# Patient Record
Sex: FEMALE | Race: BLACK OR AFRICAN AMERICAN | NOT HISPANIC OR LATINO | Employment: PART TIME | URBAN - METROPOLITAN AREA
[De-identification: names, ages, dates, MRNs, and addresses within clinical notes are randomized per-mention and may not be internally consistent; named-entity substitution may affect disease eponyms.]

---

## 2017-01-20 ENCOUNTER — TELEPHONE (OUTPATIENT)
Dept: OBSTETRICS AND GYNECOLOGY | Facility: CLINIC | Age: 32
End: 2017-01-20

## 2017-01-20 NOTE — TELEPHONE ENCOUNTER
----- Message from Hattie Bowen MA sent at 1/20/2017 10:43 AM CST -----  Contact: Jm      ----- Message -----     From: Swetha Wilson     Sent: 1/20/2017  10:41 AM       To: , #    Pt is returning phone call from Kierra. Pt can be reached at [phone]

## 2017-02-01 ENCOUNTER — HOSPITAL ENCOUNTER (EMERGENCY)
Facility: OTHER | Age: 32
Discharge: HOME | End: 2017-02-01
Attending: EMERGENCY MEDICINE
Payer: MEDICARE

## 2017-02-01 VITALS
HEIGHT: 67 IN | RESPIRATION RATE: 14 BRPM | DIASTOLIC BLOOD PRESSURE: 74 MMHG | HEART RATE: 74 BPM | TEMPERATURE: 98 F | BODY MASS INDEX: 30.76 KG/M2 | OXYGEN SATURATION: 100 % | SYSTOLIC BLOOD PRESSURE: 118 MMHG | WEIGHT: 196 LBS

## 2017-02-01 DIAGNOSIS — G89.29 CHRONIC LOW BACK PAIN WITHOUT SCIATICA, UNSPECIFIED BACK PAIN LATERALITY: ICD-10-CM

## 2017-02-01 DIAGNOSIS — M54.50 CHRONIC LOW BACK PAIN WITHOUT SCIATICA, UNSPECIFIED BACK PAIN LATERALITY: ICD-10-CM

## 2017-02-01 DIAGNOSIS — S90.31XA CONTUSION OF RIGHT FOOT, INITIAL ENCOUNTER: ICD-10-CM
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
## 2017-02-01 DIAGNOSIS — M25.579 ANKLE PAIN: ICD-10-CM

## 2017-02-01 LAB
B-HCG UR QL: NEGATIVE
CTP QC/QA: YES

## 2017-02-01 PROCEDURE — 99283 EMERGENCY DEPT VISIT LOW MDM: CPT

## 2017-02-01 PROCEDURE — 81025 URINE PREGNANCY TEST: CPT | Performed by: EMERGENCY MEDICINE

## 2017-02-01 NOTE — ED TRIAGE NOTES
Pt states she had a shooting pain down her right leg, and fell injuring lateral aspect of right foot, pt denies head injury, and LOC.
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
## 2017-02-01 NOTE — ED AVS SNAPSHOT
[facility]  [address] [place] [id number]               Jm Newton   2017 10:41 AM   ED    Description:  Female : 1985   Department:  [facility]           Your Care was Coordinated By:     Provider Role From To    Liss Cadena MD Attending Provider 17 1042 --    Neha Sullivan PA-C Physician Assistant 17 104 --      Reason for Visit     Fall           Diagnoses this Visit        Comments    Fall    -  Primary     Ankle pain         Contusion of right foot, initial encounter         Chronic low back pain without sciatica, unspecified back pain laterality           ED Disposition     None           To Do List           Follow-up Information     Follow up with Neo Cesar MD In 2 days.    Specialty:  Orthopedic Surgery    Why:  For symptom re-check.     Contact information:    [address] [place] [address] [phone]        [facility]     [facility] -  Assistance  Registered nurses in the [facility] provide clinical advisement, health education, appointment booking, and other advisory services.  Call for this free service at [phone].             Medications           Message regarding Medications     Verify the changes and/or additions to your medication regime listed below are the same as discussed with your clinician today.  If any of these changes or additions are incorrect, please notify your healthcare provider.        STOP taking these medications     iron polysaccharides (NIFEREX) 150 mg iron Cap Take 1 capsule (150 mg total) by mouth 2 (two) times daily.    misoprostol (CYTOTEC) 200 MCG Tab Place 1 tablet (200 mcg total) vaginally every 6 (six) hours.    ondansetron (ZOFRAN, AS HYDROCHLORIDE,) 4 MG tablet Take 1 tablet (4 mg total) by mouth daily as needed for Nausea.    PNV #26-iron ps-folic acid-dha 29 mg iron- 1 mg-200 mg Cap Take 1  "capsule by mouth once daily.           Verify that the below list of medications is an accurate representation of the medications you are currently taking.  If none reported, the list may be blank. If incorrect, please contact your healthcare provider. Carry this list with you in case of emergency.           Current Medications     emtricitabine-tenofovir 200-300 mg (TRUVADA) 200-300 mg Tab Take 1 tablet by mouth once daily.    PREZISTA 800 mg Tab 800 mg daily with breakfast.     ritonavir (NORVIR) 100 mg Cap Take 100 mg by mouth once daily.    diclofenac (VOLTAREN) 25 MG TbEC Take 1 tablet (25 mg total) by mouth 3 (three) times daily as needed (pain).           Clinical Reference Information           Your Vitals Were     BP Pulse Temp Resp Height Weight    116/56 (BP Location: Left arm, Patient Position: Sitting) 66 98 °F (36.7 °C) (Oral) 14 5' 7" (1.702 m) 88.9 kg (196 lb)    SpO2 BMI             100% 30.7 kg/m2         Allergies as of 2/1/2017        Reactions    Azithromycin Edema      Immunizations Administered on Date of Encounter - 2/1/2017     None      ED Micro, Lab, POCT     Start Ordered       Status Ordering Provider    02/01/17 1044 02/01/17 1043  POCT urine pregnancy  Once      Final result       ED Imaging Orders     Start Ordered       Status Ordering Provider    02/01/17 1142 02/01/17 1141  X-Ray Foot Complete Right  1 time imaging      Final result     02/01/17 1100 02/01/17 1059  X-Ray Ankle Complete Right  1 time imaging      Final result       Discharge References/Attachments     FOOT CONTUSION (ENGLISH)       [facility] complies with applicable Federal civil rights laws and does not discriminate on the basis of race, color, national origin, age, disability, or sex.        Language Assistance Services     ATTENTION: Language assistance services are available, free of charge. Please call [phone].      ATENCIÓN: Si jorge lla jethro, tiene a summers disposición servicios " gratuitos de asistencia lingüística. Kevin bronson [phone].     GAVIN Ý: N?u b?n nói Ti?ng Vi?t, có các d?ch v? h? tr? ngôn ng? mi?n phí salh cho b?n. G?i s? [phone].

## 2017-02-17 ENCOUNTER — TELEPHONE (OUTPATIENT)
Dept: OBSTETRICS AND GYNECOLOGY | Facility: CLINIC | Age: 32
End: 2017-02-17

## 2017-02-17 NOTE — TELEPHONE ENCOUNTER
----- Message from Sunitha Ponce sent at 2/16/2017  3:34 PM CST -----  Pt states that she is having irregular bleeding. Pt can be reached at [phone].

## 2017-02-20 ENCOUNTER — TELEPHONE (OUTPATIENT)
Dept: OBSTETRICS AND GYNECOLOGY | Facility: CLINIC | Age: 32
End: 2017-02-20

## 2017-02-21 ENCOUNTER — OFFICE VISIT (OUTPATIENT)
Dept: OBSTETRICS AND GYNECOLOGY | Facility: CLINIC | Age: 32
End: 2017-02-21
Payer: MEDICARE

## 2017-02-21 ENCOUNTER — LAB VISIT (OUTPATIENT)
Dept: LAB | Facility: HOSPITAL | Age: 32
End: 2017-02-21
Attending: OBSTETRICS & GYNECOLOGY
Payer: MEDICARE

## 2017-02-21 VITALS
DIASTOLIC BLOOD PRESSURE: 70 MMHG | BODY MASS INDEX: 31.56 KG/M2 | HEIGHT: 67 IN | WEIGHT: 201.06 LBS | SYSTOLIC BLOOD PRESSURE: 120 MMHG

## 2017-02-21 DIAGNOSIS — E05.90 HYPERTHYROIDISM: ICD-10-CM

## 2017-02-21 DIAGNOSIS — N93.9 ABNORMAL UTERINE BLEEDING (AUB): ICD-10-CM

## 2017-02-21 DIAGNOSIS — N93.9 ABNORMAL UTERINE BLEEDING (AUB): Primary | ICD-10-CM

## 2017-02-21 LAB
T4 FREE SERPL-MCNC: 0.84 NG/DL
TSH SERPL DL<=0.005 MIU/L-ACNC: 0.1 UIU/ML

## 2017-02-21 PROCEDURE — 1160F RVW MEDS BY RX/DR IN RCRD: CPT | Mod: S$GLB,,, | Performed by: OBSTETRICS & GYNECOLOGY

## 2017-02-21 PROCEDURE — 99999 PR PBB SHADOW E&M-EST. PATIENT-LVL III: CPT | Mod: PBBFAC,,, | Performed by: OBSTETRICS & GYNECOLOGY

## 2017-02-21 PROCEDURE — 84439 ASSAY OF FREE THYROXINE: CPT

## 2017-02-21 PROCEDURE — 99213 OFFICE O/P EST LOW 20 MIN: CPT | Mod: S$GLB,,, | Performed by: OBSTETRICS & GYNECOLOGY

## 2017-02-21 PROCEDURE — 84443 ASSAY THYROID STIM HORMONE: CPT

## 2017-02-21 PROCEDURE — 36415 COLL VENOUS BLD VENIPUNCTURE: CPT

## 2017-02-21 RX ORDER — METHOCARBAMOL 500 MG/1
TABLET, FILM COATED ORAL
Refills: 0 | COMMUNITY
Start: 2017-01-08 | End: 2017-08-29

## 2017-02-21 NOTE — PROGRESS NOTES
SUBJECTIVE:   31 y.o. female  complains of abnormal uterine bleeding - patient bleeding more than once per month. Patient's last menstrual period was 2017 (exact date). She began bleeding again on the . Alternating heavy and light days, spotting yesterday and today.  She describes her periods as regular before, but now unpredictable. She is HIV positive. Also with hypothyroidism, not currently on meds.  She uses no method for contraception and does not wish to start anything. Patient needs a colposcopy but declines it today because she is afraid that it will be too painful. Patient with abnormal pap prior to last pregnancy. No showed for previous colpo appointment. Patient will be scheduled for colpo at [facility] for completion of colpo with paracervical block for pain control.    ROS:  GENERAL: No fever, chills, fatigability or weight loss.  VULVAR: No pain, no lesions and no itching.  VAGINAL: No relaxation, no itching, no discharge, no abnormal bleeding and no lesions.  ABDOMEN: No abdominal pain. Denies nausea. Denies vomiting. No diarrhea. No constipation  BREAST: Denies pain. No lumps. No discharge.  URINARY: No incontinence, no nocturia, no frequency and no dysuria.  CARDIOVASCULAR: No chest pain. No shortness of breath. No leg cramps.  NEUROLOGICAL: No headaches. No vision changes.    Vitals:    17 0908   BP: 120/70     OBJECTIVE:   She appears well, afebrile.  Abdomen: benign, soft, nontender, no masses.  VULVA: Normal external female genitalia, normal urethra, normal urethral meatus  VAGINA:no lesions, moderate blood in the vault  CERVIX appears normal  UTERUSnormal size, contour, position, consistency, mobility, tender to palpation (but appropriate for patient's baseline)  ADNEXA normal adnexa and no mass, fullness, tenderness    ASSESSMENT:   Abnormal uterine bleeding    PLAN:   Colpo scheduled for [facility] on 3/6  Exam benign today  No OCPs at this time

## 2017-02-22 ENCOUNTER — PATIENT MESSAGE (OUTPATIENT)
Dept: OBSTETRICS AND GYNECOLOGY | Facility: CLINIC | Age: 32
End: 2017-02-22

## 2017-02-22 DIAGNOSIS — N93.9 ABNORMAL UTERINE BLEEDING (AUB): Primary | ICD-10-CM

## 2017-02-23 ENCOUNTER — TELEPHONE (OUTPATIENT)
Dept: OBSTETRICS AND GYNECOLOGY | Facility: CLINIC | Age: 32
End: 2017-02-23

## 2017-02-23 NOTE — TELEPHONE ENCOUNTER
----- Message from Srinivasa Hill sent at 2/23/2017 10:45 AM CST -----  Contact: pt  x_ 1st Request   _ 2nd Request   _ 3rd Request     Who: ZAY MERAZ [[id number]]    Why: Pt missed your call is requesting a call back    What Number to Call Back: [phone]    When to Expect a call back: (Before the end of the day)   -- if call after 3:00 call back will be tomorrow.

## 2017-03-15 ENCOUNTER — TELEPHONE (OUTPATIENT)
Dept: OBSTETRICS AND GYNECOLOGY | Facility: CLINIC | Age: 32
End: 2017-03-15

## 2017-03-15 NOTE — TELEPHONE ENCOUNTER
----- Message from Kierra Kirkpatrick LPN sent at 3/8/2017  8:31 PM CST -----  Call pt to reschedule colpo.

## 2017-03-23 ENCOUNTER — PROCEDURE VISIT (OUTPATIENT)
Dept: OBSTETRICS AND GYNECOLOGY | Facility: CLINIC | Age: 32
End: 2017-03-23
Payer: MEDICARE

## 2017-03-23 VITALS
HEIGHT: 67 IN | BODY MASS INDEX: 31.11 KG/M2 | SYSTOLIC BLOOD PRESSURE: 120 MMHG | DIASTOLIC BLOOD PRESSURE: 70 MMHG | WEIGHT: 198.19 LBS

## 2017-03-23 DIAGNOSIS — B20 HIV (HUMAN IMMUNODEFICIENCY VIRUS INFECTION): ICD-10-CM

## 2017-03-23 DIAGNOSIS — R87.619 ABNORMAL CERVICAL PAPANICOLAOU SMEAR, UNSPECIFIED ABNORMAL PAP FINDING: ICD-10-CM

## 2017-03-23 DIAGNOSIS — R87.612 LGSIL ON PAP SMEAR OF CERVIX: Primary | ICD-10-CM

## 2017-03-23 LAB
B-HCG UR QL: NEGATIVE
CTP QC/QA: YES

## 2017-03-23 PROCEDURE — 81025 URINE PREGNANCY TEST: CPT | Mod: S$GLB,,, | Performed by: OBSTETRICS & GYNECOLOGY

## 2017-03-23 PROCEDURE — 88305 TISSUE EXAM BY PATHOLOGIST: CPT | Performed by: PATHOLOGY

## 2017-03-23 PROCEDURE — 57454 BX/CURETT OF CERVIX W/SCOPE: CPT | Mod: S$GLB,,, | Performed by: OBSTETRICS & GYNECOLOGY

## 2017-03-23 PROCEDURE — 88305 TISSUE EXAM BY PATHOLOGIST: CPT | Mod: 26,,, | Performed by: PATHOLOGY

## 2017-03-23 NOTE — PROCEDURES
Colposcopy  Date/Time: 3/23/2017 1:50 PM  Performed by: MARTÍN NEWBERRY  Authorized by: MARTÍN NEWBERRY     Consent Done?:  Yes (Written)  Assistants?: Yes    List of assistants:  Hemant Mahoney MD   I was present for the entire procedure.    Colposcopy Site:  Cervix  Position:  Supine  Anesthesia:  Digital block  Local anesthetic:  Lidocaine 1% without epinephrine  Anesthetic total (ml): 4  Acrowhite Lesion? Yes    Atypical Vessels? Yes    Transformation Zone Adequate?: Yes    Biopsy?: Yes         Location:  Cervix ((2 00 and 7 00))  ECC Performed?: Yes    LEEP Performed?: No     Patient tolerated the procedure well with no immediate complications.   Post-operative instructions were provided for the patient.   Patient was discharged and will follow up if any complications occur    COLPOSCOPY:    Jm Newton is a 31 y.o. female   presents for colposcopy.  Patient's last menstrual period was 2017 (exact date)..  Her most recent pap smear shows low-grade squamous intraepithelial neoplasia (LGSIL - encompassing HPV,mild dysplasia,EMEKA I).      The abnormal test findings were discussed, as well as HPV infection, need for colposcopy and possible biopsies to determine the plan of care, treatments available, the minimal risk of bleeding and infection with colposcopy, and alternatives to colposcopy and she agrees to proceed.      UPT is negative    COLPOSCOPY EXAM:   TIME OUT PERFORMED.     acetowhite lesion(s) noted at 2 and 7 o'clock and abnormal vessels noted at 7 o'clock    Biopsy was taken at 2 o'clock.  ECC was performed    Hemostasis was adequate with application of Monsel's solution.  The speculum was removed.  The patient did not tolerate the procedure well.    All collected specimens sent to pathology for histologic analysis.    Post-colposcopy counseling:  The patient was instructed to manage post-colposcopy cramping with NSAIDs or Tylenol, or with a prescription per the medication card.   Avoid intercourse, douching, or tampons in the vagina for at least 2-3 days.  Expect a clumpy blackish discharge due to Monsel's solution application for several days.  Report heavy bleeding, worsening pain or pain that does not respond to above medications, or foul-smelling vaginal discharge. HPV vaccine recommended according to FDA age guidelines.  Importance of follow-up stressed.      Follow up based on colposcopy results.

## 2017-03-28 ENCOUNTER — TELEPHONE (OUTPATIENT)
Dept: OBSTETRICS AND GYNECOLOGY | Facility: CLINIC | Age: 32
End: 2017-03-28

## 2017-03-28 NOTE — TELEPHONE ENCOUNTER
----- Message from Sunitha Ponce sent at 3/28/2017  1:07 PM CDT -----  Pt states that she is having heavy bleeding. Pt can be reached at [phone].

## 2017-03-29 ENCOUNTER — TELEPHONE (OUTPATIENT)
Dept: OBSTETRICS AND GYNECOLOGY | Facility: CLINIC | Age: 32
End: 2017-03-29

## 2017-03-29 NOTE — TELEPHONE ENCOUNTER
Returned call. Pt stated that she's still bleeding and also c/o cramping and back pain. Pt requested a return call from Dr Worrell.

## 2017-03-29 NOTE — TELEPHONE ENCOUNTER
----- Message from Sunitha Ponce sent at 3/29/2017 10:48 AM CDT -----  Pt states that she is bleeding like a cycle. Pt states that she had a cycle. Pt can be reached at [phone].

## 2017-03-30 ENCOUNTER — TELEPHONE (OUTPATIENT)
Dept: OBSTETRICS AND GYNECOLOGY | Facility: CLINIC | Age: 32
End: 2017-03-30

## 2017-03-30 NOTE — TELEPHONE ENCOUNTER
Called pt and scheduled appt on 04/04/2017. Instructed pt to go to ER if she feels dizziness and/or SOB or if symptoms worsen or persist before next appt. Pt voiced understanding.

## 2017-03-30 NOTE — TELEPHONE ENCOUNTER
----- Message from Kierra Kirkpatrick LPN sent at 3/29/2017  4:28 PM CDT -----  Please call Jm because she's been bleeding since Sunday. Also c/o back pain and cramping. Currently taking Tylenol. Pt can be reached at .

## 2017-04-03 ENCOUNTER — PATIENT MESSAGE (OUTPATIENT)
Dept: OBSTETRICS AND GYNECOLOGY | Facility: CLINIC | Age: 32
End: 2017-04-03

## 2017-08-29 ENCOUNTER — OFFICE VISIT (OUTPATIENT)
Dept: OBSTETRICS AND GYNECOLOGY | Facility: CLINIC | Age: 32
End: 2017-08-29
Payer: MEDICARE

## 2017-08-29 VITALS
DIASTOLIC BLOOD PRESSURE: 78 MMHG | WEIGHT: 204.38 LBS | SYSTOLIC BLOOD PRESSURE: 120 MMHG | BODY MASS INDEX: 32.08 KG/M2 | HEIGHT: 67 IN

## 2017-08-29 DIAGNOSIS — N93.8 DUB (DYSFUNCTIONAL UTERINE BLEEDING): Primary | ICD-10-CM

## 2017-08-29 DIAGNOSIS — B20 HIV (HUMAN IMMUNODEFICIENCY VIRUS INFECTION): ICD-10-CM

## 2017-08-29 DIAGNOSIS — E05.90 HYPERTHYROIDISM: ICD-10-CM

## 2017-08-29 DIAGNOSIS — N87.0 CIN I (CERVICAL INTRAEPITHELIAL NEOPLASIA I): ICD-10-CM

## 2017-08-29 PROCEDURE — 99499 UNLISTED E&M SERVICE: CPT | Mod: S$GLB,,, | Performed by: OBSTETRICS & GYNECOLOGY

## 2017-08-29 PROCEDURE — 99999 PR PBB SHADOW E&M-EST. PATIENT-LVL III: CPT | Mod: PBBFAC,,, | Performed by: OBSTETRICS & GYNECOLOGY

## 2017-08-29 PROCEDURE — 3008F BODY MASS INDEX DOCD: CPT | Mod: S$GLB,,, | Performed by: OBSTETRICS & GYNECOLOGY

## 2017-08-29 PROCEDURE — 99213 OFFICE O/P EST LOW 20 MIN: CPT | Mod: S$GLB,,, | Performed by: OBSTETRICS & GYNECOLOGY

## 2017-08-29 RX ORDER — CLOTRIMAZOLE AND BETAMETHASONE DIPROPIONATE 10; .64 MG/G; MG/G
CREAM TOPICAL
Refills: 2 | COMMUNITY
Start: 2017-08-18 | End: 2017-08-29

## 2017-08-29 RX ORDER — SULFAMETHOXAZOLE AND TRIMETHOPRIM 800; 160 MG/1; MG/1
1 TABLET ORAL DAILY
COMMUNITY
End: 2018-11-29

## 2017-08-29 RX ORDER — MEDROXYPROGESTERONE ACETATE 10 MG/1
10 TABLET ORAL DAILY
Qty: 30 TABLET | Refills: 3 | Status: SHIPPED | OUTPATIENT
Start: 2017-08-29 | End: 2018-06-18

## 2017-08-29 NOTE — PROGRESS NOTES
Subjective:       Patient ID: Jm Newton is a 32 y.o. female.    Chief Complaint:  Menstrual Problem (having two menstraul cycles per month, large clots)      History of Present Illness  HPI   Pt is 32 y.o. with Patient's last menstrual period was 2017 (exact date). who is here for evaluation of polymenorrhea.  Pt had D/C 2016 and has had following issues:  Skipped /Feb  2 periods feb (2/3- and )  2 in march  1 in April/may  2 in   1 in July  2 in august.    Now starting to pass clots.  No new meds.  Pt does have hx of hyperthyroidism.      GYN & OB History  Patient's last menstrual period was 2017 (exact date).   Date of Last Pap: 11/3/2016    OB History    Para Term  AB Living   7 6 1 5   4   SAB TAB Ectopic Multiple Live Births         2 6      # Outcome Date GA Lbr Adrian/2nd Weight Sex Delivery Anes PTL Lv   7 Term 14 37w5d  3.26 kg (7 lb 3 oz) F CS-LTranv EPI N ANAHY   6A   24w0d  0.51 kg (1 lb 2 oz) M CS-LTranv  Y DEC      Complications: Premature rupture of membranes      Birth Comments: lived for 2 months in NICU   6B   24w0d  0.992 kg (2 lb 3 oz) M CS-LTranv  Y DEC      Complications: Premature rupture of membranes      Birth Comments: lived only 2 hours   5  09 35w0d  2.608 kg (5 lb 12 oz) M CS-LTranv  Y ANAHY      Complications: Premature rupture of membranes      Birth Comments: HIV, CS d/t viral load   4  08 32w0d  2.722 kg (6 lb) M Vag-Spont  Y ANAHY      Complications: Premature rupture of membranes      Birth Comments:  labor   3  10/05/06 24w0d   M Vag-Spont  Y FD      Complications: Abruptio Placenta      Birth Comments: abruption and stillbirth   2  03 35w0d  2.722 kg (6 lb) M Vag-Spont  Y ANAHY      Birth Comments:  labor   1                    Review of Systems  Review of Systems   Constitutional: Negative for activity change, appetite change and  fatigue.   HENT: Negative.  Negative for tinnitus.    Eyes: Negative.    Respiratory: Negative for cough and shortness of breath.    Cardiovascular: Negative for chest pain and palpitations.   Gastrointestinal: Negative.  Negative for abdominal pain, blood in stool, constipation, diarrhea and nausea.   Endocrine: Negative.  Negative for hot flashes.   Genitourinary: Positive for menstrual problem. Negative for dyspareunia, dysuria, frequency, pelvic pain, vaginal discharge, dysmenorrhea, urinary incontinence and postcoital bleeding.   Musculoskeletal: Negative for back pain and joint swelling.   Skin:  Negative for rash.   Neurological: Negative.  Negative for headaches.   Hematological: Negative.  Does not bruise/bleed easily.   Psychiatric/Behavioral: The patient is not nervous/anxious.    Breast: negative.  Negative for breast mass, nipple discharge and skin changes          Objective:    Physical Exam     def (pt was bleeding)  Assessment:        1. DUB (dysfunctional uterine bleeding)    2. EMEKA I (cervical intraepithelial neoplasia I)    3. HIV (human immunodeficiency virus infection)    4. Hyperthyroidism                Plan:      Jm was seen today for menstrual problem.    Diagnoses and all orders for this visit:    DUB (dysfunctional uterine bleeding)  -     medroxyPROGESTERone (PROVERA) 10 MG tablet; Take 1 tablet (10 mg total) by mouth once daily.  -     TSH; Future  -     T4, free; Future  Will coordinate u/s prior to follow up appt.  Possibly due to AUB-E (hx of hyperthyroidism).  Discussed ocp's and pt doesn't want to use contraception.  Will track cycles and re-evaluate in 6 weeks.    EMEKA I (cervical intraepithelial neoplasia I)  -      HIV (human immunodeficiency virus infection)  Hyperthyroidism  -     TSH; Future  -     T4, free; Future

## 2017-08-30 ENCOUNTER — HOSPITAL ENCOUNTER (OUTPATIENT)
Dept: RADIOLOGY | Facility: OTHER | Age: 32
Discharge: HOME | End: 2017-08-30
Attending: OBSTETRICS & GYNECOLOGY
Payer: MEDICARE

## 2017-08-30 DIAGNOSIS — N93.9 ABNORMAL UTERINE BLEEDING (AUB): ICD-10-CM

## 2017-08-30 PROCEDURE — 76856 US EXAM PELVIC COMPLETE: CPT | Mod: 26,,, | Performed by: RADIOLOGY

## 2017-08-30 PROCEDURE — 76830 TRANSVAGINAL US NON-OB: CPT | Mod: 26,,, | Performed by: RADIOLOGY

## 2017-08-30 PROCEDURE — 76856 US EXAM PELVIC COMPLETE: CPT | Mod: TC

## 2018-02-06 ENCOUNTER — OFFICE VISIT (OUTPATIENT)
Dept: OBSTETRICS AND GYNECOLOGY | Facility: CLINIC | Age: 33
End: 2018-02-06
Payer: MEDICARE

## 2018-02-06 VITALS
HEIGHT: 67 IN | WEIGHT: 223.13 LBS | DIASTOLIC BLOOD PRESSURE: 80 MMHG | SYSTOLIC BLOOD PRESSURE: 132 MMHG | BODY MASS INDEX: 35.02 KG/M2

## 2018-02-06 DIAGNOSIS — Z91.89 GYN EXAM FOR HIGH-RISK MEDICARE PATIENT: Primary | ICD-10-CM

## 2018-02-06 DIAGNOSIS — N87.0 CIN I (CERVICAL INTRAEPITHELIAL NEOPLASIA I): ICD-10-CM

## 2018-02-06 DIAGNOSIS — E05.90 HYPERTHYROIDISM: ICD-10-CM

## 2018-02-06 PROCEDURE — G0101 CA SCREEN;PELVIC/BREAST EXAM: HCPCS | Mod: GC,S$GLB,, | Performed by: STUDENT IN AN ORGANIZED HEALTH CARE EDUCATION/TRAINING PROGRAM

## 2018-02-06 PROCEDURE — 88175 CYTOPATH C/V AUTO FLUID REDO: CPT | Performed by: PATHOLOGY

## 2018-02-06 PROCEDURE — 99999 PR PBB SHADOW E&M-EST. PATIENT-LVL III: CPT | Mod: PBBFAC,GC,, | Performed by: STUDENT IN AN ORGANIZED HEALTH CARE EDUCATION/TRAINING PROGRAM

## 2018-02-06 PROCEDURE — 99499 UNLISTED E&M SERVICE: CPT | Mod: S$GLB,,, | Performed by: OBSTETRICS & GYNECOLOGY

## 2018-02-06 PROCEDURE — 87624 HPV HI-RISK TYP POOLED RSLT: CPT

## 2018-02-06 PROCEDURE — 88141 CYTOPATH C/V INTERPRET: CPT | Mod: ,,, | Performed by: PATHOLOGY

## 2018-02-06 RX ORDER — OXYCODONE AND ACETAMINOPHEN 5; 325 MG/1; MG/1
TABLET ORAL
Refills: 0 | COMMUNITY
Start: 2018-02-02 | End: 2018-06-18

## 2018-02-06 RX ORDER — CLOBETASOL PROPIONATE 0.5 MG/G
OINTMENT TOPICAL
Refills: 0 | COMMUNITY
Start: 2017-11-17 | End: 2018-02-06

## 2018-02-06 RX ORDER — GABAPENTIN 300 MG/1
CAPSULE ORAL
Refills: 1 | COMMUNITY
Start: 2017-12-29 | End: 2018-02-06

## 2018-02-06 RX ORDER — IBUPROFEN 800 MG/1
TABLET ORAL
Refills: 0 | COMMUNITY
Start: 2018-02-02 | End: 2018-11-29

## 2018-02-06 RX ORDER — CYCLOBENZAPRINE HCL 10 MG
TABLET ORAL
Refills: 1 | COMMUNITY
Start: 2017-12-29 | End: 2018-02-06

## 2018-02-06 RX ORDER — MELOXICAM 15 MG/1
TABLET ORAL
Refills: 1 | COMMUNITY
Start: 2017-12-29 | End: 2018-02-06

## 2018-02-06 RX ORDER — LIDOCAINE AND PRILOCAINE 25; 25 MG/G; MG/G
CREAM TOPICAL
Refills: 1 | COMMUNITY
Start: 2017-11-17 | End: 2018-02-06

## 2018-02-06 NOTE — PROGRESS NOTES
SUBJECTIVE:   32 y.o. female   for annual routine Pap and checkup. Patient's last menstrual period was 2018..  She has no unusual complaints.        Past Medical History:   Diagnosis Date    Asthma     HIV infection     dx     Hyperthyroidism in pregnancy, antepartum      Past Surgical History:   Procedure Laterality Date    CERVICAL CERCLAGE      emergent with twins     SECTION, CLASSIC      Last section with classical extention, from [facility]     Social History     Social History    Marital status: Single     Spouse name: N/A    Number of children: N/A    Years of education: N/A     Occupational History    Not on file.     Social History Main Topics    Smoking status: Never Smoker    Smokeless tobacco: Never Used    Alcohol use 0.0 oz/week      Comment: socially    Drug use: No    Sexual activity: Yes     Partners: Male     Birth control/ protection: Condom     Other Topics Concern    Not on file     Social History Narrative    No narrative on file     Family History   Problem Relation Age of Onset    Colon cancer Neg Hx     Hypertension Neg Hx      OB History    Para Term  AB Living   7 6 1 5   4   SAB TAB Ectopic Multiple Live Births         2 6      # Outcome Date GA Lbr Adrian/2nd Weight Sex Delivery Anes PTL Lv   7 Term 14 37w5d  3.26 kg (7 lb 3 oz) F CS-LTranv EPI N ANAHY   6A   24w0d  0.51 kg (1 lb 2 oz) M CS-LTranv  Y DEC      Complications: Premature rupture of membranes      Birth Comments: lived for 2 months in NICU   6B   24w0d  0.992 kg (2 lb 3 oz) M CS-LTranv  Y DEC      Complications: Premature rupture of membranes      Birth Comments: lived only 2 hours   5  09 35w0d  2.608 kg (5 lb 12 oz) M CS-LTranv  Y ANAHY      Complications: Premature rupture of membranes      Birth Comments: HIV, CS d/t viral load   4  08 32w0d  2.722 kg (6 lb) M Vag-Spont  Y ANAHY      Complications: Premature rupture of  membranes      Birth Comments:  labor   3  10/05/06 24w0d   M Vag-Spont  Y FD      Complications: Abruptio Placenta      Birth Comments: abruption and stillbirth   2  03 35w0d  2.722 kg (6 lb) M Vag-Spont  Y ANAHY      Birth Comments:  labor   1                      Current Outpatient Prescriptions   Medication Sig Dispense Refill    darunavir-cobicistat (PREZCOBIX) 800-150 mg-mg Tab Take 1 tablet by mouth once daily.      emtricitabine-tenofovir alafen (DESCOVY) 200-25 mg Tab Take 1 tablet by mouth once daily.      ibuprofen (ADVIL,MOTRIN) 800 MG tablet TK 1 T PO  Q 6 H PRN P.  0    oxyCODONE-acetaminophen (PERCOCET) 5-325 mg per tablet TK 1 T PO Q 4 TO 6 H PRF DENTAL PAIN.  0    sulfamethoxazole-trimethoprim 800-160mg (BACTRIM DS) 800-160 mg Tab Take 1 tablet by mouth once daily.      medroxyPROGESTERone (PROVERA) 10 MG tablet Take 1 tablet (10 mg total) by mouth once daily. 30 tablet 3     No current facility-administered medications for this visit.      Allergies: Azithromycin     ROS:  Constitutional: no weight loss, weight gain, fever, fatigue  Eyes:  No vision changes, glasses/contacts  ENT/Mouth: No ulcers, sinus problems, ears ringing, headache  Cardiovascular: No inability to lie flat, chest pain, exercise intolerance, swelling, heart palpitations  Respiratory: No wheezing, coughing blood, shortness of breath, or cough  Gastrointestinal: No diarrhea, bloody stool, nausea/vomiting, constipation, gas, hemorrhoids  Genitourinary: No blood in urine, painful urination, urgency of urination, frequency of urination, incomplete emptying, incontinence, abnormal bleeding, painful periods, heavy periods, vaginal discharge, vaginal odor, painful intercourse, sexual problems, bleeding after intercourse.  Musculoskeletal: No muscle weakness  Skin/Breast: No painful breasts, nipple discharge, masses, rash, ulcers  Neurological: No passing out, seizures, numbness,  "headache  Endocrine: No diabetes, hypothyroid, hyperthyroid, hot flashes, hair loss, abnormal hair growth, ance  Psychiatric: No depression, crying  Hematologic: No bruises, bleeding, swollen lymph nodes, anemia.      OBJECTIVE:   The patient appears well, alert, oriented x 3, in no distress.  /80   Ht 5' 7" (1.702 m)   Wt 101.2 kg (223 lb 1.7 oz)   LMP 01/21/2018   BMI 34.94 kg/m²   NECK: no thyromegaly, trachea midline  SKIN: no acne, striae, hirsutism  BREAST EXAM: breasts appear normal, no suspicious masses, no skin or nipple changes or axillary nodes  ABDOMEN: no hernias, masses, or hepatosplenomegaly  GENITALIA: normal external genitalia, no erythema, no discharge  URETHRA: normal urethra, normal urethral meatus  VAGINA: Normal  CERVIX: no lesions or cervical motion tenderness  UTERUS: normal size, contour, position, consistency, mobility, non-tender  ADNEXA: no mass, fullness, tenderness    \  ASSESSMENT:   Chip was seen today for annual exam.    Diagnoses and all orders for this visit:    GYN exam for high-risk Medicare patient  -     Liquid-based pap smear, screening  -     HPV High Risk Genotypes, PCR    EMEKA I (cervical intraepithelial neoplasia I)    Hyperthyroidism  -     TSH; Future      "

## 2018-02-09 LAB
HPV16 AG SPEC QL: NEGATIVE
HPV16+18+H RISK 12 DNA CVX-IMP: POSITIVE
HPV18 DNA SPEC QL NAA+PROBE: NEGATIVE

## 2018-02-15 ENCOUNTER — TELEPHONE (OUTPATIENT)
Dept: OBSTETRICS AND GYNECOLOGY | Facility: HOSPITAL | Age: 33
End: 2018-02-15

## 2018-03-08 ENCOUNTER — TELEPHONE (OUTPATIENT)
Dept: OBSTETRICS AND GYNECOLOGY | Facility: CLINIC | Age: 33
End: 2018-03-08

## 2018-04-16 ENCOUNTER — TELEPHONE (OUTPATIENT)
Dept: OBSTETRICS AND GYNECOLOGY | Facility: CLINIC | Age: 33
End: 2018-04-16

## 2018-06-05 ENCOUNTER — TELEPHONE (OUTPATIENT)
Dept: OBSTETRICS AND GYNECOLOGY | Facility: CLINIC | Age: 33
End: 2018-06-05

## 2018-06-06 ENCOUNTER — TELEPHONE (OUTPATIENT)
Dept: OBSTETRICS AND GYNECOLOGY | Facility: CLINIC | Age: 33
End: 2018-06-06

## 2018-06-12 DIAGNOSIS — N87.0 CIN I (CERVICAL INTRAEPITHELIAL NEOPLASIA I): Primary | ICD-10-CM

## 2018-06-12 DIAGNOSIS — B20 HIV (HUMAN IMMUNODEFICIENCY VIRUS INFECTION): ICD-10-CM

## 2018-06-18 ENCOUNTER — OFFICE VISIT (OUTPATIENT)
Dept: OBSTETRICS AND GYNECOLOGY | Facility: CLINIC | Age: 33
End: 2018-06-18
Attending: OBSTETRICS & GYNECOLOGY
Payer: MEDICARE

## 2018-06-18 ENCOUNTER — HOSPITAL ENCOUNTER (OUTPATIENT)
Dept: PREADMISSION TESTING | Facility: OTHER | Age: 33
Discharge: HOME | End: 2018-06-18
Attending: OBSTETRICS & GYNECOLOGY
Payer: MEDICARE

## 2018-06-18 ENCOUNTER — ANESTHESIA EVENT (OUTPATIENT)
Dept: SURGERY | Facility: OTHER | Age: 33
End: 2018-06-18
Payer: MEDICARE

## 2018-06-18 VITALS
DIASTOLIC BLOOD PRESSURE: 64 MMHG | HEIGHT: 67 IN | SYSTOLIC BLOOD PRESSURE: 128 MMHG | TEMPERATURE: 98 F | BODY MASS INDEX: 38.14 KG/M2 | OXYGEN SATURATION: 98 % | WEIGHT: 243 LBS

## 2018-06-18 VITALS
HEIGHT: 67 IN | WEIGHT: 243.81 LBS | BODY MASS INDEX: 38.27 KG/M2 | SYSTOLIC BLOOD PRESSURE: 132 MMHG | DIASTOLIC BLOOD PRESSURE: 68 MMHG

## 2018-06-18 DIAGNOSIS — E05.90 HYPERTHYROIDISM: ICD-10-CM

## 2018-06-18 DIAGNOSIS — E66.09 CLASS 2 OBESITY DUE TO EXCESS CALORIES WITHOUT SERIOUS COMORBIDITY WITH BODY MASS INDEX (BMI) OF 38.0 TO 38.9 IN ADULT: ICD-10-CM

## 2018-06-18 DIAGNOSIS — N87.0 CIN I (CERVICAL INTRAEPITHELIAL NEOPLASIA I): Primary | ICD-10-CM

## 2018-06-18 DIAGNOSIS — B20 HIV (HUMAN IMMUNODEFICIENCY VIRUS INFECTION): ICD-10-CM

## 2018-06-18 PROBLEM — F33.1 MODERATE RECURRENT MAJOR DEPRESSION: Status: ACTIVE | Noted: 2017-06-21

## 2018-06-18 PROBLEM — E66.812 CLASS 2 OBESITY DUE TO EXCESS CALORIES WITHOUT SERIOUS COMORBIDITY WITH BODY MASS INDEX (BMI) OF 38.0 TO 38.9 IN ADULT: Status: ACTIVE | Noted: 2018-06-18

## 2018-06-18 LAB
ANISOCYTOSIS BLD QL SMEAR: SLIGHT
BASOPHILS # BLD AUTO: 0.01 K/UL
BASOPHILS NFR BLD: 0.2 %
DIFFERENTIAL METHOD: ABNORMAL
EOSINOPHIL # BLD AUTO: 0 K/UL
EOSINOPHIL NFR BLD: 0.4 %
ERYTHROCYTE [DISTWIDTH] IN BLOOD BY AUTOMATED COUNT: 19.4 %
GIANT PLATELETS BLD QL SMEAR: PRESENT
HCT VFR BLD AUTO: 29.9 %
HGB BLD-MCNC: 9.2 G/DL
LYMPHOCYTES # BLD AUTO: 1.4 K/UL
LYMPHOCYTES NFR BLD: 28.8 %
MCH RBC QN AUTO: 21.7 PG
MCHC RBC AUTO-ENTMCNC: 30.8 G/DL
MCV RBC AUTO: 71 FL
MONOCYTES # BLD AUTO: 0.3 K/UL
MONOCYTES NFR BLD: 6.2 %
NEUTROPHILS # BLD AUTO: 3.1 K/UL
NEUTROPHILS NFR BLD: 64.4 %
PLATELET # BLD AUTO: 273 K/UL
PMV BLD AUTO: 9 FL
RBC # BLD AUTO: 4.23 M/UL
WBC # BLD AUTO: 4.86 K/UL

## 2018-06-18 PROCEDURE — 99499 UNLISTED E&M SERVICE: CPT | Mod: HCWC,S$GLB,, | Performed by: OBSTETRICS & GYNECOLOGY

## 2018-06-18 PROCEDURE — 99999 PR PBB SHADOW E&M-EST. PATIENT-LVL III: CPT | Mod: PBBFAC,,, | Performed by: OBSTETRICS & GYNECOLOGY

## 2018-06-18 PROCEDURE — 36415 COLL VENOUS BLD VENIPUNCTURE: CPT

## 2018-06-18 PROCEDURE — 99499 UNLISTED E&M SERVICE: CPT | Mod: S$GLB,,, | Performed by: OBSTETRICS & GYNECOLOGY

## 2018-06-18 PROCEDURE — 85025 COMPLETE CBC W/AUTO DIFF WBC: CPT

## 2018-06-18 RX ORDER — PREGABALIN 75 MG/1
150 CAPSULE ORAL
Status: DISCONTINUED | OUTPATIENT
Start: 2018-06-18 | End: 2018-06-19 | Stop reason: HOSPADM

## 2018-06-18 RX ORDER — ALBUTEROL SULFATE 0.83 MG/ML
2.5 SOLUTION RESPIRATORY (INHALATION)
Status: CANCELLED | OUTPATIENT
Start: 2018-06-18 | End: 2018-06-18

## 2018-06-18 RX ORDER — FAMOTIDINE 20 MG/1
20 TABLET, FILM COATED ORAL
Status: CANCELLED | OUTPATIENT
Start: 2018-06-18 | End: 2018-06-18

## 2018-06-18 RX ORDER — SODIUM CHLORIDE, SODIUM LACTATE, POTASSIUM CHLORIDE, CALCIUM CHLORIDE 600; 310; 30; 20 MG/100ML; MG/100ML; MG/100ML; MG/100ML
INJECTION, SOLUTION INTRAVENOUS CONTINUOUS
Status: CANCELLED | OUTPATIENT
Start: 2018-06-18

## 2018-06-18 NOTE — DISCHARGE INSTRUCTIONS
PRE-ADMIT TESTING -  [phone]    [address]  [facility]          Your surgery has been scheduled at [facility]. We are pleased to have the opportunity to serve you. For Further Information please call [phone].    On the day of surgery please report to the Information Desk on the 1st floor.    · CONTACT YOUR PHYSICIAN'S OFFICE THE DAY PRIOR TO YOUR SURGERY TO OBTAIN YOUR ARRIVAL TIME.     · The evening before surgery do not eat anything after 9 p.m. ( this includes hard candy, chewing gum and mints).  You may only have GATORADE, POWERADE AND WATER  from 9 p.m. until you leave your home.   DO NOT DRINK ANY LIQUIDS ON THE WAY TO THE HOSPITAL.      SPECIAL MEDICATION INSTRUCTIONS: TAKE medications checked off by the Anesthesiologist on your Medication List.    Angiogram Patients: Take medications as instructed by your physician, including aspirin.     Surgery Patients:    If you take ASPIRIN - Your PHYSICIAN/SURGEON will need to inform you IF/OR when you need to stop taking aspirin prior to your surgery.     Do Not take any medications containing IBUPROFEN.  Do Not Wear any make-up or dark nail polish   (especially eye make-up) to surgery. If you come to surgery with makeup on you will be required to remove the makeup or nail polish.    Do not shave your surgical area at least 5 days prior to your surgery. The surgical prep will be performed at the hospital according to Infection Control regulations.    Leave all valuables at home.   Do Not wear any jewelry or watches, including any metal in body piercings.  Contact Lens must be removed before surgery. Either do not wear the contact lens or bring a case and solution for storage.  Please bring a container for eyeglasses or dentures as required.  Bring any paperwork your physician has provided, such as consent forms,  history and physicals, doctor's orders, etc.   Bring comfortable clothes that are loose fitting to wear upon  discharge. Take into consideration the type of surgery being performed.  Maintain your diet as advised per your physician the day prior to surgery.      Adequate rest the night before surgery is advised.   Park in the Parking lot behind the hospital or in the [place] [facility] across the street from the parking lot. Parking is complimentary.  If you will be discharged the same day as your procedure, please arrange for a responsible adult to drive you home or to accompany you if traveling by taxi.   YOU WILL NOT BE PERMITTED TO DRIVE OR TO LEAVE THE HOSPITAL ALONE AFTER SURGERY.   It is strongly recommended that you arrange for someone to remain with you for the first 24 hrs following your surgery.       Thank you for your cooperation.  The Staff of [facility].        Bathing Instructions                                                                 Please shower the evening before and morning of your procedure with    ANTIBACTERIAL SOAP. ( DIAL, etc )  Concentrate on the surgical area   for at least 3 minutes and rinse completely. Dry off as usual.   Do not use any deodorant, powder, body lotions, perfume, after shave or    cologne.

## 2018-06-19 NOTE — PRE ADMISSION SCREENING
CBC results sent too Dr. Worrell.  8:20 - Spoke to Kierra re: CBC results. Please have Dr. Worrell look at them.

## 2018-06-21 NOTE — PROGRESS NOTES
Subjective:       Patient ID: Jm Newton is a 32 y.o. female.    Chief Complaint: Pre-op Exam (consent ckc)    HPI  She is here for pre-op for ckc for persistent EMEKA I and AIDS.  colpos are extremely uncomfortable despite cervical block.  Review of Systems    ROS:  GENERAL: No fever, chills, fatigability or weight loss.  VULVAR: No pain, no lesions and no itching.  VAGINAL: No relaxation, no itching, no discharge, no abnormal bleeding and no lesions.  ABDOMEN: No abdominal pain. Denies nausea. Denies vomiting. No diarrhea. No constipation  BREAST: Denies pain. No lumps. No discharge.  URINARY: No incontinence, no nocturia, no frequency and no dysuria.  CARDIOVASCULAR: No chest pain. No shortness of breath. No leg cramps.  NEUROLOGICAL: no headaches. No vision changes.    Objective:      Physical Exam    NECK: no thyromegaly, trachea midline  SKIN: no acne, striae, hirsutism  BREAST EXAM: breasts appear normal, no suspicious masses, no skin or nipple changes or axillary nodes  ABDOMEN: no hernias, masses, or hepatosplenomegaly  GENITALIA: normal external genitalia, no erythema, no discharge  URETHRA: normal urethra, normal urethral meatus  VAGINA: Normal  CERVIX: no lesions or cervical motion tenderness  UTERUS: normal size, contour, position, consistency, mobility, non-tender  ADNEXA: no mass, fullness, tenderness  Assessment:       1. EMEKA I (cervical intraepithelial neoplasia I)    2. HIV (human immunodeficiency virus infection)    3. Hyperthyroidism    4. Class 2 obesity due to excess calories without serious comorbidity with body mass index (BMI) of 38.0 to 38.9 in adult        Plan:       Plan CKC.  I have discussed the risks, benefits, indications, and alternatives of the procedure in detail.  The patient verbalizes her understanding.  All questions answered.  Consents signed.  The patient agrees to proceed to proceed as planned.

## 2018-06-25 ENCOUNTER — ANESTHESIA (OUTPATIENT)
Dept: SURGERY | Facility: OTHER | Age: 33
End: 2018-06-25
Payer: MEDICARE

## 2018-06-25 ENCOUNTER — HOSPITAL ENCOUNTER (OUTPATIENT)
Facility: OTHER | Age: 33
Discharge: HOME | End: 2018-06-25
Attending: OBSTETRICS & GYNECOLOGY | Admitting: OBSTETRICS & GYNECOLOGY
Payer: MEDICARE

## 2018-06-25 VITALS
BODY MASS INDEX: 38.14 KG/M2 | HEIGHT: 67 IN | SYSTOLIC BLOOD PRESSURE: 132 MMHG | OXYGEN SATURATION: 100 % | HEART RATE: 63 BPM | TEMPERATURE: 98 F | DIASTOLIC BLOOD PRESSURE: 67 MMHG | RESPIRATION RATE: 18 BRPM | WEIGHT: 243 LBS

## 2018-06-25 DIAGNOSIS — N87.0 CIN I (CERVICAL INTRAEPITHELIAL NEOPLASIA I): ICD-10-CM

## 2018-06-25 DIAGNOSIS — Z98.890 S/P LEEP: Primary | ICD-10-CM

## 2018-06-25 LAB
B-HCG UR QL: NEGATIVE
CTP QC/QA: YES

## 2018-06-25 PROCEDURE — 25000003 PHARM REV CODE 250: Performed by: ANESTHESIOLOGY

## 2018-06-25 PROCEDURE — 88305 TISSUE EXAM BY PATHOLOGIST: CPT | Performed by: PATHOLOGY

## 2018-06-25 PROCEDURE — 81025 URINE PREGNANCY TEST: CPT | Performed by: ANESTHESIOLOGY

## 2018-06-25 PROCEDURE — 71000016 HC POSTOP RECOV ADDL HR: Performed by: OBSTETRICS & GYNECOLOGY

## 2018-06-25 PROCEDURE — 25000003 PHARM REV CODE 250: Performed by: OBSTETRICS & GYNECOLOGY

## 2018-06-25 PROCEDURE — 57522 CONIZATION OF CERVIX: CPT | Mod: ,,, | Performed by: OBSTETRICS & GYNECOLOGY

## 2018-06-25 PROCEDURE — 36000706: Performed by: OBSTETRICS & GYNECOLOGY

## 2018-06-25 PROCEDURE — 82962 GLUCOSE BLOOD TEST: CPT | Performed by: OBSTETRICS & GYNECOLOGY

## 2018-06-25 PROCEDURE — 25000003 PHARM REV CODE 250: Performed by: STUDENT IN AN ORGANIZED HEALTH CARE EDUCATION/TRAINING PROGRAM

## 2018-06-25 PROCEDURE — 36000707: Performed by: OBSTETRICS & GYNECOLOGY

## 2018-06-25 PROCEDURE — 63600175 PHARM REV CODE 636 W HCPCS: Performed by: ANESTHESIOLOGY

## 2018-06-25 PROCEDURE — 94640 AIRWAY INHALATION TREATMENT: CPT

## 2018-06-25 PROCEDURE — 71000033 HC RECOVERY, INTIAL HOUR: Performed by: OBSTETRICS & GYNECOLOGY

## 2018-06-25 PROCEDURE — 63600175 PHARM REV CODE 636 W HCPCS: Performed by: NURSE ANESTHETIST, CERTIFIED REGISTERED

## 2018-06-25 PROCEDURE — 88307 TISSUE EXAM BY PATHOLOGIST: CPT | Performed by: PATHOLOGY

## 2018-06-25 PROCEDURE — 25000242 PHARM REV CODE 250 ALT 637 W/ HCPCS: Performed by: ANESTHESIOLOGY

## 2018-06-25 PROCEDURE — 63600175 PHARM REV CODE 636 W HCPCS: Performed by: OBSTETRICS & GYNECOLOGY

## 2018-06-25 PROCEDURE — 88305 TISSUE EXAM BY PATHOLOGIST: CPT | Mod: 26,,, | Performed by: PATHOLOGY

## 2018-06-25 PROCEDURE — 88307 TISSUE EXAM BY PATHOLOGIST: CPT | Mod: 26,,, | Performed by: PATHOLOGY

## 2018-06-25 PROCEDURE — 37000009 HC ANESTHESIA EA ADD 15 MINS: Performed by: OBSTETRICS & GYNECOLOGY

## 2018-06-25 PROCEDURE — 71000015 HC POSTOP RECOV 1ST HR: Performed by: OBSTETRICS & GYNECOLOGY

## 2018-06-25 PROCEDURE — 94761 N-INVAS EAR/PLS OXIMETRY MLT: CPT

## 2018-06-25 PROCEDURE — 37000008 HC ANESTHESIA 1ST 15 MINUTES: Performed by: OBSTETRICS & GYNECOLOGY

## 2018-06-25 RX ORDER — HYDROCODONE BITARTRATE AND ACETAMINOPHEN 10; 325 MG/1; MG/1
1 TABLET ORAL EVERY 4 HOURS PRN
Status: DISCONTINUED | OUTPATIENT
Start: 2018-06-25 | End: 2018-06-25 | Stop reason: HOSPADM

## 2018-06-25 RX ORDER — FENTANYL CITRATE 50 UG/ML
INJECTION, SOLUTION INTRAMUSCULAR; INTRAVENOUS
Status: DISCONTINUED | OUTPATIENT
Start: 2018-06-25 | End: 2018-06-25

## 2018-06-25 RX ORDER — ALBUTEROL SULFATE 0.83 MG/ML
2.5 SOLUTION RESPIRATORY (INHALATION)
Status: COMPLETED | OUTPATIENT
Start: 2018-06-25 | End: 2018-06-25

## 2018-06-25 RX ORDER — PROPOFOL 10 MG/ML
VIAL (ML) INTRAVENOUS
Status: DISCONTINUED | OUTPATIENT
Start: 2018-06-25 | End: 2018-06-25

## 2018-06-25 RX ORDER — ONDANSETRON 2 MG/ML
4 INJECTION INTRAMUSCULAR; INTRAVENOUS DAILY PRN
Status: DISCONTINUED | OUTPATIENT
Start: 2018-06-25 | End: 2018-06-25 | Stop reason: HOSPADM

## 2018-06-25 RX ORDER — OXYCODONE HYDROCHLORIDE 5 MG/1
5 TABLET ORAL
Status: DISCONTINUED | OUTPATIENT
Start: 2018-06-25 | End: 2018-06-25 | Stop reason: HOSPADM

## 2018-06-25 RX ORDER — SODIUM CHLORIDE 0.9 % (FLUSH) 0.9 %
3 SYRINGE (ML) INJECTION
Status: DISCONTINUED | OUTPATIENT
Start: 2018-06-25 | End: 2018-06-25 | Stop reason: HOSPADM

## 2018-06-25 RX ORDER — MEPERIDINE HYDROCHLORIDE 50 MG/ML
12.5 INJECTION INTRAMUSCULAR; INTRAVENOUS; SUBCUTANEOUS ONCE AS NEEDED
Status: DISCONTINUED | OUTPATIENT
Start: 2018-06-25 | End: 2018-06-25 | Stop reason: HOSPADM

## 2018-06-25 RX ORDER — HYDROMORPHONE HYDROCHLORIDE 2 MG/ML
0.4 INJECTION, SOLUTION INTRAMUSCULAR; INTRAVENOUS; SUBCUTANEOUS EVERY 5 MIN PRN
Status: DISCONTINUED | OUTPATIENT
Start: 2018-06-25 | End: 2018-06-25 | Stop reason: HOSPADM

## 2018-06-25 RX ORDER — CEFAZOLIN SODIUM 2 G/50ML
2 SOLUTION INTRAVENOUS
Status: COMPLETED | OUTPATIENT
Start: 2018-06-25 | End: 2018-06-25

## 2018-06-25 RX ORDER — MIDAZOLAM HYDROCHLORIDE 1 MG/ML
INJECTION INTRAMUSCULAR; INTRAVENOUS
Status: DISCONTINUED | OUTPATIENT
Start: 2018-06-25 | End: 2018-06-25

## 2018-06-25 RX ORDER — FENTANYL CITRATE 50 UG/ML
25 INJECTION, SOLUTION INTRAMUSCULAR; INTRAVENOUS EVERY 5 MIN PRN
Status: DISCONTINUED | OUTPATIENT
Start: 2018-06-25 | End: 2018-06-25 | Stop reason: HOSPADM

## 2018-06-25 RX ORDER — HYDROCODONE BITARTRATE AND ACETAMINOPHEN 5; 325 MG/1; MG/1
1 TABLET ORAL EVERY 4 HOURS PRN
Status: DISCONTINUED | OUTPATIENT
Start: 2018-06-25 | End: 2018-06-25 | Stop reason: HOSPADM

## 2018-06-25 RX ORDER — DIPHENHYDRAMINE HYDROCHLORIDE 50 MG/ML
25 INJECTION INTRAMUSCULAR; INTRAVENOUS EVERY 4 HOURS PRN
Status: DISCONTINUED | OUTPATIENT
Start: 2018-06-25 | End: 2018-06-25 | Stop reason: HOSPADM

## 2018-06-25 RX ORDER — DIPHENHYDRAMINE HCL 25 MG
25 CAPSULE ORAL EVERY 4 HOURS PRN
Status: DISCONTINUED | OUTPATIENT
Start: 2018-06-25 | End: 2018-06-25 | Stop reason: HOSPADM

## 2018-06-25 RX ORDER — ACETAMINOPHEN 10 MG/ML
INJECTION, SOLUTION INTRAVENOUS
Status: DISCONTINUED | OUTPATIENT
Start: 2018-06-25 | End: 2018-06-25

## 2018-06-25 RX ORDER — HYDROCODONE BITARTRATE AND ACETAMINOPHEN 5; 325 MG/1; MG/1
1 TABLET ORAL EVERY 4 HOURS PRN
Qty: 5 TABLET | Refills: 0 | Status: SHIPPED | OUTPATIENT
Start: 2018-06-25 | End: 2018-11-08

## 2018-06-25 RX ORDER — KETOROLAC TROMETHAMINE 30 MG/ML
INJECTION, SOLUTION INTRAMUSCULAR; INTRAVENOUS
Status: DISCONTINUED | OUTPATIENT
Start: 2018-06-25 | End: 2018-06-25

## 2018-06-25 RX ORDER — FAMOTIDINE 20 MG/1
20 TABLET, FILM COATED ORAL
Status: COMPLETED | OUTPATIENT
Start: 2018-06-25 | End: 2018-06-25

## 2018-06-25 RX ORDER — ONDANSETRON 2 MG/ML
INJECTION INTRAMUSCULAR; INTRAVENOUS
Status: DISCONTINUED | OUTPATIENT
Start: 2018-06-25 | End: 2018-06-25

## 2018-06-25 RX ORDER — IBUPROFEN 600 MG/1
600 TABLET ORAL 3 TIMES DAILY
Qty: 40 TABLET | Refills: 0 | Status: SHIPPED | OUTPATIENT
Start: 2018-06-25 | End: 2018-11-29

## 2018-06-25 RX ORDER — ONDANSETRON 8 MG/1
8 TABLET, ORALLY DISINTEGRATING ORAL EVERY 8 HOURS PRN
Status: DISCONTINUED | OUTPATIENT
Start: 2018-06-25 | End: 2018-06-25 | Stop reason: HOSPADM

## 2018-06-25 RX ORDER — SODIUM CHLORIDE, SODIUM LACTATE, POTASSIUM CHLORIDE, CALCIUM CHLORIDE 600; 310; 30; 20 MG/100ML; MG/100ML; MG/100ML; MG/100ML
INJECTION, SOLUTION INTRAVENOUS CONTINUOUS
Status: DISCONTINUED | OUTPATIENT
Start: 2018-06-25 | End: 2018-06-25 | Stop reason: HOSPADM

## 2018-06-25 RX ORDER — DIPHENHYDRAMINE HYDROCHLORIDE 50 MG/ML
12.5 INJECTION INTRAMUSCULAR; INTRAVENOUS EVERY 30 MIN PRN
Status: DISCONTINUED | OUTPATIENT
Start: 2018-06-25 | End: 2018-06-25 | Stop reason: HOSPADM

## 2018-06-25 RX ORDER — LIDOCAINE HYDROCHLORIDE AND EPINEPHRINE 10; 10 MG/ML; UG/ML
INJECTION, SOLUTION INFILTRATION; PERINEURAL
Status: DISCONTINUED | OUTPATIENT
Start: 2018-06-25 | End: 2018-06-25 | Stop reason: HOSPADM

## 2018-06-25 RX ORDER — LIDOCAINE HCL/PF 100 MG/5ML
SYRINGE (ML) INTRAVENOUS
Status: DISCONTINUED | OUTPATIENT
Start: 2018-06-25 | End: 2018-06-25

## 2018-06-25 RX ORDER — IBUPROFEN 600 MG/1
600 TABLET ORAL EVERY 6 HOURS PRN
Status: DISCONTINUED | OUTPATIENT
Start: 2018-06-25 | End: 2018-06-25 | Stop reason: HOSPADM

## 2018-06-25 RX ADMIN — FAMOTIDINE 20 MG: 20 TABLET ORAL at 08:06

## 2018-06-25 RX ADMIN — LIDOCAINE HYDROCHLORIDE 100 MG: 20 INJECTION, SOLUTION INTRAVENOUS at 08:06

## 2018-06-25 RX ADMIN — ONDANSETRON 4 MG: 2 INJECTION INTRAMUSCULAR; INTRAVENOUS at 09:06

## 2018-06-25 RX ADMIN — ALBUTEROL SULFATE 2.5 MG: 2.5 SOLUTION RESPIRATORY (INHALATION) at 08:06

## 2018-06-25 RX ADMIN — ONDANSETRON 8 MG: 8 TABLET, ORALLY DISINTEGRATING ORAL at 11:06

## 2018-06-25 RX ADMIN — KETOROLAC TROMETHAMINE 30 MG: 30 INJECTION, SOLUTION INTRAMUSCULAR; INTRAVENOUS at 09:06

## 2018-06-25 RX ADMIN — HYDROMORPHONE HYDROCHLORIDE 0.4 MG: 2 INJECTION INTRAMUSCULAR; INTRAVENOUS; SUBCUTANEOUS at 09:06

## 2018-06-25 RX ADMIN — ACETAMINOPHEN 1000 MG: 10 INJECTION, SOLUTION INTRAVENOUS at 08:06

## 2018-06-25 RX ADMIN — FENTANYL CITRATE 100 MCG: 50 INJECTION, SOLUTION INTRAMUSCULAR; INTRAVENOUS at 08:06

## 2018-06-25 RX ADMIN — CEFAZOLIN SODIUM 2 G: 2 SOLUTION INTRAVENOUS at 08:06

## 2018-06-25 RX ADMIN — PROMETHAZINE HYDROCHLORIDE 6.25 MG: 25 INJECTION INTRAMUSCULAR; INTRAVENOUS at 12:06

## 2018-06-25 RX ADMIN — PROPOFOL 200 MG: 10 INJECTION, EMULSION INTRAVENOUS at 08:06

## 2018-06-25 RX ADMIN — OXYCODONE HYDROCHLORIDE 5 MG: 5 TABLET ORAL at 09:06

## 2018-06-25 RX ADMIN — SODIUM CHLORIDE, SODIUM LACTATE, POTASSIUM CHLORIDE, AND CALCIUM CHLORIDE: 600; 310; 30; 20 INJECTION, SOLUTION INTRAVENOUS at 08:06

## 2018-06-25 NOTE — ANESTHESIA POSTPROCEDURE EVALUATION
"Anesthesia Post Evaluation    Patient: Jm Newton    Procedure(s) Performed: Procedure(s) (LRB):  CONE BIOPSY, CERVIX, USING COLD KNIFE (N/A)  LEEP CONIZATION, CERVIX (N/A)    Final Anesthesia Type: general  Patient location during evaluation: PACU  Patient participation: Yes- Able to Participate  Level of consciousness: awake and alert  Post-procedure vital signs: reviewed and stable  Pain management: adequate  Airway patency: patent  PONV status at discharge: No PONV  Anesthetic complications: no      Cardiovascular status: blood pressure returned to baseline  Respiratory status: unassisted, spontaneous ventilation and room air  Hydration status: euvolemic  Follow-up not needed.        Visit Vitals  /76 (BP Location: Left arm, Patient Position: Lying)   Pulse 84   Temp 36.9 °C (98.4 °F) (Oral)   Resp 16   Ht 5' 7" (1.702 m)   Wt 110.2 kg (243 lb)   LMP 05/31/2018 (Exact Date)   SpO2 100%   Breastfeeding? No   BMI 38.06 kg/m²       Pain/Katherine Score: Pain Assessment Performed: Yes (6/25/2018 10:35 AM)  Presence of Pain: denies (6/25/2018 10:35 AM)  Pain Rating Prior to Med Admin: 8 (6/25/2018  9:51 AM)  Pain Rating Post Med Admin: 0 (appears to be sleeping) (6/25/2018 10:12 AM)  Katherine Score: 10 (6/25/2018 10:35 AM)      "

## 2018-06-25 NOTE — INTERVAL H&P NOTE
The patient has been examined and the H&P has been reviewed:    I concur with the findings and no changes have occurred since H&P was written.    Surgery risks, benefits and alternative options discussed and understood by patient/family.      Active Hospital Problems    Diagnosis  POA    EMEKA I (cervical intraepithelial neoplasia I) [N87.0]  Yes      Resolved Hospital Problems    Diagnosis Date Resolved POA   No resolved problems to display.     Davida Monaco M.D.  PGY-3 ObGyn  [phone]

## 2018-06-25 NOTE — OP NOTE
OPERATIVE NOTE    DATE OF PROCEDURE: 10/23/2017    SURGEON: Chantal Worrell M.D.     ASSISTANT: Blanka Kunz M.D. (RES)     PREOPERATIVE DIAGNOSIS:   1. EMEKA 1    POSTOPERATIVE DIAGNOSIS: same, s/p LEEP.     PROCEDURE: LEEP    ANESTHESIA: General    FINDINGS:   Normal external genitalia  Cervix did not pull well.   Transformation zone visualized after staining with Lugol's   Noted decreased uptake at the endocervical canal    ESTIMATED BLOOD LOSS: Minimal < 10 cc     URINE OUTPUT: 75 mL.     IV FLUIDS: 300 mL    INDICATIONS: Persistent EMEKA 1    SPECIMEN:  1. LEEP Specimen  2. Endocervical curretage    PROCEDURE IN DETAIL:   After proper consents were explained and obtained, the patient was taken to the Operating Room where anesthesia was obtained without difficulty. She was then placed in dorsal lithotomy position in Nathanael stirrups. The patient was then prepped and draped in normal sterile fashion. A coated speculum was placed into the vagina with an attached smoke evacuator. The cervix and transformation zone were visualized after staining with Lugol's solution. Decreased uptake of the Lugols solution was noted around the endocervical canal. The appropriate size loop was selected. The entire transformation zone was excised and sent to pathology. The base was cauterized with a ball cautery. Monsels solution was applied to the base to obtain hemostasis and the speculum removed.    The patient tolerated the procedure well. All counts were correct x2. The patient was taken to Recovery area in stable condition.    Deng Quinones M.D.  PGY1 OB/GYN

## 2018-06-25 NOTE — PLAN OF CARE
Problem: Patient Care Overview  Goal: Plan of Care Review  Outcome: Ongoing (interventions implemented as appropriate)  Patient on room air saturations 98% with diminished and clear BS;1 x pre-op albuterol 2.5mg given tolerated well.

## 2018-06-25 NOTE — DISCHARGE SUMMARY
[facility]  Brief Op Note/Discharge Note  Short Stay    Admit Date: 6/25/2018    Discharge Date: 06/25/2018    Attending Physician: Chantal Worrell MD     Surgery Date: 6/25/2018     Surgeon(s) and Role:     * Chantal Worrell MD - Primary    Assisting Surgeon: Deng Milan MD (Resident)    Pre-op Diagnosis:  EMEKA I (cervical intraepithelial neoplasia I) [N87.0]    Post-op Diagnosis:  Post-Op Diagnosis Codes:     * EMEKA I (cervical intraepithelial neoplasia I) [N87.0]     * S/P LEEP    Procedure(s) (LRB):  CONE BIOPSY, CERVIX, USING COLD KNIFE (N/A)  LEEP CONIZATION, CERVIX (N/A)    Anesthesia: Choice    Findings/Key Components:   1. Normal external genitalia  2. Decreased uptake of Lugols Solution noted at cervical OS  3. Successful LEEP procedure. Specimen sent to pathology    Estimated Blood Loss: * No values recorded between 6/25/2018  9:04 AM and 6/25/2018  9:24 AM *         Specimens:   Specimen (12h ago through future)    Start     Ordered    06/25/18 0911  Specimen to Pathology - Surgery  Once     Comments:  1. Cervical biopsy2. Endometrial cervical curettage      06/25/18 0915          Discharge Provider: Deng Milan    Diagnoses:  Active Hospital Problems    Diagnosis  POA    S/P LEEP [Z98.890]  Not Applicable    EMEKA I (cervical intraepithelial neoplasia I) [N87.0]  Yes      Resolved Hospital Problems    Diagnosis Date Resolved POA   No resolved problems to display.       Discharged Condition: good    Hospital Course:   Patient was admitted for outpatient procedure as above, and tolerated the procedure well with no complications. Please see operative report for further details. Following the procedure, the patient was awakened from anesthesia and transferred to the recovery area in stable condition. She was discharged to home once ambulating, voiding, tolerating PO intake, and pain was well-controlled. Patient was given routine post-op instructions and prescriptions for pain medication to take  as needed. Patient instructed to follow up with Dr. Worrell in 4 weeks.    Final Diagnoses: Same as principal problem.    Disposition: Home or Self Care    Follow up/Patient Instructions:    Medications:  Reconciled Home Medications:      Medication List      START taking these medications    HYDROcodone-acetaminophen 5-325 mg per tablet  Commonly known as:  NORCO  Take 1 tablet by mouth every 4 (four) hours as needed for Pain.        CONTINUE taking these medications    BACTRIM -160 mg Tab  Generic drug:  sulfamethoxazole-trimethoprim 800-160mg  Take 1 tablet by mouth once daily.     DESCOVY 200-25 mg Tab  Generic drug:  emtricitabine-tenofovir alafen  Take 1 tablet by mouth once daily.     ibuprofen 800 MG tablet  Commonly known as:  ADVIL,MOTRIN  TK 1 T PO  Q 6 H PRN P.     multivitamin with minerals tablet     PREZCOBIX 800-150 mg-mg Tab  Generic drug:  darunavir-cobicistat  Take 1 tablet by mouth once daily.     VENTOLIN HFA INHL  Ventolin HFA 90 mcg/actuation aerosol inhaler 2 Puff(s) INH PRN as needed SOB            Discharge Procedure Orders  Diet general     Activity as tolerated     Other restrictions (specify):   Order Comments: Pelvic Rest - Nothing in the Vagina for 2 weeks.     Call MD for:  extreme fatigue     Call MD for:  persistent dizziness or light-headedness     Call MD for:  hives     Call MD for:  redness, tenderness, or signs of infection (pain, swelling, redness, odor or green/yellow discharge around incision site)     Call MD for:  difficulty breathing, headache or visual disturbances     Call MD for:  severe uncontrolled pain     Call MD for:  persistent nausea and vomiting     Call MD for:   Order Comments: Vaginal Bleeding greater than a pad per hour.       Follow-up Information     Chantal Worrell MD.    Specialties:  Obstetrics, Obstetrics and Gynecology  Contact information:  [address] [id number] [phone]                   Deng Quinones M.D.  PGY1  OB/GYN

## 2018-06-25 NOTE — TRANSFER OF CARE
"Anesthesia Transfer of Care Note    Patient: Jm Newton    Procedure(s) Performed: Procedure(s) (LRB):  CONE BIOPSY, CERVIX, USING COLD KNIFE (N/A)  LEEP CONIZATION, CERVIX (N/A)    Patient location: PACU    Anesthesia Type: general    Transport from OR: Transported from OR on room air with adequate spontaneous ventilation    Post pain: adequate analgesia    Post assessment: no apparent anesthetic complications    Post vital signs: stable    Level of consciousness: awake, alert and oriented    Nausea/Vomiting: no nausea/vomiting    Complications: none    Transfer of care protocol was followed      Last vitals:   Visit Vitals  /69 (BP Location: Left arm, Patient Position: Lying)   Pulse 74   Temp 37.2 °C (98.9 °F) (Oral)   Resp 16   Ht 5' 7" (1.702 m)   Wt 110.2 kg (243 lb)   LMP 05/31/2018 (Exact Date)   SpO2 98%   Breastfeeding? No   BMI 38.06 kg/m²     "

## 2018-06-25 NOTE — PLAN OF CARE
Jm Nielsenelle Amna has met all discharge criteria from Phase II. Vital Signs are stable, ambulating  without difficulty. Nausea has now subsided. Discharge instructions given, patient verbalized understanding. Discharged from facility via wheelchair in stable condition.

## 2018-06-25 NOTE — DISCHARGE INSTRUCTIONS
After a Cone Biopsy     Make sure to keep any follow-up appointments with your healthcare provider.     A cone biopsy is a quick outpatient surgery used to find and treat a problem in the cervix. Your healthcare provider may do a cone biopsy if one or more Pap tests and a microscope (colposcopy) exam showed abnormal cells on your cervix. A cone biopsy takes less than an hour, and youll be able to go home the same day.    During your recovery  After the surgery has been done, youll rest in the recovery area until youre awake and ready to go home. An adult friend or family member will need to drive you home.  · Plan to rest at home for a day or two.  · You may have some bleeding or discharge and mild cramping for a few days after surgery. Use sanitary pads, not tampons, for at least the first month.  · You may be given medicine to relieve any discomfort  · Do not have sexual intercourse or douche for 4 to 6 weeks after your biopsy. If the cervix has not fully healed, the tissue could be injured and then bleed.  · Follow any other instructions your healthcare provider gives you.    Getting your results  Your healthcare provider will get the biopsy results and discuss them with you in about a week. He or she will see you in 3 to 6 weeks to be sure the tissue is healing well.    Call your healthcare provider if you have any of the following after your cone biopsy:  · Heavy bleeding (more than a pad an hour) or blood clots  · Severe stomach pain  · Chills  · Fever over 100.4°F (38°C)           Discharge Instructions: After Your Surgery  Youve just had surgery. During surgery, you were given medicine called anesthesia to keep you relaxed and free of pain. After surgery, you may have some pain or nausea. This is common. Here are some tips for feeling better and getting well after surgery.     Stay on schedule with your medicine.     Going home  Your healthcare provider will show you how to take care of yourself when  you go home. He or she will also answer your questions. Have an adult family member or friend drive you home. For the first 24 hours after your surgery:    · Do not drive or use heavy equipment.  · Do not make important decisions or sign legal papers.  · Do not drink alcohol.  · Have someone stay with you, if needed. He or she can watch for problems and help keep you safe.    Be sure to go to all follow-up visits with your healthcare provider. And rest after your surgery for as long as your healthcare provider tells you to.    Coping with pain  If you have pain after surgery, pain medicine will help you feel better. Take it as told, before pain becomes severe. Also, ask your healthcare provider or pharmacist about other ways to control pain. This might be with heat, ice, or relaxation. And follow any other instructions your surgeon or nurse gives you.    Tips for taking pain medicine  To get the best relief possible, remember these points:    · Pain medicines can upset your stomach. Taking them with a little food may help.  · Most pain relievers taken by mouth need at least 20 to 30 minutes to start to work.  · Taking medicine on a schedule can help you remember to take it. Try to time your medicine so that you can take it before starting an activity. This might be before you get dressed, go for a walk, or sit down for dinner.  · Constipation is a common side effect of pain medicines. Call your healthcare provider before taking any medicines such as laxatives or stool softeners to help ease constipation. Also ask if you should skip any foods. Drinking lots of fluids and eating foods such as fruits and vegetables that are high in fiber can also help. Remember, do not take laxatives unless your surgeon has prescribed them.  · Drinking alcohol and taking pain medicine can cause dizziness and slow your breathing. It can even be deadly. Do not drink alcohol while taking pain medicine.  · Pain medicine can make you react  more slowly to things. Do not drive or run machinery while taking pain medicine.    Your healthcare provider may tell you to take acetaminophen to help ease your pain. Ask him or her how much you are supposed to take each day. Acetaminophen or other pain relievers may interact with your prescription medicines or other over-the-counter (OTC) medicines. Some prescription medicines have acetaminophen and other ingredients. Using both prescription and OTC acetaminophen for pain can cause you to overdose. Read the labels on your OTC medicines with care. This will help you to clearly know the list of ingredients, how much to take, and any warnings. It may also help you not take too much acetaminophen. If you have questions or do not understand the information, ask your pharmacist or healthcare provider to explain it to you before you take the OTC medicine.    Managing nausea  Some people have an upset stomach after surgery. This is often because of anesthesia, pain, or pain medicine, or the stress of surgery. These tips will help you handle nausea and eat healthy foods as you get better. If you were on a special food plan before surgery, ask your healthcare provider if you should follow it while you get better. These tips may help:    · Do not push yourself to eat. Your body will tell you when to eat and how much.  · Start off with clear liquids and soup. They are easier to digest.  · Next try semi-solid foods, such as mashed potatoes, applesauce, and gelatin, as you feel ready.  · Slowly move to solid foods. Dont eat fatty, rich, or spicy foods at first.  · Do not force yourself to have 3 large meals a day. Instead eat smaller amounts more often.  · Take pain medicines with a small amount of solid food, such as crackers or toast, to avoid nausea.     Call your surgeon if  · You still have pain an hour after taking medicine. The medicine may not be strong enough.  · You feel too sleepy, dizzy, or groggy. The medicine  may be too strong.  · You have side effects like nausea, vomiting, or skin changes, such as rash, itching, or hives.       If you have obstructive sleep apnea  You were given anesthesia medicine during surgery to keep you comfortable and free of pain. After surgery, you may have more apnea spells because of this medicine and other medicines you were given. The spells may last longer than usual.   At home:    · Keep using the continuous positive airway pressure (CPAP) device when you sleep. Unless your healthcare provider tells you not to, use it when you sleep, day or night. CPAP is a common device used to treat obstructive sleep apnea.  · Talk with your provider before taking any pain medicine, muscle relaxants, or sedatives. Your provider will tell you about the possible dangers of taking these medicines.    © 5753-9863 [employer]. [address] [place] [address], [place] [address]. All rights reserved. This information is not intended as a substitute for professional medical care. Always follow your healthcare professional's instructions.    PLEASE FOLLOW ANY OTHER INSTRUCTIONS PROVIDED TO YOU BY DR. NEWBERRY!

## 2018-06-26 ENCOUNTER — TELEPHONE (OUTPATIENT)
Dept: OBSTETRICS AND GYNECOLOGY | Facility: CLINIC | Age: 33
End: 2018-06-26

## 2018-06-26 NOTE — TELEPHONE ENCOUNTER
----- Message from Susan Salmeron sent at 6/26/2018  9:28 AM CDT -----  Contact: self  Pt had procedure on yesterday and needing a 1 week follow up appt, pt wants to be seen at [facility], she can be reached at [phone].

## 2018-06-27 ENCOUNTER — TELEPHONE (OUTPATIENT)
Dept: OBSTETRICS AND GYNECOLOGY | Facility: CLINIC | Age: 33
End: 2018-06-27

## 2018-06-27 RX ORDER — ONDANSETRON 4 MG/1
4 TABLET, FILM COATED ORAL DAILY PRN
Qty: 30 TABLET | Refills: 1 | Status: SHIPPED | OUTPATIENT
Start: 2018-06-27 | End: 2019-06-27

## 2018-06-27 NOTE — TELEPHONE ENCOUNTER
----- Message from Sunitha Ponce sent at 6/27/2018 10:52 AM CDT -----  Pt states that she is still nausea. Pt can be reached at [phone].

## 2018-06-27 NOTE — TELEPHONE ENCOUNTER
----- Message from Herman Osullivan MA sent at 6/27/2018  1:50 PM CDT -----  Contact: Self   Pt requesting a rx for nausea  ----- Message -----  From: Sera Welch  Sent: 6/27/2018  11:18 AM  To: , #    Patient called back regarding a missed call and would like be reached again. Patient can be reached at [phone].

## 2018-06-28 ENCOUNTER — TELEPHONE (OUTPATIENT)
Dept: OBSTETRICS AND GYNECOLOGY | Facility: CLINIC | Age: 33
End: 2018-06-28

## 2018-06-28 NOTE — TELEPHONE ENCOUNTER
Pt needs letter stating that she had surgery on 06/25/2018 and her expected return to work date is 07/11/2018. Pre-op appt is scheduled on 07/10/2018. Email leter to [email or web address] per pt request.

## 2018-06-28 NOTE — LETTER
June 28, 2018    Jm Newton  [address]  [facility] [address]         [facility]  [address]  [facility] [address]  Phone: [phone] June 28, 2018     Patient: Jm Newton   YOB: 1985   Date of Visit: 6/28/2018       To Whom It May Concern:    It is my medical opinion that Jm Newton may return to work on 7/11/18.  She began leave on 6/25/18.    If you have any questions or concerns, please don't hesitate to call.    Sincerely,        Chantal Worrell MD

## 2018-06-28 NOTE — TELEPHONE ENCOUNTER
----- Message from Sera Welch sent at 6/28/2018 11:23 AM CDT -----  Contact: Self   Patient would like Kierra to give her a call back regarding a procedure that was done. The patient can be reached at [phone]

## 2018-07-10 ENCOUNTER — OFFICE VISIT (OUTPATIENT)
Dept: OBSTETRICS AND GYNECOLOGY | Facility: CLINIC | Age: 33
End: 2018-07-10
Payer: MEDICARE

## 2018-07-10 VITALS
WEIGHT: 245.38 LBS | HEIGHT: 67 IN | BODY MASS INDEX: 38.51 KG/M2 | DIASTOLIC BLOOD PRESSURE: 82 MMHG | SYSTOLIC BLOOD PRESSURE: 128 MMHG

## 2018-07-10 DIAGNOSIS — N87.1 CIN II (CERVICAL INTRAEPITHELIAL NEOPLASIA II): ICD-10-CM

## 2018-07-10 DIAGNOSIS — Z98.890 S/P LEEP: Primary | ICD-10-CM

## 2018-07-10 PROCEDURE — 99999 PR PBB SHADOW E&M-EST. PATIENT-LVL III: CPT | Mod: PBBFAC,GC,, | Performed by: STUDENT IN AN ORGANIZED HEALTH CARE EDUCATION/TRAINING PROGRAM

## 2018-07-10 PROCEDURE — 99024 POSTOP FOLLOW-UP VISIT: CPT | Mod: GC,S$GLB,, | Performed by: STUDENT IN AN ORGANIZED HEALTH CARE EDUCATION/TRAINING PROGRAM

## 2018-07-10 PROCEDURE — 3008F BODY MASS INDEX DOCD: CPT | Mod: CPTII,GC,S$GLB, | Performed by: STUDENT IN AN ORGANIZED HEALTH CARE EDUCATION/TRAINING PROGRAM

## 2018-07-10 RX ORDER — DOXYCYCLINE 100 MG/1
100 CAPSULE ORAL EVERY 12 HOURS
Qty: 14 CAPSULE | Refills: 0 | Status: SHIPPED | OUTPATIENT
Start: 2018-07-10 | End: 2018-07-17

## 2018-07-10 NOTE — LETTER
July 10, 2018    Jm Newton  [address]  [facility] [address]         [facility]  [address]  [facility] [address]  Phone: [phone] July 10, 2018     Patient: Jm Newton   YOB: 1985   Date of Visit: 7/10/2018       To Whom It May Concern:    It is my medical opinion that Jm Newton may return to full duty immediately with no restrictions.    If you have any questions or concerns, please don't hesitate to call.    Sincerely,        Chantal Worrell MD

## 2018-07-10 NOTE — PROGRESS NOTES
SUBJECTIVE:   32 y.o. female  is here for follow up after leep.  She complains of bleeding that began last night.  Pathology shows emeka II with negative margins.    ROS:  GENERAL: No fever, chills, fatigability or weight loss.  VULVAR: No pain, no lesions and no itching.  VAGINAL: No relaxation, no itching, no discharge, no abnormal bleeding and no lesions.  ABDOMEN: No abdominal pain. Denies nausea. Denies vomiting. No diarrhea. No constipation  BREAST: Denies pain. No lumps. No discharge.  URINARY: No incontinence, no nocturia, no frequency and no dysuria.  CARDIOVASCULAR: No chest pain. No shortness of breath. No leg cramps.  NEUROLOGICAL: No headaches. No vision changes.        Vitals:    07/10/18 0828   BP: 128/82         OBJECTIVE:   She appears well, afebrile.  Abdomen: benign, soft, nontender, no masses.  VULVA: Normal external female genitalia, normal urethra, normal urethral meatus  VAGINA:blood  CERVIXNormal  UTERUSnormal size, contour, position, consistency, mobility, non-tender  ADNEXAno mass, fullness, tenderness      ASSESSMENT:   Jm was seen today for consult.    Diagnoses and all orders for this visit:    S/P LEEP    EMEKA II (cervical intraepithelial neoplasia II)    Other orders  -     doxycycline (MONODOX) 100 MG capsule; Take 1 capsule (100 mg total) by mouth every 12 (twelve) hours. MAY REFILL ONCE for 7 days    She will let me know if bleeding continues.

## 2018-07-20 ENCOUNTER — PES CALL (OUTPATIENT)
Dept: ADMINISTRATIVE | Facility: CLINIC | Age: 33
End: 2018-07-20

## 2018-09-18 NOTE — ANESTHESIA PREPROCEDURE EVALUATION
06/18/2018  Jm Newton is a 32 y.o., female.    Anesthesia Evaluation    I have reviewed the Patient Summary Reports.    I have reviewed the Nursing Notes.   I have reviewed the Medications.     Review of Systems  Anesthesia Hx:  No problems with previous Anesthesia  History of prior surgery of interest to airway management or planning: Previous anesthesia: General 11/16 D&C with general anesthesia.  Airway issues documented on chart review include mask, easy, laryngeal mask airway used  Denies Family Hx of Anesthesia complications.   Denies Personal Hx of Anesthesia complications.   Social:  Non-Smoker    Hematology/Oncology:     Oncology Normal    -- Anemia (hct 30): Hematology Comments: HIV on antivirals    Cardiovascular:  Cardiovascular Normal     Pulmonary:   Asthma mild and asymptomatic Seasonal asthma rarely uses inhaler   Renal/:  Renal/ Normal     Hepatic/GI:  Hepatic/GI Normal    Musculoskeletal:  Spine Disorders: lumbar Disc disease    Neurological:  Neurology Normal    Endocrine:   Hyperthyroidism Treated 3 yr ago and doing well       Physical Exam  General:  Obesity    Airway/Jaw/Neck:  Airway Findings: Mouth Opening: Normal Tongue: Normal  General Airway Assessment: Adult  Mallampati: II         Dental:  Dental Findings: In tact             Anesthesia Plan  Type of Anesthesia, risks & benefits discussed:  Anesthesia Type:  general  Patient's Preference:   Intra-op Monitoring Plan: standard ASA monitors  Intra-op Monitoring Plan Comments:   Post Op Pain Control Plan: per primary service following discharge from PACU  Post Op Pain Control Plan Comments:   Induction:   IV  Beta Blocker:         Informed Consent: Patient understands risks and agrees with Anesthesia plan.  Questions answered. Anesthesia consent signed with patient.  ASA Score: 2     Day of Surgery Review of  History & Physical:    H&P update referred to the surgeon.         Ready For Surgery From Anesthesia Perspective.        Parent(s)

## 2018-11-08 ENCOUNTER — OFFICE VISIT (OUTPATIENT)
Dept: OBSTETRICS AND GYNECOLOGY | Facility: CLINIC | Age: 33
End: 2018-11-08
Payer: MEDICARE

## 2018-11-08 VITALS
SYSTOLIC BLOOD PRESSURE: 120 MMHG | BODY MASS INDEX: 38.37 KG/M2 | HEIGHT: 67 IN | DIASTOLIC BLOOD PRESSURE: 74 MMHG | WEIGHT: 244.5 LBS

## 2018-11-08 DIAGNOSIS — N93.9 ABNORMAL UTERINE BLEEDING (AUB): Primary | ICD-10-CM

## 2018-11-08 DIAGNOSIS — E05.90 HYPERTHYROIDISM: ICD-10-CM

## 2018-11-08 PROCEDURE — 99999 PR PBB SHADOW E&M-EST. PATIENT-LVL III: CPT | Mod: PBBFAC,HCWC,, | Performed by: OBSTETRICS & GYNECOLOGY

## 2018-11-08 PROCEDURE — 3008F BODY MASS INDEX DOCD: CPT | Mod: CPTII,HCWC,S$GLB, | Performed by: OBSTETRICS & GYNECOLOGY

## 2018-11-08 PROCEDURE — 99213 OFFICE O/P EST LOW 20 MIN: CPT | Mod: HCWC,S$GLB,, | Performed by: OBSTETRICS & GYNECOLOGY

## 2018-11-08 RX ORDER — SULFAMETHOXAZOLE AND TRIMETHOPRIM 400; 80 MG/1; MG/1
TABLET ORAL
Refills: 5 | COMMUNITY
Start: 2018-10-16 | End: 2018-11-29

## 2018-11-08 RX ORDER — CHLORHEXIDINE GLUCONATE ORAL RINSE 1.2 MG/ML
SOLUTION DENTAL
Refills: 0 | COMMUNITY
Start: 2018-10-12 | End: 2018-11-29

## 2018-11-08 RX ORDER — ALBUTEROL SULFATE 90 UG/1
2 AEROSOL, METERED RESPIRATORY (INHALATION) EVERY 6 HOURS PRN
Refills: 5 | COMMUNITY
Start: 2018-09-04 | End: 2021-03-31 | Stop reason: CLARIF

## 2018-11-08 RX ORDER — METHYLPREDNISOLONE 4 MG/1
TABLET ORAL
Refills: 1 | COMMUNITY
Start: 2018-10-12 | End: 2018-11-29

## 2018-11-13 NOTE — PROGRESS NOTES
Subjective:       Patient ID: Jm Newton is a 33 y.o. female.    Chief Complaint: Annual Exam (vomiting )    HPI She complains of irregular bleeding.      Review of Systems    ROS:  GENERAL: No fever, chills, fatigability or weight loss.  VULVAR: No pain, no lesions and no itching.  VAGINAL: No relaxation, no itching, no discharge, + abnormal bleeding and no lesions.  ABDOMEN: No abdominal pain. Denies nausea. Denies vomiting. No diarrhea. No constipation  BREAST: Denies pain. No lumps. No discharge.  URINARY: No incontinence, no nocturia, no frequency and no dysuria.  CARDIOVASCULAR: No chest pain. No shortness of breath. No leg cramps.  NEUROLOGICAL: no headaches. No vision changes.    Objective:      Physical Exam   Constitutional: She is oriented to person, place, and time. She appears well-developed and well-nourished.   Abdominal: She exhibits no mass. There is no hepatomegaly. There is no tenderness. No hernia.   Neurological: She is alert and oriented to person, place, and time.   Psychiatric: She has a normal mood and affect. Her behavior is normal. Judgment and thought content normal.       Assessment:       1. Abnormal uterine bleeding (AUB)    2. Hyperthyroidism        Plan:       Jm was seen today for annual exam.    Diagnoses and all orders for this visit:    Abnormal uterine bleeding (AUB)  -     TSH; Future  -     CBC auto differential; Future  -     US Pelvis Comp with Transvag NON-OB (xpd); Future    Hyperthyroidism  -     TSH; Future

## 2018-11-14 ENCOUNTER — LAB VISIT (OUTPATIENT)
Dept: LAB | Facility: HOSPITAL | Age: 33
End: 2018-11-14
Attending: OBSTETRICS & GYNECOLOGY
Payer: MEDICARE

## 2018-11-14 ENCOUNTER — TELEPHONE (OUTPATIENT)
Dept: OBSTETRICS AND GYNECOLOGY | Facility: CLINIC | Age: 33
End: 2018-11-14

## 2018-11-14 DIAGNOSIS — N93.9 ABNORMAL UTERINE BLEEDING (AUB): ICD-10-CM

## 2018-11-14 DIAGNOSIS — E05.90 HYPERTHYROIDISM: ICD-10-CM

## 2018-11-14 LAB
BASOPHILS # BLD AUTO: 0.01 K/UL
BASOPHILS NFR BLD: 0.2 %
DIFFERENTIAL METHOD: ABNORMAL
EOSINOPHIL # BLD AUTO: 0 K/UL
EOSINOPHIL NFR BLD: 0.4 %
ERYTHROCYTE [DISTWIDTH] IN BLOOD BY AUTOMATED COUNT: 16 %
HCT VFR BLD AUTO: 30.6 %
HGB BLD-MCNC: 8.7 G/DL
IMM GRANULOCYTES # BLD AUTO: 0.01 K/UL
IMM GRANULOCYTES NFR BLD AUTO: 0.2 %
LYMPHOCYTES # BLD AUTO: 1.5 K/UL
LYMPHOCYTES NFR BLD: 26.9 %
MCH RBC QN AUTO: 21.4 PG
MCHC RBC AUTO-ENTMCNC: 28.4 G/DL
MCV RBC AUTO: 75 FL
MONOCYTES # BLD AUTO: 0.4 K/UL
MONOCYTES NFR BLD: 7.7 %
NEUTROPHILS # BLD AUTO: 3.6 K/UL
NEUTROPHILS NFR BLD: 64.6 %
NRBC BLD-RTO: 0 /100 WBC
PLATELET # BLD AUTO: 369 K/UL
PMV BLD AUTO: 9.7 FL
RBC # BLD AUTO: 4.06 M/UL
T4 FREE SERPL-MCNC: 1.08 NG/DL
TSH SERPL DL<=0.005 MIU/L-ACNC: 0.09 UIU/ML
WBC # BLD AUTO: 5.57 K/UL

## 2018-11-14 PROCEDURE — 36415 COLL VENOUS BLD VENIPUNCTURE: CPT | Mod: HCWC

## 2018-11-14 PROCEDURE — 85025 COMPLETE CBC W/AUTO DIFF WBC: CPT | Mod: HCWC

## 2018-11-14 PROCEDURE — 84439 ASSAY OF FREE THYROXINE: CPT | Mod: HCWC

## 2018-11-14 PROCEDURE — 84443 ASSAY THYROID STIM HORMONE: CPT | Mod: HCWC

## 2018-11-29 ENCOUNTER — OFFICE VISIT (OUTPATIENT)
Dept: OBSTETRICS AND GYNECOLOGY | Facility: CLINIC | Age: 33
End: 2018-11-29
Payer: MEDICARE

## 2018-11-29 VITALS
DIASTOLIC BLOOD PRESSURE: 80 MMHG | BODY MASS INDEX: 37.72 KG/M2 | SYSTOLIC BLOOD PRESSURE: 132 MMHG | WEIGHT: 240.31 LBS | HEIGHT: 67 IN

## 2018-11-29 DIAGNOSIS — R53.83 OTHER FATIGUE: ICD-10-CM

## 2018-11-29 DIAGNOSIS — E05.90 HYPERTHYROIDISM: ICD-10-CM

## 2018-11-29 DIAGNOSIS — R10.9 ABDOMINAL PAIN, UNSPECIFIED ABDOMINAL LOCATION: ICD-10-CM

## 2018-11-29 DIAGNOSIS — N91.2 AMENORRHEA: Primary | ICD-10-CM

## 2018-11-29 DIAGNOSIS — B20 HIV (HUMAN IMMUNODEFICIENCY VIRUS INFECTION): ICD-10-CM

## 2018-11-29 DIAGNOSIS — Z32.01 POSITIVE PREGNANCY TEST: ICD-10-CM

## 2018-11-29 LAB
ABO + RH BLD: NORMAL
ANION GAP SERPL CALC-SCNC: 9 MMOL/L
B-HCG UR QL: POSITIVE
BASOPHILS # BLD AUTO: 0.01 K/UL
BASOPHILS NFR BLD: 0.3 %
BLD GP AB SCN CELLS X3 SERPL QL: NORMAL
BUN SERPL-MCNC: 9 MG/DL
CALCIUM SERPL-MCNC: 9.5 MG/DL
CHLORIDE SERPL-SCNC: 109 MMOL/L
CO2 SERPL-SCNC: 20 MMOL/L
CREAT SERPL-MCNC: 0.7 MG/DL
CTP QC/QA: YES
DIFFERENTIAL METHOD: ABNORMAL
EOSINOPHIL # BLD AUTO: 0 K/UL
EOSINOPHIL NFR BLD: 0 %
ERYTHROCYTE [DISTWIDTH] IN BLOOD BY AUTOMATED COUNT: 16.9 %
EST. GFR  (AFRICAN AMERICAN): >60 ML/MIN/1.73 M^2
EST. GFR  (NON AFRICAN AMERICAN): >60 ML/MIN/1.73 M^2
GLUCOSE SERPL-MCNC: 80 MG/DL
HCT VFR BLD AUTO: 29.2 %
HGB BLD-MCNC: 8.7 G/DL
IMM GRANULOCYTES # BLD AUTO: 0.01 K/UL
IMM GRANULOCYTES NFR BLD AUTO: 0.3 %
LYMPHOCYTES # BLD AUTO: 1.2 K/UL
LYMPHOCYTES NFR BLD: 30.9 %
MCH RBC QN AUTO: 21.1 PG
MCHC RBC AUTO-ENTMCNC: 29.8 G/DL
MCV RBC AUTO: 71 FL
MONOCYTES # BLD AUTO: 0.3 K/UL
MONOCYTES NFR BLD: 8.5 %
NEUTROPHILS # BLD AUTO: 2.3 K/UL
NEUTROPHILS NFR BLD: 60 %
NRBC BLD-RTO: 0 /100 WBC
PLATELET # BLD AUTO: 291 K/UL
PMV BLD AUTO: 10.3 FL
POTASSIUM SERPL-SCNC: 3.8 MMOL/L
RBC # BLD AUTO: 4.12 M/UL
SODIUM SERPL-SCNC: 138 MMOL/L
T4 FREE SERPL-MCNC: 1.1 NG/DL
TSH SERPL DL<=0.005 MIU/L-ACNC: 0.06 UIU/ML
WBC # BLD AUTO: 3.88 K/UL

## 2018-11-29 PROCEDURE — 87340 HEPATITIS B SURFACE AG IA: CPT | Mod: HCWC

## 2018-11-29 PROCEDURE — 83021 HEMOGLOBIN CHROMOTOGRAPHY: CPT | Mod: HCWC

## 2018-11-29 PROCEDURE — 80048 BASIC METABOLIC PNL TOTAL CA: CPT | Mod: HCWC

## 2018-11-29 PROCEDURE — 85025 COMPLETE CBC W/AUTO DIFF WBC: CPT | Mod: HCWC

## 2018-11-29 PROCEDURE — 86361 T CELL ABSOLUTE COUNT: CPT | Mod: HCWC

## 2018-11-29 PROCEDURE — 86592 SYPHILIS TEST NON-TREP QUAL: CPT | Mod: HCWC

## 2018-11-29 PROCEDURE — 87086 URINE CULTURE/COLONY COUNT: CPT | Mod: HCWC

## 2018-11-29 PROCEDURE — 3008F BODY MASS INDEX DOCD: CPT | Mod: CPTII,HCWC,S$GLB, | Performed by: OBSTETRICS & GYNECOLOGY

## 2018-11-29 PROCEDURE — 84443 ASSAY THYROID STIM HORMONE: CPT | Mod: HCWC

## 2018-11-29 PROCEDURE — 99213 OFFICE O/P EST LOW 20 MIN: CPT | Mod: HCWC,S$GLB,, | Performed by: OBSTETRICS & GYNECOLOGY

## 2018-11-29 PROCEDURE — 86901 BLOOD TYPING SEROLOGIC RH(D): CPT | Mod: HCWC

## 2018-11-29 PROCEDURE — 87491 CHLMYD TRACH DNA AMP PROBE: CPT | Mod: HCWC

## 2018-11-29 PROCEDURE — 86762 RUBELLA ANTIBODY: CPT | Mod: HCWC

## 2018-11-29 PROCEDURE — 99999 PR PBB SHADOW E&M-EST. PATIENT-LVL III: CPT | Mod: PBBFAC,HCWC,, | Performed by: OBSTETRICS & GYNECOLOGY

## 2018-11-29 PROCEDURE — 87536 HIV-1 QUANT&REVRSE TRNSCRPJ: CPT | Mod: HCWC

## 2018-11-29 PROCEDURE — 81025 URINE PREGNANCY TEST: CPT | Mod: HCWC,S$GLB,, | Performed by: OBSTETRICS & GYNECOLOGY

## 2018-11-29 PROCEDURE — 84439 ASSAY OF FREE THYROXINE: CPT | Mod: HCWC

## 2018-11-29 RX ORDER — PROMETHAZINE HYDROCHLORIDE 25 MG/1
25 TABLET ORAL EVERY 6 HOURS PRN
Qty: 30 TABLET | Refills: 6 | Status: SHIPPED | OUTPATIENT
Start: 2018-11-29 | End: 2019-10-15 | Stop reason: CLARIF

## 2018-11-30 LAB
BACTERIA UR CULT: NO GROWTH
C TRACH DNA SPEC QL NAA+PROBE: NOT DETECTED
HBV SURFACE AG SERPL QL IA: NEGATIVE
HGB A2 MFR BLD HPLC: 2.2 %
HGB FRACT BLD ELPH-IMP: NORMAL
HGB FRACT BLD ELPH-IMP: NORMAL
N GONORRHOEA DNA SPEC QL NAA+PROBE: NOT DETECTED
RPR SER QL: NORMAL
RUBV IGG SER-ACNC: 14.7 IU/ML
RUBV IGG SER-IMP: REACTIVE

## 2018-12-03 LAB
CD3+CD4+ CELLS # BLD: 193 CELLS/UL (ref 300–1400)
CD3+CD4+ CELLS NFR BLD: 18.4 % (ref 28–57)

## 2018-12-03 NOTE — PROGRESS NOTES
SUBJECTIVE:   33 y.o. female  complains of amenorrhea.  +UPT.  She is taking pnv.  Will alert HIV docs of diagnosis.  She understands will need cerclage and con.    ROS:  GENERAL: No fever, chills, fatigability or weight loss.  VULVAR: No pain, no lesions and no itching.  VAGINAL: No relaxation, no itching, no discharge, no abnormal bleeding and no lesions.  ABDOMEN: No abdominal pain. Denies nausea. Denies vomiting. No diarrhea. No constipation  BREAST: Denies pain. No lumps. No discharge.  URINARY: No incontinence, no nocturia, no frequency and no dysuria.  CARDIOVASCULAR: No chest pain. No shortness of breath. No leg cramps.  NEUROLOGICAL: No headaches. No vision changes.        Vitals:    18 1334   BP: 132/80         OBJECTIVE:   She appears well, afebrile.  Abdomen: benign, soft, nontender, no masses.  VULVA: Normal external female genitalia, normal urethra, normal urethral meatus  VAGINA:no lesions  CERVIXNormal  UTERUSnormal size, contour, position, consistency, mobility, non-tender  ADNEXAnormal adnexa and no mass, fullness, tenderness      ASSESSMENT:   Jm was seen today for amenorrhea, morning sickness and dysmenorrhea.    Diagnoses and all orders for this visit:    Amenorrhea  -     POCT urine pregnancy    Positive pregnancy test  -     Cancel: Basic metabolic panel; Future  -     Cancel: RPR; Future  -     Cancel: Hemoglobin Electrophoresis,Hgb A2 Conner.; Future  -     Cancel: Hepatitis B surface antigen; Future  -     Cancel: Type & Screen - Ob Profile; Future  -     Cancel: Rubella antibody, IgG; Future  -     Urine culture  -     Cancel: CBC auto differential; Future  -     US OB/GYN Procedure ([facility]); Future  -     Basic metabolic panel  -     RPR  -     Hemoglobin Electrophoresis,Hgb A2 Conner.  -     Hepatitis B surface antigen  -     Type & Screen - Ob Profile  -     Rubella antibody, IgG  -     CBC auto differential    Hyperthyroidism  -     Cancel: TSH; Future  -      TSH    HIV (human immunodeficiency virus infection)  -     C. trachomatis/N. gonorrhoeae by AMP DNA  -     Cancel: HIV RNA, QUANTITATIVE, PCR; Future  -     Cancel: T-HELPER CELLS (CD4) COUNT; Future  -     HIV RNA, QUANTITATIVE, PCR  -     T-HELPER CELLS (CD4) COUNT    Other fatigue   -     Urine culture    Abdominal pain, unspecified abdominal location   -     C. trachomatis/N. gonorrhoeae by AMP DNA    Other orders  -     promethazine (PHENERGAN) 25 MG tablet; Take 1 tablet (25 mg total) by mouth every 6 (six) hours as needed for Nausea.  -     T4, free

## 2018-12-04 LAB
HIV UQ DATE RECEIVED: ABNORMAL
HIV UQ DATE REPORTED: ABNORMAL
HIV1 RNA # SERPL NAA+PROBE: ABNORMAL COPIES/ML
HIV1 RNA SERPL NAA+PROBE-LOG#: 3.14 LOG (10) COPIES/ML
HIV1 RNA SERPL QL NAA+PROBE: DETECTED

## 2018-12-18 ENCOUNTER — PROCEDURE VISIT (OUTPATIENT)
Dept: OBSTETRICS AND GYNECOLOGY | Facility: CLINIC | Age: 33
End: 2018-12-18
Attending: OBSTETRICS & GYNECOLOGY
Payer: MEDICARE

## 2018-12-18 DIAGNOSIS — Z32.01 POSITIVE PREGNANCY TEST: ICD-10-CM

## 2018-12-18 DIAGNOSIS — N92.6 MISSED MENSES: ICD-10-CM

## 2018-12-18 DIAGNOSIS — O36.80X0 ENCOUNTER TO DETERMINE FETAL VIABILITY OF PREGNANCY, SINGLE OR UNSPECIFIED FETUS: ICD-10-CM

## 2018-12-18 DIAGNOSIS — Z36.89 ESTABLISH GESTATIONAL AGE, ULTRASOUND: ICD-10-CM

## 2018-12-18 PROCEDURE — 76801 OB US < 14 WKS SINGLE FETUS: CPT | Mod: HCWC,S$GLB,, | Performed by: OBSTETRICS & GYNECOLOGY

## 2018-12-18 NOTE — PROCEDURES
Obstetrical ultrasound completed today.  See report in imaging section of [facility].

## 2018-12-19 ENCOUNTER — TELEPHONE (OUTPATIENT)
Dept: OBSTETRICS AND GYNECOLOGY | Facility: CLINIC | Age: 33
End: 2018-12-19

## 2018-12-19 NOTE — TELEPHONE ENCOUNTER
----- Message from Herman Osullivan MA sent at 12/19/2018 10:51 AM CST -----  Contact: Dr. Ramos      ----- Message -----  From: Crys Austin  Sent: 12/19/2018  10:01 AM  To: Anaid GRIMES Staff              Name of Who is Calling: Dr. Ramos      What is the request in detail: Dr. Ramos states he would like to speak to Dr. Worrell regarding the mutual pt. Dr. Ramos states due to the pt's pregnancy he made some changes to the pt's medications that he would like to discuss. Please contact to further discuss and advise.        Can the clinic reply by [facility]: N      What Number to Call Back if not in [facility]: [phone](Mobile)

## 2018-12-20 ENCOUNTER — ROUTINE PRENATAL (OUTPATIENT)
Dept: OBSTETRICS AND GYNECOLOGY | Facility: CLINIC | Age: 33
End: 2018-12-20
Payer: MEDICARE

## 2018-12-20 VITALS — BODY MASS INDEX: 37.43 KG/M2 | DIASTOLIC BLOOD PRESSURE: 80 MMHG | SYSTOLIC BLOOD PRESSURE: 142 MMHG | WEIGHT: 239 LBS

## 2018-12-20 DIAGNOSIS — Z3A.01 7 WEEKS GESTATION OF PREGNANCY: Primary | ICD-10-CM

## 2018-12-20 DIAGNOSIS — B20 HIV (HUMAN IMMUNODEFICIENCY VIRUS INFECTION): ICD-10-CM

## 2018-12-20 DIAGNOSIS — O09.899 HISTORY OF PRETERM DELIVERY, CURRENTLY PREGNANT: ICD-10-CM

## 2018-12-20 PROBLEM — O09.90 PREGNANCY, SUPERVISION, HIGH-RISK: Status: ACTIVE | Noted: 2018-12-20

## 2018-12-20 PROBLEM — O09.299 HISTORY OF CERVICAL INCOMPETENCE IN PREGNANCY, CURRENTLY PREGNANT: Status: ACTIVE | Noted: 2018-12-20

## 2018-12-20 PROBLEM — O34.40 H/O LEEP (LOOP ELECTROSURGICAL EXCISION PROCEDURE) OF CERVIX COMPLICATING PREGNANCY: Status: ACTIVE | Noted: 2018-12-20

## 2018-12-20 PROBLEM — O99.210 OBESITY COMPLICATING PREGNANCY: Status: ACTIVE | Noted: 2018-12-20

## 2018-12-20 PROBLEM — O99.519 MATERNAL ASTHMA COMPLICATING PREGNANCY: Status: ACTIVE | Noted: 2018-12-20

## 2018-12-20 PROBLEM — J45.909 MATERNAL ASTHMA COMPLICATING PREGNANCY: Status: ACTIVE | Noted: 2018-12-20

## 2018-12-20 PROBLEM — Z98.891 HISTORY OF CLASSICAL CESAREAN SECTION: Status: ACTIVE | Noted: 2018-12-20

## 2018-12-20 PROBLEM — O99.280 HYPERTHYROIDISM AFFECTING PREGNANCY: Status: ACTIVE | Noted: 2018-12-20

## 2018-12-20 PROBLEM — E05.90 HYPERTHYROIDISM AFFECTING PREGNANCY: Status: ACTIVE | Noted: 2018-12-20

## 2018-12-20 PROBLEM — Z98.890 H/O LEEP (LOOP ELECTROSURGICAL EXCISION PROCEDURE) OF CERVIX COMPLICATING PREGNANCY: Status: ACTIVE | Noted: 2018-12-20

## 2018-12-20 PROCEDURE — 0502F SUBSEQUENT PRENATAL CARE: CPT | Mod: HCWC,GC,S$GLB, | Performed by: STUDENT IN AN ORGANIZED HEALTH CARE EDUCATION/TRAINING PROGRAM

## 2018-12-20 PROCEDURE — 99999 PR PBB SHADOW E&M-EST. PATIENT-LVL III: CPT | Mod: PBBFAC,HCWC,GC, | Performed by: STUDENT IN AN ORGANIZED HEALTH CARE EDUCATION/TRAINING PROGRAM

## 2018-12-20 PROCEDURE — 3008F BODY MASS INDEX DOCD: CPT | Mod: CPTII,HCWC,GC,S$GLB | Performed by: STUDENT IN AN ORGANIZED HEALTH CARE EDUCATION/TRAINING PROGRAM

## 2018-12-20 RX ORDER — ALBUTEROL SULFATE 90 UG/1
AEROSOL, METERED RESPIRATORY (INHALATION)
COMMUNITY
End: 2019-04-25

## 2018-12-20 RX ORDER — ATAZANAVIR 300 MG/1
CAPSULE ORAL
Status: ON HOLD | COMMUNITY
Start: 2018-12-19 | End: 2019-01-28 | Stop reason: HOSPADM

## 2018-12-20 RX ORDER — EMTRICITABINE AND TENOFOVIR DISOPROXIL FUMARATE 200; 300 MG/1; MG/1
TABLET, FILM COATED ORAL
COMMUNITY
Start: 2018-12-19 | End: 2019-04-25

## 2018-12-20 NOTE — PROGRESS NOTES
Subjective:       Patient ID: Jm Newton is a 33 y.o. female.    Chief Complaint:  Routine Prenatal Visit      History of Present Illness  Jm Newton is a 33 y.o. [id number] at 7w3d presents for routine ob visit.   This IUP is complicated by HIV, history of  labor and asthma. Patient is feeling overall well today. Nausea/vomiting has been controlled with phenergan and zofran and avoiding triggering food/large meals.  Patient denies contractions, denies vaginal bleeding, denies LOF.   Fetal Movement: n/a.            GYN & OB History  Patient's last menstrual period was 10/29/2018 (exact date).   Date of Last Pap: 2018    OB History    Para Term  AB Living   8 6 1 5   4   SAB TAB Ectopic Multiple Live Births         2 6      # Outcome Date GA Lbr Adrian/2nd Weight Sex Delivery Anes PTL Lv   8 Current            7 Term 14 37w5d  3.26 kg (7 lb 3 oz) F CS-LTranv EPI N ANAHY   6A   24w0d  0.51 kg (1 lb 2 oz) M CS-LTranv  Y DEC      Complications: Premature rupture of membranes      Birth Comments: lived for 2 months in NICU   6B   24w0d  0.992 kg (2 lb 3 oz) M CS-LTranv  Y DEC      Complications: Premature rupture of membranes      Birth Comments: lived only 2 hours   5  09 35w0d  2.608 kg (5 lb 12 oz) M CS-LTranv  Y ANAHY      Complications: Premature rupture of membranes      Birth Comments: HIV, CS d/t viral load   4  08 32w0d  2.722 kg (6 lb) M Vag-Spont  Y ANAHY      Complications: Premature rupture of membranes      Birth Comments:  labor   3  10/05/06 24w0d   M Vag-Spont  Y FD      Complications: Abruptio Placenta      Birth Comments: abruption and stillbirth   2  03 35w0d  2.722 kg (6 lb) M Vag-Spont  Y ANAHY      Birth Comments:  labor   1                    Review of Systems  Review of Systems   Constitutional: Negative for chills and fever.   Eyes: Negative for visual  disturbance.   Respiratory: Negative for shortness of breath.    Cardiovascular: Negative for chest pain and palpitations.   Gastrointestinal: Negative for abdominal pain, constipation, diarrhea, nausea and vomiting.   Genitourinary: Negative for vaginal bleeding and vaginal discharge.   Musculoskeletal: Negative for back pain.   Neurological: Negative for seizures, syncope and headaches.   Hematological: Does not bruise/bleed easily.   Psychiatric/Behavioral: Negative for depression. The patient is not nervous/anxious.            Objective:    Physical Exam:   Constitutional: She is oriented to person, place, and time. She appears well-developed and well-nourished. No distress.    HENT:   Head: Normocephalic and atraumatic.   Nose: No epistaxis.    Eyes: Conjunctivae and EOM are normal.    Neck: Normal range of motion. Neck supple. No thyromegaly present.    Cardiovascular: Normal rate and regular rhythm.     Pulmonary/Chest: Effort normal. No respiratory distress.        Abdominal: Soft. She exhibits no distension. There is no tenderness.             Musculoskeletal: Normal range of motion and moves all extremeties. She exhibits no edema or tenderness.       Neurological: She is alert and oriented to person, place, and time.    Skin: Skin is warm and dry. No rash noted.    Psychiatric: She has a normal mood and affect. Her behavior is normal.          Assessment:        1. 7 weeks gestation of pregnancy    2. HIV (human immunodeficiency virus infection)    3. History of  delivery, currently pregnant               Plan:      Jm was seen today for routine prenatal visit.    Diagnoses and all orders for this visit:    HIV (human immunodeficiency virus infection)  -     Ambulatory consult to Maternal Fetal Medicine  -     US MFM Procedure ([facility]); Future  - following regular with infectious disease specialist   - medications altered since pregnancy     History of  delivery, currently  pregnant  -     Ambulatory consult to Maternal Fetal Medicine  -     US MFM Procedure ([facility]); Future  - plan for con and cerclage    Asthma   - referred to pulmonary specialist       - beclomethasone (QVAR) 80 mcg/actuation Aero; Inhale 1 puff into the lungs 2 (two) times daily. Controller        Orders Placed This Encounter   Procedures    Ambulatory consult to Maternal Fetal Medicine    US MFM Procedure ([facility])       Follow-up in about 4 weeks (around 1/17/2019).       Grace Landry MD  OBGYN, PGY-1

## 2018-12-31 ENCOUNTER — TELEPHONE (OUTPATIENT)
Dept: OBSTETRICS AND GYNECOLOGY | Facility: CLINIC | Age: 33
End: 2018-12-31

## 2018-12-31 NOTE — TELEPHONE ENCOUNTER
----- Message from Sunitha Ponce sent at 12/31/2018  8:15 AM CST -----  9wks ob pt stated that she is having some cramping. Pt can be reached at [phone].

## 2018-12-31 NOTE — LETTER
December 31, 2018    Jm Newton  [address]  [facility] [address]         [facility]  [address]  [facility] [address]  Phone: [phone] December 31, 2018     Patient: Jm Newton   YOB: 1985   Date of Visit: 12/31/2018       To Whom It May Concern:    It is my medical opinion that Jm Newton may return to light duty immediately with the following restrictions: no lifting over 25 pounds.  She needs to be able to sit when necessary..    If you have any questions or concerns, please don't hesitate to call.    Sincerely,        Chantal Worrell MD

## 2018-12-31 NOTE — TELEPHONE ENCOUNTER
----- Message from Herman Osullivan MA sent at 12/31/2018  9:20 AM CST -----  Pt states she is cramping bad, unable to sleep and is stressed about her job. Pt would like to speak to you.  ----- Message -----  From: Sunitha Ponce  Sent: 12/31/2018   9:01 AM  To: , #    Ob pt returning nurse phone call. Pt can be reached at [phone].

## 2019-01-11 ENCOUNTER — HOSPITAL ENCOUNTER (EMERGENCY)
Facility: OTHER | Age: 34
Discharge: HOME | End: 2019-01-12
Attending: EMERGENCY MEDICINE
Payer: MEDICARE

## 2019-01-11 ENCOUNTER — TELEPHONE (OUTPATIENT)
Dept: OBSTETRICS AND GYNECOLOGY | Facility: CLINIC | Age: 34
End: 2019-01-11

## 2019-01-11 VITALS
OXYGEN SATURATION: 98 % | TEMPERATURE: 99 F | HEIGHT: 67 IN | BODY MASS INDEX: 36.41 KG/M2 | WEIGHT: 232 LBS | RESPIRATION RATE: 18 BRPM | HEART RATE: 75 BPM | SYSTOLIC BLOOD PRESSURE: 129 MMHG | DIASTOLIC BLOOD PRESSURE: 61 MMHG

## 2019-01-11 DIAGNOSIS — Z21 HIV POSITIVE, ASYMPTOMATIC: ICD-10-CM

## 2019-01-11 DIAGNOSIS — Z3A.10 10 WEEKS GESTATION OF PREGNANCY: ICD-10-CM

## 2019-01-11 DIAGNOSIS — O26.899 PELVIC PAIN IN PREGNANCY: ICD-10-CM

## 2019-01-11 DIAGNOSIS — R10.2 PELVIC PAIN IN PREGNANCY: ICD-10-CM

## 2019-01-11 DIAGNOSIS — R11.2 INTRACTABLE VOMITING WITH NAUSEA, UNSPECIFIED VOMITING TYPE: Primary | ICD-10-CM

## 2019-01-11 LAB
BACTERIA GENITAL QL WET PREP: ABNORMAL
BILIRUB UR QL STRIP: NEGATIVE
CLARITY UR REFRACT.AUTO: ABNORMAL
CLUE CELLS VAG QL WET PREP: ABNORMAL
COLOR UR AUTO: YELLOW
FILAMENT FUNGI VAG WET PREP-#/AREA: ABNORMAL
GLUCOSE UR QL STRIP: NEGATIVE
HGB UR QL STRIP: NEGATIVE
KETONES UR QL STRIP: NEGATIVE
LEUKOCYTE ESTERASE UR QL STRIP: NEGATIVE
NITRITE UR QL STRIP: NEGATIVE
PH UR STRIP: 7 [PH] (ref 5–8)
PROT UR QL STRIP: NEGATIVE
SP GR UR STRIP: 1.02 (ref 1–1.03)
SPECIMEN SOURCE: ABNORMAL
T VAGINALIS GENITAL QL WET PREP: ABNORMAL
URN SPEC COLLECT METH UR: ABNORMAL
WBC #/AREA VAG WET PREP: ABNORMAL
YEAST GENITAL QL WET PREP: ABNORMAL

## 2019-01-11 PROCEDURE — 99285 EMERGENCY DEPT VISIT HI MDM: CPT | Mod: 25,HCWC

## 2019-01-11 PROCEDURE — 99284 PR EMERGENCY DEPT VISIT,LEVEL IV: ICD-10-PCS | Mod: HCWC,,, | Performed by: PHYSICIAN ASSISTANT

## 2019-01-11 PROCEDURE — 99284 PR EMERGENCY DEPT VISIT,LEVEL IV: ICD-10-PCS | Mod: HCWC,,, | Performed by: OBSTETRICS & GYNECOLOGY

## 2019-01-11 PROCEDURE — 99284 EMERGENCY DEPT VISIT MOD MDM: CPT | Mod: HCWC,,, | Performed by: PHYSICIAN ASSISTANT

## 2019-01-11 PROCEDURE — 81003 URINALYSIS AUTO W/O SCOPE: CPT | Mod: HCWC

## 2019-01-11 PROCEDURE — 96365 THER/PROPH/DIAG IV INF INIT: CPT | Mod: HCWC

## 2019-01-11 PROCEDURE — 87491 CHLMYD TRACH DNA AMP PROBE: CPT | Mod: HCWC

## 2019-01-11 PROCEDURE — 63600175 PHARM REV CODE 636 W HCPCS: Mod: HCWC | Performed by: EMERGENCY MEDICINE

## 2019-01-11 PROCEDURE — 63600175 PHARM REV CODE 636 W HCPCS: Mod: HCWC | Performed by: OBSTETRICS & GYNECOLOGY

## 2019-01-11 PROCEDURE — 25000003 PHARM REV CODE 250: Mod: HCWC | Performed by: OBSTETRICS & GYNECOLOGY

## 2019-01-11 PROCEDURE — 96361 HYDRATE IV INFUSION ADD-ON: CPT | Mod: HCWC

## 2019-01-11 PROCEDURE — 96375 TX/PRO/DX INJ NEW DRUG ADDON: CPT | Mod: HCWC

## 2019-01-11 PROCEDURE — 87210 SMEAR WET MOUNT SALINE/INK: CPT | Mod: HCWC

## 2019-01-11 PROCEDURE — 99284 EMERGENCY DEPT VISIT MOD MDM: CPT | Mod: HCWC,,, | Performed by: OBSTETRICS & GYNECOLOGY

## 2019-01-11 PROCEDURE — 25000003 PHARM REV CODE 250: Mod: HCWC | Performed by: PHYSICIAN ASSISTANT

## 2019-01-11 RX ORDER — ACETAMINOPHEN 500 MG
1000 TABLET ORAL
Status: COMPLETED | OUTPATIENT
Start: 2019-01-11 | End: 2019-01-11

## 2019-01-11 RX ORDER — ONDANSETRON 2 MG/ML
4 INJECTION INTRAMUSCULAR; INTRAVENOUS
Status: COMPLETED | OUTPATIENT
Start: 2019-01-11 | End: 2019-01-11

## 2019-01-11 RX ORDER — ONDANSETRON 4 MG/1
4 TABLET, ORALLY DISINTEGRATING ORAL
Status: COMPLETED | OUTPATIENT
Start: 2019-01-11 | End: 2019-01-11

## 2019-01-11 RX ADMIN — PROMETHAZINE HYDROCHLORIDE 12.5 MG: 25 INJECTION INTRAMUSCULAR; INTRAVENOUS at 10:01

## 2019-01-11 RX ADMIN — ONDANSETRON 4 MG: 4 TABLET, ORALLY DISINTEGRATING ORAL at 05:01

## 2019-01-11 RX ADMIN — ACETAMINOPHEN 1000 MG: 500 TABLET ORAL at 05:01

## 2019-01-11 RX ADMIN — SODIUM CHLORIDE 1000 ML: 0.9 INJECTION, SOLUTION INTRAVENOUS at 06:01

## 2019-01-11 RX ADMIN — ONDANSETRON 4 MG: 2 INJECTION INTRAMUSCULAR; INTRAVENOUS at 08:01

## 2019-01-11 NOTE — LETTER
January 12, 2019         [address]  [facility] [address]  Phone: [phone]       Patient: Jm Newton   YOB: 1985  Date of Visit: 01/12/2019    To Whom It May Concern:    Julia Newton  was at [facility] on 01/12/2019. She may return to work/school on Monday, 1/14/2019 with no restrictions. If you have any questions or concerns, or if I can be of further assistance, please do not hesitate to contact me.    Sincerely,          Katharine Espinosa MD

## 2019-01-11 NOTE — TELEPHONE ENCOUNTER
Returned call. Pt is in the ER and stated that she has been vomiting and cramping all day. Instructed pt to stay in ER to be evaluated. Notified Dr. Worrell.

## 2019-01-11 NOTE — ED PROVIDER NOTES
Encounter Date: 2019       History     Chief Complaint   Patient presents with    Emesis     Pt vomiting since this am.  Feels dizzy.  Pt is 10wks pregnant.   Also having LLQ pain.  Denies bleeding.      33-year-old  pregnant female at approximately 10 weeks EGA, HIV presents the ED with complaints of vomiting, lightheadedness, pelvic cramping for the past couple of days.  Patient reports persistent nausea for the duration of this pregnancy, however she has been unable to tolerate anything by mouth and has had multiple episodes of vomiting today.  She reports LLQ abdominal cramping that began yesterday and is now across the low abdomen.  She denies vaginal bleeding.  Patient has been seen by OB early in her pregnancy.  She had a vaginal ultrasound at approximately 6 weeks EGA which showed a yolk sac and fetal pole in the uterus.  Patient is scheduled to see MFM later this month for possible placement of cerclage.           Review of patient's allergies indicates:   Allergen Reactions    Azithromycin Edema     Past Medical History:   Diagnosis Date    Asthma     HIV infection     dx     Hyperthyroidism in pregnancy, antepartum      Past Surgical History:   Procedure Laterality Date    CERCLAGE, CERVIX N/A 2014    Performed by Obey Henry [facility] at [facility] L[facility]D    CERVICAL CERCLAGE      emergent with twins     SECTION, CLASSIC      Last section with classical extention, from [facility]    CONE BIOPSY, CERVIX, USING COLD KNIFE N/A 2018    Performed by [facility]al Worrell [facility] at [facility] OR    D & C (SUCTION) N/A 11/15/2016    Performed by Pancho Albert MD at [facility] OR    DELIVERY-CEASAREAN SECTION N/A 2014    Performed by [facility]rell [facility] at [facility] L[facility]D    DILATION AND CURETTAGE OF UTERUS      LEEP CONIZATION, CERVIX N/A 2018    Performed by [facility]rell MD at [facility] OR     Family History   Problem Relation Age of Onset    Colon cancer Neg Hx     Hypertension Neg Hx      Social  History     Tobacco Use    Smoking status: Never Smoker    Smokeless tobacco: Never Used   Substance Use Topics    Alcohol use: No     Alcohol/week: 0.0 oz     Frequency: Never     Comment: socially    Drug use: No     Review of Systems   Constitutional: Negative for fever.   HENT: Negative for sore throat.    Respiratory: Negative for shortness of breath.    Cardiovascular: Negative for chest pain.   Gastrointestinal: Positive for nausea and vomiting.   Genitourinary: Positive for pelvic pain. Negative for dysuria.   Musculoskeletal: Negative for back pain.   Skin: Negative for rash.   Neurological: Negative for weakness.   Hematological: Does not bruise/bleed easily.       Physical Exam     Initial Vitals [01/11/19 1409]   BP Pulse Resp Temp SpO2   (!) 153/79 95 16 98.7 °F (37.1 °C) 99 %      MAP       --         Physical Exam    Nursing note and vitals reviewed.  Constitutional: She appears well-developed and well-nourished. She is not diaphoretic.  Non-toxic appearance. She does not appear ill. No distress.   HENT:   Head: Normocephalic and atraumatic.   Neck: Neck supple.   Cardiovascular: Normal rate and regular rhythm. Exam reveals no gallop and no friction rub.    No murmur heard.  Pulmonary/Chest: Effort normal and breath sounds normal. No accessory muscle usage. No tachypnea. No respiratory distress. She has no decreased breath sounds. She has no wheezes. She has no rhonchi. She has no rales.   Abdominal: Soft. She exhibits no distension. There is tenderness in the suprapubic area and left lower quadrant.   Genitourinary: Pelvic exam was performed with patient supine. Uterus is tender. Cervix exhibits no discharge. Left adnexum displays tenderness. No bleeding in the vagina. Vaginal discharge found.   Genitourinary Comments: Scant of white vaginal discharge. Cervical os does not appear fully closed.    Neurological: She is alert.   Skin: No rash noted.   Psychiatric: She has a normal mood and affect.  Her behavior is normal.         ED Course   Procedures  Labs Reviewed   URINALYSIS, REFLEX TO URINE CULTURE - Abnormal; Notable for the following components:       Result Value    Appearance, UA Hazy (*)     All other components within normal limits    Narrative:     Preferred Collection Type->Urine, Clean Catch   VAGINAL SCREEN - Abnormal; Notable for the following components:    Clue Cells, Wet Prep Rare (*)     Bacteria - Vaginal Screen Rare (*)     All other components within normal limits   C. TRACHOMATIS/N. GONORRHOEAE BY AMP DNA    Narrative:     Resulting Location->[facility]          Imaging Results    None          Medical Decision Making:   History:   Old Medical Records: I decided to obtain old medical records.  Differential Diagnosis:   My differential diagnosis includes but is not limited to:  Threatened , PID, Dehydration, electrolyte abnormality, RAAD  Clinical Tests:   Lab Tests: Ordered and Reviewed  Other:   I have discussed this case with another health care provider.       APC / Resident Notes:   34 yo pregnant F with HIV, approx 10wks EGA presents with pelvic pain and vomiting for 1-2 days. NAD, afebrile.  Abd exam remarkable for LLQ and suprapubic TTP.  exam shows a partially open cervical os. No products of conception or blood noted.  Scant discharge. Uterine tenderness. Bedside US revealed good fetal movement with fetal heart rate of 156.     Discussed this case with OB.  With only rare clue cells on vaginal screen, they do not recommend treatment for bacterial vaginosis.  THought cervix appears slightly opened, does not seem to extend towards internal cervix per bimanual exam. Pt given antiemetics, fluids in the ED.  Will plan to discharge if able to tolerate PO.  If she fails PO challenge, OB recommends transfer to [facility] for further management.  Pt signed out to my attending, Dr. Bacon pending final disposition.     I have reviewed the patient's records and discussed this case  with my supervising physician.           Attending Attestation:     Physician Attestation Statement for NP/PA:       Other NP/PA Attestation Additions:      Medical Decision Making: Patient failed PO challenge in ED. Discussed with Dr. Walden, OB-GYN at [facility] who recommended transfer to [facility] ED for further evaluation. She and Dr. Evans accepted patient as transfer.   Procedure Note: Beside US performed by me. IUP w/ good fetal heart tones and movement noted via TA US.                    Clinical Impression:   The primary encounter diagnosis was Intractable vomiting with nausea, unspecified vomiting type. Diagnoses of Pelvic pain in pregnancy, HIV positive, asymptomatic, and 10 weeks gestation of pregnancy were also pertinent to this visit.      Disposition:   Disposition: Transferred  Condition: Stable                        Margi Auguste PA-C  01/12/19 1051       Marco A Bacon MD  01/12/19 4654

## 2019-01-11 NOTE — ED TRIAGE NOTES
10 week 4 day pregnant female presents to ER with lower abdominal pain and pressure, nausea and vomiting.  Patient's name and date of birth checked and is correct.    LOC: The patient is awake, alert, and oriented to place, time, situation. Affect is appropriate.  Speech is appropriate and clear.      APPEARANCE: Patient resting comfortably, and is  in no acute distress.  Patient is clean and well groomed.     SKIN: The skin is warm and dry; color consistent with ethnicity.  Patient has normal skin turgor and moist mucus membranes.  Skin intact; no breakdown or bruising noted.      MUSCULOSKELETAL: Patient moving upper and lower extremities without difficulty.  Denies weakness.      RESPIRATORY: Airway is open and patent. Respirations spontaneous, even, easy, and non-labored.  Patient has a normal effort and rate.  No accessory muscle use noted. Denies cough.  BS clear.     CARDIAC:  No peripheral edema noted. No complaints of chest pain.       ABDOMEN: Soft and non tender to palpation.  No distention noted.      NEUROLOGIC: Eyes open spontaneously.  Behavior appropriate to situation.  Follows commands; facial expression symmetrical.  Purposeful motor response noted; normal sensation in all extremities.

## 2019-01-11 NOTE — TELEPHONE ENCOUNTER
----- Message from Sunitha Ponce sent at 1/11/2019  1:15 PM CST -----  Pt can be reached at [phone]. Pt needs to talk to nurse.

## 2019-01-12 LAB
C TRACH DNA SPEC QL NAA+PROBE: NOT DETECTED
N GONORRHOEA DNA SPEC QL NAA+PROBE: NOT DETECTED

## 2019-01-12 NOTE — DISCHARGE INSTRUCTIONS
Call clinic [phone] or L & D after hours at [phone] for vaginal bleeding, leakage of fluids, contractions 4-5 in one hour, decreased fetal movements ( 10 kicks in 2 hours), headache not relieved by Tylenol, blurry vision, or temp of 100.4 or greater.  Begin doing fetal kick counts, at least 10 movements in 2 hours starting at 28 weeks gestation.  Keep next clinic appointment

## 2019-01-12 NOTE — ED PROVIDER NOTES
Encounter Date: 2019       History     Chief Complaint   Patient presents with    Emesis     Pt vomiting since this am.  Feels dizzy.  Pt is 10wks pregnant.   Also having LLQ pain.  Denies bleeding.      32 yo  at 10w4d presents as a transfer from [facility] for nausea and vomiting. She states that she has been taking zofran around the clock at home with no relief. She last kept food down yesterday but felt light-headed today at work. She also reports constipation and mild abdominal cramping but states that improved after IV fluids. She denies fever, chills, diarrhea, dysuria. No contractions, vaginal bleeding, leakage of fluid. Reports good fetal movement.            Review of patient's allergies indicates:   Allergen Reactions    Azithromycin Edema     Past Medical History:   Diagnosis Date    Asthma     HIV infection     dx     Hyperthyroidism in pregnancy, antepartum      Past Surgical History:   Procedure Laterality Date    CERCLAGE, CERVIX N/A 2014    Performed by Obey Henry MD at [facility] [place], operated by [facility] L&D    CERVICAL CERCLAGE      emergent with twins     SECTION, CLASSIC      Last section with classical extention, from [facility]    CONE BIOPSY, CERVIX, USING COLD KNIFE N/A 2018    Performed by Chantal Worrell MD at [facility] [place] operated [facility] OR    D & C (SUCTION) N/A 11/15/2016    Performed by Pancho Albert MD at [facility] [place] OR    DELIVERY-CEASAREAN SECTION N/A 2014    Performed by Chantal Worrell MD at [facility] [place] L[facility]D    DILATION AND CURETTAGE OF UTERUS      LEEP CONIZATION, CERVIX N/A 2018    Performed by Chantal Worrell MD at [facility] [place] OR     Family History   Problem Relation Age of Onset    Colon cancer Neg Hx     Hypertension Neg Hx      Social History     Tobacco Use    Smoking status: Never Smoker    Smokeless tobacco: Never Used   Substance Use Topics    Alcohol use: No     Alcohol/week: 0.0 oz     Frequency: Never     Comment: socially    Drug use: No     Review of Systems    Constitutional: Negative for activity change, appetite change, chills and fever.   Eyes: Negative for photophobia and visual disturbance.   Respiratory: Negative for chest tightness and shortness of breath.    Cardiovascular: Negative for chest pain.   Gastrointestinal: Positive for constipation, nausea and vomiting. Negative for abdominal pain and diarrhea.   Genitourinary: Negative for dysuria, pelvic pain, vaginal bleeding and vaginal discharge.   Neurological: Positive for light-headedness. Negative for dizziness and headaches.   Psychiatric/Behavioral: Negative for dysphoric mood, self-injury and suicidal ideas.       Physical Exam     Initial Vitals [01/11/19 1409]   BP Pulse Resp Temp SpO2   (!) 153/79 95 16 98.7 °F (37.1 °C) 99 %      MAP       --         BP: (127-153)/(61-79) 129/61    Physical Exam    Vitals reviewed.  Constitutional: She appears well-developed and well-nourished. She is not diaphoretic. No distress.   HENT:   Head: Normocephalic and atraumatic.   Nose: Nose normal.   Eyes: Conjunctivae are normal.   Neck: Normal range of motion.   Cardiovascular: Normal rate.   Pulmonary/Chest: No respiratory distress.   Abdominal: Soft. She exhibits no distension. There is no tenderness.   Musculoskeletal: She exhibits no edema or tenderness.   Neurological: She is alert and oriented to person, place, and time.   Skin: Skin is warm and dry.   Psychiatric: She has a normal mood and affect. Her behavior is normal. Judgment and thought content normal.         ED Course   Procedures  Labs Reviewed   URINALYSIS, REFLEX TO URINE CULTURE - Abnormal; Notable for the following components:       Result Value    Appearance, UA Hazy (*)     All other components within normal limits    Narrative:     Preferred Collection Type->Urine, Clean Catch   VAGINAL SCREEN - Abnormal; Notable for the following components:    Clue Cells, Wet Prep Rare (*)     Bacteria - Vaginal Screen Rare (*)     All other components within  normal limits   C. TRACHOMATIS/N. GONORRHOEAE BY AMP DNA          Imaging Results    None          Medical Decision Making:   ED Management:  Vital signs stable. Afebrile. UA negative for ketones. Patient received 1 liter LR and zofran at outside hospital and still could not tolerate PO. Will give phenergan 12.5 mg IV and reassess.    Patient reports feeling much improved. Able to tolerate sips of water. States that she has phenergan at home that she can take. Patient stable for discharge. All questions answered.              Attending Attestation:   Physician Attestation Statement for Resident:  As the supervising MD   Physician Attestation Statement: I have personally seen and examined this patient.   I agree with the above history. -:   As the supervising MD I agree with the above PE.    As the supervising MD I agree with the above treatment, course, plan, and disposition.   -: Patient evaluated and found to be stable, agree with resident's assessment of hyperemesis at 10 weeks responsive to phenergan and plan to discharge to home after hydration.  I was personally present during the critical portions of the procedure(s) performed by the resident and was immediately available in the ED to provide services and assistance as needed during the entire procedure.  I have reviewed the following: old records at this facility.                       Clinical Impression:   The primary encounter diagnosis was Intractable vomiting with nausea, unspecified vomiting type. A diagnosis of Pelvic pain in pregnancy was also pertinent to this visit.      Disposition:   Disposition: Discharged  Condition: Stable                        Katharine Espinosa MD  Resident  01/12/19 0045       Etta Song MD  01/12/19 0122

## 2019-01-17 ENCOUNTER — ROUTINE PRENATAL (OUTPATIENT)
Dept: OBSTETRICS AND GYNECOLOGY | Facility: CLINIC | Age: 34
End: 2019-01-17
Payer: MEDICARE

## 2019-01-17 VITALS
WEIGHT: 241.19 LBS | BODY MASS INDEX: 37.77 KG/M2 | DIASTOLIC BLOOD PRESSURE: 68 MMHG | SYSTOLIC BLOOD PRESSURE: 130 MMHG

## 2019-01-17 DIAGNOSIS — J45.909 MATERNAL ASTHMA COMPLICATING PREGNANCY: ICD-10-CM

## 2019-01-17 DIAGNOSIS — O09.891 HIV RISK FACTORS AFFECTING PREGNANCY IN FIRST TRIMESTER: ICD-10-CM

## 2019-01-17 DIAGNOSIS — O99.519 MATERNAL ASTHMA COMPLICATING PREGNANCY: ICD-10-CM

## 2019-01-17 DIAGNOSIS — Z98.891 HISTORY OF CLASSICAL CESAREAN SECTION: ICD-10-CM

## 2019-01-17 DIAGNOSIS — O09.299 HISTORY OF CERVICAL INCOMPETENCE IN PREGNANCY, CURRENTLY PREGNANT: ICD-10-CM

## 2019-01-17 DIAGNOSIS — O09.91 SUPERVISION OF HIGH RISK PREGNANCY IN FIRST TRIMESTER: Primary | ICD-10-CM

## 2019-01-17 DIAGNOSIS — O99.281 HYPERTHYROIDISM AFFECTING PREGNANCY IN FIRST TRIMESTER: ICD-10-CM

## 2019-01-17 DIAGNOSIS — E05.90 HYPERTHYROIDISM AFFECTING PREGNANCY IN FIRST TRIMESTER: ICD-10-CM

## 2019-01-17 DIAGNOSIS — O99.211 OBESITY AFFECTING PREGNANCY IN FIRST TRIMESTER: ICD-10-CM

## 2019-01-17 PROCEDURE — 99999 PR PBB SHADOW E&M-EST. PATIENT-LVL III: ICD-10-PCS | Mod: PBBFAC,HCWC,, | Performed by: OBSTETRICS & GYNECOLOGY

## 2019-01-17 PROCEDURE — 3008F BODY MASS INDEX DOCD: CPT | Mod: CPTII,HCWC,S$GLB, | Performed by: OBSTETRICS & GYNECOLOGY

## 2019-01-17 PROCEDURE — 99999 PR PBB SHADOW E&M-EST. PATIENT-LVL III: CPT | Mod: PBBFAC,HCWC,, | Performed by: OBSTETRICS & GYNECOLOGY

## 2019-01-17 PROCEDURE — 0502F PR SUBSEQUENT PRENATAL CARE: ICD-10-PCS | Mod: HCWC,S$GLB,, | Performed by: OBSTETRICS & GYNECOLOGY

## 2019-01-17 PROCEDURE — 0502F SUBSEQUENT PRENATAL CARE: CPT | Mod: HCWC,S$GLB,, | Performed by: OBSTETRICS & GYNECOLOGY

## 2019-01-17 PROCEDURE — 99499 UNLISTED E&M SERVICE: CPT | Mod: HCWC,S$GLB,, | Performed by: OBSTETRICS & GYNECOLOGY

## 2019-01-17 PROCEDURE — 3008F PR BODY MASS INDEX (BMI) DOCUMENTED: ICD-10-PCS | Mod: CPTII,HCWC,S$GLB, | Performed by: OBSTETRICS & GYNECOLOGY

## 2019-01-17 PROCEDURE — 99499 RISK ADDL DX/OHS AUDIT: ICD-10-PCS | Mod: HCWC,S$GLB,, | Performed by: OBSTETRICS & GYNECOLOGY

## 2019-01-17 RX ORDER — RITONAVIR 100 MG/1
TABLET ORAL DAILY
Refills: 6 | Status: ON HOLD | COMMUNITY
Start: 2018-12-20 | End: 2019-10-22 | Stop reason: SDUPTHER

## 2019-01-17 RX ORDER — HYDROXYPROGESTERONE CAPROATE 250 MG/ML
250 INJECTION INTRAMUSCULAR
Qty: 5 ML | Refills: 6 | Status: ON HOLD | OUTPATIENT
Start: 2019-01-17 | End: 2019-06-15

## 2019-01-17 RX ORDER — INHALER, ASSIST DEVICES
SPACER (EA) MISCELLANEOUS
COMMUNITY
Start: 2019-01-09 | End: 2023-11-14 | Stop reason: CLARIF

## 2019-01-17 RX ORDER — DEXAMETHASONE 4 MG/1
TABLET ORAL
Refills: 11 | COMMUNITY
Start: 2019-01-09 | End: 2019-04-26

## 2019-01-17 RX ORDER — INHALER,ASSIST DEVICE,LG MASK
SPACER (EA) MISCELLANEOUS
Refills: 3 | COMMUNITY
Start: 2019-01-14 | End: 2023-11-14 | Stop reason: CLARIF

## 2019-01-17 NOTE — PROGRESS NOTES
Still with n/v.  Breathing worse at night.  Admits to heartburn.  Recommend zantac twice a day and diclegisMaki Oneal was seen today for routine prenatal visit.    Diagnoses and all orders for this visit:    Supervision of high risk pregnancy in first trimester    HIV risk factors affecting pregnancy in first trimester    Maternal asthma complicating pregnancy - pulmonary referral in  -     Ambulatory consult to Pulmonology    Hyperthyroidism affecting pregnancy in first trimester    History of classical  section - repeat 36-37 weeks    History of cervical incompetence in pregnancy, currently pregnant - mfm consult/con  -     hydroxyprogest,PF,,preg presv, (CON) 250 mg/mL (1 mL) Oil; Inject 1 mL (250 mg total) into the muscle every 7 days.    Obesity affecting pregnancy in first trimester

## 2019-01-17 NOTE — PATIENT INSTRUCTIONS
Take 20 mg of unisom and 20 mg of vitamin B6 every night.  If you are still having nausea you can take 10 mg of unisom and 10 mg of vitamin B6 in the morning starting on day 3.  If your nausea persists, you can add 10 mg of unisom and 10 mg of B6 in the afternoon starting on day 4.  Let me know if you have any questions.

## 2019-01-18 ENCOUNTER — TELEPHONE (OUTPATIENT)
Dept: PHARMACY | Facility: CLINIC | Age: 34
End: 2019-01-18

## 2019-01-18 ENCOUNTER — TELEPHONE (OUTPATIENT)
Dept: INTERNAL MEDICINE | Facility: CLINIC | Age: 34
End: 2019-01-18

## 2019-01-21 ENCOUNTER — OFFICE VISIT (OUTPATIENT)
Dept: URGENT CARE | Facility: CLINIC | Age: 34
End: 2019-01-21
Payer: MEDICARE

## 2019-01-21 VITALS
HEART RATE: 115 BPM | SYSTOLIC BLOOD PRESSURE: 137 MMHG | DIASTOLIC BLOOD PRESSURE: 76 MMHG | TEMPERATURE: 99 F | RESPIRATION RATE: 18 BRPM | WEIGHT: 241 LBS | HEIGHT: 67 IN | OXYGEN SATURATION: 100 % | BODY MASS INDEX: 37.83 KG/M2

## 2019-01-21 DIAGNOSIS — J06.9 UPPER RESPIRATORY TRACT INFECTION, UNSPECIFIED TYPE: ICD-10-CM

## 2019-01-21 DIAGNOSIS — Z20.828 EXPOSURE TO INFLUENZA: Primary | ICD-10-CM

## 2019-01-21 DIAGNOSIS — R05.9 COUGH: ICD-10-CM

## 2019-01-21 PROCEDURE — 99214 PR OFFICE/OUTPT VISIT, EST, LEVL IV, 30-39 MIN: ICD-10-PCS | Mod: S$GLB,,, | Performed by: NURSE PRACTITIONER

## 2019-01-21 PROCEDURE — 99214 OFFICE O/P EST MOD 30 MIN: CPT | Mod: S$GLB,,, | Performed by: NURSE PRACTITIONER

## 2019-01-21 PROCEDURE — 3008F PR BODY MASS INDEX (BMI) DOCUMENTED: ICD-10-PCS | Mod: CPTII,S$GLB,, | Performed by: NURSE PRACTITIONER

## 2019-01-21 PROCEDURE — 3008F BODY MASS INDEX DOCD: CPT | Mod: CPTII,S$GLB,, | Performed by: NURSE PRACTITIONER

## 2019-01-21 RX ORDER — OSELTAMIVIR PHOSPHATE 75 MG/1
75 CAPSULE ORAL DAILY
Qty: 10 CAPSULE | Refills: 0 | Status: SHIPPED | OUTPATIENT
Start: 2019-01-21 | End: 2019-01-31

## 2019-01-21 RX ORDER — PROMETHAZINE HYDROCHLORIDE AND DEXTROMETHORPHAN HYDROBROMIDE 6.25; 15 MG/5ML; MG/5ML
5 SYRUP ORAL NIGHTLY PRN
Qty: 118 ML | Refills: 0 | Status: SHIPPED | OUTPATIENT
Start: 2019-01-21 | End: 2019-01-31

## 2019-01-21 NOTE — PATIENT INSTRUCTIONS
Follow up with your doctor in a few days.  Return to the urgent care or go to the ER if symptoms get worse.    tamiflu as directed for prophylaxis.  Tylenol as directed.  Stay hydrated.  Promethazine DM as directed.  Follow up if symptoms do not improve.  mucinex  for chest congestion.        Bronchitis, Viral (Adult)    You have a viral bronchitis. Bronchitis is inflammation and swelling of the lining of the lungs. This is often caused by an infection. Symptoms include a dry, hacking cough that is worse at night. The cough may bring up yellow-green mucus. You may also feel short of breath or wheeze. Other symptoms may include tiredness, chest discomfort, and chills.  Bronchitis that is caused by a virus is not treated with antibiotics. Instead, medicines may be given to help relieve symptoms. Symptoms can last up to 2 weeks, although the cough may last much longer.  This illness is contagious during the first few days and is spread through the air by coughing and sneezing, or by direct contact (touching the sick person and then touching your own eyes, nose, or mouth).  Most viral illnesses resolve within 10 to 14 days with rest and simple home remedies, although they may sometimes last for several weeks.  Home care  · If symptoms are severe, rest at home for the first 2 to 3 days. When you go back to your usual activities, don't let yourself get too tired.  · Do not smoke. Also avoid being exposed to secondhand smoke.  · You may use over-the-counter medicine to control fever or pain, unless another pain medicine was prescribed. (Note: If you have chronic liver or kidney disease or have ever had a stomach ulcer or gastrointestinal bleeding, talk with your healthcare provider before using these medicines. Also talk to your provider if you are taking medicine to prevent blood clots.) Aspirin should never be given to anyone younger than 18 years of age who is ill with a viral infection or fever. It may cause severe  liver or brain damage.  · Your appetite may be poor, so a light diet is fine. Avoid dehydration by drinking 6 to 8 glasses of fluids per day (such as water, soft drinks, sports drinks, juices, tea, or soup). Extra fluids will help loosen secretions in the nose and lungs.  · Over-the-counter cough, cold, and sore-throat medicines will not shorten the length of the illness, but they may help to reduce symptoms. (Note: Do not use decongestants if you have high blood pressure.)  Follow-up care  Follow up with your healthcare provider, or as advised. If you had an X-ray or ECG (electrocardiogram), a specialist will review it. You will be notified of any new findings that may affect your care.  Note: If you are age 65 or older, or if you have a chronic lung disease or condition that affects your immune system, or you smoke, talk to your healthcare provider about having pneumococcal vaccinations and a yearly influenza vaccination (flu shot).  When to seek medical advice  Call your healthcare provider right away if any of these occur:  · Fever of 100.4°F (38°C) or higher  · Coughing up increased amounts of colored sputum  · Weakness, drowsiness, headache, facial pain, ear pain, or a stiff neck  Call 911, or get immediate medical care  Contact emergency services right away if any of these occur:  · Coughing up blood  · Worsening weakness, drowsiness, headache, or stiff neck  · Trouble breathing, wheezing, or pain with breathing  Date Last Reviewed: 9/13/2015  © 7672-8870 [employer]. [address] [place] [address], [place] [address]. All rights reserved. This information is not intended as a substitute for professional medical care. Always follow your healthcare professional's instructions.

## 2019-01-21 NOTE — PROGRESS NOTES
"Subjective:       Patient ID: Jm Newton is a 33 y.o. female.    Vitals:  height is 5' 7" (1.702 m) and weight is 109.3 kg (241 lb). Her temperature is 98.5 °F (36.9 °C). Her blood pressure is 137/76 and her pulse is 115 (abnormal). Her respiration is 18 and oxygen saturation is 100%.     Chief Complaint: Cough    Productive cough and congestion for 4 days. Patient also reports headaches   SHE IS CURRENTLY PREGNANT, HIV POSITIVE. DAUGHTER POSITIVE FOR INFLUENZA A AT TIME OF VISIT. PATIENT STATES SHE RECEIVED FLU SHOT AND DENIES FEVER/CHILLS.      Cough   This is a new problem. The current episode started in the past 7 days. The problem has been unchanged. The problem occurs every few minutes. The cough is productive of sputum. Associated symptoms include headaches and rhinorrhea. Pertinent negatives include no chills, ear pain, eye redness, fever, hemoptysis, myalgias, rash, sore throat, shortness of breath or wheezing. She has tried nothing for the symptoms. Her past medical history is significant for asthma.       Constitution: Negative for chills, sweating, fatigue and fever.   HENT: Positive for congestion. Negative for ear pain, sinus pain, sinus pressure, sore throat and voice change.    Neck: Negative for painful lymph nodes.   Eyes: Negative for eye redness.   Respiratory: Positive for cough and sputum production. Negative for chest tightness, bloody sputum, COPD, shortness of breath, stridor, wheezing and asthma.    Gastrointestinal: Negative for nausea and vomiting.   Musculoskeletal: Negative for muscle ache.   Skin: Negative for rash.   Allergic/Immunologic: Negative for seasonal allergies and asthma.   Neurological: Positive for headaches.   Hematologic/Lymphatic: Negative for swollen lymph nodes.       Objective:      Physical Exam   Constitutional: She is oriented to person, place, and time. She appears well-developed and well-nourished. She is cooperative.  Non-toxic appearance. She " does not appear ill. No distress.   HENT:   Head: Normocephalic and atraumatic.   Right Ear: Hearing, tympanic membrane, external ear and ear canal normal.   Left Ear: Hearing, tympanic membrane, external ear and ear canal normal.   Nose: Mucosal edema and rhinorrhea present. No nasal deformity. No epistaxis. Right sinus exhibits no maxillary sinus tenderness and no frontal sinus tenderness. Left sinus exhibits no maxillary sinus tenderness and no frontal sinus tenderness.   Mouth/Throat: Uvula is midline and mucous membranes are normal. No trismus in the jaw. Normal dentition. No uvula swelling. Posterior oropharyngeal edema and posterior oropharyngeal erythema present.   Eyes: Conjunctivae and lids are normal. No scleral icterus.   Sclera clear bilat   Neck: Trachea normal, full passive range of motion without pain and phonation normal. Neck supple.   Cardiovascular: Normal rate, regular rhythm, normal heart sounds, intact distal pulses and normal pulses.   Pulmonary/Chest: Effort normal and breath sounds normal. No stridor. No respiratory distress. She has no decreased breath sounds. She has no wheezes.   Abdominal: Soft. Normal appearance and bowel sounds are normal. She exhibits no distension. There is no tenderness.   Musculoskeletal: Normal range of motion. She exhibits no edema or deformity.   Neurological: She is alert and oriented to person, place, and time. She exhibits normal muscle tone. Coordination normal.   Skin: Skin is warm, dry and intact. She is not diaphoretic. No pallor.   Psychiatric: She has a normal mood and affect. Her speech is normal and behavior is normal. Judgment and thought content normal. Cognition and memory are normal.   Nursing note and vitals reviewed.      Assessment:       1. Exposure to influenza    2. Upper respiratory tract infection, unspecified type    3. Cough        Plan:     PROMETHAZINE DM PRESCRIBED; PATIENT ON SCHEDULED PROMETHAZINE FOR HYPEREMESIS  GRAVIDARUM    Exposure to influenza  -     oseltamivir (TAMIFLU) 75 MG capsule; Take 1 capsule (75 mg total) by mouth once daily. for 10 days  Dispense: 10 capsule; Refill: 0    Upper respiratory tract infection, unspecified type    Cough  -     promethazine-dextromethorphan (PROMETHAZINE-DM) 6.25-15 mg/5 mL Syrp; Take 5 mLs by mouth nightly as needed.  Dispense: 118 mL; Refill: 0      Patient Instructions   Follow up with your doctor in a few days.  Return to the urgent care or go to the ER if symptoms get worse.    tamiflu as directed for prophylaxis.  Tylenol as directed.  Stay hydrated.  Promethazine DM as directed.  Follow up if symptoms do not improve.  mucinex  for chest congestion.        Bronchitis, Viral (Adult)    You have a viral bronchitis. Bronchitis is inflammation and swelling of the lining of the lungs. This is often caused by an infection. Symptoms include a dry, hacking cough that is worse at night. The cough may bring up yellow-green mucus. You may also feel short of breath or wheeze. Other symptoms may include tiredness, chest discomfort, and chills.  Bronchitis that is caused by a virus is not treated with antibiotics. Instead, medicines may be given to help relieve symptoms. Symptoms can last up to 2 weeks, although the cough may last much longer.  This illness is contagious during the first few days and is spread through the air by coughing and sneezing, or by direct contact (touching the sick person and then touching your own eyes, nose, or mouth).  Most viral illnesses resolve within 10 to 14 days with rest and simple home remedies, although they may sometimes last for several weeks.  Home care  · If symptoms are severe, rest at home for the first 2 to 3 days. When you go back to your usual activities, don't let yourself get too tired.  · Do not smoke. Also avoid being exposed to secondhand smoke.  · You may use over-the-counter medicine to control fever or pain, unless another pain  medicine was prescribed. (Note: If you have chronic liver or kidney disease or have ever had a stomach ulcer or gastrointestinal bleeding, talk with your healthcare provider before using these medicines. Also talk to your provider if you are taking medicine to prevent blood clots.) Aspirin should never be given to anyone younger than 18 years of age who is ill with a viral infection or fever. It may cause severe liver or brain damage.  · Your appetite may be poor, so a light diet is fine. Avoid dehydration by drinking 6 to 8 glasses of fluids per day (such as water, soft drinks, sports drinks, juices, tea, or soup). Extra fluids will help loosen secretions in the nose and lungs.  · Over-the-counter cough, cold, and sore-throat medicines will not shorten the length of the illness, but they may help to reduce symptoms. (Note: Do not use decongestants if you have high blood pressure.)  Follow-up care  Follow up with your healthcare provider, or as advised. If you had an X-ray or ECG (electrocardiogram), a specialist will review it. You will be notified of any new findings that may affect your care.  Note: If you are age 65 or older, or if you have a chronic lung disease or condition that affects your immune system, or you smoke, talk to your healthcare provider about having pneumococcal vaccinations and a yearly influenza vaccination (flu shot).  When to seek medical advice  Call your healthcare provider right away if any of these occur:  · Fever of 100.4°F (38°C) or higher  · Coughing up increased amounts of colored sputum  · Weakness, drowsiness, headache, facial pain, ear pain, or a stiff neck  Call 911, or get immediate medical care  Contact emergency services right away if any of these occur:  · Coughing up blood  · Worsening weakness, drowsiness, headache, or stiff neck  · Trouble breathing, wheezing, or pain with breathing  Date Last Reviewed: 9/13/2015  © 3195-9635 [employer]. [address] [place] [address]  Road, [place] [address]. All rights reserved. This information is not intended as a substitute for professional medical care. Always follow your healthcare professional's instructions.

## 2019-01-24 ENCOUNTER — INITIAL CONSULT (OUTPATIENT)
Dept: MATERNAL FETAL MEDICINE | Facility: CLINIC | Age: 34
End: 2019-01-24
Payer: MEDICARE

## 2019-01-24 ENCOUNTER — PROCEDURE VISIT (OUTPATIENT)
Dept: MATERNAL FETAL MEDICINE | Facility: CLINIC | Age: 34
End: 2019-01-24
Payer: MEDICARE

## 2019-01-24 VITALS
BODY MASS INDEX: 37.48 KG/M2 | DIASTOLIC BLOOD PRESSURE: 78 MMHG | SYSTOLIC BLOOD PRESSURE: 125 MMHG | WEIGHT: 239.31 LBS

## 2019-01-24 DIAGNOSIS — O09.899 HISTORY OF PRETERM DELIVERY, CURRENTLY PREGNANT: ICD-10-CM

## 2019-01-24 DIAGNOSIS — Z3A.01 7 WEEKS GESTATION OF PREGNANCY: ICD-10-CM

## 2019-01-24 DIAGNOSIS — Z36.89 ENCOUNTER FOR FETAL ANATOMIC SURVEY: Primary | ICD-10-CM

## 2019-01-24 DIAGNOSIS — B20 HIV (HUMAN IMMUNODEFICIENCY VIRUS INFECTION): ICD-10-CM

## 2019-01-24 DIAGNOSIS — Z36.82 ENCOUNTER FOR ANTENATAL SCREENING FOR NUCHAL TRANSLUCENCY: ICD-10-CM

## 2019-01-24 PROCEDURE — 3008F BODY MASS INDEX DOCD: CPT | Mod: HCWC,CPTII,S$GLB, | Performed by: PEDIATRICS

## 2019-01-24 PROCEDURE — 76813 OB US NUCHAL MEAS 1 GEST: CPT | Mod: HCWC,S$GLB,, | Performed by: PEDIATRICS

## 2019-01-24 PROCEDURE — 76801 OB US < 14 WKS SINGLE FETUS: CPT | Mod: HCWC,S$GLB,, | Performed by: PEDIATRICS

## 2019-01-24 PROCEDURE — 99999 PR PBB SHADOW E&M-EST. PATIENT-LVL III: CPT | Mod: PBBFAC,HCWC,, | Performed by: PEDIATRICS

## 2019-01-24 PROCEDURE — 76801 PR US, OB <14WKS, TRANSABD, SINGLE GESTATION: ICD-10-PCS | Mod: HCWC,S$GLB,, | Performed by: PEDIATRICS

## 2019-01-24 PROCEDURE — 3008F PR BODY MASS INDEX (BMI) DOCUMENTED: ICD-10-PCS | Mod: HCWC,CPTII,S$GLB, | Performed by: PEDIATRICS

## 2019-01-24 PROCEDURE — 99205 OFFICE O/P NEW HI 60 MIN: CPT | Mod: HCWC,25,S$GLB, | Performed by: PEDIATRICS

## 2019-01-24 PROCEDURE — 99205 PR OFFICE/OUTPT VISIT, NEW, LEVL V, 60-74 MIN: ICD-10-PCS | Mod: HCWC,25,S$GLB, | Performed by: PEDIATRICS

## 2019-01-24 PROCEDURE — 76813 PR US, OB NUCHAL, TRANSABDOM/TRANSVAG, FIRST GESTATION: ICD-10-PCS | Mod: HCWC,S$GLB,, | Performed by: PEDIATRICS

## 2019-01-24 PROCEDURE — 99999 PR PBB SHADOW E&M-EST. PATIENT-LVL III: ICD-10-PCS | Mod: PBBFAC,HCWC,, | Performed by: PEDIATRICS

## 2019-01-24 NOTE — LETTER
January 29, 2019      Chantal Worrell MD  [address]  [facility] [address]           [facility] Maternal Fetal [facility]  [address] [facility] [address]  Phone: [phone]  Fax: [phone]          Patient: Jm Newton   MR Number: [id number]   YOB: 1985   Date of Visit: 1/24/2019       Dear Dr. Chantal Worrell:    Thank you for referring Jm Newton to me for evaluation. Attached you will find relevant portions of my assessment and plan of care.    If you have questions, please do not hesitate to call me. I look forward to following Jm Newton along with you.    Sincerely,    Reji Martin  CC:  No Recipients    If you would like to receive this communication electronically, please contact [email or web address] or [phone] to request more information on [employer] access.    For providers and/or their staff who would like to refer a patient to [facility], please contact us through our one-stop-shop provider referral line, [facility], at [phone].    If you feel you have received this communication in error or would no longer like to receive these types of communications, please e-mail [email or web address]

## 2019-01-25 ENCOUNTER — HOSPITAL ENCOUNTER (INPATIENT)
Facility: OTHER | Age: 34
LOS: 3 days | Discharge: HOME | DRG: 832 | End: 2019-01-28
Attending: OBSTETRICS & GYNECOLOGY | Admitting: OBSTETRICS & GYNECOLOGY
Payer: MEDICARE

## 2019-01-25 DIAGNOSIS — O21.0 HYPEREMESIS OF PREGNANCY: ICD-10-CM

## 2019-01-25 DIAGNOSIS — B20 HIV (HUMAN IMMUNODEFICIENCY VIRUS INFECTION): Primary | ICD-10-CM

## 2019-01-25 DIAGNOSIS — O09.91 SUPERVISION OF HIGH RISK PREGNANCY IN FIRST TRIMESTER: ICD-10-CM

## 2019-01-25 PROCEDURE — G0378 HOSPITAL OBSERVATION PER HR: HCPCS | Mod: HCWC

## 2019-01-25 PROCEDURE — 11000001 HC ACUTE MED/SURG PRIVATE ROOM: Mod: HCWC

## 2019-01-25 PROCEDURE — 99219 PR INITIAL OBSERVATION CARE,LEVL II: CPT | Mod: HCWC,GC,, | Performed by: OBSTETRICS & GYNECOLOGY

## 2019-01-25 PROCEDURE — 99219 PR INITIAL OBSERVATION CARE,LEVL II: ICD-10-PCS | Mod: HCWC,GC,, | Performed by: OBSTETRICS & GYNECOLOGY

## 2019-01-25 PROCEDURE — G0379 DIRECT REFER HOSPITAL OBSERV: HCPCS | Mod: HCWC

## 2019-01-25 RX ORDER — PYRIDOXINE HCL (VITAMIN B6) 25 MG
25 TABLET ORAL ONCE
Status: DISCONTINUED | OUTPATIENT
Start: 2019-01-26 | End: 2019-01-25

## 2019-01-25 RX ORDER — AMOXICILLIN 250 MG
1 CAPSULE ORAL NIGHTLY PRN
Status: DISCONTINUED | OUTPATIENT
Start: 2019-01-25 | End: 2019-01-28 | Stop reason: HOSPADM

## 2019-01-25 RX ORDER — PYRIDOXINE HCL (VITAMIN B6) 25 MG
25 TABLET ORAL NIGHTLY
Status: DISCONTINUED | OUTPATIENT
Start: 2019-01-25 | End: 2019-01-28 | Stop reason: HOSPADM

## 2019-01-25 RX ORDER — ALBUTEROL SULFATE 90 UG/1
2 AEROSOL, METERED RESPIRATORY (INHALATION) EVERY 6 HOURS PRN
Status: DISCONTINUED | OUTPATIENT
Start: 2019-01-25 | End: 2019-01-28 | Stop reason: HOSPADM

## 2019-01-25 RX ORDER — DIPHENHYDRAMINE HCL 25 MG
25 CAPSULE ORAL EVERY 4 HOURS PRN
Status: DISCONTINUED | OUTPATIENT
Start: 2019-01-25 | End: 2019-01-28 | Stop reason: HOSPADM

## 2019-01-25 RX ORDER — PROMETHAZINE HYDROCHLORIDE 12.5 MG/1
12.5 TABLET ORAL EVERY 4 HOURS
Status: DISCONTINUED | OUTPATIENT
Start: 2019-01-26 | End: 2019-01-25

## 2019-01-25 RX ORDER — PYRIDOXINE HCL (VITAMIN B6) 25 MG
25 TABLET ORAL NIGHTLY
Status: DISCONTINUED | OUTPATIENT
Start: 2019-01-25 | End: 2019-01-25

## 2019-01-25 RX ORDER — SODIUM CHLORIDE, SODIUM LACTATE, POTASSIUM CHLORIDE, CALCIUM CHLORIDE 600; 310; 30; 20 MG/100ML; MG/100ML; MG/100ML; MG/100ML
INJECTION, SOLUTION INTRAVENOUS CONTINUOUS
Status: DISCONTINUED | OUTPATIENT
Start: 2019-01-26 | End: 2019-01-27

## 2019-01-25 RX ORDER — SIMETHICONE 80 MG
1 TABLET,CHEWABLE ORAL EVERY 6 HOURS PRN
Status: DISCONTINUED | OUTPATIENT
Start: 2019-01-25 | End: 2019-01-28 | Stop reason: HOSPADM

## 2019-01-25 RX ORDER — EMTRICITABINE AND TENOFOVIR DISOPROXIL FUMARATE 200; 300 MG/1; MG/1
1 TABLET, FILM COATED ORAL DAILY
Status: DISCONTINUED | OUTPATIENT
Start: 2019-01-26 | End: 2019-01-28 | Stop reason: HOSPADM

## 2019-01-25 RX ORDER — DIPHENHYDRAMINE HYDROCHLORIDE 50 MG/ML
25 INJECTION INTRAMUSCULAR; INTRAVENOUS EVERY 4 HOURS PRN
Status: DISCONTINUED | OUTPATIENT
Start: 2019-01-25 | End: 2019-01-28 | Stop reason: HOSPADM

## 2019-01-25 RX ORDER — PRENATAL WITH FERROUS FUM AND FOLIC ACID 3080; 920; 120; 400; 22; 1.84; 3; 20; 10; 1; 12; 200; 27; 25; 2 [IU]/1; [IU]/1; MG/1; [IU]/1; MG/1; MG/1; MG/1; MG/1; MG/1; MG/1; UG/1; MG/1; MG/1; MG/1; MG/1
1 TABLET ORAL DAILY
Status: DISCONTINUED | OUTPATIENT
Start: 2019-01-26 | End: 2019-01-28 | Stop reason: HOSPADM

## 2019-01-25 RX ORDER — OSELTAMIVIR PHOSPHATE 75 MG/1
75 CAPSULE ORAL DAILY
Status: DISCONTINUED | OUTPATIENT
Start: 2019-01-26 | End: 2019-01-27

## 2019-01-26 LAB
ALBUMIN SERPL BCP-MCNC: 3.1 G/DL
ALP SERPL-CCNC: 56 U/L
ALT SERPL W/O P-5'-P-CCNC: 15 U/L
ANION GAP SERPL CALC-SCNC: 13 MMOL/L
AST SERPL-CCNC: 19 U/L
BACTERIA #/AREA URNS HPF: NORMAL /HPF
BASOPHILS # BLD AUTO: 0.01 K/UL
BASOPHILS NFR BLD: 0.3 %
BILIRUB SERPL-MCNC: 0.3 MG/DL
BILIRUB UR QL STRIP: NEGATIVE
BUN SERPL-MCNC: 8 MG/DL
CALCIUM SERPL-MCNC: 9.6 MG/DL
CHLORIDE SERPL-SCNC: 106 MMOL/L
CLARITY UR: CLEAR
CO2 SERPL-SCNC: 18 MMOL/L
COLOR UR: YELLOW
CREAT SERPL-MCNC: 0.6 MG/DL
DIFFERENTIAL METHOD: ABNORMAL
EOSINOPHIL # BLD AUTO: 0 K/UL
EOSINOPHIL NFR BLD: 0.9 %
ERYTHROCYTE [DISTWIDTH] IN BLOOD BY AUTOMATED COUNT: 19.6 %
EST. GFR  (AFRICAN AMERICAN): >60 ML/MIN/1.73 M^2
EST. GFR  (NON AFRICAN AMERICAN): >60 ML/MIN/1.73 M^2
GLUCOSE SERPL-MCNC: 85 MG/DL
GLUCOSE UR QL STRIP: NEGATIVE
HCT VFR BLD AUTO: 30.6 %
HGB BLD-MCNC: 9.8 G/DL
HGB UR QL STRIP: NEGATIVE
KETONES UR QL STRIP: NEGATIVE
LEUKOCYTE ESTERASE UR QL STRIP: ABNORMAL
LYMPHOCYTES # BLD AUTO: 1.2 K/UL
LYMPHOCYTES NFR BLD: 34 %
MAGNESIUM SERPL-MCNC: 1.7 MG/DL
MCH RBC QN AUTO: 22.1 PG
MCHC RBC AUTO-ENTMCNC: 32 G/DL
MCV RBC AUTO: 69 FL
MICROSCOPIC COMMENT: NORMAL
MONOCYTES # BLD AUTO: 0.3 K/UL
MONOCYTES NFR BLD: 9.4 %
NEUTROPHILS # BLD AUTO: 1.9 K/UL
NEUTROPHILS NFR BLD: 55.1 %
NITRITE UR QL STRIP: NEGATIVE
PH UR STRIP: 7 [PH] (ref 5–8)
PHOSPHATE SERPL-MCNC: 3.4 MG/DL
PLATELET # BLD AUTO: 253 K/UL
PMV BLD AUTO: 9.5 FL
POTASSIUM SERPL-SCNC: 4 MMOL/L
PROT SERPL-MCNC: 8.2 G/DL
PROT UR QL STRIP: NEGATIVE
RBC # BLD AUTO: 4.43 M/UL
SODIUM SERPL-SCNC: 137 MMOL/L
SP GR UR STRIP: 1.01 (ref 1–1.03)
SQUAMOUS #/AREA URNS HPF: 3 /HPF
T4 FREE SERPL-MCNC: 1.52 NG/DL
TSH SERPL DL<=0.005 MIU/L-ACNC: <0.01 UIU/ML
URN SPEC COLLECT METH UR: ABNORMAL
UROBILINOGEN UR STRIP-ACNC: NEGATIVE EU/DL
WBC # BLD AUTO: 3.41 K/UL
WBC #/AREA URNS HPF: 4 /HPF (ref 0–5)
WBC CLUMPS URNS QL MICRO: NORMAL

## 2019-01-26 PROCEDURE — 36415 COLL VENOUS BLD VENIPUNCTURE: CPT | Mod: HCWC

## 2019-01-26 PROCEDURE — 25000003 PHARM REV CODE 250: Mod: HCWC | Performed by: STUDENT IN AN ORGANIZED HEALTH CARE EDUCATION/TRAINING PROGRAM

## 2019-01-26 PROCEDURE — 85025 COMPLETE CBC W/AUTO DIFF WBC: CPT | Mod: HCWC

## 2019-01-26 PROCEDURE — G0378 HOSPITAL OBSERVATION PER HR: HCPCS | Mod: HCWC

## 2019-01-26 PROCEDURE — 84100 ASSAY OF PHOSPHORUS: CPT | Mod: HCWC

## 2019-01-26 PROCEDURE — 80053 COMPREHEN METABOLIC PANEL: CPT | Mod: HCWC

## 2019-01-26 PROCEDURE — 83735 ASSAY OF MAGNESIUM: CPT | Mod: HCWC

## 2019-01-26 PROCEDURE — 63600175 PHARM REV CODE 636 W HCPCS: Mod: HCWC | Performed by: OBSTETRICS & GYNECOLOGY

## 2019-01-26 PROCEDURE — 99900035 HC TECH TIME PER 15 MIN (STAT): Mod: HCWC

## 2019-01-26 PROCEDURE — 11000001 HC ACUTE MED/SURG PRIVATE ROOM: Mod: HCWC

## 2019-01-26 PROCEDURE — 25000003 PHARM REV CODE 250: Mod: HCWC | Performed by: OBSTETRICS & GYNECOLOGY

## 2019-01-26 PROCEDURE — 84443 ASSAY THYROID STIM HORMONE: CPT | Mod: HCWC

## 2019-01-26 PROCEDURE — 63600175 PHARM REV CODE 636 W HCPCS: Mod: HCWC | Performed by: STUDENT IN AN ORGANIZED HEALTH CARE EDUCATION/TRAINING PROGRAM

## 2019-01-26 PROCEDURE — 63700000 PHARM REV CODE 250 ALT 637 W/O HCPCS: Mod: HCWC | Performed by: OBSTETRICS & GYNECOLOGY

## 2019-01-26 PROCEDURE — 59025 FETAL NON-STRESS TEST: CPT | Mod: HCWC

## 2019-01-26 PROCEDURE — 84439 ASSAY OF FREE THYROXINE: CPT | Mod: HCWC

## 2019-01-26 PROCEDURE — 81000 URINALYSIS NONAUTO W/SCOPE: CPT | Mod: HCWC

## 2019-01-26 RX ORDER — ONDANSETRON 2 MG/ML
4 INJECTION INTRAMUSCULAR; INTRAVENOUS EVERY 6 HOURS
Status: DISCONTINUED | OUTPATIENT
Start: 2019-01-26 | End: 2019-01-26

## 2019-01-26 RX ORDER — PROMETHAZINE HYDROCHLORIDE 25 MG/1
25 TABLET ORAL EVERY 6 HOURS
Status: DISCONTINUED | OUTPATIENT
Start: 2019-01-26 | End: 2019-01-26

## 2019-01-26 RX ORDER — FAMOTIDINE 20 MG/1
20 TABLET, FILM COATED ORAL 2 TIMES DAILY
Status: DISCONTINUED | OUTPATIENT
Start: 2019-01-26 | End: 2019-01-28 | Stop reason: HOSPADM

## 2019-01-26 RX ORDER — ONDANSETRON 2 MG/ML
4 INJECTION INTRAMUSCULAR; INTRAVENOUS EVERY 6 HOURS
Status: DISCONTINUED | OUTPATIENT
Start: 2019-01-27 | End: 2019-01-27

## 2019-01-26 RX ORDER — DARUNAVIR 600 MG/1
600 TABLET, FILM COATED ORAL 2 TIMES DAILY WITH MEALS
Status: DISCONTINUED | OUTPATIENT
Start: 2019-01-26 | End: 2019-01-28 | Stop reason: HOSPADM

## 2019-01-26 RX ORDER — ONDANSETRON 8 MG/1
8 TABLET, ORALLY DISINTEGRATING ORAL EVERY 8 HOURS
Status: DISCONTINUED | OUTPATIENT
Start: 2019-01-27 | End: 2019-01-26

## 2019-01-26 RX ORDER — RITONAVIR 100 MG/1
100 TABLET ORAL 2 TIMES DAILY WITH MEALS
Status: DISCONTINUED | OUTPATIENT
Start: 2019-01-26 | End: 2019-01-28 | Stop reason: HOSPADM

## 2019-01-26 RX ADMIN — PROMETHAZINE HYDROCHLORIDE 25 MG: 25 INJECTION INTRAMUSCULAR; INTRAVENOUS at 08:01

## 2019-01-26 RX ADMIN — PROMETHAZINE HYDROCHLORIDE 25 MG: 25 INJECTION, SOLUTION INTRAMUSCULAR; INTRAVENOUS at 06:01

## 2019-01-26 RX ADMIN — SODIUM CHLORIDE, SODIUM LACTATE, POTASSIUM CHLORIDE, AND CALCIUM CHLORIDE: .6; .31; .03; .02 INJECTION, SOLUTION INTRAVENOUS at 07:01

## 2019-01-26 RX ADMIN — Medication 25 MG: at 08:01

## 2019-01-26 RX ADMIN — DARUNAVIR 600 MG: 600 TABLET, FILM COATED ORAL at 08:01

## 2019-01-26 RX ADMIN — ONDANSETRON 4 MG: 2 INJECTION INTRAMUSCULAR; INTRAVENOUS at 10:01

## 2019-01-26 RX ADMIN — ONDANSETRON 4 MG: 2 INJECTION INTRAMUSCULAR; INTRAVENOUS at 06:01

## 2019-01-26 RX ADMIN — DOXYLAMINE SUCCINATE 12.5 MG: 25 TABLET ORAL at 12:01

## 2019-01-26 RX ADMIN — DOXYLAMINE SUCCINATE 12.5 MG: 25 TABLET ORAL at 08:01

## 2019-01-26 RX ADMIN — FAMOTIDINE 20 MG: 20 TABLET, FILM COATED ORAL at 08:01

## 2019-01-26 RX ADMIN — SODIUM CHLORIDE, SODIUM LACTATE, POTASSIUM CHLORIDE, AND CALCIUM CHLORIDE: .6; .31; .03; .02 INJECTION, SOLUTION INTRAVENOUS at 03:01

## 2019-01-26 RX ADMIN — PROMETHAZINE HYDROCHLORIDE 25 MG: 25 INJECTION, SOLUTION INTRAMUSCULAR; INTRAVENOUS at 11:01

## 2019-01-26 RX ADMIN — SODIUM CHLORIDE, SODIUM LACTATE, POTASSIUM CHLORIDE, AND CALCIUM CHLORIDE: .6; .31; .03; .02 INJECTION, SOLUTION INTRAVENOUS at 11:01

## 2019-01-26 RX ADMIN — PRENATAL VIT W/ FE FUMARATE-FA TAB 27-0.8 MG 1 TABLET: 27-0.8 TAB at 09:01

## 2019-01-26 RX ADMIN — SODIUM CHLORIDE, SODIUM LACTATE, POTASSIUM CHLORIDE, AND CALCIUM CHLORIDE: .6; .31; .03; .02 INJECTION, SOLUTION INTRAVENOUS at 12:01

## 2019-01-26 RX ADMIN — EMTRICITABINE AND TENOFOVIR DISOPROXIL FUMARATE 1 TABLET: 200; 300 TABLET, FILM COATED ORAL at 09:01

## 2019-01-26 RX ADMIN — OSELTAMIVIR PHOSPHATE 75 MG: 75 CAPSULE ORAL at 09:01

## 2019-01-26 RX ADMIN — Medication 25 MG: at 12:01

## 2019-01-26 RX ADMIN — RITONAVIR 100 MG: 100 TABLET, FILM COATED ORAL at 08:01

## 2019-01-26 RX ADMIN — PROMETHAZINE HYDROCHLORIDE 25 MG: 25 INJECTION, SOLUTION INTRAMUSCULAR; INTRAVENOUS at 12:01

## 2019-01-26 NOTE — PLAN OF CARE
Problem: Adult Inpatient Plan of Care  Goal: Plan of Care Review  Outcome: Ongoing (interventions implemented as appropriate)  No respiratory distress, prn tx not required overnight.

## 2019-01-26 NOTE — H&P
HISTORY AND PHYSICAL  ANTEPARTUM          Subjective:       Jm Newton is a 33 y.o.  female with IUP at 12w4d measured by 7wk ultrasound with significant hx of HIV, hyperthyroidism, obesity, asthma, h/o prior classical  delivery and history of multiple prior  deliveries who is being admitted for nausea and vomiting of pregnancy.     Patient reportedly has been suffering from nausea and vomiting throughout pregnancy with inability to tolerate solid food since 9weeks gestation. She reports she has lost 5-7 pounds during pregnancy. She is tolerating liquid diet without difficulty. She had been prescribed zofran and phenergan which have not been providing relief despite taking daily. She was seen by her ID physician yesterday and reports an elevation in her HIV viral load possibly secondary to poor drug absorption due to nausea vomiting. He suggested admission for nausea/vomiting management.     Patient denies contractions, denies vaginal bleeding, denies LOF.   Fetal Movement: n/a.     PMHx:   Past Medical History:   Diagnosis Date    Asthma     HIV infection     dx     Hyperthyroidism in pregnancy, antepartum        PSHx:   Past Surgical History:   Procedure Laterality Date    CERCLAGE, CERVIX N/A 2014    Performed by [facility]bey [facility] at [facility] L[facility]D    CERVICAL CERCLAGE      emergent with twins     SECTION, CLASSIC      Last section with classical extention, from [facility]    CONE BIOPSY, CERVIX, USING COLD KNIFE N/A 2018    [facility]formed [facility]rell [facility] at [facility] OR    D & C (SUCTION) N/A 11/15/2016    Performed by Pancho Albert MD at [facility] OR    DELIVERY-CEASAREAN SECTION N/A 2014    Performed by [facility] at [facility] L[facility]D    DILATION AND CURETTAGE OF UTERUS      LEEP CONIZATION, CERVIX N/A 2018    Performed by [facility] at [facility] OR       All:   Review of patient's allergies indicates:   Allergen Reactions    Azithromycin  Edema       Meds:   Medications Prior to Admission   Medication Sig Dispense Refill Last Dose    albuterol (VENTOLIN HFA) 90 mcg/actuation inhaler Ventolin HFA 90 mcg/actuation aerosol inhaler 2 Puff(s) INH PRN as needed SOB   Taking    albuterol sulfate (VENTOLIN HFA INHL) Ventolin HFA 90 mcg/actuation aerosol inhaler 2 Puff(s) INH PRN as needed SOB   Taking    atazanavir (REYATAZ) 300 MG Cap 1 capsule with food   Taking    beclomethasone (QVAR) 80 mcg/actuation Aero Inhale 1 puff into the lungs 2 (two) times daily. Controller 1 Inhaler 0 Not Taking    beclomethasone dipropionate (QVAR REDIHALER) 80 mcg/actuation HFAB 1 puff   Not Taking    darunavir-cobicistat (PREZCOBIX) 800-150 mg-mg Tab Take 1 tablet by mouth once daily.   Taking    emtricitabine-tenofovir 200-300 mg (TRUVADA) 200-300 mg Tab 1 tablet   Taking    emtricitabine-tenofovir alafen (DESCOVY) 200-25 mg Tab Take 1 tablet by mouth once daily.   Taking    FLOVENT  mcg/actuation inhaler INL 2 PUFFS INTO THE LUNGS BID  11 Taking    hydroxyprogest,PF,,preg presv, (KARI) 250 mg/mL (1 mL) Oil Inject 1 mL (250 mg total) into the muscle every 7 days. 5 mL 6 Not Taking    ondansetron (ZOFRAN) 4 MG tablet Take 1 tablet (4 mg total) by mouth daily as needed for Nausea. 30 tablet 1 Taking    OPTICHAMBER HERBERT LG MASK Spcr U UTD BID  3 Taking    oseltamivir (TAMIFLU) 75 MG capsule Take 1 capsule (75 mg total) by mouth once daily. for 10 days 10 capsule 0 Taking    PROCHAMBER    Taking    promethazine (PHENERGAN) 25 MG tablet Take 1 tablet (25 mg total) by mouth every 6 (six) hours as needed for Nausea. 30 tablet 6 Not Taking    promethazine-dextromethorphan (PROMETHAZINE-DM) 6.25-15 mg/5 mL Syrp Take 5 mLs by mouth nightly as needed. 118 mL 0 Taking    ritonavir (NORVIR) 100 mg Cap 1 tablet with a meal   Taking    ritonavir (NORVIR) 100 mg Tab tablet   6 Taking    VENTOLIN HFA 90 mcg/actuation inhaler   5 Taking       SH:   Social  History     Socioeconomic History    Marital status: Single     Spouse name: Not on file    Number of children: Not on file    Years of education: Not on file    Highest education level: Not on file   Social Needs    Financial resource strain: Not on file    Food insecurity - worry: Not on file    Food insecurity - inability: Not on file    Transportation needs - medical: Not on file    Transportation needs - non-medical: Not on file   Occupational History    Not on file   Tobacco Use    Smoking status: Never Smoker    Smokeless tobacco: Never Used   Substance and Sexual Activity    Alcohol use: No     Alcohol/week: 0.0 oz     Frequency: Never     Comment: socially    Drug use: No    Sexual activity: Yes     Partners: Male     Birth control/protection: None   Other Topics Concern    Not on file   Social History Narrative    Not on file       FH:   Family History   Problem Relation Age of Onset    Colon cancer Neg Hx     Hypertension Neg Hx        OBHx:   Obstetric History       T1      L4     SAB0   TAB0   Ectopic0   Multiple2   Live Births6       # Outcome Date GA Lbr Adrian/2nd Weight Sex Delivery Anes PTL Lv   8 Current            7 Term 14 37w5d  3.26 kg (7 lb 3 oz) F CS-LTranv EPI N ANAHY      Name: Zaiddominik      Apgar1:  9                Apgar5: 9   6A   24w0d  0.51 kg (1 lb 2 oz) M CS-LTranv  Y DEC      Name: Humphrey       Complications: Premature rupture of membranes   6B   24w0d  0.992 kg (2 lb 3 oz) M CS-LTranv  Y DEC      Name: Cat      Complications: Premature rupture of membranes   5  09 35w0d  2.608 kg (5 lb 12 oz) M CS-LTranv  Y ANAHY      Name: Omega      Complications: Premature rupture of membranes   4  08 32w0d  2.722 kg (6 lb) M Vag-Spont  Y ANAHY      Name: Edgar      Complications: Premature rupture of membranes   3  10/05/06 24w0d   M Vag-Spont  Y FD      Name: Mak      Complications: Abruptio Placenta    2  03 35w0d  2.722 kg (6 lb) M Vag-Spont  Y ANAHY      Name: Jeremy   1                    Objective:       /64   Pulse 85   Temp 98.6 °F (37 °C) (Oral)   Resp 18   LMP 10/29/2018 (Exact Date)   SpO2 97%   Breastfeeding? No     Vitals:    19 2231 19 2300   BP: 127/64    Pulse: 85    Resp: 18    Temp:  98.6 °F (37 °C)   TempSrc:  Oral   SpO2: 97%        General:   alert, appears stated age and cooperative   Lungs:   clear to auscultation bilaterally   Heart:   regular rate and rhythm, S1, S2 normal, no murmur, click, rub or gallop   Abdomen:  soft, non-tender; bowel sounds normal; no masses,  no organomegaly   Extremities negative edema, negative erythema   FHT: Confirmed via ultrasound      Lab Review  Blood Type O POS  GBBS: n/a   Rubella: Immune  RPR: NR  HIV: positive  HepB: negative      Assessment:       12w4d weeks gestation presents for nausea and vomiting     Active Hospital Problems    Diagnosis  POA    Hyperemesis of pregnancy [O21.0]  Yes      Resolved Hospital Problems   No resolved problems to display.            Plan:   Plan to admit patient due to persistent nausea and vomiting with inability to tolerate PO intake. Admit to antefloor.    1. Nausea and Vomiting in pregnancy   - NPO until nausea resolves  - scheduled phenergan 25 q6  - doxylamine 12.5 mg and pyridoxine 25 mg nightly   - IVFs   - CBC, CMP drawn  - upon chart review appears patient has gained weight     2. HIV   - patient is currently on truvada, Reyatav and norvir  - per patient her ID physician is switching her to Truvada and two other new medications which she does not know the name of starting tomorrow   - plan contact Dr. Hamlin at [facility] in the am to discuss patient care   - continue home HIV medications   - viral load 1 month ago 1390; per patient viral load was also elevated at ID visit yesterday    3. Asthma   - currently asymptomatic  - patient has home inhaler with her which can  be used for symptomatic relief      4. Obesity   - TEDs/SCDs  - encourage ambulation       5. Hyperthyroidism   - TSH one month ago 0.057  - free T4 normal   - repeat TSH pending      Grace Landry MD  [facility], PGY-1

## 2019-01-26 NOTE — CONSULTS
Consult received for 'long term nausea & vomiting'. Pt 12wks pregnant. No height or weight to properly assess needs, evaluation pending. RN please obtain weight and height.

## 2019-01-27 PROCEDURE — 99900035 HC TECH TIME PER 15 MIN (STAT): Mod: HCWC

## 2019-01-27 PROCEDURE — 63700000 PHARM REV CODE 250 ALT 637 W/O HCPCS: Mod: HCWC | Performed by: OBSTETRICS & GYNECOLOGY

## 2019-01-27 PROCEDURE — 99232 SBSQ HOSP IP/OBS MODERATE 35: CPT | Mod: HCWC,GC,, | Performed by: OBSTETRICS & GYNECOLOGY

## 2019-01-27 PROCEDURE — 25000003 PHARM REV CODE 250: Mod: HCWC | Performed by: STUDENT IN AN ORGANIZED HEALTH CARE EDUCATION/TRAINING PROGRAM

## 2019-01-27 PROCEDURE — G0378 HOSPITAL OBSERVATION PER HR: HCPCS | Mod: HCWC

## 2019-01-27 PROCEDURE — 25000003 PHARM REV CODE 250: Mod: HCWC | Performed by: OBSTETRICS & GYNECOLOGY

## 2019-01-27 PROCEDURE — 11000001 HC ACUTE MED/SURG PRIVATE ROOM: Mod: HCWC

## 2019-01-27 PROCEDURE — 99232 PR SUBSEQUENT HOSPITAL CARE,LEVL II: ICD-10-PCS | Mod: HCWC,GC,, | Performed by: OBSTETRICS & GYNECOLOGY

## 2019-01-27 PROCEDURE — 63600175 PHARM REV CODE 636 W HCPCS: Mod: HCWC | Performed by: STUDENT IN AN ORGANIZED HEALTH CARE EDUCATION/TRAINING PROGRAM

## 2019-01-27 RX ORDER — ONDANSETRON 4 MG/1
4 TABLET, FILM COATED ORAL EVERY 6 HOURS
Status: DISCONTINUED | OUTPATIENT
Start: 2019-01-27 | End: 2019-01-28 | Stop reason: HOSPADM

## 2019-01-27 RX ORDER — METOCLOPRAMIDE 10 MG/1
10 TABLET ORAL
Status: DISCONTINUED | OUTPATIENT
Start: 2019-01-27 | End: 2019-01-28 | Stop reason: HOSPADM

## 2019-01-27 RX ORDER — ONDANSETRON 4 MG/1
4 TABLET, FILM COATED ORAL EVERY 6 HOURS
Status: DISCONTINUED | OUTPATIENT
Start: 2019-01-27 | End: 2019-01-27

## 2019-01-27 RX ORDER — ONDANSETRON 2 MG/ML
4 INJECTION INTRAMUSCULAR; INTRAVENOUS EVERY 6 HOURS
Status: DISCONTINUED | OUTPATIENT
Start: 2019-01-27 | End: 2019-01-27

## 2019-01-27 RX ADMIN — PROMETHAZINE HYDROCHLORIDE 25 MG: 25 INJECTION INTRAMUSCULAR; INTRAVENOUS at 03:01

## 2019-01-27 RX ADMIN — RITONAVIR 100 MG: 100 TABLET, FILM COATED ORAL at 07:01

## 2019-01-27 RX ADMIN — DARUNAVIR 600 MG: 600 TABLET, FILM COATED ORAL at 07:01

## 2019-01-27 RX ADMIN — FAMOTIDINE 20 MG: 20 TABLET, FILM COATED ORAL at 08:01

## 2019-01-27 RX ADMIN — SODIUM CHLORIDE, SODIUM LACTATE, POTASSIUM CHLORIDE, AND CALCIUM CHLORIDE: .6; .31; .03; .02 INJECTION, SOLUTION INTRAVENOUS at 11:01

## 2019-01-27 RX ADMIN — ONDANSETRON 4 MG: 2 INJECTION INTRAMUSCULAR; INTRAVENOUS at 01:01

## 2019-01-27 RX ADMIN — DOXYLAMINE SUCCINATE 12.5 MG: 25 TABLET ORAL at 09:01

## 2019-01-27 RX ADMIN — PRENATAL VIT W/ FE FUMARATE-FA TAB 27-0.8 MG 1 TABLET: 27-0.8 TAB at 08:01

## 2019-01-27 RX ADMIN — ONDANSETRON 4 MG: 4 TABLET, FILM COATED ORAL at 10:01

## 2019-01-27 RX ADMIN — METOCLOPRAMIDE 10 MG: 10 TABLET ORAL at 04:01

## 2019-01-27 RX ADMIN — RITONAVIR 100 MG: 100 TABLET, FILM COATED ORAL at 04:01

## 2019-01-27 RX ADMIN — EMTRICITABINE AND TENOFOVIR DISOPROXIL FUMARATE 1 TABLET: 200; 300 TABLET, FILM COATED ORAL at 08:01

## 2019-01-27 RX ADMIN — DARUNAVIR 600 MG: 600 TABLET, FILM COATED ORAL at 04:01

## 2019-01-27 RX ADMIN — Medication 25 MG: at 09:01

## 2019-01-27 RX ADMIN — ONDANSETRON 4 MG: 2 INJECTION INTRAMUSCULAR; INTRAVENOUS at 07:01

## 2019-01-27 RX ADMIN — METOCLOPRAMIDE 10 MG: 10 TABLET ORAL at 12:01

## 2019-01-27 RX ADMIN — PROMETHAZINE HYDROCHLORIDE 25 MG: 25 INJECTION INTRAMUSCULAR; INTRAVENOUS at 08:01

## 2019-01-27 RX ADMIN — FAMOTIDINE 20 MG: 20 TABLET, FILM COATED ORAL at 09:01

## 2019-01-27 NOTE — PLAN OF CARE
Problem: Adult Inpatient Plan of Care  Goal: Plan of Care Review  Outcome: Ongoing (interventions implemented as appropriate)  No major events over night. VSS. Pt had 1 episode of vomiting. Pt is able to keep down liquids and PO pills. IV meds given for N/V. HIV medications given as ordered. POC reviewed with pt. All questions and concerns answered. Denies vaginal bleeding, LOF, and cxts. Remains safe and free from falls with call light in reach. Will continue to monitor.

## 2019-01-27 NOTE — PROGRESS NOTES
PROGRESS NOTE - ANTEPARTUM    Admit Date: 2019   LOS: 1 day     Reason for Admission:  Hyperemesis of pregnancy    SUBJECTIVE:     Jm Newton is a 33 y.o. female at 12w6d who is here for management of hyperemesis gravidarum which has resulted in her HIV viral load increasing due to vomiting up her medication.  Patient had episode of emesis after dinner but no nausea or emesis overnight.  Patient reports no obstetric complaints.    OBJECTIVE:     Vital Signs (last 24 hours)  Temp:  [96.6 °F (35.9 °C)-97.8 °F (36.6 °C)] 96.6 °F (35.9 °C)  Pulse:  [71-87] 86  Resp:  [18] 18  SpO2:  [98 %-100 %] 100 %  BP: (114-130)/(53-61) 119/56    I & O (Last 24H):    Intake/Output Summary (Last 24 hours) at 2019 1148  Last data filed at 2019 1547  Gross per 24 hour   Intake 1320.42 ml   Output 600 ml   Net 720.42 ml       Physical Exam:  General: well developed, well nourished, appears stated age, no distress  Lungs:  clear to auscultation bilaterally and normal respiratory effort  Heart: regular rate and rhythm, S1, S2 normal, no murmur, click, rub or gallop  Abdomen: soft, non-tender non-distented; bowel sounds normal; no masses,  no organomegaly and gravid  Extremities: no cyanosis or edema, or clubbing and no edema, redness or tenderness in the calves or thighs      ASSESSMENT/PLAN:     Assessment:   33 y.o.female  at 12w6d HD#2 for hyperemesis gravidum    Active Hospital Problems    Diagnosis  POA    *Hyperemesis of pregnancy [O21.0]  Yes    HIV (human immunodeficiency virus infection) [B20]  Yes      Resolved Hospital Problems   No resolved problems to display.       Plan:  1. Hyperemesis  - No baseline nausea or emesis between meals, episodes still only after meals,   - Continue scheduled zofran/phenergan, will add reglan 10mg TID  - Continue IVF  - Discontinue tamilfu    2. HIV  - Continue Truvada, Darunavir/ritonavir, emtricitibaine-tenofovir    3. Asthma  - PRN  albuterol      Scheduled Meds:   darunavir ethanolate  600 mg Oral BID WM    And    ritonavir  100 mg Oral BID WM    doxylamine succinate  12.5 mg Oral QHS    And    pyridoxine (vitamin B6)  25 mg Oral QHS    emtricitabine-tenofovir 200-300 mg  1 tablet Oral Daily    famotidine  20 mg Oral BID    metoclopramide HCl  10 mg Oral TID AC    ondansetron  4 mg Intravenous Q6H    prenatal vitamin  1 tablet Oral Daily    promethazine (PHENERGAN) IVPB  25 mg Intravenous Q6H     Continuous Infusions:   lactated ringers 125 mL/hr at 01/26/19 2338     PRN Meds:albuterol, diphenhydrAMINE, diphenhydrAMINE, senna-docusate 8.6-50 mg, simethicone    Marcus Jacobs MD   PGY-4 Ob-gyn

## 2019-01-28 ENCOUNTER — ANESTHESIA (OUTPATIENT)
Dept: OBSTETRICS AND GYNECOLOGY | Facility: OTHER | Age: 34
End: 2019-01-28

## 2019-01-28 ENCOUNTER — TELEPHONE (OUTPATIENT)
Dept: MATERNAL FETAL MEDICINE | Facility: CLINIC | Age: 34
End: 2019-01-28

## 2019-01-28 ENCOUNTER — ANESTHESIA EVENT (OUTPATIENT)
Dept: OBSTETRICS AND GYNECOLOGY | Facility: OTHER | Age: 34
End: 2019-01-28

## 2019-01-28 VITALS
DIASTOLIC BLOOD PRESSURE: 54 MMHG | RESPIRATION RATE: 18 BRPM | HEART RATE: 74 BPM | OXYGEN SATURATION: 98 % | TEMPERATURE: 97 F | SYSTOLIC BLOOD PRESSURE: 106 MMHG

## 2019-01-28 PROCEDURE — 25000003 PHARM REV CODE 250: Mod: HCWC | Performed by: OBSTETRICS & GYNECOLOGY

## 2019-01-28 PROCEDURE — 25000003 PHARM REV CODE 250: Mod: HCWC | Performed by: STUDENT IN AN ORGANIZED HEALTH CARE EDUCATION/TRAINING PROGRAM

## 2019-01-28 PROCEDURE — 99232 SBSQ HOSP IP/OBS MODERATE 35: CPT | Mod: HCWC,GC,, | Performed by: OBSTETRICS & GYNECOLOGY

## 2019-01-28 PROCEDURE — 99232 PR SUBSEQUENT HOSPITAL CARE,LEVL II: ICD-10-PCS | Mod: HCWC,GC,, | Performed by: OBSTETRICS & GYNECOLOGY

## 2019-01-28 PROCEDURE — 99900035 HC TECH TIME PER 15 MIN (STAT): Mod: HCWC

## 2019-01-28 PROCEDURE — 11000001 HC ACUTE MED/SURG PRIVATE ROOM: Mod: HCWC

## 2019-01-28 RX ORDER — PYRIDOXINE HCL (VITAMIN B6) 25 MG
25 TABLET ORAL NIGHTLY
Refills: 0 | COMMUNITY
Start: 2019-01-28 | End: 2019-04-25

## 2019-01-28 RX ORDER — METOCLOPRAMIDE 10 MG/1
10 TABLET ORAL
Qty: 60 TABLET | Refills: 2 | Status: ON HOLD | OUTPATIENT
Start: 2019-01-28 | End: 2019-10-22

## 2019-01-28 RX ADMIN — RITONAVIR 100 MG: 100 TABLET, FILM COATED ORAL at 08:01

## 2019-01-28 RX ADMIN — FAMOTIDINE 20 MG: 20 TABLET, FILM COATED ORAL at 08:01

## 2019-01-28 RX ADMIN — PRENATAL VIT W/ FE FUMARATE-FA TAB 27-0.8 MG 1 TABLET: 27-0.8 TAB at 08:01

## 2019-01-28 RX ADMIN — EMTRICITABINE AND TENOFOVIR DISOPROXIL FUMARATE 1 TABLET: 200; 300 TABLET, FILM COATED ORAL at 08:01

## 2019-01-28 RX ADMIN — DARUNAVIR 600 MG: 600 TABLET, FILM COATED ORAL at 08:01

## 2019-01-28 RX ADMIN — METOCLOPRAMIDE 10 MG: 10 TABLET ORAL at 06:01

## 2019-01-28 RX ADMIN — METOCLOPRAMIDE 10 MG: 10 TABLET ORAL at 11:01

## 2019-01-28 RX ADMIN — ONDANSETRON 4 MG: 4 TABLET, FILM COATED ORAL at 11:01

## 2019-01-28 RX ADMIN — ONDANSETRON 4 MG: 4 TABLET, FILM COATED ORAL at 06:01

## 2019-01-28 NOTE — PLAN OF CARE
Problem: Adult Inpatient Plan of Care  Goal: Plan of Care Review  Outcome: Ongoing (interventions implemented as appropriate)  PRN treatment not requested.

## 2019-01-28 NOTE — PLAN OF CARE
Problem: Adult Inpatient Plan of Care  Goal: Plan of Care Review  Outcome: Ongoing (interventions implemented as appropriate)  No acute changes this overnight. Pt denies N/V and was able to tolerate meals/PO meds. HIV and nausea meds given as ordered. Denies LOF, vag bleeding. POC discussed with pt. All questions answered, pt verbalized understanding. Pt free from falls/injuries. Will continue to monitor.

## 2019-01-28 NOTE — TELEPHONE ENCOUNTER
Patient has been notified of NEGATIVE [id number] results. This specimen showed an expected representation of chromosomes 13, 18 and 21 material.     Patient requested fetal sex information be placed in an sealed envelope and she will pick it up from [facility] clinic after cerclage on 1/31/19.    Pt verbalized understanding of information.

## 2019-01-28 NOTE — PLAN OF CARE
Problem: Adult Inpatient Plan of Care  Goal: Plan of Care Review  Outcome: Ongoing (interventions implemented as appropriate)  No PRN tx required. No distress noted.

## 2019-01-28 NOTE — DISCHARGE SUMMARY
[facility]  Discharge Summary  Obstetrics - Antepartum      Admit Date: 1/25/2019    Discharge Date and Time: 1/28/2019 11:30 AM    Discharge Attending Physician: No att. providers found     Discharge Provider: Gibson Dunn    Reason for Admission: hyperemesis    Procedures Performed: * No surgery found *    Hospital Course Pt was admitted for hyperemesis. Was placed on IVF and given scheduled antiemetics and diclegis. HIV medications were continued based on her ID doctor's recommendations.  Was started on reglan with meals which resolved patients symptoms. Pt was discharged home with a rx for reglan to take with meals.     Significant Diagnostic Studies: Labs:   [facility] No results for input(s): NA, K, CL, CO2, GLU, BUN, CREATININE, CALCIUM, PROT, ALBUMIN, BILITOT, ALKPHOS, AST, ALT, ANIONGAP, ESTGFRAFRICA, EGFRNONAA in the last 48 hours. and CBC   Recent Labs   Lab 01/31/19  0844   WBC 5.98   HGB 9.9*   HCT 30.1*          Final Diagnoses:   Principal Problem: Hyperemesis of pregnancy   Secondary Diagnoses: HIV    Discharged Condition: good    Disposition: Home or Self Care    Follow Up/Patient Instructions:     Medications:  Reconciled Home Medications:      Medication List      START taking these medications    doxylamine succinate 25 mg tablet  Take 1 tablet (25 mg total) by mouth nightly as needed (nausea).     metoclopramide HCl 10 MG tablet  Commonly known as:  REGLAN  Take 1 tablet (10 mg total) by mouth 3 (three) times daily before meals.     pyridoxine (vitamin B6) 25 MG Tab  Commonly known as:  B-6  Take 1 tablet (25 mg total) by mouth every evening.        CONTINUE taking these medications    FLOVENT  mcg/actuation inhaler  Generic drug:  fluticasone  INL 2 PUFFS INTO THE LUNGS BID     hydroxyprogest(PF)(preg presv) 250 mg/mL (1 mL) Oil  Commonly known as:  KARI  Inject 1 mL (250 mg total) into the muscle every 7 days.     * NORVIR 100 mg Cap  Generic drug:  ritonavir  1  tablet with a meal     * ritonavir 100 mg Tab tablet  Commonly known as:  NORVIR     ondansetron 4 MG tablet  Commonly known as:  ZOFRAN  Take 1 tablet (4 mg total) by mouth daily as needed for Nausea.     OPTICHAMBER HERBERT LG MASK Spcr  Generic drug:  inhalat.spacing dev,large mask  U UTD BID     oseltamivir 75 MG capsule  Commonly known as:  TAMIFLU  Take 1 capsule (75 mg total) by mouth once daily. for 10 days     PROCHAMBER  Generic drug:  inhalation spacing device     promethazine 25 MG tablet  Commonly known as:  PHENERGAN  Take 1 tablet (25 mg total) by mouth every 6 (six) hours as needed for Nausea.     promethazine-dextromethorphan 6.25-15 mg/5 mL Syrp  Commonly known as:  PROMETHAZINE-DM  Take 5 mLs by mouth nightly as needed.     * QVAR REDIHALER 80 mcg/actuation Hfab  Generic drug:  beclomethasone dipropionate  1 puff     * beclomethasone 80 mcg/actuation Aero  Commonly known as:  QVAR  Inhale 1 puff into the lungs 2 (two) times daily. Controller     TRUVADA 200-300 mg Tab  Generic drug:  emtricitabine-tenofovir 200-300 mg  1 tablet     * VENTOLIN HFA 90 mcg/actuation inhaler  Generic drug:  albuterol  Ventolin HFA 90 mcg/actuation aerosol inhaler 2 Puff(s) INH PRN as needed SOB     * VENTOLIN HFA INHL  Ventolin HFA 90 mcg/actuation aerosol inhaler 2 Puff(s) INH PRN as needed SOB     * VENTOLIN HFA 90 mcg/actuation inhaler  Generic drug:  albuterol         * This list has 7 medication(s) that are the same as other medications prescribed for you. Read the directions carefully, and ask your doctor or other care provider to review them with you.            STOP taking these medications    DESCOVY 200-25 mg Tab  Generic drug:  emtricitabine-tenofovir alafen     PREZCOBIX 800-150 mg-mg Tab  Generic drug:  darunavir-cobicistat     REYATAZ 300 MG Cap  Generic drug:  atazanavir          Discharge Procedure Orders   Diet Adult Regular     Notify your health care provider if you experience any of the following:   temperature >100.4     Notify your health care provider if you experience any of the following:  persistent nausea and vomiting or diarrhea     Notify your health care provider if you experience any of the following:  severe uncontrolled pain     Notify your health care provider if you experience any of the following:  increased confusion or weakness     Notify your health care provider if you experience any of the following:  persistent dizziness, light-headedness, or visual disturbances     Notify your health care provider if you experience any of the following:  worsening rash     Notify your health care provider if you experience any of the following:  severe persistent headache     Notify your health care provider if you experience any of the following:  difficulty breathing or increased cough     Activity as tolerated     Follow-up Information     [facility]. Schedule an appointment as soon as possible for a visit in 1 week.    Specialty:  Obstetrics and Gynecology  Why:  follow up nausea/vomiting  Contact information:  [address] [facility] [address] [phone]               Gibson Dunn MD  PGY2, OBGYN  [facility]

## 2019-01-28 NOTE — PROGRESS NOTES
PROGRESS NOTE - ANTEPARTUM    Admit Date: 2019   LOS: 1 day     Reason for Admission:  Hyperemesis of pregnancy    SUBJECTIVE:     Jm Newton is a 33 y.o. female at 13w0d who is here for management of hyperemesis gravidarum which has resulted in her HIV viral load increasing due to vomiting up her medication.  Patient reports no current nausea/vomiting. No pain. Patient reports no obstetric complaints.    OBJECTIVE:     Vital Signs (last 24 hours)  Temp:  [96.6 °F (35.9 °C)-98.3 °F (36.8 °C)] 97.1 °F (36.2 °C)  Pulse:  [73-88] 73  Resp:  [18] 18  SpO2:  [94 %-100 %] 94 %  BP: (111-125)/(53-56) 111/53    I & O (Last 24H):  No intake or output data in the 24 hours ending 19 0658    Physical Exam:  A&Ox3, NAD  nonlabored breathing, no respiratory distress  Abd soft, nontender, nondistended  No LE edema  Appropriate mood & affect      ASSESSMENT/PLAN:     Assessment:   33 y.o.female  at 12w6d HD#3 for hyperemesis gravidum    Active Hospital Problems    Diagnosis  POA    *Hyperemesis of pregnancy [O21.0]  Yes    HIV (human immunodeficiency virus infection) [B20]  Yes      Resolved Hospital Problems   No resolved problems to display.       Plan:  1. Hyperemesis  - No current nausea/vomiting  - Continue scheduled zofran/phenergan, will add reglan 10mg TID  - Continue IVF  - tamiflu discontinued  - PO challenge for breakfast this am    2. HIV  - Continue Truvada, Darunavir/ritonavir, emtricitibaine-tenofovir    3. Asthma  - PRN albuterol      Scheduled Meds:   darunavir ethanolate  600 mg Oral BID WM    And    ritonavir  100 mg Oral BID WM    doxylamine succinate  12.5 mg Oral QHS    And    pyridoxine (vitamin B6)  25 mg Oral QHS    emtricitabine-tenofovir 200-300 mg  1 tablet Oral Daily    famotidine  20 mg Oral BID    metoclopramide HCl  10 mg Oral TID AC    ondansetron  4 mg Oral Q6H    prenatal vitamin  1 tablet Oral Daily     Continuous Infusions:    PRN Meds:albuterol,  diphenhydrAMINE, diphenhydrAMINE, senna-docusate 8.6-50 mg, simethicone    Gibson Dunn MD  PGY2, OBGYN [facility]

## 2019-01-28 NOTE — DISCHARGE INSTRUCTIONS
Take your HIV medication as prescribed by Dr. Hamlin. You should be taking Truvada once daily, darunavir 600 two times a day, and ritonavir 100 two times a day. Please call Dr. Hamlin with any questions or concerns and follow up with him as scheduled by his office.

## 2019-01-28 NOTE — NURSING
Discharge instructions reviewed with pt. Pt denies questions, states understanding. Pt discharged home.

## 2019-01-29 ENCOUNTER — TELEPHONE (OUTPATIENT)
Dept: MATERNAL FETAL MEDICINE | Facility: CLINIC | Age: 34
End: 2019-01-29

## 2019-01-29 NOTE — TELEPHONE ENCOUNTER
After discussion between Dr. Lizarraga and Dr. Hollins, the decision was made to reschedule the patient's cerclage on 1/31/19 at 7 am to 9am. Pt was then contacted and informed of the time change. Pt will check-in at 7am on Labor & Delivery, nothing to eat or drink after midnight.    Pt verbalized understanding of information.

## 2019-01-31 ENCOUNTER — ANESTHESIA (OUTPATIENT)
Dept: OBSTETRICS AND GYNECOLOGY | Facility: OTHER | Age: 34
End: 2019-01-31

## 2019-01-31 ENCOUNTER — HOSPITAL ENCOUNTER (OUTPATIENT)
Facility: OTHER | Age: 34
Discharge: HOME | End: 2019-01-31
Attending: OBSTETRICS & GYNECOLOGY | Admitting: OBSTETRICS & GYNECOLOGY
Payer: MEDICARE

## 2019-01-31 ENCOUNTER — ANESTHESIA (OUTPATIENT)
Dept: OBSTETRICS AND GYNECOLOGY | Facility: OTHER | Age: 34
End: 2019-01-31
Payer: MEDICARE

## 2019-01-31 ENCOUNTER — ANESTHESIA EVENT (OUTPATIENT)
Dept: OBSTETRICS AND GYNECOLOGY | Facility: OTHER | Age: 34
End: 2019-01-31
Payer: MEDICARE

## 2019-01-31 ENCOUNTER — ANESTHESIA EVENT (OUTPATIENT)
Dept: OBSTETRICS AND GYNECOLOGY | Facility: OTHER | Age: 34
End: 2019-01-31

## 2019-01-31 ENCOUNTER — TELEPHONE (OUTPATIENT)
Dept: PHARMACY | Facility: CLINIC | Age: 34
End: 2019-01-31

## 2019-01-31 VITALS
HEART RATE: 75 BPM | OXYGEN SATURATION: 100 % | HEIGHT: 67 IN | TEMPERATURE: 97 F | BODY MASS INDEX: 37.35 KG/M2 | WEIGHT: 238 LBS | DIASTOLIC BLOOD PRESSURE: 60 MMHG | SYSTOLIC BLOOD PRESSURE: 119 MMHG | RESPIRATION RATE: 16 BRPM

## 2019-01-31 DIAGNOSIS — O34.32 CERVICAL CERCLAGE SUTURE PRESENT IN SECOND TRIMESTER: ICD-10-CM

## 2019-01-31 DIAGNOSIS — O09.299 HISTORY OF CERVICAL INCOMPETENCE IN PREGNANCY, CURRENTLY PREGNANT: Primary | ICD-10-CM

## 2019-01-31 DIAGNOSIS — N88.3 CERVICAL INCOMPETENCE: ICD-10-CM

## 2019-01-31 LAB
ABO + RH BLD: NORMAL
ANISOCYTOSIS BLD QL SMEAR: SLIGHT
BASOPHILS # BLD AUTO: 0.02 K/UL
BASOPHILS NFR BLD: 0.3 %
BLD GP AB SCN CELLS X3 SERPL QL: NORMAL
DIFFERENTIAL METHOD: ABNORMAL
EOSINOPHIL # BLD AUTO: 0 K/UL
EOSINOPHIL NFR BLD: 0.3 %
ERYTHROCYTE [DISTWIDTH] IN BLOOD BY AUTOMATED COUNT: 19.6 %
HCT VFR BLD AUTO: 30.1 %
HGB BLD-MCNC: 9.9 G/DL
HYPOCHROMIA BLD QL SMEAR: ABNORMAL
LYMPHOCYTES # BLD AUTO: 2.1 K/UL
LYMPHOCYTES NFR BLD: 34.6 %
MCH RBC QN AUTO: 22.4 PG
MCHC RBC AUTO-ENTMCNC: 32.9 G/DL
MCV RBC AUTO: 68 FL
MONOCYTES # BLD AUTO: 0.4 K/UL
MONOCYTES NFR BLD: 6.4 %
NEUTROPHILS # BLD AUTO: 3.5 K/UL
NEUTROPHILS NFR BLD: 58.4 %
PLATELET # BLD AUTO: 258 K/UL
PLATELET BLD QL SMEAR: ABNORMAL
PMV BLD AUTO: 9.2 FL
RBC # BLD AUTO: 4.42 M/UL
WBC # BLD AUTO: 5.98 K/UL

## 2019-01-31 PROCEDURE — 27200688 HC TRAY, SPINAL-HYPER/ ISOBARIC: Mod: HCWC | Performed by: SURGERY

## 2019-01-31 PROCEDURE — 36004722: Mod: HCWC | Performed by: OBSTETRICS & GYNECOLOGY

## 2019-01-31 PROCEDURE — D9220A PRA ANESTHESIA: Mod: HCWC,,, | Performed by: ANESTHESIOLOGY

## 2019-01-31 PROCEDURE — 63600175 PHARM REV CODE 636 W HCPCS: Mod: HCWC | Performed by: STUDENT IN AN ORGANIZED HEALTH CARE EDUCATION/TRAINING PROGRAM

## 2019-01-31 PROCEDURE — 25000003 PHARM REV CODE 250: Mod: HCWC | Performed by: SURGERY

## 2019-01-31 PROCEDURE — 71000033 HC RECOVERY, INTIAL HOUR: Mod: HCWC | Performed by: OBSTETRICS & GYNECOLOGY

## 2019-01-31 PROCEDURE — 71000039 HC RECOVERY, EACH ADD'L HOUR: Mod: HCWC | Performed by: OBSTETRICS & GYNECOLOGY

## 2019-01-31 PROCEDURE — 37000009 HC ANESTHESIA EA ADD 15 MINS: Mod: HCWC | Performed by: OBSTETRICS & GYNECOLOGY

## 2019-01-31 PROCEDURE — D9220A PRA ANESTHESIA: ICD-10-PCS | Mod: HCWC,,, | Performed by: ANESTHESIOLOGY

## 2019-01-31 PROCEDURE — 63600175 PHARM REV CODE 636 W HCPCS: Mod: HCWC | Performed by: SURGERY

## 2019-01-31 PROCEDURE — G0378 HOSPITAL OBSERVATION PER HR: HCPCS | Mod: HCWC

## 2019-01-31 PROCEDURE — 37000008 HC ANESTHESIA 1ST 15 MINUTES: Mod: HCWC | Performed by: OBSTETRICS & GYNECOLOGY

## 2019-01-31 PROCEDURE — 59320 REVISION OF CERVIX: CPT | Mod: HCWC,,, | Performed by: OBSTETRICS & GYNECOLOGY

## 2019-01-31 PROCEDURE — 25000003 PHARM REV CODE 250: Mod: HCWC | Performed by: STUDENT IN AN ORGANIZED HEALTH CARE EDUCATION/TRAINING PROGRAM

## 2019-01-31 PROCEDURE — 59320 PR REVISION CERVIX W PREG,VAG APPRCH: ICD-10-PCS | Mod: HCWC,,, | Performed by: OBSTETRICS & GYNECOLOGY

## 2019-01-31 PROCEDURE — 63600175 PHARM REV CODE 636 W HCPCS: Mod: HCWC | Performed by: ANESTHESIOLOGY

## 2019-01-31 PROCEDURE — S0028 INJECTION, FAMOTIDINE, 20 MG: HCPCS | Mod: HCWC | Performed by: SURGERY

## 2019-01-31 PROCEDURE — 36004723: Mod: HCWC | Performed by: OBSTETRICS & GYNECOLOGY

## 2019-01-31 PROCEDURE — 85025 COMPLETE CBC W/AUTO DIFF WBC: CPT | Mod: HCWC

## 2019-01-31 PROCEDURE — 86850 RBC ANTIBODY SCREEN: CPT | Mod: HCWC

## 2019-01-31 RX ORDER — ACETAMINOPHEN 325 MG/1
650 TABLET ORAL EVERY 6 HOURS
Status: CANCELLED | OUTPATIENT
Start: 2019-01-31 | End: 2019-02-01

## 2019-01-31 RX ORDER — INDOMETHACIN 25 MG/1
50 CAPSULE ORAL ONCE
Status: COMPLETED | OUTPATIENT
Start: 2019-01-31 | End: 2019-01-31

## 2019-01-31 RX ORDER — FAMOTIDINE 10 MG/ML
20 INJECTION INTRAVENOUS 2 TIMES DAILY
Status: DISCONTINUED | OUTPATIENT
Start: 2019-01-31 | End: 2019-01-31 | Stop reason: HOSPADM

## 2019-01-31 RX ORDER — ONDANSETRON HYDROCHLORIDE 2 MG/ML
INJECTION, SOLUTION INTRAMUSCULAR; INTRAVENOUS
Status: DISCONTINUED | OUTPATIENT
Start: 2019-01-31 | End: 2019-01-31

## 2019-01-31 RX ORDER — ACETAMINOPHEN 10 MG/ML
INJECTION, SOLUTION INTRAVENOUS
Status: DISCONTINUED | OUTPATIENT
Start: 2019-01-31 | End: 2019-01-31

## 2019-01-31 RX ORDER — CEFAZOLIN SODIUM 2 G/50ML
2 SOLUTION INTRAVENOUS ONCE
Status: COMPLETED | OUTPATIENT
Start: 2019-01-31 | End: 2019-01-31

## 2019-01-31 RX ORDER — SODIUM CITRATE AND CITRIC ACID MONOHYDRATE 334; 500 MG/5ML; MG/5ML
30 SOLUTION ORAL ONCE
Status: COMPLETED | OUTPATIENT
Start: 2019-01-31 | End: 2019-01-31

## 2019-01-31 RX ORDER — SODIUM CHLORIDE, SODIUM LACTATE, POTASSIUM CHLORIDE, CALCIUM CHLORIDE 600; 310; 30; 20 MG/100ML; MG/100ML; MG/100ML; MG/100ML
INJECTION, SOLUTION INTRAVENOUS CONTINUOUS PRN
Status: DISCONTINUED | OUTPATIENT
Start: 2019-01-31 | End: 2019-01-31

## 2019-01-31 RX ORDER — ONDANSETRON 2 MG/ML
4 INJECTION INTRAMUSCULAR; INTRAVENOUS ONCE AS NEEDED
Status: CANCELLED | OUTPATIENT
Start: 2019-01-31 | End: 2030-06-29

## 2019-01-31 RX ORDER — AMOXICILLIN AND CLAVULANATE POTASSIUM 875; 125 MG/1; MG/1
1 TABLET, FILM COATED ORAL EVERY 12 HOURS
Qty: 1 TABLET | Refills: 0 | Status: SHIPPED | OUTPATIENT
Start: 2019-01-31 | End: 2019-02-01

## 2019-01-31 RX ORDER — FENTANYL CITRATE 50 UG/ML
INJECTION, SOLUTION INTRAMUSCULAR; INTRAVENOUS
Status: DISCONTINUED | OUTPATIENT
Start: 2019-01-31 | End: 2019-01-31

## 2019-01-31 RX ORDER — AMOXICILLIN AND CLAVULANATE POTASSIUM 875; 125 MG/1; MG/1
1 TABLET, FILM COATED ORAL EVERY 12 HOURS
Status: DISCONTINUED | OUTPATIENT
Start: 2019-01-31 | End: 2019-01-31 | Stop reason: HOSPADM

## 2019-01-31 RX ORDER — INDOMETHACIN 25 MG/1
25 CAPSULE ORAL EVERY 6 HOURS
Qty: 7 CAPSULE | Refills: 0 | Status: SHIPPED | OUTPATIENT
Start: 2019-01-31 | End: 2019-02-20

## 2019-01-31 RX ADMIN — MEPIVACAINE HYDROCHLORIDE 3 ML: 15 INJECTION, SOLUTION EPIDURAL; INFILTRATION at 09:01

## 2019-01-31 RX ADMIN — ONDANSETRON 4 MG: 2 INJECTION, SOLUTION INTRAMUSCULAR; INTRAVENOUS at 09:01

## 2019-01-31 RX ADMIN — FENTANYL CITRATE 10 MCG: 50 INJECTION, SOLUTION INTRAMUSCULAR; INTRAVENOUS at 09:01

## 2019-01-31 RX ADMIN — SODIUM CHLORIDE, SODIUM LACTATE, POTASSIUM CHLORIDE, AND CALCIUM CHLORIDE: 600; 310; 30; 20 INJECTION, SOLUTION INTRAVENOUS at 09:01

## 2019-01-31 RX ADMIN — SODIUM CITRATE AND CITRIC ACID MONOHYDRATE 30 ML: 500; 334 SOLUTION ORAL at 08:01

## 2019-01-31 RX ADMIN — ACETAMINOPHEN 1000 MG: 10 INJECTION, SOLUTION INTRAVENOUS at 10:01

## 2019-01-31 RX ADMIN — INDOMETHACIN 50 MG: 25 CAPSULE ORAL at 10:01

## 2019-01-31 RX ADMIN — CEFAZOLIN SODIUM 2 G: 2 SOLUTION INTRAVENOUS at 10:01

## 2019-01-31 RX ADMIN — FAMOTIDINE 20 MG: 10 INJECTION, SOLUTION INTRAVENOUS at 08:01

## 2019-01-31 RX ADMIN — AMOXICILLIN AND CLAVULANATE POTASSIUM 1 TABLET: 875; 125 TABLET, FILM COATED ORAL at 12:01

## 2019-01-31 NOTE — TELEPHONE ENCOUNTER
Good Afternoon,      This is Palmira from [facility]. We have received a delayed approval for the Pt's KARI through [facility] [place].  However her co-pay is over $500 so we will proveed with

## 2019-01-31 NOTE — DISCHARGE SUMMARY
[facility]  Discharge Note  Short Stay    Admit Date: 1/31/2019    Discharge Date: 01/31/2019    Surgery Date: 1/31/2019     Surgeon(s) and Role:     * Obey Henry MD - Primary    Assisting Surgeon: Gibson Dunn MD PGY2    Pre-op Diagnosis:  History of cervical incompetence in pregnancy, currently pregnant [O09.299]    Post-op Diagnosis:  Post-Op Diagnosis Codes:     * History of cervical incompetence in pregnancy, currently pregnant [O09.299]    Procedure(s) (LRB):  CERCLAGE, CERVIX (N/A)    Discharge Provider: Gibson Dunn    Diagnoses:  Active Hospital Problems    Diagnosis  POA    *Cervical incompetence [N88.3]  Yes    Cervical cerclage suture present in second trimester [O34.32]  Unknown      Resolved Hospital Problems   No resolved problems to display.       Discharged Condition: good    Hospital Course:   Patient was admitted for outpatient procedure as above, and tolerated the procedure well with no complications. Please see operative report for further details. Following the procedure, the patient was transferred to the recovery area in stable condition. She was discharged to home once ambulating, voiding, tolerating PO intake, and pain was well-controlled. Patient was given routine post-op instructions and instructed to f/u with [facility] in 2 weeks. Given 48 hours of indocin. Given 1 dose of augmentin prior to discharge with 2nd dose in 12 hours.    Final Diagnoses: Same as principal problem.    Disposition: Home or Self Care    Follow up/Patient Instructions:    Medications:  Reconciled Home Medications:      Medication List      START taking these medications    amoxicillin-clavulanate 875-125mg 875-125 mg per tablet  Commonly known as:  AUGMENTIN  Take 1 tablet by mouth every 12 (twelve) hours. for 1 dose     indomethacin 25 MG capsule  Commonly known as:  INDOCIN  Take 1 capsule (25 mg total) by mouth every 6 (six) hours.        CONTINUE taking these medications    doxylamine  succinate 25 mg tablet  Take 1 tablet (25 mg total) by mouth nightly as needed (nausea).     FLOVENT  mcg/actuation inhaler  Generic drug:  fluticasone  INL 2 PUFFS INTO THE LUNGS BID     hydroxyprogest(PF)(preg presv) 250 mg/mL (1 mL) Oil  Commonly known as:  KARI  Inject 1 mL (250 mg total) into the muscle every 7 days.     metoclopramide HCl 10 MG tablet  Commonly known as:  REGLAN  Take 1 tablet (10 mg total) by mouth 3 (three) times daily before meals.     * NORVIR 100 mg Cap  Generic drug:  ritonavir  1 tablet with a meal     * ritonavir 100 mg Tab tablet  Commonly known as:  NORVIR     ondansetron 4 MG tablet  Commonly known as:  ZOFRAN  Take 1 tablet (4 mg total) by mouth daily as needed for Nausea.     OPTICHAMBER HERBERT LG MASK Spcr  Generic drug:  inhalat.spacing dev,large mask  U UTD BID     oseltamivir 75 MG capsule  Commonly known as:  TAMIFLU  Take 1 capsule (75 mg total) by mouth once daily. for 10 days     PROCHAMBER  Generic drug:  inhalation spacing device     promethazine 25 MG tablet  Commonly known as:  PHENERGAN  Take 1 tablet (25 mg total) by mouth every 6 (six) hours as needed for Nausea.     promethazine-dextromethorphan 6.25-15 mg/5 mL Syrp  Commonly known as:  PROMETHAZINE-DM  Take 5 mLs by mouth nightly as needed.     pyridoxine (vitamin B6) 25 MG Tab  Commonly known as:  B-6  Take 1 tablet (25 mg total) by mouth every evening.     * QVAR REDIHALER 80 mcg/actuation Hfab  Generic drug:  beclomethasone dipropionate  1 puff     * beclomethasone 80 mcg/actuation Aero  Commonly known as:  QVAR  Inhale 1 puff into the lungs 2 (two) times daily. Controller     TRUVADA 200-300 mg Tab  Generic drug:  emtricitabine-tenofovir 200-300 mg  1 tablet     * VENTOLIN HFA 90 mcg/actuation inhaler  Generic drug:  albuterol  Ventolin HFA 90 mcg/actuation aerosol inhaler 2 Puff(s) INH PRN as needed SOB     * VENTOLIN HFA INHL  Ventolin HFA 90 mcg/actuation aerosol inhaler 2 Puff(s) INH PRN as  needed SOB     * VENTOLIN HFA 90 mcg/actuation inhaler  Generic drug:  albuterol         * This list has 7 medication(s) that are the same as other medications prescribed for you. Read the directions carefully, and ask your doctor or other care provider to review them with you.              Discharge Procedure Orders   Diet Adult Regular     Other restrictions (specify):   Order Comments: Nothing in the vagina until cleared by your doctor     Notify your health care provider if you experience any of the following:  temperature >100.4     Notify your health care provider if you experience any of the following:  persistent nausea and vomiting or diarrhea     Notify your health care provider if you experience any of the following:  severe uncontrolled pain     Notify your health care provider if you experience any of the following:  difficulty breathing or increased cough     Notify your health care provider if you experience any of the following:  severe persistent headache     Notify your health care provider if you experience any of the following:  increased confusion or weakness     Notify your health care provider if you experience any of the following:  persistent dizziness, light-headedness, or visual disturbances     Notify your health care provider if you experience any of the following:  worsening rash     Notify your health care provider if you experience any of the following:   Order Comments: Heavy vaginal bleeding     Activity as tolerated     Follow-up Information     [facility] In 2 weeks.    Specialty:  Maternal and Fetal Medicine  Why:  Post Op Check  Contact information:  [address]  [facility] [address] [phone]  Additional information:  4th floor               Gibson Dunn MD  PGY2, OBGYN  [facility]

## 2019-01-31 NOTE — PROGRESS NOTES
Pt discharged home with her cousin.  Medications to be picked up at [facility] on 2nd floor.  Pt told to follow up with MFM in 2 weeks.

## 2019-01-31 NOTE — PHYSICIAN QUERY
PT Name: Jm Newton  MR #: [id number]    Physician Query Form - HIV Clarification     CDS/: ALICIA Oshea,[email or web address]          Contact information:[email or web address]    This form is a permanent document in the medical record.     Query Date: January 31, 2019      By submitting this query, we are merely seeking further clarification of documentation. Please utilize your independent clinical judgment when addressing the question(s) below.    The Medical record contains the following:   Indicators   Supporting Clinical Findings Location in Medical Record   X HIV or AIDS significant hx of HIV    HIV infection H&P1/26   X CD4 Count          CD4%        Viral Load viral load 1 month ago 1390; per patient viral load was also elevated at ID visit yesterday   H&P1/26    History of Opportunistic Infection      Cancer  Pneumocystis Pneumonia (PCP)  Cytomegalovirus (CMV)  Kaposi Sarcoma      HIV-Related Conditions (e.g. Dementia, Encephalopathy) documented     X Medications - patient is currently on truvada, Reyatav and norvir  - per patient her ID physician is switching her to Truvada and two other new medications which she does not know the name of starting tomorrow H&P1/26    Other       Please clarify the patients HIV status  Per CDC publication Vol 60 RR-17 https://www.cdc.gov/mmwr/preview/mmwrhtml/vw1923h3.htmRelating to the classification HIV Infection, once a patient is diagnosed with AIDS the diagnosis still stands even if, after treatment, the CD4+ T cell count rises to above 200 per ìL of blood or other AIDS-defining illnesses are cured.       The following are AIDS-Defining Illnesses or HIV-Related Diseases.  Bacterial infections, multiple or recurrent only in children aged <6 years Kaposi sarcoma   Candidiasis of bronchi, trachea, or lungs Lymphoma, Burkitt (or equivalent term)   Candidiasis of esophagus Lymphoma, immunoblastic (or equivalent term)   Cervical cancer, invasive Only  among adults, adolescents, and children aged > 6 years. Lymphoma, primary, of brain   Coccidioidomycosis, disseminated or extrapulmonary Mycobacterium avium complex or Mycobacterium kansasii, disseminated or extrapulmonary   Cryptococcosis, extrapulmonary Mycobacterium tuberculosis of any site, pulmonary, disseminated, or extrapulmonary   Cryptosporidiosis, chronic intestinal (>1 months duration) Mycobacterium, other species or unidentified species, disseminated or extrapulmonary   Cytomegalovirus disease (other than liver, spleen, or nodes), onset at age >1 month Pneumocystis jirovecii (previously known as Pneumocystis carinii) pneumonia   Cytomegalovirus retinitis (with loss of vision) Pneumonia, recurrent Only among adults, adolescents, and children aged .6 years.   Encephalopathy attributed to HIV Progressive multifocal leukoencephalopathy   Herpes simplex: chronic ulcers (>1 months duration) or bronchitis, pneumonitis, or esophagitis (onset at age >1 month) Salmonella septicemia, recurrent   Histoplasmosis, disseminated or extrapulmonary Toxoplasmosis of brain, onset at age >1 month   Isosporiasis, chronic intestinal (>1 months duration) Wasting syndrome attributed to HIV       [ x] Asymptomatic HIV Infection - Positive Status Only - without any history of (or current) AIDS-Defining Illness or HIV-Related Illness     [   ] HIV Disease / AIDS - Meets the current CDC Definition of AIDS: HIV-infected persons who have less than 200 CD4+ T-lymphocytes/uL, or CD4+ T-lymphocyte percentage of total lymphocytes of less than 14, and/or an AIDS-Defining Illness or HIV-Related Disease (see above).     [   ] Patient is HIV Negative   [   ] Other (please specify):   [  ] Clinically Undetermined         Please document in your progress notes daily for the duration of treatment until resolved and include in your discharge summary.

## 2019-01-31 NOTE — H&P
HISTORY AND PHYSICAL                                                OBSTETRICS          Subjective:       Jm Newton is a 33 y.o.  female with IUP at 13w3d weeks gestation who presents for scheduled cerclage placement 2/2 history of cervical incompetence and h/o cerclages in 2 prior pregnancies. Pt without complaints today. Has no questions regarding the procedure this am.  This IUP is complicated by HIV, asthma, and hyperthyroidism.       Review of Systems   Constitutional: Negative for chills and fever.   Eyes: Negative for visual disturbance.   Respiratory: Negative for shortness of breath.    Cardiovascular: Negative for chest pain.   Gastrointestinal: Negative for abdominal pain, nausea and vomiting.   Genitourinary: Negative for vaginal bleeding and vaginal discharge.   Neurological: Negative for headaches.       PMHx:   Past Medical History:   Diagnosis Date    Asthma     HIV infection     dx     Hyperthyroidism in pregnancy, antepartum        PSHx:   Past Surgical History:   Procedure Laterality Date    CERCLAGE, CERVIX N/A 2014    [facility]    CERVICAL CERCLAGE      emergent with twins     SECTION, CLASSIC      Last section with classical extention, from [facility]    CONE BIOPSY, CERVIX, USING COLD KNIFE N/A 2018    [facility] OR    D & C (SUCTION) N/A 11/15/2016    [facility] Pancho Albert [facility] at [facility] OR    DELIVERY-CEASAREAN SECTION N/A 2014    [facility] at [facility] L[facility]D    DILATION AND CURETTAGE OF UTERUS      LEEP CONIZATION, CERVIX N/A 2018    [facility] at [facility] OR       All:   Review of patient's allergies indicates:   Allergen Reactions    Azithromycin Edema       Meds:   Medications Prior to Admission   Medication Sig Dispense Refill Last Dose    albuterol (VENTOLIN HFA) 90 mcg/actuation inhaler Ventolin HFA 90 mcg/actuation  aerosol inhaler 2 Puff(s) INH PRN as needed SOB   Taking    albuterol sulfate (VENTOLIN HFA INHL) Ventolin HFA 90 mcg/actuation aerosol inhaler 2 Puff(s) INH PRN as needed SOB   Taking    beclomethasone (QVAR) 80 mcg/actuation Aero Inhale 1 puff into the lungs 2 (two) times daily. Controller 1 Inhaler 0 Not Taking    beclomethasone dipropionate (QVAR REDIHALER) 80 mcg/actuation HFAB 1 puff   Not Taking    doxylamine succinate 25 mg tablet Take 1 tablet (25 mg total) by mouth nightly as needed (nausea).  0     emtricitabine-tenofovir 200-300 mg (TRUVADA) 200-300 mg Tab 1 tablet   Taking    FLOVENT  mcg/actuation inhaler INL 2 PUFFS INTO THE LUNGS BID  11 Taking    hydroxyprogest,PF,,preg presv, (KARI) 250 mg/mL (1 mL) Oil Inject 1 mL (250 mg total) into the muscle every 7 days. 5 mL 6 Not Taking    metoclopramide HCl (REGLAN) 10 MG tablet Take 1 tablet (10 mg total) by mouth 3 (three) times daily before meals. 60 tablet 2     ondansetron (ZOFRAN) 4 MG tablet Take 1 tablet (4 mg total) by mouth daily as needed for Nausea. 30 tablet 1 Taking    OPTICHAMBER HERBERT LG MASK Spcr U UTD BID  3 Taking    oseltamivir (TAMIFLU) 75 MG capsule Take 1 capsule (75 mg total) by mouth once daily. for 10 days 10 capsule 0 Taking    PROCHAMBER    Taking    promethazine (PHENERGAN) 25 MG tablet Take 1 tablet (25 mg total) by mouth every 6 (six) hours as needed for Nausea. 30 tablet 6 Not Taking    promethazine-dextromethorphan (PROMETHAZINE-DM) 6.25-15 mg/5 mL Syrp Take 5 mLs by mouth nightly as needed. 118 mL 0 Taking    pyridoxine, vitamin B6, (B-6) 25 MG Tab Take 1 tablet (25 mg total) by mouth every evening.  0     ritonavir (NORVIR) 100 mg Cap 1 tablet with a meal   Taking    ritonavir (NORVIR) 100 mg Tab tablet   6 Taking    VENTOLIN HFA 90 mcg/actuation inhaler   5 Taking       SH:   Social History     Socioeconomic History    Marital status: Single     Spouse name: Not on file    Number of  children: Not on file    Years of education: Not on file    Highest education level: Not on file   Social Needs    Financial resource strain: Not on file    Food insecurity - worry: Not on file    Food insecurity - inability: Not on file    Transportation needs - medical: Not on file    Transportation needs - non-medical: Not on file   Occupational History    Not on file   Tobacco Use    Smoking status: Never Smoker    Smokeless tobacco: Never Used   Substance and Sexual Activity    Alcohol use: No     Alcohol/week: 0.0 oz     Frequency: Never     Comment: socially    Drug use: No    Sexual activity: Yes     Partners: Male     Birth control/protection: None   Other Topics Concern    Not on file   Social History Narrative    Not on file       FH:   Family History   Problem Relation Age of Onset    Colon cancer Neg Hx     Hypertension Neg Hx        OBHx:   Obstetric History       T1      L4     SAB0   TAB0   Ectopic0   Multiple2   Live Births6       # Outcome Date GA Lbr Adrian/2nd Weight Sex Delivery Anes PTL Lv   7 Current            6 Term 14 37w5d  3.26 kg (7 lb 3 oz) F CS-LTranv EPI N ANAHY      Name: Ruben      Apgar1:  9                Apgar5: 9   5A   24w0d  0.51 kg (1 lb 2 oz) M CS-LTranv  Y DEC      Name: Humphrey       Complications: Premature rupture of membranes   5B   24w0d  0.992 kg (2 lb 3 oz) M CS-LTranv  Y DEC      Name: Cat      Complications: Premature rupture of membranes   4  09 35w0d  2.608 kg (5 lb 12 oz) M CS-LTranv  Y ANAHY      Name: Omega      Complications: Premature rupture of membranes   3  08 32w0d  2.722 kg (6 lb) M Vag-Spont  Y ANAHY      Name: Edgar      Complications: Premature rupture of membranes   2  10/05/06 24w0d   M Vag-Spont  Y FD      Name: Mak      Complications: Abruptio Placenta   1  03 35w0d  2.722 kg (6 lb) M Vag-Spont  Y ANAHY      Name: Jeremy          Objective:        LMP 10/29/2018 (Exact Date)     There were no vitals filed for this visit.    General:   alert, appears stated age and cooperative, no apparent distress   HENT:  normocephalic, atraumatic   Eyes:  extraocular movements and conjunctivae normal   Neck:  range of motion normal   Lungs:   no respiratory distress   Heart:   regular rate   Abdomen:  soft, non-tender, non-distended    Extremities negative edema, negative erythema   FHT: N/a FETAL HEART TONES VERIFIED                 TOCO: N/a                                        Lab Review  Blood Type O POS  CBC pending       Assessment:       13w3d weeks gestation presenting for scheduled cervical cerclage.    Active Hospital Problems    Diagnosis  POA    Cervical incompetence [N88.3]  Yes      Resolved Hospital Problems   No resolved problems to display.          Plan:      Risks, benefits, alternatives and possible complications have been discussed in detail with the patient.   - Consents signed and to chart  - Anesthesia notified for spinal  - Draw CBC, T&S  - Notify Staff  - to OR for cerclage  - verify heart tones s/p procedure and dc home    Gibson Dunn MD  PGY2, OBGYN [facility]

## 2019-01-31 NOTE — TRANSFER OF CARE
"Anesthesia Transfer of Care Note    Patient: Jm Newton    Procedure(s) Performed: Procedure(s) (LRB):  CERCLAGE, CERVIX (N/A)    Patient location: Labor and Delivery    Anesthesia Type: spinal    Transport from OR: Transported from OR on room air with adequate spontaneous ventilation    Post pain: adequate analgesia    Post assessment: no apparent anesthetic complications    Post vital signs: stable    Level of consciousness: awake, alert and oriented    Nausea/Vomiting: no nausea/vomiting    Complications: none    Transfer of care protocol was followed      Last vitals:   Visit Vitals  BP (!) 112/55   Pulse 87   Temp 35.8 °C (96.5 °F) (Temporal)   Resp 16   Ht 5' 7" (1.702 m)   Wt 108 kg (238 lb)   LMP 10/29/2018 (Exact Date)   SpO2 100%   Breastfeeding? No   BMI 37.28 kg/m²     "

## 2019-01-31 NOTE — ANESTHESIA PREPROCEDURE EVALUATION
2019  Jm Newton is a 33 y.o., female   Pre-operative evaluation for Procedure(s) (LRB):  CERCLAGE, CERVIX (N/A)    Jm Newton is a 33 y.o. female   OB History      Para Term  AB Living    7 6 1 5   4    SAB TAB Ectopic Multiple Live Births          2 6            Patient Active Problem List   Diagnosis    HIV (human immunodeficiency virus infection)    Hyperthyroidism    Moderate recurrent major depression    Class 2 obesity due to excess calories without serious comorbidity with body mass index (BMI) of 38.0 to 38.9 in adult    EMEKA II (cervical intraepithelial neoplasia II)    Pregnancy, supervision, high-risk/flu    HIV (human immunodeficiency virus) risk factors complicating pregnancy    Maternal asthma complicating pregnancy - pulmonary referral in    Hyperthyroidism affecting pregnancy -     History of classical  section - repeat 36-37 weeks    History of cervical incompetence in pregnancy, currently pregnant - mfm consult/con    H/O LEEP (loop electrosurgical excision procedure) of cervix complicating pregnancy    Obesity complicating pregnancy    Hyperemesis of pregnancy    Cervical incompetence       Review of patient's allergies indicates:   Allergen Reactions    Azithromycin Edema       No current facility-administered medications on file prior to encounter.      Current Outpatient Medications on File Prior to Encounter   Medication Sig Dispense Refill    albuterol (VENTOLIN HFA) 90 mcg/actuation inhaler Ventolin HFA 90 mcg/actuation aerosol inhaler 2 Puff(s) INH PRN as needed SOB      albuterol sulfate (VENTOLIN HFA INHL) Ventolin HFA 90 mcg/actuation aerosol inhaler 2 Puff(s) INH PRN as needed SOB      beclomethasone (QVAR) 80 mcg/actuation Aero Inhale 1 puff into the lungs 2 (two) times daily. Controller 1 Inhaler 0     beclomethasone dipropionate (QVAR REDIHALER) 80 mcg/actuation HFAB 1 puff      doxylamine succinate 25 mg tablet Take 1 tablet (25 mg total) by mouth nightly as needed (nausea).  0    emtricitabine-tenofovir 200-300 mg (TRUVADA) 200-300 mg Tab 1 tablet      FLOVENT  mcg/actuation inhaler INL 2 PUFFS INTO THE LUNGS BID  11    hydroxyprogest,PF,,preg presv, (KARI) 250 mg/mL (1 mL) Oil Inject 1 mL (250 mg total) into the muscle every 7 days. 5 mL 6    metoclopramide HCl (REGLAN) 10 MG tablet Take 1 tablet (10 mg total) by mouth 3 (three) times daily before meals. 60 tablet 2    ondansetron (ZOFRAN) 4 MG tablet Take 1 tablet (4 mg total) by mouth daily as needed for Nausea. 30 tablet 1    OPTICHAMBER HERBERT LG MASK Spcr U UTD BID  3    oseltamivir (TAMIFLU) 75 MG capsule Take 1 capsule (75 mg total) by mouth once daily. for 10 days 10 capsule 0    PROCHAMBER       promethazine (PHENERGAN) 25 MG tablet Take 1 tablet (25 mg total) by mouth every 6 (six) hours as needed for Nausea. 30 tablet 6    promethazine-dextromethorphan (PROMETHAZINE-DM) 6.25-15 mg/5 mL Syrp Take 5 mLs by mouth nightly as needed. 118 mL 0    pyridoxine, vitamin B6, (B-6) 25 MG Tab Take 1 tablet (25 mg total) by mouth every evening.  0    ritonavir (NORVIR) 100 mg Cap 1 tablet with a meal      ritonavir (NORVIR) 100 mg Tab tablet   6    VENTOLIN HFA 90 mcg/actuation inhaler   5       Past Surgical History:   Procedure Laterality Date    CERCLAGE, CERVIX N/A 2014    Performed by [facility] at [facility] L&D    CERVICAL CERCLAGE      emergent with twins     SECTION, CLASSIC      Last section with classical extention, from [facility]    CONE BIOPSY, CERVIX, USING COLD KNIFE N/A 2018    [facility] at [facility] OR    D & C (SUCTION) N/A 11/15/2016    [facility] by [facility]o Albert [facility] at [facility] OR    DELIVERY-CEASAREAN SECTION N/A 2014    [facility] at [facility] L[facility]D     DILATION AND CURETTAGE OF UTERUS      LEEP CONIZATION, CERVIX N/A 2018    [facility]       Social History     Socioeconomic History    Marital status: Single     Spouse name: Not on file    Number of children: Not on file    Years of education: Not on file    Highest education level: Not on file   Social Needs    Financial resource strain: Not on file    Food insecurity - worry: Not on file    Food insecurity - inability: Not on file    Transportation needs - medical: Not on file    Transportation needs - non-medical: Not on file   Occupational History    Not on file   Tobacco Use    Smoking status: Never Smoker    Smokeless tobacco: Never Used   Substance and Sexual Activity    Alcohol use: No     Alcohol/week: 0.0 oz     Frequency: Never     Comment: socially    Drug use: No    Sexual activity: Yes     Partners: Male     Birth control/protection: None   Other Topics Concern    Not on file   Social History Narrative    Not on file     Lab Results   Component Value Date    WBC 3.41 (L) 2019    HGB 9.8 (L) 2019    HCT 30.6 (L) 2019    MCV 69 (L) 2019     2019         Anesthesia Evaluation    I have reviewed the Patient Summary Reports.     I have reviewed the Medications.     Review of Systems  Anesthesia Hx:  No problems with previous Anesthesia Denies Hx of Anesthetic complications  History of prior surgery of interest to airway management or planning: Denies Family Hx of Anesthesia complications.   Denies Personal Hx of Anesthesia complications.   Social:  No Alcohol Use, Non-Smoker HIV positive, on retroviral therapy but viral load remains high   Hematology/Oncology:  Hematology Normal   Oncology Normal     EENT/Dental:EENT/Dental Normal   Cardiovascular:  Cardiovascular Normal Exercise tolerance: good     Pulmonary:   Asthma    Renal/:  Renal/ Normal     Hepatic/GI:  Hepatic/GI Normal    OB/GYN/PEDS:   presenting  for history indicated cerclage   Neurological:  Neurology Normal    Endocrine:   Hyperthyroidism    Psych:   Psychiatric History depression             Anesthesia Plan  Type of Anesthesia, risks & benefits discussed:  Anesthesia Type:  spinal  Patient's Preference:   Intra-op Monitoring Plan: standard ASA monitors  Intra-op Monitoring Plan Comments:   Post Op Pain Control Plan: intrathecal opioid and IV/PO Opioids PRN  Post Op Pain Control Plan Comments:   Induction:    Beta Blocker:  Patient is not currently on a Beta-Blocker (No further documentation required).       Informed Consent: Patient understands risks and agrees with Anesthesia plan.  Questions answered. Anesthesia consent signed with patient.  ASA Score: 3     Day of Surgery Review of History & Physical:    H&P update referred to the surgeon.         Ready For Surgery From Anesthesia Perspective.

## 2019-01-31 NOTE — PROGRESS NOTES
Pt's IV appears to be infiltrated.  Ancef infusion about 1/3 completed - asked Dr. Dunn if necessary to finish infusion or if we could replace with PO antibiotic.  She will speak to Dr. Henry and let me know.

## 2019-01-31 NOTE — OP NOTE
Operative Note       Surgery Date: 01/31/2019     Surgeon(s) and Role:    * Obey Henry MD - Primary    * Gibson Dunn MD PGY2    Pre-op Diagnosis:  Incompetence of cervix [622.5]    Post-op Diagnosis:  Same    Procedure(s) (LRB):  ENCERCLAGE (N/A)    Anesthesia: Spinal    Findings/Key Components:  Cerclage placed without difficuly    Procedure in Detail:  The patient was taken to the OR where spinal anesthesia was found to be adequate.  She was placed in the dorsal lithotomy position, then prepped and draped in the normal sterile fashion.    A weighted speculum was inserted into the posterior vagina and a right angle was used to visualize the cervix.  A second betadine prep was performed by the surgeon under direct visualization.  The anterior lip of the cervix was grasped with an allis clamp.  A #1 Ethibond was inserted through the 12 o'clock position of the cervix with careful attention to avoid the bladder.  The suture was the continued around the cervix at the 9 o'clock, 6 o'clock, and 3 o'clock positions.  The suture was tied with a long tail remaining.  The cervix was noted to be hemostatic, and the os was noted to be closed at the end of the procedure.   The weighted speculum and ring forceps were removed.  The patient was taken out of the dorsal lithotomy position.  She tolerated the procedure well.  Instrument and lap counts were correct times two.  She was transferred to recovery in stable condition.        Estimated Blood Loss: 10cc           Specimens     None        Output:  Urine: 200 cc        Complications: None           Disposition: PACU - hemodynamically stable.           Condition: Stable    Gibson Dunn MD  PGY2, OBGYN [facility]

## 2019-01-31 NOTE — ANESTHESIA PREPROCEDURE EVALUATION
2019  Jm Newton is a 33 y.o., female   Pre-operative evaluation for Procedure(s) (LRB):  CERCLAGE, CERVIX (N/A)    Jm Newton is a 33 y.o. female   OB History      Para Term  AB Living    7 6 1 5   4    SAB TAB Ectopic Multiple Live Births          2 6            Patient Active Problem List   Diagnosis    HIV (human immunodeficiency virus infection)    Hyperthyroidism    Moderate recurrent major depression    Class 2 obesity due to excess calories without serious comorbidity with body mass index (BMI) of 38.0 to 38.9 in adult    EMEKA II (cervical intraepithelial neoplasia II)    Pregnancy, supervision, high-risk/flu    HIV (human immunodeficiency virus) risk factors complicating pregnancy    Maternal asthma complicating pregnancy - pulmonary referral in    Hyperthyroidism affecting pregnancy -     History of classical  section - repeat 36-37 weeks    History of cervical incompetence in pregnancy, currently pregnant - mfm consult/con    H/O LEEP (loop electrosurgical excision procedure) of cervix complicating pregnancy    Obesity complicating pregnancy    Hyperemesis of pregnancy    Cervical incompetence    Cervical cerclage suture present in second trimester       Review of patient's allergies indicates:   Allergen Reactions    Azithromycin Edema       Current Facility-Administered Medications on File Prior to Visit   Medication Dose Route Frequency Provider Last Rate Last Dose    amoxicillin-clavulanate 875-125mg per tablet 1 tablet  1 tablet Oral Q12H Gibson Dunn MD   1 tablet at 19 1202    famotidine (PF) injection 20 mg  20 mg Intravenous BID Abhay Cuevas MD   20 mg at 19 0838     Current Outpatient Medications on File Prior to Visit   Medication Sig Dispense Refill    albuterol (VENTOLIN HFA) 90  mcg/actuation inhaler Ventolin HFA 90 mcg/actuation aerosol inhaler 2 Puff(s) INH PRN as needed SOB      albuterol sulfate (VENTOLIN HFA INHL) Ventolin HFA 90 mcg/actuation aerosol inhaler 2 Puff(s) INH PRN as needed SOB      amoxicillin-clavulanate 875-125mg (AUGMENTIN) 875-125 mg per tablet Take 1 tablet by mouth every 12 (twelve) hours. for 1 dose 1 tablet 0    beclomethasone (QVAR) 80 mcg/actuation Aero Inhale 1 puff into the lungs 2 (two) times daily. Controller 1 Inhaler 0    beclomethasone dipropionate (QVAR REDIHALER) 80 mcg/actuation HFAB 1 puff      doxylamine succinate 25 mg tablet Take 1 tablet (25 mg total) by mouth nightly as needed (nausea).  0    emtricitabine-tenofovir 200-300 mg (TRUVADA) 200-300 mg Tab 1 tablet      FLOVENT  mcg/actuation inhaler INL 2 PUFFS INTO THE LUNGS BID  11    hydroxyprogest,PF,,preg presv, (KARI) 250 mg/mL (1 mL) Oil Inject 1 mL (250 mg total) into the muscle every 7 days. 5 mL 6    indomethacin (INDOCIN) 25 MG capsule Take 1 capsule (25 mg total) by mouth every 6 (six) hours. 7 capsule 0    metoclopramide HCl (REGLAN) 10 MG tablet Take 1 tablet (10 mg total) by mouth 3 (three) times daily before meals. 60 tablet 2    ondansetron (ZOFRAN) 4 MG tablet Take 1 tablet (4 mg total) by mouth daily as needed for Nausea. 30 tablet 1    OPTICHAMBER HERBERT LG MASK Spcr U UTD BID  3    oseltamivir (TAMIFLU) 75 MG capsule Take 1 capsule (75 mg total) by mouth once daily. for 10 days 10 capsule 0    PROCHAMBER       promethazine (PHENERGAN) 25 MG tablet Take 1 tablet (25 mg total) by mouth every 6 (six) hours as needed for Nausea. 30 tablet 6    promethazine-dextromethorphan (PROMETHAZINE-DM) 6.25-15 mg/5 mL Syrp Take 5 mLs by mouth nightly as needed. 118 mL 0    pyridoxine, vitamin B6, (B-6) 25 MG Tab Take 1 tablet (25 mg total) by mouth every evening.  0    ritonavir (NORVIR) 100 mg Cap 1 tablet with a meal      ritonavir (NORVIR) 100 mg Tab tablet   6     VENTOLIN HFA 90 mcg/actuation inhaler   5       Past Surgical History:   Procedure Laterality Date    CERCLAGE, CERVIX N/A 2014    Performed by Obey Henry [facility] at [facility] L[facility]D    CERVICAL CERCLAGE      emergent with twins     SECTION, CLASSIC      Last section with classical extention, from [facility]    CONE BIOPSY, CERVIX, USING COLD KNIFE N/A 2018    Performed by [facility]al Worrell MD at [facility] OR    D & C (SUCTION) N/A 11/15/2016    Performed by Pancho Albert MD at [facility] OR    DELIVERY-CEASAREAN SECTION N/A 2014    Performed by [facility]al Worrell MD at [facility] L&D    DILATION AND CURETTAGE OF UTERUS      LEEP CONIZATION, CERVIX N/A 2018    Performed by [facility]al Worrell MD at [facility] OR       Social History     Socioeconomic History    Marital status: Single     Spouse name: Not on file    Number of children: Not on file    Years of education: Not on file    Highest education level: Not on file   Social Needs    Financial resource strain: Not on file    Food insecurity - worry: Not on file    Food insecurity - inability: Not on file    Transportation needs - medical: Not on file    Transportation needs - non-medical: Not on file   Occupational History    Not on file   Tobacco Use    Smoking status: Never Smoker    Smokeless tobacco: Never Used   Substance and Sexual Activity    Alcohol use: No     Alcohol/week: 0.0 oz     Frequency: Never     Comment: socially    Drug use: No    Sexual activity: Yes     Partners: Male     Birth control/protection: None   Other Topics Concern    Not on file   Social History Narrative    Not on file     Lab Results   Component Value Date    WBC 5.98 2019    HGB 9.9 (L) 2019    HCT 30.1 (L) 2019    MCV 68 (L) 2019     2019         Pre-op Assessment    I have reviewed the Patient Summary Reports.      I have reviewed the Medications.     Review of Systems  Anesthesia Hx:  No problems with previous Anesthesia  Denies Hx of Anesthetic complications  History of prior surgery of interest to airway management or planning: Denies Family Hx of Anesthesia complications.   Denies Personal Hx of Anesthesia complications.   Social:  No Alcohol Use, Non-Smoker HIV positive, on retroviral therapy but viral load remains high   Hematology/Oncology:  Hematology Normal   Oncology Normal     EENT/Dental:EENT/Dental Normal   Cardiovascular:  Cardiovascular Normal Exercise tolerance: good     Pulmonary:   Asthma    Renal/:  Renal/ Normal     Hepatic/GI:  Hepatic/GI Normal    OB/GYN/PEDS:   presenting for history indicated cerclage   Neurological:  Neurology Normal    Endocrine:   Hyperthyroidism    Psych:   Psychiatric History depression      Body mass index is 37.28 kg/m².      Physical Exam  General:  Well nourished, Obesity    Airway/Jaw/Neck:  Airway Findings: Mouth Opening: Normal Tongue: Normal  General Airway Assessment: Adult, Average  Mallampati: II  TM Distance: Normal, at least 6 cm  Jaw/Neck Findings:  Neck ROM: Normal ROM      Dental:  Dental Findings: In tact   Chest/Lungs:  Chest/Lungs Findings: Normal Respiratory Rate, Clear to auscultation     Heart/Vascular:  Heart Findings: Rate: Normal  Rhythm: Regular Rhythm        Mental Status:  Mental Status Findings:  Alert and Oriented, Cooperative         Anesthesia Plan  Type of Anesthesia, risks & benefits discussed:  Anesthesia Type:  spinal  Patient's Preference:   Intra-op Monitoring Plan: standard ASA monitors  Intra-op Monitoring Plan Comments:   Post Op Pain Control Plan: intrathecal opioid and IV/PO Opioids PRN  Post Op Pain Control Plan Comments:   Induction:    Beta Blocker:  Patient is not currently on a Beta-Blocker (No further documentation required).       Informed Consent: Patient understands risks and agrees with Anesthesia plan.  Questions answered. Anesthesia consent signed with patient.  ASA Score: 3     Day of Surgery Review of History & Physical:     H&P update referred to the surgeon.         Ready For Surgery From Anesthesia Perspective.

## 2019-01-31 NOTE — ANESTHESIA PROCEDURE NOTES
Spinal    Diagnosis: incompetent cervix  Patient location during procedure: OR  Start time: 1/31/2019 9:37 AM  Timeout: 1/31/2019 9:36 AM  End time: 1/31/2019 9:47 AM  Staffing  Anesthesiologist: Elida Lui MD  Resident/CRNA: Abhay Cuevas MD  Performed: anesthesiologist   Preanesthetic Checklist  Completed: patient identified, site marked, surgical consent, pre-op evaluation, timeout performed, IV checked, risks and benefits discussed and monitors and equipment checked  Spinal Block  Patient position: sitting  Prep: ChloraPrep  Patient monitoring: heart rate, cardiac monitor, continuous pulse ox and frequent blood pressure checks  Approach: midline  Location: L3-4  Injection technique: single shot  CSF Fluid: clear free-flowing CSF  Needle  Needle type: Felecia   Needle gauge: 25 G  Needle length: 5 in  Additional Documentation: incremental injection, negative aspiration for heme, no paresthesia on injection, left transient paresthesia and right transient paresthesia  Needle localization: anatomical landmarks  Assessment  Sensory level: T6   Dermatomal levels determined by pinch or prick  Ease of block: moderate  Patient's tolerance of the procedure: comfortable throughout block

## 2019-01-31 NOTE — ANESTHESIA POSTPROCEDURE EVALUATION
"Anesthesia Post Evaluation    Patient: Jm Newton    Procedure(s) Performed: Procedure(s) (LRB):  CERCLAGE, CERVIX (N/A)    Final Anesthesia Type: spinal  Patient location during evaluation: labor & delivery  Patient participation: Yes- Able to Participate  Level of consciousness: awake and alert and oriented  Post-procedure vital signs: reviewed and stable  Pain management: adequate  Airway patency: patent  PONV status at discharge: No PONV  Anesthetic complications: no      Cardiovascular status: blood pressure returned to baseline and hemodynamically stable  Respiratory status: unassisted, spontaneous ventilation and room air  Hydration status: euvolemic  Follow-up not needed.        Visit Vitals  /60   Pulse 75   Temp 35.8 °C (96.5 °F) (Temporal)   Resp 16   Ht 5' 7" (1.702 m)   Wt 108 kg (238 lb)   LMP 10/29/2018 (Exact Date)   SpO2 100%   Breastfeeding? No   BMI 37.28 kg/m²       Pain/Katherine Score: Pain Rating Prior to Med Admin: 0 (1/31/2019 10:53 AM)        "

## 2019-02-03 NOTE — PROGRESS NOTES
"  Indication  ========    Consultation: NT/ HIV/hx cerclage .    History  ======    General History  Height 170 cm  Height (ft) 5 ft  Height (in) 7 in  Medical History  Past surgical history: Previous surgeries performed  Surgery:  section  Details: 3  Previous Outcomes  Preg. no. 1  Outcome: Live YOB: 2003  Gest. age 35 w + 0 d  Details: , 6 lbs,  labor, "Jeremy"  Preg. no. 2  Outcome: Intrapartum stillbirth  Date: 10/5/2006  Gest. age 24 w + 0 d  Gender: male  Details: , abruption & stillbirth  Preg. no. 3  Outcome: Live YOB: 2008  Gest. age 32 w + 0 d  Gender: male  Details: , 6lbs, PPROM, "Edgar"  Preg. no. 4  Outcome: Live YOB: 2009  Gest. age 35 w + 0 d  Gender: male  Details: c/s, 5lbs 12oz, PPROM, "Alfredo'ryn"/ viral load  Preg. no. 5  Outcome:  death  Gest. age 24 w + 0 d  Gender: male  Details: , twins, PPROM: 1lb 2oz male - lived for 2 months; 2lbs 3oz male - lived for 2 hours  Preg. no. 6  Outcome: Live YOB: 2014  Gest. age 37 w + 5 d  Gender: female  Details: c/s, 7lbs 3oz, "Dalaiya"  Preg. no. 7  Outcome: Spontaneous miscarriage  Gest. age 8 w + 0 d  Details: 2016, blighted ovum   8  Para 7  Leyva children born living (T) 1  Leyva children born (T) 1  Leyva children born (P) 6  Abortions (A) 1  Leyva living children (L) 4  Leyva children born living (P) 3   deaths (P) 1  Total  deaths 2  Stillbirths 1  Miscarriages 1  Risk Factors  History risk factors:  delivery in previous pregnancy  Details: hx c/s  Details: LEEP - 2018  Details: hx D& C  Details: hyperthyroidism  Details: HIV dx in   Details: asthma    Maternal Assessment  =================    Weight 109 kg  Weight (lb) 240 lb  Height 170 cm  Height (ft) 5 ft  Height (in) 7 in  BP syst 125 mmHg  BP diast 78 mmHg  BMI 37.64 kg/m²    Method  ======    Transabdominal ultrasound examination, [place] S8. View: " Good view.    Pregnancy  =========    Leyva pregnancy. Number of fetuses: 1.    Dating  ======    Ultrasound examination on: 2019  GA by U/S based upon: CRL  GA by U/S 12 w + 5 d  ESTUARDO by U/S: 8/3/2019  Assigned: Dating performed on 2018, based on the LMP  Assigned GA 12 w + 3 d  Assigned ESTUARDO: 2019    General Evaluation  ==============    Cardiac activity: present.          Fetal Biometry  ============    CRL 63.0 mm 12w 5d Hadlock  NT 1.7 mm   bpm          Consultation  ==========      Ms. Jm Newton is a 33-year-old  8 para 0814 at 12 weeks and 3 days. Ms. Newton is referred from Dr. Worrell for HIV and a history of  cerclage. Patient also has a medical history of hyperthyroidism and asthma. I reviewed her [facility] medications and they appear to be correct per  the patient. She is on triple therapy as noted for HIV and is cared for by Dr. Vazquez at [facility]. His phone number is [phone].  Her most recent HIV-1 RNA ultra was 1390 copies per mL. That is down from 52,910 copies two years prior.    Ms. Newton has a history of 2 prior classical sections, 1 LTCS and 3 SVDs -her 1st 3 pregnancies.    All of her pregnancies have been . She lost a 24 week pregnancy from abruption in  and she had 24 week twins in -both   in the  period. Her obstetrical history is given to me concurs with epic. Her last pregnancy was a blighted ovum in .    Due to the history consistent  deliveries, the last 2 pregnancies with cerclage, the patient is interested in repeat cerclage. This has  been discussed in length with multiple providers, and she will be scheduled for cerclage in our institution with Dr. Henry. We have  discussed genetic screening for this - I have recommended cffDNA as sequential screening would not be performed in time for the cerclage.    Vertical HIV transmission has declined remarkably with the use of antiretroviral medications.  The combined use of maternal antepartum,  maternal intrapartum, and infant antiretroviral prophylaxis maximizes infant pre-exposure and post-exposure prophylaxis to decrease the risk of  transmission. Fully suppressive antiretroviral therapy (ART) for HIV-infected pregnant women will reduce  transmission and treat  maternal HIV disease. All pregnant HIV-infected women should receive combination ART, and earlier treatment will decrease the chance of  vertical transmission. It is important to select the regimen most effective by evaluation of the individual viral resistance profile. Medications with  safety records are preferred, but also consider adherence potential, tolerability, other comorbidities, and pharmacokinetic data in pregnancy.    Treatment-experienced women on a virologically suppressive ART regimen that they tolerate can continue that regimen, even if the individual  agents are not preferred in pregnancy.    Preferred ART in pregnancy include the NRTI combinations tenofovir disoproxil fumarate with emtricitabine or lamivudine as well as  abacavir/lamivudine; the protease inhibitors ritonavir-boosted atazanavir and ritonavir-boosted darunavir; and the integrase inhibitor raltegravir.    There may be a small increased risk of  delivery and impaired fetal growth with the use of ART, particularly protease inhibitors, during  pregnancy.    Women should continue taking their ART regimen during labor and delivery or scheduled  delivery. Intrapartum intravenous zidovudine  is given to women with HIV RNA >1000 copies/mL. Women with HIV RNA <1000 copies/mL are not candidates for intrapartum IV zidovudine; it  is not associated with further reduction of vertical transmission.    All infants born to HIV-infected mothers receive antiretroviral postexposure prophylaxis. The prophylaxis should ideally be initiated in the first 6 -  12 hours following delivery.    ART should be continued  postpartum. Adherence during the postpartum period may be problematic, so careful adherence counseling and  social support should be offered.    Recommendations:    1. Influeza vaccination, hepatitis A vaccination, hepatitis b vaccination series and the pneumovax vaccination to decrease opportunistic  infection.    2. Continue current therapy.    3. Recommend consultation and follow up with her current infectious disease physicians.    4. Per CDC recommendations, we recommend  section at 38 weeks (with AZT prophylaxis) if viral load is >1,000 at 36 weeks. If viral  load is <1,000 at 36 weeks, would institute AZT prophylaxis at time of spontaneous labor and continue her HAART intrapartum, both to reduce  the risk of vertical transmission. If viral load <1000 copies/mL on ART, C/S for obstetric indications only. I would recommend blood borne  precautions during labor (i.e. no fetal scalp electrode, avoidance of early AROM, no elective forceps).      5. ART regimen should be continued during labor and delivery or scheduled  delivery. Intrapartum IV AZT is given to women with HIV  RNA >1000 copies/mL. Intrapartum IV zidovudine is not associated with further reduction of vertical transmission in women with >1000  copies/mL.    6. Postexposure prophylaxis is given to all exposed infants with initiation within 6 - 12 hours of delivery.              Impression  =========    Fetal size is consistent with dating, and normal fetal heart motion is seen.    The nuchal translucency is measured WNL with present nasal bone. Patient has opted for Materni T 21 given cerclage decision and HIV  status.       I spent 60 min in patient care management and consultation with greater than 50% face-to-face.        Recommendation  ==============    1. Plans have been made for cerclage following performance of cffDNA.  2. Dr. Vazquez will follow HIV; see OB recommendations above.  3. We did not consult regarding  hyperthyroidism or asthma. Please refer back if further information is desired.      Thank you again for allowing us to participate in the care of your patients. If you have any questions concerning today's consultation, feel free  to contact me or one of my partners. We can be reached at [phone] during normal business hours. If you have a question after normal  business hours, please contact Labor and Delivery at [phone].

## 2019-02-04 ENCOUNTER — TELEPHONE (OUTPATIENT)
Dept: MATERNAL FETAL MEDICINE | Facility: CLINIC | Age: 34
End: 2019-02-04

## 2019-02-04 DIAGNOSIS — Z98.890 HISTORY OF CERVICAL CERCLAGE: Primary | ICD-10-CM

## 2019-02-04 DIAGNOSIS — O34.32 CERVICAL CERCLAGE SUTURE PRESENT IN SECOND TRIMESTER: ICD-10-CM

## 2019-02-04 NOTE — TELEPHONE ENCOUNTER
Returned pt's call. Pt scheduled for 2/15/19 8:20 am with Dr Henry.     ----- Message from Amee Aiken sent at 2/4/2019 10:32 AM CST -----  Contact: Self  Pt is calling to schedule a fu from her [facility] placement. Pt can be reached at [phone].

## 2019-02-13 ENCOUNTER — TELEPHONE (OUTPATIENT)
Dept: OBSTETRICS AND GYNECOLOGY | Facility: CLINIC | Age: 34
End: 2019-02-13

## 2019-02-13 NOTE — TELEPHONE ENCOUNTER
Returned call. Pt missed appt this morning due to a 10/10 migraine. Pt stated that she has take Tylenol every 6 hours with no relief. Pt wants to know if she can take anything else. Notified Dr. Worrell. Pharmacy info verified.

## 2019-02-13 NOTE — TELEPHONE ENCOUNTER
----- Message from Sunitha Ponce sent at 2/13/2019  9:00 AM CST -----  Ob pt states that she is having severe headache. Pt would like to know what can she take. Pt can be reached at [phone].

## 2019-02-15 ENCOUNTER — PROCEDURE VISIT (OUTPATIENT)
Dept: MATERNAL FETAL MEDICINE | Facility: CLINIC | Age: 34
End: 2019-02-15
Payer: MEDICARE

## 2019-02-15 DIAGNOSIS — O34.32 CERVICAL CERCLAGE SUTURE PRESENT IN SECOND TRIMESTER: ICD-10-CM

## 2019-02-15 PROCEDURE — 99499 NO LOS: ICD-10-PCS | Mod: HCWC,S$GLB,, | Performed by: OBSTETRICS & GYNECOLOGY

## 2019-02-15 PROCEDURE — 76817 TRANSVAGINAL US OBSTETRIC: CPT | Mod: HCWC,S$GLB,, | Performed by: OBSTETRICS & GYNECOLOGY

## 2019-02-15 PROCEDURE — 76805 OB US >/= 14 WKS SNGL FETUS: CPT | Mod: HCWC,S$GLB,, | Performed by: OBSTETRICS & GYNECOLOGY

## 2019-02-15 PROCEDURE — 99499 UNLISTED E&M SERVICE: CPT | Mod: HCWC,S$GLB,, | Performed by: OBSTETRICS & GYNECOLOGY

## 2019-02-15 PROCEDURE — 76805 PR US, OB 14+WKS, TRANSABD, SINGLE GESTATION: ICD-10-PCS | Mod: HCWC,S$GLB,, | Performed by: OBSTETRICS & GYNECOLOGY

## 2019-02-15 PROCEDURE — 76817 PR US, OB, TRANSVAG APPROACH: ICD-10-PCS | Mod: HCWC,S$GLB,, | Performed by: OBSTETRICS & GYNECOLOGY

## 2019-02-20 ENCOUNTER — APPOINTMENT (OUTPATIENT)
Dept: LAB | Facility: HOSPITAL | Age: 34
End: 2019-02-20
Attending: OBSTETRICS & GYNECOLOGY
Payer: MEDICARE

## 2019-02-20 ENCOUNTER — CLINICAL SUPPORT (OUTPATIENT)
Dept: OBSTETRICS AND GYNECOLOGY | Facility: CLINIC | Age: 34
End: 2019-02-20
Payer: MEDICARE

## 2019-02-20 ENCOUNTER — ROUTINE PRENATAL (OUTPATIENT)
Dept: OBSTETRICS AND GYNECOLOGY | Facility: CLINIC | Age: 34
End: 2019-02-20
Payer: MEDICARE

## 2019-02-20 VITALS — DIASTOLIC BLOOD PRESSURE: 81 MMHG | BODY MASS INDEX: 37.5 KG/M2 | WEIGHT: 239.44 LBS | SYSTOLIC BLOOD PRESSURE: 122 MMHG

## 2019-02-20 DIAGNOSIS — O34.42 HISTORY OF LOOP ELECTROSURGICAL EXCISION PROCEDURE (LEEP) OF CERVIX AFFECTING PREGNANCY IN SECOND TRIMESTER: ICD-10-CM

## 2019-02-20 DIAGNOSIS — Z98.891 HISTORY OF CLASSICAL CESAREAN SECTION: ICD-10-CM

## 2019-02-20 DIAGNOSIS — J45.909 MATERNAL ASTHMA COMPLICATING PREGNANCY: ICD-10-CM

## 2019-02-20 DIAGNOSIS — O09.892 HIV RISK FACTORS AFFECTING PREGNANCY IN SECOND TRIMESTER: ICD-10-CM

## 2019-02-20 DIAGNOSIS — R79.9 ABNORMAL FINDING OF BLOOD CHEMISTRY: ICD-10-CM

## 2019-02-20 DIAGNOSIS — O09.92 SUPERVISION OF HIGH RISK PREGNANCY IN SECOND TRIMESTER: Primary | ICD-10-CM

## 2019-02-20 DIAGNOSIS — O99.519 MATERNAL ASTHMA COMPLICATING PREGNANCY: ICD-10-CM

## 2019-02-20 DIAGNOSIS — O99.212 OBESITY AFFECTING PREGNANCY IN SECOND TRIMESTER: ICD-10-CM

## 2019-02-20 DIAGNOSIS — O99.282 HYPERTHYROIDISM AFFECTING PREGNANCY IN SECOND TRIMESTER: ICD-10-CM

## 2019-02-20 DIAGNOSIS — O09.299 HISTORY OF CERVICAL INCOMPETENCE IN PREGNANCY, CURRENTLY PREGNANT: ICD-10-CM

## 2019-02-20 DIAGNOSIS — E05.90 HYPERTHYROIDISM AFFECTING PREGNANCY IN SECOND TRIMESTER: ICD-10-CM

## 2019-02-20 DIAGNOSIS — Z87.51 HISTORY OF PRETERM DELIVERY: Primary | ICD-10-CM

## 2019-02-20 DIAGNOSIS — Z98.890 HISTORY OF LOOP ELECTROSURGICAL EXCISION PROCEDURE (LEEP) OF CERVIX AFFECTING PREGNANCY IN SECOND TRIMESTER: ICD-10-CM

## 2019-02-20 LAB
FERRITIN SERPL-MCNC: 13 NG/ML
IRON SERPL-MCNC: 34 UG/DL
SATURATED IRON: 8 %
TOTAL IRON BINDING CAPACITY: 411 UG/DL
TRANSFERRIN SERPL-MCNC: 278 MG/DL

## 2019-02-20 PROCEDURE — 99999 PR PBB SHADOW E&M-EST. PATIENT-LVL III: CPT | Mod: PBBFAC,HCWC,,

## 2019-02-20 PROCEDURE — 96372 THER/PROPH/DIAG INJ SC/IM: CPT | Mod: HCWC,S$GLB,, | Performed by: OBSTETRICS & GYNECOLOGY

## 2019-02-20 PROCEDURE — 99999 PR PBB SHADOW E&M-EST. PATIENT-LVL III: CPT | Mod: PBBFAC,HCWC,, | Performed by: OBSTETRICS & GYNECOLOGY

## 2019-02-20 PROCEDURE — 99999 PR PBB SHADOW E&M-EST. PATIENT-LVL III: ICD-10-PCS | Mod: PBBFAC,HCWC,,

## 2019-02-20 PROCEDURE — 99999 PR PBB SHADOW E&M-EST. PATIENT-LVL III: ICD-10-PCS | Mod: PBBFAC,HCWC,, | Performed by: OBSTETRICS & GYNECOLOGY

## 2019-02-20 PROCEDURE — 96372 PR INJECTION,THERAP/PROPH/DIAG2ST, IM OR SUBCUT: ICD-10-PCS | Mod: HCWC,S$GLB,, | Performed by: OBSTETRICS & GYNECOLOGY

## 2019-02-20 PROCEDURE — 82728 ASSAY OF FERRITIN: CPT | Mod: HCWC

## 2019-02-20 PROCEDURE — 0502F SUBSEQUENT PRENATAL CARE: CPT | Mod: HCWC,CPTII,S$GLB, | Performed by: OBSTETRICS & GYNECOLOGY

## 2019-02-20 PROCEDURE — 83540 ASSAY OF IRON: CPT | Mod: HCWC

## 2019-02-20 PROCEDURE — 36415 COLL VENOUS BLD VENIPUNCTURE: CPT | Mod: HCWC,PO

## 2019-02-20 PROCEDURE — 0502F PR SUBSEQUENT PRENATAL CARE: ICD-10-PCS | Mod: HCWC,CPTII,S$GLB, | Performed by: OBSTETRICS & GYNECOLOGY

## 2019-02-20 RX ORDER — FERROUS SULFATE 325(65) MG
325 TABLET ORAL 2 TIMES DAILY
Qty: 30 TABLET | Refills: 3 | Status: ON HOLD | OUTPATIENT
Start: 2019-02-20 | End: 2019-10-23 | Stop reason: SDUPTHER

## 2019-02-20 RX ORDER — HYDROXYPROGESTERONE CAPROATE 250 MG/ML
250 INJECTION INTRAMUSCULAR
Status: DISCONTINUED | OUTPATIENT
Start: 2019-02-20 | End: 2019-06-15

## 2019-02-20 RX ADMIN — HYDROXYPROGESTERONE CAPROATE 250 MG: 250 INJECTION INTRAMUSCULAR at 10:02

## 2019-02-20 NOTE — PROGRESS NOTES
Here for hydroxyprogesterone caproate injection (Ree ) 250 mg/ml. 1 ml./IM . ( LB ) Patient without complaint of pain before or after injection. Advised to wait in lobby 15 minutes after injection. Return in 1 week.     CLINIC SUPPLIED MEDICATION

## 2019-02-20 NOTE — PROGRESS NOTES
Complaints today:Denies vaginal bleeding, contractions, loss of fluid. Occasional HA not relieved by tylenol. Has not tried anything else. No complaints with cerclage. Plans to start con injections this week. Has received flu shot. N/v much improved, able to take retrovirals.     /81   Wt 108.6 kg (239 lb 6.7 oz)   LMP 10/29/2018 (Exact Date)   BMI 37.50 kg/m²     33 y.o., at 16w2d by Estimated Date of Delivery: 19  Patient Active Problem List   Diagnosis    HIV (human immunodeficiency virus infection)    Hyperthyroidism    Moderate recurrent major depression    Class 2 obesity due to excess calories without serious comorbidity with body mass index (BMI) of 38.0 to 38.9 in adult    EMEKA II (cervical intraepithelial neoplasia II)    Pregnancy, supervision, high-risk/flu    HIV (human immunodeficiency virus) risk factors complicating pregnancy    Maternal asthma complicating pregnancy - pulmonary referral in    Hyperthyroidism affecting pregnancy -     History of classical  section - repeat 36-37 weeks    History of cervical incompetence in pregnancy, currently pregnant - mfm consult/con    H/O LEEP (loop electrosurgical excision procedure) of cervix complicating pregnancy    Obesity complicating pregnancy    Hyperemesis of pregnancy    Cervical incompetence    Cervical cerclage suture present in second trimester     OB History    Para Term  AB Living   7 6 1 5   4   SAB TAB Ectopic Multiple Live Births         2 6      # Outcome Date GA Lbr Adrian/2nd Weight Sex Delivery Anes PTL Lv   7 Current            6 Term 14 37w5d  3.26 kg (7 lb 3 oz) F CS-LTranv EPI N ANAHY   5A   24w0d  0.51 kg (1 lb 2 oz) M CS-LTranv  Y DEC      Complications: Premature rupture of membranes      Birth Comments: lived for 2 months in NICU   5B   24w0d  0.992 kg (2 lb 3 oz) M CS-LTranv  Y DEC      Complications: Premature rupture of membranes      Birth Comments:  lived only 2 hours   4  09 35w0d  2.608 kg (5 lb 12 oz) M CS-LTranv  Y ANAHY      Complications: Premature rupture of membranes      Birth Comments: HIV, CS d/t viral load   3  08 32w0d  2.722 kg (6 lb) M Vag-Spont  Y ANAHY      Complications: Premature rupture of membranes      Birth Comments:  labor   2  10/05/06 24w0d   M Vag-Spont  Y FD      Complications: Abruptio Placenta      Birth Comments: abruption and stillbirth   1  03 35w0d  2.722 kg (6 lb) M Vag-Spont  Y ANAHY      Birth Comments:  labor          Dating reviewed    Allergies and problem list reviewed and updated    Medical and surgical history reviewed    Prenatal labs reviewed and updated    Physical Exam:  ABD: soft, gravid, nontender,     Assessment:  Jm was seen today for routine prenatal visit.    Diagnoses and all orders for this visit:    Supervision of high risk pregnancy in second trimester  -     Maternal Screen AFP (Single Marker); Future  -     Iron and TIBC  -     Ferritin    Obesity affecting pregnancy in second trimester    Maternal asthma complicating pregnancy - pulmonary referral in    Hyperthyroidism affecting pregnancy in second trimester    HIV risk factors affecting pregnancy in second trimester    History of classical  section - repeat 36-37 weeks    History of cervical incompetence in pregnancy, currently pregnant - mfm consult/ocn    History of loop electrosurgical excision procedure (LEEP) of cervix affecting pregnancy in second trimester    Abnormal finding of blood chemistry   -     Iron and TIBC  -     Ferritin          Plan:   - reviewed labs, Iron studies ordered, will start on 325mg Fe BID and colace  - fioricet and benadryl PRN for HA  - set up con injections this week  - s/p flu shot  - AFP single marker today  - Not sure what she wants for birth control    follow up 4 Weeks, bleeding/pain, labor precautions given.

## 2019-02-28 ENCOUNTER — CLINICAL SUPPORT (OUTPATIENT)
Dept: OBSTETRICS AND GYNECOLOGY | Facility: CLINIC | Age: 34
End: 2019-02-28
Payer: MEDICARE

## 2019-02-28 PROCEDURE — 96372 THER/PROPH/DIAG INJ SC/IM: CPT | Mod: HCWC,S$GLB,, | Performed by: OBSTETRICS & GYNECOLOGY

## 2019-02-28 PROCEDURE — 99999 PR PBB SHADOW E&M-EST. PATIENT-LVL III: ICD-10-PCS | Mod: PBBFAC,HCWC,,

## 2019-02-28 PROCEDURE — 96372 PR INJECTION,THERAP/PROPH/DIAG2ST, IM OR SUBCUT: ICD-10-PCS | Mod: HCWC,S$GLB,, | Performed by: OBSTETRICS & GYNECOLOGY

## 2019-02-28 PROCEDURE — 99999 PR PBB SHADOW E&M-EST. PATIENT-LVL III: CPT | Mod: PBBFAC,HCWC,,

## 2019-02-28 RX ADMIN — HYDROXYPROGESTERONE CAPROATE 250 MG: 250 INJECTION INTRAMUSCULAR at 09:02

## 2019-02-28 NOTE — PROGRESS NOTES
Here for hydroxyprogesterone caproate injection (Ree ) 250 mg/ml. 1 ml./IM . ( RB ) Patient without complaint of pain before or after injection. Advised to wait in lobby 15 minutes after injection. Return in 1 week.     CLINIC SUPPLIED MEDICATION

## 2019-03-07 ENCOUNTER — CLINICAL SUPPORT (OUTPATIENT)
Dept: OBSTETRICS AND GYNECOLOGY | Facility: CLINIC | Age: 34
End: 2019-03-07
Payer: MEDICARE

## 2019-03-07 PROCEDURE — 96372 PR INJECTION,THERAP/PROPH/DIAG2ST, IM OR SUBCUT: ICD-10-PCS | Mod: HCWC,S$GLB,, | Performed by: OBSTETRICS & GYNECOLOGY

## 2019-03-07 PROCEDURE — 99999 PR PBB SHADOW E&M-EST. PATIENT-LVL III: CPT | Mod: PBBFAC,HCWC,,

## 2019-03-07 PROCEDURE — 96372 THER/PROPH/DIAG INJ SC/IM: CPT | Mod: HCWC,S$GLB,, | Performed by: OBSTETRICS & GYNECOLOGY

## 2019-03-07 PROCEDURE — 99999 PR PBB SHADOW E&M-EST. PATIENT-LVL III: ICD-10-PCS | Mod: PBBFAC,HCWC,,

## 2019-03-07 RX ADMIN — HYDROXYPROGESTERONE CAPROATE 250 MG: 250 INJECTION INTRAMUSCULAR at 10:03

## 2019-03-11 ENCOUNTER — PROCEDURE VISIT (OUTPATIENT)
Dept: MATERNAL FETAL MEDICINE | Facility: CLINIC | Age: 34
End: 2019-03-11
Payer: MEDICARE

## 2019-03-11 DIAGNOSIS — Z36.89 ENCOUNTER FOR FETAL ANATOMIC SURVEY: ICD-10-CM

## 2019-03-11 PROCEDURE — 76817 PR US, OB, TRANSVAG APPROACH: ICD-10-PCS | Mod: HCWC,S$GLB,, | Performed by: OBSTETRICS & GYNECOLOGY

## 2019-03-11 PROCEDURE — 99499 UNLISTED E&M SERVICE: CPT | Mod: HCWC,S$GLB,, | Performed by: OBSTETRICS & GYNECOLOGY

## 2019-03-11 PROCEDURE — 76811 PR US, OB FETAL EVAL & EXAM, TRANSABDOM,FIRST GESTATION: ICD-10-PCS | Mod: HCWC,S$GLB,, | Performed by: OBSTETRICS & GYNECOLOGY

## 2019-03-11 PROCEDURE — 76811 OB US DETAILED SNGL FETUS: CPT | Mod: HCWC,S$GLB,, | Performed by: OBSTETRICS & GYNECOLOGY

## 2019-03-11 PROCEDURE — 99499 NO LOS: ICD-10-PCS | Mod: HCWC,S$GLB,, | Performed by: OBSTETRICS & GYNECOLOGY

## 2019-03-11 PROCEDURE — 76817 TRANSVAGINAL US OBSTETRIC: CPT | Mod: HCWC,S$GLB,, | Performed by: OBSTETRICS & GYNECOLOGY

## 2019-03-13 NOTE — TELEPHONE ENCOUNTER
Patient returned call to [facility]. Explained coverage and copay for [place] through her insurance and patient given # for [place] [facility] to enroll in copay assistance. She mentions that she is over 19 weeks GA now and is currently getting [place] injections in her MDO. She received a letter from [employer] stating that it is covered but she is unsure if that was from a medical or prescription level. If covered medically, she does not want to proceed with [facility]. PÉREZ Piper, asked to call [facility] to confirm for patient. HAN

## 2019-03-13 NOTE — TELEPHONE ENCOUNTER
Patient states that she is already getting Ree at [facility]. She received letter saying it is covered through [employer] - through her medical benefits, so she would like to proceed with getting it from [facility] and NOT billing through her Rx benefits. OSP to discharge from our services. PÉREZ Piper, confirms and will msg [facility] to continue to buy and bill Ree. TTN

## 2019-03-14 ENCOUNTER — TELEPHONE (OUTPATIENT)
Dept: OBSTETRICS AND GYNECOLOGY | Facility: CLINIC | Age: 34
End: 2019-03-14

## 2019-03-14 NOTE — TELEPHONE ENCOUNTER
----- Message from Avery Castillo sent at 3/14/2019 10:44 AM CDT -----  Contact: Pt  Name of Who is Calling: ZAY MERAZ [[id number]]      What is the request in detail: Pt is calling in regards to rescheduling her mekena Injection for tomorrow for 10 am. I tried to reschedule but was unable.    Please contact to further discuss and advise.       Can the clinic reply by [facility]:       [facility]: [phone]

## 2019-03-14 NOTE — TELEPHONE ENCOUNTER
"Returned call and scheduled appt for Ree injection. Offered appt on Monday, March 18. Pt refused and stated " I was actually due today so I need to get the injection as soon as possible". Scheduled appt on 03/5/2019. Pt voiced understanding.  "

## 2019-03-15 ENCOUNTER — CLINICAL SUPPORT (OUTPATIENT)
Dept: OBSTETRICS AND GYNECOLOGY | Facility: CLINIC | Age: 34
End: 2019-03-15
Payer: MEDICARE

## 2019-03-15 PROCEDURE — 99999 PR PBB SHADOW E&M-EST. PATIENT-LVL III: CPT | Mod: PBBFAC,HCWC,,

## 2019-03-15 PROCEDURE — 99999 PR PBB SHADOW E&M-EST. PATIENT-LVL III: ICD-10-PCS | Mod: PBBFAC,HCWC,,

## 2019-03-15 PROCEDURE — 96372 THER/PROPH/DIAG INJ SC/IM: CPT | Mod: HCWC,S$GLB,, | Performed by: OBSTETRICS & GYNECOLOGY

## 2019-03-15 PROCEDURE — 96372 PR INJECTION,THERAP/PROPH/DIAG2ST, IM OR SUBCUT: ICD-10-PCS | Mod: HCWC,S$GLB,, | Performed by: OBSTETRICS & GYNECOLOGY

## 2019-03-15 RX ADMIN — HYDROXYPROGESTERONE CAPROATE 250 MG: 250 INJECTION INTRAMUSCULAR at 10:03

## 2019-03-22 ENCOUNTER — CLINICAL SUPPORT (OUTPATIENT)
Dept: OBSTETRICS AND GYNECOLOGY | Facility: CLINIC | Age: 34
End: 2019-03-22
Payer: MEDICARE

## 2019-03-22 PROCEDURE — 96372 PR INJECTION,THERAP/PROPH/DIAG2ST, IM OR SUBCUT: ICD-10-PCS | Mod: HCWC,S$GLB,, | Performed by: OBSTETRICS & GYNECOLOGY

## 2019-03-22 PROCEDURE — 99999 PR PBB SHADOW E&M-EST. PATIENT-LVL III: ICD-10-PCS | Mod: PBBFAC,HCWC,,

## 2019-03-22 PROCEDURE — 99999 PR PBB SHADOW E&M-EST. PATIENT-LVL III: CPT | Mod: PBBFAC,HCWC,,

## 2019-03-22 PROCEDURE — 96372 THER/PROPH/DIAG INJ SC/IM: CPT | Mod: HCWC,S$GLB,, | Performed by: OBSTETRICS & GYNECOLOGY

## 2019-03-22 RX ADMIN — HYDROXYPROGESTERONE CAPROATE 250 MG: 250 INJECTION INTRAMUSCULAR at 02:03

## 2019-03-27 ENCOUNTER — ROUTINE PRENATAL (OUTPATIENT)
Dept: OBSTETRICS AND GYNECOLOGY | Facility: CLINIC | Age: 34
End: 2019-03-27
Payer: MEDICARE

## 2019-03-27 VITALS
BODY MASS INDEX: 38.33 KG/M2 | SYSTOLIC BLOOD PRESSURE: 113 MMHG | WEIGHT: 244.69 LBS | DIASTOLIC BLOOD PRESSURE: 63 MMHG

## 2019-03-27 DIAGNOSIS — O99.282 HYPERTHYROIDISM AFFECTING PREGNANCY IN SECOND TRIMESTER: ICD-10-CM

## 2019-03-27 DIAGNOSIS — R12 HEARTBURN DURING PREGNANCY IN SECOND TRIMESTER: ICD-10-CM

## 2019-03-27 DIAGNOSIS — O26.892 HEARTBURN DURING PREGNANCY IN SECOND TRIMESTER: ICD-10-CM

## 2019-03-27 DIAGNOSIS — O34.32 CERVICAL CERCLAGE SUTURE PRESENT IN SECOND TRIMESTER: ICD-10-CM

## 2019-03-27 DIAGNOSIS — Z98.891 HISTORY OF CLASSICAL CESAREAN SECTION: ICD-10-CM

## 2019-03-27 DIAGNOSIS — O09.92 SUPERVISION OF HIGH RISK PREGNANCY IN SECOND TRIMESTER: Primary | ICD-10-CM

## 2019-03-27 DIAGNOSIS — O34.42 HISTORY OF LOOP ELECTROSURGICAL EXCISION PROCEDURE (LEEP) OF CERVIX AFFECTING PREGNANCY IN SECOND TRIMESTER: ICD-10-CM

## 2019-03-27 DIAGNOSIS — Z98.890 HISTORY OF LOOP ELECTROSURGICAL EXCISION PROCEDURE (LEEP) OF CERVIX AFFECTING PREGNANCY IN SECOND TRIMESTER: ICD-10-CM

## 2019-03-27 DIAGNOSIS — J45.909 MATERNAL ASTHMA COMPLICATING PREGNANCY: ICD-10-CM

## 2019-03-27 DIAGNOSIS — O09.892 HIV RISK FACTORS AFFECTING PREGNANCY IN SECOND TRIMESTER: ICD-10-CM

## 2019-03-27 DIAGNOSIS — O99.212 OBESITY AFFECTING PREGNANCY IN SECOND TRIMESTER: ICD-10-CM

## 2019-03-27 DIAGNOSIS — E05.90 HYPERTHYROIDISM AFFECTING PREGNANCY IN SECOND TRIMESTER: ICD-10-CM

## 2019-03-27 DIAGNOSIS — O09.299 HISTORY OF CERVICAL INCOMPETENCE IN PREGNANCY, CURRENTLY PREGNANT: ICD-10-CM

## 2019-03-27 DIAGNOSIS — N88.3 CERVICAL INCOMPETENCE: ICD-10-CM

## 2019-03-27 DIAGNOSIS — O99.519 MATERNAL ASTHMA COMPLICATING PREGNANCY: ICD-10-CM

## 2019-03-27 LAB
T4 FREE SERPL-MCNC: 0.88 NG/DL (ref 0.71–1.51)
TSH SERPL DL<=0.005 MIU/L-ACNC: 0.02 UIU/ML (ref 0.4–4)

## 2019-03-27 PROCEDURE — 84443 ASSAY THYROID STIM HORMONE: CPT | Mod: HCWC

## 2019-03-27 PROCEDURE — 3008F PR BODY MASS INDEX (BMI) DOCUMENTED: ICD-10-PCS | Mod: HCWC,CPTII,S$GLB, | Performed by: OBSTETRICS & GYNECOLOGY

## 2019-03-27 PROCEDURE — 84439 ASSAY OF FREE THYROXINE: CPT | Mod: HCWC

## 2019-03-27 PROCEDURE — 99999 PR PBB SHADOW E&M-EST. PATIENT-LVL II: CPT | Mod: PBBFAC,HCWC,, | Performed by: OBSTETRICS & GYNECOLOGY

## 2019-03-27 PROCEDURE — 99999 PR PBB SHADOW E&M-EST. PATIENT-LVL II: ICD-10-PCS | Mod: PBBFAC,HCWC,, | Performed by: OBSTETRICS & GYNECOLOGY

## 2019-03-27 PROCEDURE — 0502F PR SUBSEQUENT PRENATAL CARE: ICD-10-PCS | Mod: HCWC,S$GLB,, | Performed by: OBSTETRICS & GYNECOLOGY

## 2019-03-27 PROCEDURE — 0502F SUBSEQUENT PRENATAL CARE: CPT | Mod: HCWC,S$GLB,, | Performed by: OBSTETRICS & GYNECOLOGY

## 2019-03-27 PROCEDURE — 3008F BODY MASS INDEX DOCD: CPT | Mod: HCWC,CPTII,S$GLB, | Performed by: OBSTETRICS & GYNECOLOGY

## 2019-03-27 RX ORDER — SULFAMETHOXAZOLE AND TRIMETHOPRIM 80; 16 MG/ML; MG/ML
INJECTION INTRAVENOUS
COMMUNITY
End: 2019-04-16

## 2019-03-27 RX ORDER — EMTRICITABINE AND TENOFOVIR DISOPROXIL FUMARATE 200; 300 MG/1; MG/1
TABLET, FILM COATED ORAL DAILY
Status: ON HOLD | COMMUNITY
End: 2019-10-22

## 2019-03-27 RX ORDER — FLUTICASONE PROPIONATE 110 UG/1
AEROSOL, METERED RESPIRATORY (INHALATION)
COMMUNITY
End: 2019-04-25

## 2019-03-27 NOTE — PROGRESS NOTES
Complaints today: none  Good fm.  Denies ctx, vb, lof.  Centering in pregnancy session 1.  Discussed personal goals, prenatal testing, nutrition, healthy choices.    /63   Wt 111 kg (244 lb 11.4 oz)   LMP 10/29/2018 (Exact Date)   BMI 38.33 kg/m²     33 y.o., at 21w2d by Estimated Date of Delivery: 19  Patient Active Problem List   Diagnosis    HIV (human immunodeficiency virus infection)    Hyperthyroidism    Moderate recurrent major depression    Class 2 obesity due to excess calories without serious comorbidity with body mass index (BMI) of 38.0 to 38.9 in adult    EMEKA II (cervical intraepithelial neoplasia II)    Pregnancy, supervision, high-risk/flu    HIV (human immunodeficiency virus) risk factors complicating pregnancy    Maternal asthma complicating pregnancy - pulmonary referral in    Hyperthyroidism affecting pregnancy -     History of classical  section - repeat 36-37 weeks    History of cervical incompetence in pregnancy, currently pregnant - mfm consult/con    H/O LEEP (loop electrosurgical excision procedure) of cervix complicating pregnancy    Obesity complicating pregnancy    Hyperemesis of pregnancy    Cervical incompetence    Cervical cerclage suture present in second trimester     OB History    Para Term  AB Living   7 6 1 5   4   SAB TAB Ectopic Multiple Live Births         2 6      # Outcome Date GA Lbr Adrian/2nd Weight Sex Delivery Anes PTL Lv   7 Current            6 Term 14 37w5d  3.26 kg (7 lb 3 oz) F CS-LTranv EPI N ANAHY   5A   24w0d  0.51 kg (1 lb 2 oz) M CS-LTranv  Y DEC      Birth Comments: lived for 2 months in NICU      Complications: Premature rupture of membranes   5B   24w0d  0.992 kg (2 lb 3 oz) M CS-LTranv  Y DEC      Birth Comments: lived only 2 hours      Complications: Premature rupture of membranes   4  09 35w0d  2.608 kg (5 lb 12 oz) M CS-LTranv  Y ANAHY      Birth Comments: HIV, CS d/t  viral load      Complications: Premature rupture of membranes   3  08 32w0d  2.722 kg (6 lb) M Vag-Spont  Y ANAHY      Birth Comments:  labor      Complications: Premature rupture of membranes   2  10/05/06 24w0d   M Vag-Spont  Y FD      Birth Comments: abruption and stillbirth      Complications: Abruptio Placenta   1  03 35w0d  2.722 kg (6 lb) M Vag-Spont  Y ANAHY      Birth Comments:  labor       Dating reviewed    Allergies and problem list reviewed and updated    Medical and surgical history reviewed    Prenatal labs reviewed and updated    Physical Exam:  ABD: soft, gravid, nontender,     Assessment:  Diagnoses and all orders for this visit:    Supervision of high risk pregnancy in second trimester    HIV risk factors affecting pregnancy in second trimester    Maternal asthma complicating pregnancy - pulmonary referral in    Hyperthyroidism affecting pregnancy in second trimester  -     TSH    History of classical  section - repeat 36-37 weeks    History of cervical incompetence in pregnancy, currently pregnant - mfm consult/con    History of loop electrosurgical excision procedure (LEEP) of cervix affecting pregnancy in second trimester    Obesity affecting pregnancy in second trimester    Cervical cerclage suture present in second trimester    Cervical incompetence    Heartburn during pregnancy in second trimester  -     Discontinue: ranitidine (ZANTAC) 150 MG tablet; Take 1 tablet (150 mg total) by mouth 2 (two) times daily.         Plan:   zantac   follow up 4 Weeks, bleeding/pain  kick counts, labor precautions

## 2019-03-29 ENCOUNTER — CLINICAL SUPPORT (OUTPATIENT)
Dept: OBSTETRICS AND GYNECOLOGY | Facility: CLINIC | Age: 34
End: 2019-03-29
Payer: MEDICARE

## 2019-03-29 PROCEDURE — 99999 PR PBB SHADOW E&M-EST. PATIENT-LVL III: ICD-10-PCS | Mod: PBBFAC,HCWC,,

## 2019-03-29 PROCEDURE — 99999 PR PBB SHADOW E&M-EST. PATIENT-LVL III: CPT | Mod: PBBFAC,HCWC,,

## 2019-03-29 PROCEDURE — 96372 THER/PROPH/DIAG INJ SC/IM: CPT | Mod: HCWC,S$GLB,, | Performed by: OBSTETRICS & GYNECOLOGY

## 2019-03-29 PROCEDURE — 96372 PR INJECTION,THERAP/PROPH/DIAG2ST, IM OR SUBCUT: ICD-10-PCS | Mod: HCWC,S$GLB,, | Performed by: OBSTETRICS & GYNECOLOGY

## 2019-03-29 RX ADMIN — HYDROXYPROGESTERONE CAPROATE 250 MG: 250 INJECTION INTRAMUSCULAR at 01:03

## 2019-04-05 ENCOUNTER — CLINICAL SUPPORT (OUTPATIENT)
Dept: OBSTETRICS AND GYNECOLOGY | Facility: CLINIC | Age: 34
End: 2019-04-05
Payer: MEDICARE

## 2019-04-05 PROCEDURE — 99999 PR PBB SHADOW E&M-EST. PATIENT-LVL III: ICD-10-PCS | Mod: PBBFAC,HCWC,,

## 2019-04-05 PROCEDURE — 96372 PR INJECTION,THERAP/PROPH/DIAG2ST, IM OR SUBCUT: ICD-10-PCS | Mod: HCWC,S$GLB,, | Performed by: OBSTETRICS & GYNECOLOGY

## 2019-04-05 PROCEDURE — 99999 PR PBB SHADOW E&M-EST. PATIENT-LVL III: CPT | Mod: PBBFAC,HCWC,,

## 2019-04-05 PROCEDURE — 96372 THER/PROPH/DIAG INJ SC/IM: CPT | Mod: HCWC,S$GLB,, | Performed by: OBSTETRICS & GYNECOLOGY

## 2019-04-05 RX ADMIN — HYDROXYPROGESTERONE CAPROATE 250 MG: 250 INJECTION INTRAMUSCULAR at 01:04

## 2019-04-12 ENCOUNTER — CLINICAL SUPPORT (OUTPATIENT)
Dept: OBSTETRICS AND GYNECOLOGY | Facility: CLINIC | Age: 34
End: 2019-04-12
Payer: MEDICARE

## 2019-04-12 PROCEDURE — 99999 PR PBB SHADOW E&M-EST. PATIENT-LVL III: CPT | Mod: PBBFAC,HCWC,,

## 2019-04-12 PROCEDURE — 99999 PR PBB SHADOW E&M-EST. PATIENT-LVL III: ICD-10-PCS | Mod: PBBFAC,HCWC,,

## 2019-04-12 PROCEDURE — 96372 PR INJECTION,THERAP/PROPH/DIAG2ST, IM OR SUBCUT: ICD-10-PCS | Mod: HCWC,S$GLB,, | Performed by: OBSTETRICS & GYNECOLOGY

## 2019-04-12 PROCEDURE — 96372 THER/PROPH/DIAG INJ SC/IM: CPT | Mod: HCWC,S$GLB,, | Performed by: OBSTETRICS & GYNECOLOGY

## 2019-04-12 RX ADMIN — HYDROXYPROGESTERONE CAPROATE 250 MG: 250 INJECTION INTRAMUSCULAR at 02:04

## 2019-04-16 ENCOUNTER — INITIAL CONSULT (OUTPATIENT)
Dept: MATERNAL FETAL MEDICINE | Facility: CLINIC | Age: 34
End: 2019-04-16
Payer: MEDICARE

## 2019-04-16 ENCOUNTER — PROCEDURE VISIT (OUTPATIENT)
Dept: MATERNAL FETAL MEDICINE | Facility: CLINIC | Age: 34
End: 2019-04-16
Payer: MEDICARE

## 2019-04-16 ENCOUNTER — APPOINTMENT (OUTPATIENT)
Dept: LAB | Facility: HOSPITAL | Age: 34
End: 2019-04-16
Attending: OBSTETRICS & GYNECOLOGY
Payer: MEDICARE

## 2019-04-16 DIAGNOSIS — Z36.89 ENCOUNTER FOR ULTRASOUND TO CHECK FETAL GROWTH: ICD-10-CM

## 2019-04-16 DIAGNOSIS — Z01.89 ENCOUNTER FOR LABORATORY TEST: ICD-10-CM

## 2019-04-16 DIAGNOSIS — O36.8390 FETAL ARRHYTHMIA AFFECTING PREGNANCY, ANTEPARTUM: ICD-10-CM

## 2019-04-16 DIAGNOSIS — O36.8390 FETAL ARRHYTHMIA AFFECTING PREGNANCY, ANTEPARTUM: Primary | ICD-10-CM

## 2019-04-16 PROCEDURE — 99214 OFFICE O/P EST MOD 30 MIN: CPT | Mod: HCWC,25,S$GLB, | Performed by: PEDIATRICS

## 2019-04-16 PROCEDURE — 36415 COLL VENOUS BLD VENIPUNCTURE: CPT | Mod: HCWC,PO

## 2019-04-16 PROCEDURE — 86235 NUCLEAR ANTIGEN ANTIBODY: CPT | Mod: 59,HCWC

## 2019-04-16 PROCEDURE — 86038 ANTINUCLEAR ANTIBODIES: CPT | Mod: HCWC

## 2019-04-16 PROCEDURE — 99214 PR OFFICE/OUTPT VISIT, EST, LEVL IV, 30-39 MIN: ICD-10-PCS | Mod: HCWC,25,S$GLB, | Performed by: PEDIATRICS

## 2019-04-16 PROCEDURE — 76816 PR  US,PREGNANT UTERUS,F/U,TRANSABD APP: ICD-10-PCS | Mod: HCWC,S$GLB,, | Performed by: PEDIATRICS

## 2019-04-16 PROCEDURE — 76816 OB US FOLLOW-UP PER FETUS: CPT | Mod: HCWC,S$GLB,, | Performed by: PEDIATRICS

## 2019-04-16 PROCEDURE — 86235 NUCLEAR ANTIGEN ANTIBODY: CPT | Mod: HCWC

## 2019-04-16 NOTE — LETTER
April 16, 2019      Chantal Worrell MD  [address]  [facility] [address]           [facility] Maternal Fetal [facility]  [address] [address]  Phone: [phone]  Fax: [phone]          Patient: Jm Newton   MR Number: [id number]   YOB: 1985   Date of Visit: 4/16/2019       Dear Dr. Chantal Worrell:    Thank you for referring Jm Newton to me for evaluation. Attached you will find relevant portions of my assessment and plan of care.    If you have questions, please do not hesitate to call me. I look forward to following Jm Newton along with you.    Sincerely,    Sydnee Lizarraga MD    Enclosure  CC:  No Recipients    If you would like to receive this communication electronically, please contact [email or web address] or [phone] to request more information on [employer] access.    For providers and/or their staff who would like to refer a patient to [facility], please contact us through our one-stop-shop provider referral line, [facility], at [phone].    If you feel you have received this communication in error or would no longer like to receive these types of communications, please e-mail [email or web address]

## 2019-04-16 NOTE — PROGRESS NOTES
"Indication  ========    Follow-up evaluation for fetal growth. Follow-up evaluation of anatomy.    History  ======    General History  Height 170 cm  Height (ft) 5 ft  Height (in) 7 in  Medical History  Past surgical history: Previous surgeries performed  Surgery:  section  Details: 3  Previous Outcomes  Preg. no. 1  Outcome: Live YOB: 2003  Gest. age 35 w + 0 d  Details: , 6 lbs,  labor, "Jeremy"  Preg. no. 2  Outcome: Intrapartum stillbirth  Date: 10/5/2006  Gest. age 24 w + 0 d  Gender: male  Details: , abruption & stillbirth  Preg. no. 3  Outcome: Live YOB: 2008  Gest. age 32 w + 0 d  Gender: male  Details: , 6lbs, PPROM, "Edgar"  Preg. no. 4  Outcome: Live YOB: 2009  Gest. age 35 w + 0 d  Gender: male  Details: c/s, 5lbs 12oz, PPROM, "Alfredo'ryn"/ viral load  Preg. no. 5  Outcome:  death  Gest. age 24 w + 0 d  Gender: male  Details: , twins, PPROM: 1lb 2oz male - lived for 2 months; 2lbs 3oz male - lived for 2 hours  Preg. no. 6  Outcome: Live YOB: 2014  Gest. age 37 w + 5 d  Gender: female  Details: c/s, 7lbs 3oz, "Dalaiya"  Preg. no. 7  Outcome: Spontaneous miscarriage  Gest. age 8 w + 0 d  Details: 2016, blighted ovum   8  Para 7  Leyva children born living (T) 1  Leyva children born (T) 1  Leyva children born (P) 6  Abortions (A) 1  Leyva living children (L) 4  Leyva children born living (P) 3   deaths (P) 1  Total  deaths 2  Stillbirths 1  Miscarriages 1  Risk Factors  History risk factors:  delivery in previous pregnancy  Details: hx c/s  Details: LEEP - 2018  Details: hx D& C  Details: hyperthyroidism  Details: HIV dx in   Details: asthma  History risk factors: Obesity    Pregnancy History  ==============    Maternal Lab Tests  Test: Cell free fetal DNA analysis  Result:  [id number] negative    Wants to know gender: yes    Maternal " Assessment  =================    Height 170 cm  Height (ft) 5 ft  Height (in) 7 in    Method  ======    Transabdominal ultrasound examination, 2D Color Doppler, Voluson S8. View: Good view.    Pregnancy  =========    Leyva pregnancy. Number of fetuses: 1.    Dating  ======    Cycle: regular cycle  Ultrasound examination on: 4/16/2019  GA by U/S based upon: AC, BPD, Femur, HC  GA by U/S 24 w + 5 d  ESTUARDO by U/S: 8/1/2019  Assigned: Dating performed on 12/18/2018, based on the LMP  Assigned GA 24 w + 1 d  Assigned ESTUARDO: 8/5/2019    General Evaluation  ==============    Cardiac activity: present.  bpm.  Fetal movements: visualized.  Presentation: cephalic.  Placenta: anterior.  Umbilical cord: 3 vessel cord.  Amniotic fluid: MVP 4.8 cm.    Fetal Biometry  ============    Fetal Biometry  BPD 59.4 mm 24w 2d Hadlock  OFD 81.1 mm 26w 3d Eliud  .0 mm 24w 3d Hadlock  .6 mm 25w 5d Hadlock  Femur 44.4 mm 24w 4d Hadlock   g 64% Dalton  Calculated by: Hadlock (BPD-HC-AC-FL)  EFW (lb) 1 lb  EFW (oz) 11 oz  Cephalic index 0.73  HC / AC 1.05  FL / BPD 0.75  FL / HC 0.20  FL / AC 0.21  MVP 4.8 cm   bpm    Fetal Anatomy  ============    Cranium: normal  Lips: normal  Nose: normal  LVOT: normal  4-chamber view: 4-chamber normal, septum normal  Aortic arch: normal  3-vessel view: normal  3-vessel-trachea view: normal  Stomach: normal  Kidneys: normal  Bladder: normal  Genitals: documented previously  Cervical spine: normal  Thoracic spine: normal  Lumbar spine: normal  Sacral spine: normal  Gender: male  Wants to know gender: yes          Consultation  ==========    Ms. Newton is seen for growth and completion of anatomy. On ultrasound, an irregular and inconsistent fetal dysrhythmia is noted. Areas of  apparent bradycardia are noted in addition to arrhythmic patterns consistent with PAC/PVC. Patient has no history of cardiac issues or AI  disorders in self or family. She uses Beclomethasone  occasionally for asthma; she also has a Ventolin inhaler. She is on Truvada for HIV. She  gets weekly [place] shots and is s/p cerclage.    No fetal cardiac anomalies are noted on ultrasound.    Ultrasound today demonstrates normal cardiac anatomy with an irregular rhythm. Rate is otherwise normal. The appearance is consistent with  premature atrial contractions.    PACs and PVCs are the most common fetal arrhythmia (>90%). They are generally benign and resolve by delivery and require no treatment.  However, 1-3% will develop a tachyarrhythmia, which can lead to cardiovascular decompensation. As noted, a portion of evaluation was  consistent with bradycardia and possible heart block. I discussed both with this very nice patient. She understands that a fetal echo may help  delineate the issue. In addition, we discussed AI disorders and possible connection with a fetal bradycardic rate/rhythm.    A complete cardiac examination was not possible. The most common causes of a sustained slow heart rate are complete heart block, sinus  bradycardia, and blocked atrial bigeminy. A persistent ventricular rate <60 bpm is usually associated with complete heart block, while rates  between 60 and 80 bpm can be due to nonconducted bigeminy or second or third degree block, and rates of 100 to 110 bpm are usually due to  sinus bradycardia. The rhythm here was intermittent.    RECOMMENDATIONS:  1. Fetal echo - scheduling is being negotiated between patient (who has serious time and transportation limitations) and Peds Cardiology.  2. JONE, SS-A, SS-B  3. Follow up as required by findings. Consistent f/up necessary to ensure fetus does not develop hemodynamically significant dysrhythmia.          I spent 25 minutes in patient care management and consultation with >50% face to face.        Impression  =========    The fetal anatomic survey was completed today, and no fetal structural abnormalities were noted.  Interval fetal growth has  been normal, and the AFV is normal.  A dysrhythmia was noted. See above.              Recommendation  ==============    Thank you again for allowing us to participate in the care of your patients. If you have any questions concerning today's consultation, feel free  to contact me or one of my partners. We can be reached at [phone] during normal business hours. If you have a question after normal  business hours, please contact Labor and Delivery at [phone].

## 2019-04-17 LAB
ANA SER QL IF: NORMAL
ANTI-SSA ANTIBODY: 0.74 EU (ref 0–19.99)
ANTI-SSA INTERPRETATION: NEGATIVE

## 2019-04-18 ENCOUNTER — CLINICAL SUPPORT (OUTPATIENT)
Dept: OBSTETRICS AND GYNECOLOGY | Facility: CLINIC | Age: 34
End: 2019-04-18
Payer: MEDICARE

## 2019-04-18 PROCEDURE — 96372 THER/PROPH/DIAG INJ SC/IM: CPT | Mod: HCWC,S$GLB,, | Performed by: OBSTETRICS & GYNECOLOGY

## 2019-04-18 PROCEDURE — 96372 PR INJECTION,THERAP/PROPH/DIAG2ST, IM OR SUBCUT: ICD-10-PCS | Mod: HCWC,S$GLB,, | Performed by: OBSTETRICS & GYNECOLOGY

## 2019-04-18 RX ADMIN — HYDROXYPROGESTERONE CAPROATE 250 MG: 250 INJECTION INTRAMUSCULAR at 10:04

## 2019-04-22 LAB
ANTI-SSB ANTIBODY: 0.35 EU (ref 0–19.99)
ANTI-SSB INTERPRETATION: NEGATIVE

## 2019-04-25 ENCOUNTER — ROUTINE PRENATAL (OUTPATIENT)
Dept: OBSTETRICS AND GYNECOLOGY | Facility: CLINIC | Age: 34
End: 2019-04-25
Payer: MEDICARE

## 2019-04-25 ENCOUNTER — PES CALL (OUTPATIENT)
Dept: ADMINISTRATIVE | Facility: CLINIC | Age: 34
End: 2019-04-25

## 2019-04-25 VITALS
DIASTOLIC BLOOD PRESSURE: 80 MMHG | SYSTOLIC BLOOD PRESSURE: 140 MMHG | WEIGHT: 245.38 LBS | BODY MASS INDEX: 38.43 KG/M2

## 2019-04-25 DIAGNOSIS — O99.282 HYPERTHYROIDISM AFFECTING PREGNANCY IN SECOND TRIMESTER: ICD-10-CM

## 2019-04-25 DIAGNOSIS — J45.909 MATERNAL ASTHMA COMPLICATING PREGNANCY: ICD-10-CM

## 2019-04-25 DIAGNOSIS — O99.519 MATERNAL ASTHMA COMPLICATING PREGNANCY: ICD-10-CM

## 2019-04-25 DIAGNOSIS — O09.92 SUPERVISION OF HIGH RISK PREGNANCY IN SECOND TRIMESTER: Primary | ICD-10-CM

## 2019-04-25 DIAGNOSIS — O09.299 HISTORY OF CERVICAL INCOMPETENCE IN PREGNANCY, CURRENTLY PREGNANT: ICD-10-CM

## 2019-04-25 DIAGNOSIS — O99.810 ABNORMAL GLUCOSE AFFECTING PREGNANCY: ICD-10-CM

## 2019-04-25 DIAGNOSIS — E05.90 HYPERTHYROIDISM AFFECTING PREGNANCY IN SECOND TRIMESTER: ICD-10-CM

## 2019-04-25 DIAGNOSIS — O99.212 OBESITY AFFECTING PREGNANCY IN SECOND TRIMESTER: ICD-10-CM

## 2019-04-25 DIAGNOSIS — O09.892 HIV RISK FACTORS AFFECTING PREGNANCY IN SECOND TRIMESTER: ICD-10-CM

## 2019-04-25 DIAGNOSIS — Z98.891 HISTORY OF CLASSICAL CESAREAN SECTION: ICD-10-CM

## 2019-04-25 PROCEDURE — 99999 PR PBB SHADOW E&M-EST. PATIENT-LVL III: CPT | Mod: PBBFAC,HCWC,, | Performed by: OBSTETRICS & GYNECOLOGY

## 2019-04-25 PROCEDURE — 99999 PR PBB SHADOW E&M-EST. PATIENT-LVL III: ICD-10-PCS | Mod: PBBFAC,HCWC,, | Performed by: OBSTETRICS & GYNECOLOGY

## 2019-04-25 PROCEDURE — 0502F PR SUBSEQUENT PRENATAL CARE: ICD-10-PCS | Mod: HCWC,CPTII,S$GLB, | Performed by: OBSTETRICS & GYNECOLOGY

## 2019-04-25 PROCEDURE — 0502F SUBSEQUENT PRENATAL CARE: CPT | Mod: HCWC,CPTII,S$GLB, | Performed by: OBSTETRICS & GYNECOLOGY

## 2019-04-25 NOTE — LETTER
April 25, 2019    Jm Newton  [address]  [facility] [address]         [facility]  [address]  [facility] [address]  Phone: [phone] April 25, 2019     Patient: Jm Newton   YOB: 1985   Date of Visit: 4/25/2019       To Whom It May Concern:    It is my medical opinion that Jm Newton may return to light duty immediately with the following restrictions: no lifting greater than 20 pounds, needs to be able to sit when necessary..    If you have any questions or concerns, please don't hesitate to call.    Sincerely,        Chantal Worrell MD

## 2019-04-25 NOTE — PROGRESS NOTES
Complaints today: Occasional heartburn and nausea improved with her current medications. She states that her asthma has been a little worse, which she attributes to the pollen in the area. She had to use her albuterol inhaler 3 times last week. She has not been contacted by pulmonology. No contractions, vaginal bleeding, leakage of fluid. Reports good fetal movement.     BP (!) 140/80   Wt 111.3 kg (245 lb 6 oz)   LMP 10/29/2018 (Exact Date)   BMI 38.43 kg/m²   Repeat /78    33 y.o., at 25w3d by Estimated Date of Delivery: 19  Patient Active Problem List   Diagnosis    HIV (human immunodeficiency virus infection)    Hyperthyroidism    Moderate recurrent major depression    Class 2 obesity due to excess calories without serious comorbidity with body mass index (BMI) of 38.0 to 38.9 in adult    EMEKA II (cervical intraepithelial neoplasia II)    Pregnancy, supervision, high-risk/flu    HIV (human immunodeficiency virus) risk factors complicating pregnancy    Maternal asthma complicating pregnancy - pulmonary referral in    Hyperthyroidism affecting pregnancy -     History of classical  section - repeat 36-37 weeks    History of cervical incompetence in pregnancy, currently pregnant - mfm consult/con    H/O LEEP (loop electrosurgical excision procedure) of cervix complicating pregnancy    Obesity complicating pregnancy    Hyperemesis of pregnancy    Cervical incompetence    Cervical cerclage suture present in second trimester     OB History    Para Term  AB Living   7 6 1 5   4   SAB TAB Ectopic Multiple Live Births         2 6      # Outcome Date GA Lbr Adrian/2nd Weight Sex Delivery Anes PTL Lv   7 Current            6 Term 14 37w5d  3.26 kg (7 lb 3 oz) F CS-LTranv EPI N ANAHY   5A   24w0d  0.51 kg (1 lb 2 oz) M CS-LTranv  Y DEC      Birth Comments: lived for 2 months in NICU      Complications: Premature rupture of membranes   5B   24w0d   0.992 kg (2 lb 3 oz) M CS-LTranv  Y DEC      Birth Comments: lived only 2 hours      Complications: Premature rupture of membranes   4  09 35w0d  2.608 kg (5 lb 12 oz) M CS-LTranv  Y ANAHY      Birth Comments: HIV, CS d/t viral load      Complications: Premature rupture of membranes   3  08 32w0d  2.722 kg (6 lb) M Vag-Spont  Y ANAHY      Birth Comments:  labor      Complications: Premature rupture of membranes   2  10/05/06 24w0d   M Vag-Spont  Y FD      Birth Comments: abruption and stillbirth      Complications: Abruptio Placenta   1  03 35w0d  2.722 kg (6 lb) M Vag-Spont  Y ANAHY      Birth Comments:  labor       Dating reviewed    Allergies and problem list reviewed and updated    Medical and surgical history reviewed    Prenatal labs reviewed and updated    Physical Exam:  GEN: No distress.  CARDIO: Regular rate  PULM: Normal respiratory effort.  ABD: Soft, gravid, nontender  NEURO: Alert and oriented    Assessment:  32 yo  at 25w3d presents for routine OB visit.    Plan:   1. HIV    - Continue current medication regimen per Dr. Hamlin    2. Asthma   - Continue flovent daily and albuterol prn.  - Recommend adding zyrtec daily   - Will attempt to schedule with pulmonology    3.  N/V/GERD  - Continue zantac daily and phenergan/reglan as needed    4. Cervical incompetence  - Continue weekly con injections  - Cerclage in place    5. 25 weeks gestation  - OB glucose, CBC ordered.  - RTC for centering      Katharine Espinosa MD  OBGYN PGY-1

## 2019-04-26 ENCOUNTER — CLINICAL SUPPORT (OUTPATIENT)
Dept: OBSTETRICS AND GYNECOLOGY | Facility: CLINIC | Age: 34
End: 2019-04-26
Payer: MEDICARE

## 2019-04-26 ENCOUNTER — OFFICE VISIT (OUTPATIENT)
Dept: PULMONOLOGY | Facility: CLINIC | Age: 34
End: 2019-04-26
Payer: MEDICARE

## 2019-04-26 VITALS
WEIGHT: 245.81 LBS | HEART RATE: 92 BPM | DIASTOLIC BLOOD PRESSURE: 66 MMHG | OXYGEN SATURATION: 99 % | HEIGHT: 67 IN | SYSTOLIC BLOOD PRESSURE: 118 MMHG | BODY MASS INDEX: 38.58 KG/M2

## 2019-04-26 DIAGNOSIS — J45.909 MATERNAL ASTHMA COMPLICATING PREGNANCY: Primary | ICD-10-CM

## 2019-04-26 DIAGNOSIS — O99.519 MATERNAL ASTHMA COMPLICATING PREGNANCY: Primary | ICD-10-CM

## 2019-04-26 DIAGNOSIS — K21.9 GASTROESOPHAGEAL REFLUX DISEASE, ESOPHAGITIS PRESENCE NOT SPECIFIED: ICD-10-CM

## 2019-04-26 DIAGNOSIS — R09.82 POST-NASAL DRIP: ICD-10-CM

## 2019-04-26 PROCEDURE — 3008F BODY MASS INDEX DOCD: CPT | Mod: HCWC,CPTII,GC,S$GLB | Performed by: HOSPITALIST

## 2019-04-26 PROCEDURE — 96372 THER/PROPH/DIAG INJ SC/IM: CPT | Mod: HCWC,S$GLB,, | Performed by: OBSTETRICS & GYNECOLOGY

## 2019-04-26 PROCEDURE — 3008F PR BODY MASS INDEX (BMI) DOCUMENTED: ICD-10-PCS | Mod: HCWC,CPTII,GC,S$GLB | Performed by: HOSPITALIST

## 2019-04-26 PROCEDURE — 96372 PR INJECTION,THERAP/PROPH/DIAG2ST, IM OR SUBCUT: ICD-10-PCS | Mod: HCWC,S$GLB,, | Performed by: OBSTETRICS & GYNECOLOGY

## 2019-04-26 PROCEDURE — 99204 PR OFFICE/OUTPT VISIT, NEW, LEVL IV, 45-59 MIN: ICD-10-PCS | Mod: HCWC,GC,S$GLB, | Performed by: HOSPITALIST

## 2019-04-26 PROCEDURE — 99999 PR PBB SHADOW E&M-EST. PATIENT-LVL III: ICD-10-PCS | Mod: PBBFAC,HCWC,GC, | Performed by: HOSPITALIST

## 2019-04-26 PROCEDURE — 99999 PR PBB SHADOW E&M-EST. PATIENT-LVL II: CPT | Mod: PBBFAC,HCWC,,

## 2019-04-26 PROCEDURE — 99999 PR PBB SHADOW E&M-EST. PATIENT-LVL II: ICD-10-PCS | Mod: PBBFAC,HCWC,,

## 2019-04-26 PROCEDURE — 99999 PR PBB SHADOW E&M-EST. PATIENT-LVL III: CPT | Mod: PBBFAC,HCWC,GC, | Performed by: HOSPITALIST

## 2019-04-26 PROCEDURE — 99204 OFFICE O/P NEW MOD 45 MIN: CPT | Mod: HCWC,GC,S$GLB, | Performed by: HOSPITALIST

## 2019-04-26 RX ORDER — FLUTICASONE PROPIONATE 220 UG/1
1 AEROSOL, METERED RESPIRATORY (INHALATION) 2 TIMES DAILY
Status: DISCONTINUED | OUTPATIENT
Start: 2019-04-26 | End: 2021-03-31 | Stop reason: CLARIF

## 2019-04-26 RX ORDER — FLUTICASONE PROPIONATE 50 MCG
1 SPRAY, SUSPENSION (ML) NASAL 2 TIMES DAILY
Qty: 18.2 ML | Refills: 3 | Status: ON HOLD | OUTPATIENT
Start: 2019-04-26 | End: 2019-10-22

## 2019-04-26 RX ADMIN — HYDROXYPROGESTERONE CAPROATE 250 MG: 250 INJECTION INTRAMUSCULAR at 09:04

## 2019-04-26 NOTE — PROGRESS NOTES
Subjective:       Patient ID: Jm Newton is a 33 y.o. female.    Chief Complaint: Asthma; Wheezing; Shortness of Breath; and Cough    33yoF with hx of asthma since childhood, mild, intermittent with rare inhaler use at baseline and now 25 weeks pregnant. Reports worsening of her asthma sx since she became pregnant.  Currently uses her inhaler ~3x/week.  On Flovent 110mcg.  Has significant reflux that entirely controlled w/ Zantac.  Has rhinitis and PND.  Reports her asthma is typically seasonal.  Presently most of her sx are when she is outside with the kids.  No fevers, chills.  Has a cough with minimal sputum production.    Review of Systems   Constitutional: Negative for fever, weight loss and activity change.   HENT: Positive for postnasal drip and rhinorrhea. Negative for sore throat and trouble swallowing.    Eyes: Negative for itching.   Respiratory: Positive for cough, sputum production, wheezing and dyspnea on extertion. Negative for apnea and chest tightness.    Cardiovascular: Negative for chest pain and leg swelling.   Genitourinary: Negative for difficulty urinating and hematuria.   Endocrine: Negative for polydipsia and heat intolerance.    Musculoskeletal: Negative for arthralgias and back pain.   Skin: Negative for rash.   Neurological: Negative for dizziness and light-headedness.   Hematological: Negative for adenopathy. Does not bruise/bleed easily.       Objective:      Physical Exam   Constitutional: She is oriented to person, place, and time. She appears well-developed and well-nourished.   HENT:   Head: Normocephalic.   Cardiovascular: Normal rate.   Pulmonary/Chest: Normal expansion. No stridor. She has no decreased breath sounds. She has no wheezes.   Abdominal: Soft.   Pregnant     Musculoskeletal: She exhibits no edema.   Neurological: She is alert and oriented to person, place, and time.   Skin: No erythema.     Personal Diagnostic Review  Pulmonary function tests: No  PFTs  No flowsheet data found.      Assessment:       1. Maternal asthma complicating pregnancy - pulmonary referral in    2. Gastroesophageal reflux disease, esophagitis presence not specified    3. Post-nasal drip        Outpatient Encounter Medications as of 4/26/2019   Medication Sig Dispense Refill    darunavir ethanolate 150 mg Tab Take 150 mg by mouth 2 (two) times daily.      emtricitabine-tenofovir 200-300 mg (TRUVADA) 200-300 mg Tab 1 tablet      ferrous sulfate (FEOSOL) 325 mg (65 mg iron) Tab tablet Take 1 tablet (325 mg total) by mouth 2 (two) times daily. 30 tablet 3    FLOVENT  mcg/actuation inhaler INL 2 PUFFS INTO THE LUNGS BID  11    hydroxyprogest,PF,,preg presv, (KARI) 250 mg/mL (1 mL) Oil Inject 1 mL (250 mg total) into the muscle every 7 days. 5 mL 6    inhaler,assist device,lg mask (OPTICHAMBER HERBERT LG MASK MISC) U UTD BID      metoclopramide HCl (REGLAN) 10 MG tablet Take 1 tablet (10 mg total) by mouth 3 (three) times daily before meals. 60 tablet 2    ondansetron (ZOFRAN) 4 MG tablet Take 1 tablet (4 mg total) by mouth daily as needed for Nausea. 30 tablet 1    OPTICHAMBER HERBERT LG MASK Spcr U UTD BID  3    PROCHAMBER       promethazine (PHENERGAN) 25 MG tablet Take 1 tablet (25 mg total) by mouth every 6 (six) hours as needed for Nausea. 30 tablet 6    ranitidine (ZANTAC) 150 MG tablet TAKE 1 TABLET(150 MG) BY MOUTH TWICE DAILY 180 tablet 1    ritonavir (NORVIR) 100 mg Tab tablet   6    VENTOLIN HFA 90 mcg/actuation inhaler   5     Facility-Administered Encounter Medications as of 4/26/2019   Medication Dose Route Frequency Provider Last Rate Last Dose    HYDROXYprogesterone caproate (Kari) injection 250 mg  250 mg Intramuscular Q7 Days Chantal Worrell MD   250 mg at 04/26/19 0936     No orders of the defined types were placed in this encounter.      Plan:       1. Worsening of baseline mild asthma.  Increase Flovent to 220mcg.  Control PND w/ Flonase.  Have  her discuss escalation of GERD therapy w/ OB.  Can de-escalate therapy post-partum.  No evidence of exacerbation.  Counseled to avoid triggers.    2. Asked her to discuss w/ OB re: escalating GERD therapy as she is not controlled.    3. Flonase prescribed.    RTC 6 months.    Discussed w/ Dr. Bran Santana MD  [employer] Fellow CANDACE

## 2019-05-01 ENCOUNTER — OFFICE VISIT (OUTPATIENT)
Dept: PEDIATRIC CARDIOLOGY | Facility: CLINIC | Age: 34
End: 2019-05-01
Payer: MEDICARE

## 2019-05-01 ENCOUNTER — CLINICAL SUPPORT (OUTPATIENT)
Dept: PEDIATRIC CARDIOLOGY | Facility: CLINIC | Age: 34
End: 2019-05-01
Payer: MEDICARE

## 2019-05-01 VITALS
BODY MASS INDEX: 38.4 KG/M2 | DIASTOLIC BLOOD PRESSURE: 70 MMHG | SYSTOLIC BLOOD PRESSURE: 128 MMHG | WEIGHT: 244.69 LBS | HEART RATE: 90 BPM | HEIGHT: 67 IN

## 2019-05-01 DIAGNOSIS — O36.8390 FETAL ARRHYTHMIA AFFECTING PREGNANCY, ANTEPARTUM: ICD-10-CM

## 2019-05-01 DIAGNOSIS — O36.8390 FETAL ARRHYTHMIA AFFECTING PREGNANCY, ANTEPARTUM: Primary | ICD-10-CM

## 2019-05-01 PROCEDURE — 76825 PR  SO2 FETAL HEART: ICD-10-PCS | Mod: HCWC,S$GLB,, | Performed by: PEDIATRICS

## 2019-05-01 PROCEDURE — 76825 ECHO EXAM OF FETAL HEART: CPT | Mod: HCWC,S$GLB,, | Performed by: PEDIATRICS

## 2019-05-01 PROCEDURE — 99999 PR PBB SHADOW E&M-EST. PATIENT-LVL III: ICD-10-PCS | Mod: PBBFAC,HCWC,, | Performed by: PEDIATRICS

## 2019-05-01 PROCEDURE — 93325 DOPPLER ECHO COLOR FLOW MAPG: CPT | Mod: HCWC,S$GLB,, | Performed by: PEDIATRICS

## 2019-05-01 PROCEDURE — 76827 PR  SO2 FETAL HEART DOPPLER: ICD-10-PCS | Mod: HCWC,S$GLB,, | Performed by: PEDIATRICS

## 2019-05-01 PROCEDURE — 99999 PR PBB SHADOW E&M-EST. PATIENT-LVL III: CPT | Mod: PBBFAC,HCWC,, | Performed by: PEDIATRICS

## 2019-05-01 PROCEDURE — 99203 PR OFFICE/OUTPT VISIT, NEW, LEVL III, 30-44 MIN: ICD-10-PCS | Mod: 25,HCWC,S$GLB, | Performed by: PEDIATRICS

## 2019-05-01 PROCEDURE — 99203 OFFICE O/P NEW LOW 30 MIN: CPT | Mod: 25,HCWC,S$GLB, | Performed by: PEDIATRICS

## 2019-05-01 PROCEDURE — 3008F BODY MASS INDEX DOCD: CPT | Mod: HCWC,CPTII,S$GLB, | Performed by: PEDIATRICS

## 2019-05-01 PROCEDURE — 76827 ECHO EXAM OF FETAL HEART: CPT | Mod: HCWC,S$GLB,, | Performed by: PEDIATRICS

## 2019-05-01 PROCEDURE — 93325 PR DOPPLER COLOR FLOW VELOCITY MAP: ICD-10-PCS | Mod: HCWC,S$GLB,, | Performed by: PEDIATRICS

## 2019-05-01 PROCEDURE — 3008F PR BODY MASS INDEX (BMI) DOCUMENTED: ICD-10-PCS | Mod: HCWC,CPTII,S$GLB, | Performed by: PEDIATRICS

## 2019-05-01 NOTE — PROGRESS NOTES
[facility] Pediatric Cardiology [place] [address] Fetal Cardiology Clinic    Today, I had the pleasure of evaluating Jm Newton who is now 33 y.o. and carrying her fourth pregnancy at 26 2/7 weeks gestation with an ESTUARDO of 19. She was referred for evaluation of the fetal heart due a concern for a fetal arrhythmia.      She is carrying a male fetus, named Victoriano.      Obstetric History:    .  She has had three term C sections.  Her OB history is otherwise unremarkable.     Past Medical History:   Diagnosis Date    Asthma     HIV infection     dx     Hyperthyroidism in pregnancy, antepartum          Current Outpatient Medications:     darunavir ethanolate 150 mg Tab, Take 600 mg by mouth 2 (two) times daily. , Disp: , Rfl:     emtricitabine-tenofovir 200-300 mg (TRUVADA) 200-300 mg Tab, 1 tablet, Disp: , Rfl:     ferrous sulfate (FEOSOL) 325 mg (65 mg iron) Tab tablet, Take 1 tablet (325 mg total) by mouth 2 (two) times daily., Disp: 30 tablet, Rfl: 3    fluticasone (FLONASE) 50 mcg/actuation nasal spray, 1 spray (50 mcg total) by Each Nare route 2 (two) times daily., Disp: 18.2 mL, Rfl: 3    hydroxyprogest,PF,,preg presv, (KARI) 250 mg/mL (1 mL) Oil, Inject 1 mL (250 mg total) into the muscle every 7 days., Disp: 5 mL, Rfl: 6    inhaler,assist device,lg mask (OPTICHAMBER HERBERT LG MASK MISC), U UTD BID, Disp: , Rfl:     metoclopramide HCl (REGLAN) 10 MG tablet, Take 1 tablet (10 mg total) by mouth 3 (three) times daily before meals., Disp: 60 tablet, Rfl: 2    ondansetron (ZOFRAN) 4 MG tablet, Take 1 tablet (4 mg total) by mouth daily as needed for Nausea., Disp: 30 tablet, Rfl: 1    OPTICHAMBER HERBERT LG MASK Spcr, U UTD BID, Disp: , Rfl: 3    PROCHAMBER, , Disp: , Rfl:     promethazine (PHENERGAN) 25 MG tablet, Take 1 tablet (25 mg total) by mouth every 6 (six) hours as needed for Nausea., Disp: 30 tablet, Rfl: 6    ranitidine (ZANTAC) 150 MG tablet, TAKE 1 TABLET(150 MG) BY MOUTH  TWICE DAILY, Disp: 180 tablet, Rfl: 1    ritonavir (NORVIR) 100 mg Tab tablet, , Disp: , Rfl: 6    VENTOLIN HFA 90 mcg/actuation inhaler, , Disp: , Rfl: 5    Current Facility-Administered Medications:     fluticasone 220 mcg/actuation inhaler 1 puff, 1 puff, Inhalation, BID, Thai Santana MD    HYDROXYprogesterone caproate (Ree) injection 250 mg, 250 mg, Intramuscular, Q7 Days, Chantal Worrell MD, 250 mg at 04/26/19 0936    Family History: Negative for congenital heart disease, early coronary artery disease, sudden unexplained death, connective tissues disorders, genetic syndromes, multiple miscarriages or other congenital anomalies.    Social History: Ms. Newton is single. The father of the baby is not involved.  She works for [employer] as a [employer].    FETAL ECHOCARDIOGRAM (summary):  Fetal echocardiogram at 26 2/7 weeks gestation for a concern for fetal arrhythmia. ESTUARDO 8/5/19.  Normally connected heart.  Rare premature atrial contractions, all of which are conducted.  Normal fetal atrial and ductal level shunting.  No ventricular level shunting.  Normal atrioventricular and semilunar valve structure and function.  Normal ductal and aortic arches.  Normal biventricular size and systolic function.  No pericardial effusion.  (Full report in electronic medical record)    Impression:  Single active male fetus at 26 wga.  Structurally, normal fetal echocardiogram.  The fetus does have intermittent premature atrial contractions, all of which are conducted.  Generally, these do not progress to a more organized arrhythmia in the large majority of patients.  She does not have any other evidence of prolonged tachyarrhythmias like ventricular dysfunction or hydrops.      Todays fetal echocardiogram is normal, within the limitations of fetal echocardiography.  I discussed with her that fetal echocardiography is insufficiently sensitive to rule out all septal defects, anomalies of pulmonary and systemic veins,  arch anomalies, and some valvar abnormalities, nor can it ensure that the ductus arteriosus and foramen ovale will spontaneously close.     Recommendations:  Location, timing, and mode of delivery will be determined by the obstetrical team.  She does not require further follow-up in the fetal echocardiography clinic, but I would be happy to see her again if additional questions or concerns arise.    Recommend routine surveillance of the fetal heart rate.    Should there be any concerns about the baby's heart after birth, a post- echocardiogram and cardiology consultation are recommended.     I will see Ms. Newton in the first couple of months after birth to further assess the baby's rhythm.    The above information was discussed in detail including the use of diagrams, with 30 minutes of total face to face time, with greater than 50% with counseling and coordination of care.  The discussion of the diagnosis and treatment options is as described above.      Wilfrid Donald MD, MPH  Pediatric and Fetal Cardiology  [facility]   [address] [place] [address]    Office: [phone]  Cell: [phone]

## 2019-05-02 ENCOUNTER — CLINICAL SUPPORT (OUTPATIENT)
Dept: OBSTETRICS AND GYNECOLOGY | Facility: CLINIC | Age: 34
End: 2019-05-02
Payer: MEDICARE

## 2019-05-02 ENCOUNTER — APPOINTMENT (OUTPATIENT)
Dept: LAB | Facility: HOSPITAL | Age: 34
End: 2019-05-02
Attending: OBSTETRICS & GYNECOLOGY
Payer: MEDICARE

## 2019-05-02 LAB — GLUCOSE SERPL-MCNC: 146 MG/DL (ref 70–140)

## 2019-05-02 PROCEDURE — 99999 PR PBB SHADOW E&M-EST. PATIENT-LVL III: ICD-10-PCS | Mod: PBBFAC,HCWC,,

## 2019-05-02 PROCEDURE — 96372 THER/PROPH/DIAG INJ SC/IM: CPT | Mod: HCWC,S$GLB,, | Performed by: OBSTETRICS & GYNECOLOGY

## 2019-05-02 PROCEDURE — 99999 PR PBB SHADOW E&M-EST. PATIENT-LVL III: CPT | Mod: PBBFAC,HCWC,,

## 2019-05-02 PROCEDURE — 96372 PR INJECTION,THERAP/PROPH/DIAG2ST, IM OR SUBCUT: ICD-10-PCS | Mod: HCWC,S$GLB,, | Performed by: OBSTETRICS & GYNECOLOGY

## 2019-05-02 PROCEDURE — 82950 GLUCOSE TEST: CPT | Mod: HCWC

## 2019-05-02 RX ADMIN — HYDROXYPROGESTERONE CAPROATE 250 MG: 250 INJECTION INTRAMUSCULAR at 11:05

## 2019-05-02 NOTE — PROGRESS NOTES
Here for hydroxyprogesterone caproate injection (Ree ) 250 mg/ml. 1 ml./IM . ( site RB ) Patient without complaint of pain before or after injection. Advised to wait in lobby 15 minutes after injection. Return in 1 week.     CLINIC SUPPLIED MEDICATION

## 2019-05-05 NOTE — PROGRESS NOTES
I have reviewed the notes, assessments, and/or procedures performed this visit, and I concur with the documentation.  Patient instructed to call clinic if symptoms worsen or don't improve.

## 2019-05-06 ENCOUNTER — TELEPHONE (OUTPATIENT)
Dept: OBSTETRICS AND GYNECOLOGY | Facility: CLINIC | Age: 34
End: 2019-05-06

## 2019-05-06 DIAGNOSIS — O99.810 ABNORMAL GLUCOSE AFFECTING PREGNANCY: Primary | ICD-10-CM

## 2019-05-09 ENCOUNTER — HOSPITAL ENCOUNTER (EMERGENCY)
Facility: OTHER | Age: 34
Discharge: HOME | End: 2019-05-09
Attending: OBSTETRICS & GYNECOLOGY
Payer: MEDICARE

## 2019-05-09 ENCOUNTER — CLINICAL SUPPORT (OUTPATIENT)
Dept: OBSTETRICS AND GYNECOLOGY | Facility: CLINIC | Age: 34
End: 2019-05-09
Payer: MEDICARE

## 2019-05-09 VITALS
HEART RATE: 96 BPM | RESPIRATION RATE: 18 BRPM | SYSTOLIC BLOOD PRESSURE: 132 MMHG | OXYGEN SATURATION: 98 % | DIASTOLIC BLOOD PRESSURE: 75 MMHG

## 2019-05-09 DIAGNOSIS — Z3A.27 27 WEEKS GESTATION OF PREGNANCY: ICD-10-CM

## 2019-05-09 DIAGNOSIS — O21.9 NAUSEA AND VOMITING DURING PREGNANCY: Primary | ICD-10-CM

## 2019-05-09 DIAGNOSIS — O09.892 HIV RISK FACTORS AFFECTING PREGNANCY IN SECOND TRIMESTER: ICD-10-CM

## 2019-05-09 PROCEDURE — 59025 PR FETAL 2N-STRESS TEST: ICD-10-PCS | Mod: 26,HCWC,, | Performed by: OBSTETRICS & GYNECOLOGY

## 2019-05-09 PROCEDURE — 99284 EMERGENCY DEPT VISIT MOD MDM: CPT | Mod: 25,HCWC

## 2019-05-09 PROCEDURE — 99284 PR EMERGENCY DEPT VISIT,LEVEL IV: ICD-10-PCS | Mod: HCWC,25,, | Performed by: OBSTETRICS & GYNECOLOGY

## 2019-05-09 PROCEDURE — 99999 PR PBB SHADOW E&M-EST. PATIENT-LVL I: ICD-10-PCS | Mod: PBBFAC,HCWC,,

## 2019-05-09 PROCEDURE — 96372 THER/PROPH/DIAG INJ SC/IM: CPT | Mod: HCWC,S$GLB,, | Performed by: OBSTETRICS & GYNECOLOGY

## 2019-05-09 PROCEDURE — 96372 PR INJECTION,THERAP/PROPH/DIAG2ST, IM OR SUBCUT: ICD-10-PCS | Mod: HCWC,S$GLB,, | Performed by: OBSTETRICS & GYNECOLOGY

## 2019-05-09 PROCEDURE — 59025 FETAL NON-STRESS TEST: CPT | Mod: 26,HCWC,, | Performed by: OBSTETRICS & GYNECOLOGY

## 2019-05-09 PROCEDURE — 99999 PR PBB SHADOW E&M-EST. PATIENT-LVL I: CPT | Mod: PBBFAC,HCWC,,

## 2019-05-09 PROCEDURE — 25000003 PHARM REV CODE 250: Mod: HCWC | Performed by: STUDENT IN AN ORGANIZED HEALTH CARE EDUCATION/TRAINING PROGRAM

## 2019-05-09 PROCEDURE — 99284 EMERGENCY DEPT VISIT MOD MDM: CPT | Mod: HCWC,25,, | Performed by: OBSTETRICS & GYNECOLOGY

## 2019-05-09 PROCEDURE — 59025 FETAL NON-STRESS TEST: CPT | Mod: HCWC

## 2019-05-09 RX ORDER — PROMETHAZINE HYDROCHLORIDE 12.5 MG/1
12.5 TABLET ORAL ONCE
Status: COMPLETED | OUTPATIENT
Start: 2019-05-09 | End: 2019-05-09

## 2019-05-09 RX ORDER — ACETAMINOPHEN 325 MG/1
650 TABLET ORAL ONCE
Status: COMPLETED | OUTPATIENT
Start: 2019-05-09 | End: 2019-05-09

## 2019-05-09 RX ADMIN — ACETAMINOPHEN 650 MG: 325 TABLET ORAL at 06:05

## 2019-05-09 RX ADMIN — PROMETHAZINE HYDROCHLORIDE 12.5 MG: 12.5 TABLET ORAL at 05:05

## 2019-05-09 RX ADMIN — HYDROXYPROGESTERONE CAPROATE 250 MG: 250 INJECTION INTRAMUSCULAR at 11:05

## 2019-05-09 NOTE — PROGRESS NOTES
Here for hydroxyprogesterone caproate injection (Ree ) 250 mg/ml. 1 ml./IM . ( site LB ) Patient without complaint of pain before or after injection. Advised to wait in lobby 15 minutes after injection. Return in 1 week.     CLINIC SUPPLIED MEDICATION

## 2019-05-09 NOTE — DISCHARGE INSTRUCTIONS
Call clinic [phone] or L & D after hours at [phone] for vaginal bleeding, leakage of fluids, regular contractions every 5 mins for 2 hours, decreased fetal movements ( 10 kicks in 2 hours), headache not relieved by Tylenol, blurry vision, or temp of 100.4 or greater.  Begin doing fetal kick counts, at least 10 movements in 2 hours starting at 28 weeks gestation.  Keep next clinic appointment

## 2019-05-09 NOTE — ED PROVIDER NOTES
Encounter Date: 2019       History     Chief Complaint   Patient presents with    Nausea    Vomiting     Jm Newton is a 33 y.o. [id number] at 27w3d presents complaining of nausea/vomiting and headache. Patient smelled some cleaning products earlier and felt nauseous with a headache after. She then threw up three times and has felt nauseous since.   This IUP is complicated by HIV (followed by Dr.Van Cardenas at [facility]), asthma, cervical incompetence, history of  delivery and hyperthyroidism.  Patient denies contractions, denies vaginal bleeding, denies LOF.   Fetal Movement: normal.  Reports nausea and vomiting through out her pregnancy.         Review of patient's allergies indicates:   Allergen Reactions    Azithromycin Edema     Past Medical History:   Diagnosis Date    Asthma     HIV infection     dx     Hyperthyroidism in pregnancy, antepartum      Past Surgical History:   Procedure Laterality Date    CERCLAGE, CERVIX N/A 2019    Performed by [facility]bey [facility] at [facility] L[facility]D    CERCLAGE, CERVIX N/A 2014    Performed by [facility]bey [facility] at [facility] L[facility]D    CERVICAL CERCLAGE      emergent with twins     SECTION, CLASSIC      Last section with classical extention, from [facility]    CONE BIOPSY, CERVIX, USING COLD KNIFE N/A 2018    [facility]rell [facility] at [facility] OR    D & C (SUCTION) N/A 11/15/2016    Performed by Pancho Albert MD at [facility] OR    DELIVERY-CEASAREAN SECTION N/A 2014    Per[facility] by [facility]rell [facility] at [facility] L[facility]D    DILATION AND CURETTAGE OF UTERUS      LEEP CONIZATION, CERVIX N/A 2018    [facility] by [facility] at [facility] OR     Family History   Problem Relation Age of Onset    Hypertension Maternal Grandmother     Diabetes Maternal Grandmother     Colon cancer Neg Hx     Ovarian cancer Neg Hx      Social History     Tobacco Use    Smoking status: Never Smoker    Smokeless tobacco: Never Used   Substance  Use Topics    Alcohol use: No     Alcohol/week: 0.0 oz     Frequency: Never     Comment: socially    Drug use: No     Review of Systems   Constitutional: Negative for activity change, chills, diaphoresis, fatigue and fever.   HENT: Negative for trouble swallowing.    Eyes: Negative for photophobia and visual disturbance.   Respiratory: Negative for cough, chest tightness, shortness of breath and wheezing.    Cardiovascular: Negative for chest pain and palpitations.   Gastrointestinal: Positive for nausea and vomiting. Negative for abdominal pain and diarrhea.   Genitourinary: Negative for difficulty urinating, dysuria, hematuria, pelvic pain, vaginal bleeding, vaginal discharge and vaginal pain.   Musculoskeletal: Negative for arthralgias and myalgias.   Skin: Negative for rash.   Neurological: Positive for headaches. Negative for dizziness, syncope, weakness and light-headedness.       Physical Exam     Initial Vitals   BP Pulse Resp Temp SpO2   -- -- -- -- --      MAP       --       /75   Pulse 96   Resp 18   LMP 10/29/2018 (Exact Date)   SpO2 98%     Physical Exam    Vitals reviewed.  Constitutional: She appears well-developed and well-nourished. She is not diaphoretic. No distress.   HENT:   Head: Normocephalic and atraumatic.   Nose: Nose normal.   Eyes: Conjunctivae are normal. Right eye exhibits no discharge. Left eye exhibits no discharge. No scleral icterus.   Neck: Normal range of motion.   Cardiovascular: Normal rate and intact distal pulses.   Pulmonary/Chest: No respiratory distress.   Abdominal: Soft. She exhibits no distension. There is no tenderness. There is no rebound and no guarding.   gravid   Genitourinary:   Genitourinary Comments: Gravid, NT uterus   Musculoskeletal: Normal range of motion. She exhibits no edema or tenderness.   Neurological: She is alert and oriented to person, place, and time.   Skin: Skin is warm and dry. No erythema.   Psychiatric: She has a normal mood and  affect. Her behavior is normal. Judgment and thought content normal.         ED Course   Obtain Fetal nonstress test (NST)  Date/Time: 5/9/2019 5:00 PM  Performed by: Carlee Byrne MD  Authorized by: Grace Landry MD     Nonstress Test:     Variability:  6-25 BPM    Decelerations:  None    Accelerations:  15 bpm    Baseline:  140    Uterine Irritability: No      Contractions:  Not present  Biophysical Profile:     Nonstress Test Interpretation: reactive      Overall Impression:  Reassuring      Labs Reviewed - No data to display       Imaging Results    None          Medical Decision Making:   ED Management:  VSS  NST reactive and reassuring  PO phenergan with improvement in nausea  Pt complained of mild HA, tylenol  Labor precautions              Attending Attestation:   Physician Attestation Statement for Resident:  As the supervising MD   Physician Attestation Statement: I have personally seen and examined this patient.   I agree with the above history. -:   As the supervising MD I agree with the above PE.    As the supervising MD I agree with the above treatment, course, plan, and disposition.   -: Patient evaluated and found to be stable, agree with resident's assessment and plan.  I was personally present during the critical portions of the procedure(s) performed by the resident and was immediately available in the ED to provide services and assistance as needed during the entire procedure.  I have reviewed the following: old records at this facility.                       Clinical Impression:       ICD-10-CM ICD-9-CM   1. Nausea and vomiting during pregnancy O21.9 643.90   2. HIV risk factors affecting pregnancy in second trimester O09.892 V23.89     3. 27 weeks pregnant    Disposition:   Disposition: Discharged  Condition: Stable                        Gale Lam MD  Resident  05/09/19 1755       Carlee Byrne MD  05/10/19 0649

## 2019-05-13 ENCOUNTER — PROCEDURE VISIT (OUTPATIENT)
Dept: MATERNAL FETAL MEDICINE | Facility: CLINIC | Age: 34
End: 2019-05-13
Payer: MEDICARE

## 2019-05-13 ENCOUNTER — INITIAL CONSULT (OUTPATIENT)
Dept: MATERNAL FETAL MEDICINE | Facility: CLINIC | Age: 34
End: 2019-05-13
Attending: OBSTETRICS & GYNECOLOGY
Payer: MEDICARE

## 2019-05-13 VITALS
BODY MASS INDEX: 38.03 KG/M2 | WEIGHT: 245.13 LBS | DIASTOLIC BLOOD PRESSURE: 72 MMHG | SYSTOLIC BLOOD PRESSURE: 118 MMHG

## 2019-05-13 DIAGNOSIS — Z36.89 ENCOUNTER FOR ULTRASOUND TO CHECK FETAL GROWTH: Primary | ICD-10-CM

## 2019-05-13 DIAGNOSIS — Z36.89 ENCOUNTER FOR ULTRASOUND TO CHECK FETAL GROWTH: ICD-10-CM

## 2019-05-13 DIAGNOSIS — O36.8390 FETAL ARRHYTHMIA AFFECTING PREGNANCY, ANTEPARTUM: ICD-10-CM

## 2019-05-13 PROCEDURE — 99999 PR PBB SHADOW E&M-EST. PATIENT-LVL II: ICD-10-PCS | Mod: PBBFAC,HCWC,, | Performed by: OBSTETRICS & GYNECOLOGY

## 2019-05-13 PROCEDURE — 76816 OB US FOLLOW-UP PER FETUS: CPT | Mod: HCWC,S$GLB,, | Performed by: OBSTETRICS & GYNECOLOGY

## 2019-05-13 PROCEDURE — 99499 UNLISTED E&M SERVICE: CPT | Mod: HCWC,S$GLB,, | Performed by: OBSTETRICS & GYNECOLOGY

## 2019-05-13 PROCEDURE — 99499 NO LOS: ICD-10-PCS | Mod: HCWC,S$GLB,, | Performed by: OBSTETRICS & GYNECOLOGY

## 2019-05-13 PROCEDURE — 99999 PR PBB SHADOW E&M-EST. PATIENT-LVL II: CPT | Mod: PBBFAC,HCWC,, | Performed by: OBSTETRICS & GYNECOLOGY

## 2019-05-13 PROCEDURE — 76816 PR  US,PREGNANT UTERUS,F/U,TRANSABD APP: ICD-10-PCS | Mod: HCWC,S$GLB,, | Performed by: OBSTETRICS & GYNECOLOGY

## 2019-05-13 RX ORDER — SULFAMETHOXAZOLE AND TRIMETHOPRIM 400; 80 MG/1; MG/1
TABLET ORAL
Refills: 5 | COMMUNITY
Start: 2019-05-06 | End: 2020-12-14

## 2019-05-13 RX ORDER — DARUNAVIR 600 MG/1
600 TABLET, FILM COATED ORAL 2 TIMES DAILY WITH MEALS
Refills: 5 | COMMUNITY
Start: 2019-05-06 | End: 2022-12-02

## 2019-05-16 ENCOUNTER — APPOINTMENT (OUTPATIENT)
Dept: LAB | Facility: HOSPITAL | Age: 34
End: 2019-05-16
Attending: OBSTETRICS & GYNECOLOGY
Payer: MEDICARE

## 2019-05-16 ENCOUNTER — CLINICAL SUPPORT (OUTPATIENT)
Dept: OBSTETRICS AND GYNECOLOGY | Facility: CLINIC | Age: 34
End: 2019-05-16
Payer: MEDICARE

## 2019-05-16 LAB
BASOPHILS # BLD AUTO: 0.01 K/UL (ref 0–0.2)
BASOPHILS NFR BLD: 0.2 % (ref 0–1.9)
DIFFERENTIAL METHOD: ABNORMAL
EOSINOPHIL # BLD AUTO: 0 K/UL (ref 0–0.5)
EOSINOPHIL NFR BLD: 0.5 % (ref 0–8)
ERYTHROCYTE [DISTWIDTH] IN BLOOD BY AUTOMATED COUNT: 16.6 % (ref 11.5–14.5)
GLUCOSE SERPL-MCNC: 139 MG/DL
GLUCOSE SERPL-MCNC: 166 MG/DL
GLUCOSE SERPL-MCNC: 70 MG/DL
GLUCOSE SERPL-MCNC: 70 MG/DL (ref 70–110)
HCT VFR BLD AUTO: 32.1 % (ref 37–48.5)
HGB BLD-MCNC: 9.8 G/DL (ref 12–16)
IMM GRANULOCYTES # BLD AUTO: 0.01 K/UL (ref 0–0.04)
IMM GRANULOCYTES NFR BLD AUTO: 0.2 % (ref 0–0.5)
LYMPHOCYTES # BLD AUTO: 1.1 K/UL (ref 1–4.8)
LYMPHOCYTES NFR BLD: 17.1 % (ref 18–48)
MCH RBC QN AUTO: 23.1 PG (ref 27–31)
MCHC RBC AUTO-ENTMCNC: 30.5 G/DL (ref 32–36)
MCV RBC AUTO: 76 FL (ref 82–98)
MONOCYTES # BLD AUTO: 0.2 K/UL (ref 0.3–1)
MONOCYTES NFR BLD: 3.3 % (ref 4–15)
NEUTROPHILS # BLD AUTO: 5.2 K/UL (ref 1.8–7.7)
NEUTROPHILS NFR BLD: 78.7 % (ref 38–73)
NRBC BLD-RTO: 0 /100 WBC
PLATELET # BLD AUTO: 292 K/UL (ref 150–350)
PMV BLD AUTO: 10.5 FL (ref 9.2–12.9)
RBC # BLD AUTO: 4.24 M/UL (ref 4–5.4)
T4 FREE SERPL-MCNC: 0.85 NG/DL (ref 0.71–1.51)
TSH SERPL DL<=0.005 MIU/L-ACNC: <0.01 UIU/ML (ref 0.4–4)
WBC # BLD AUTO: 6.59 K/UL (ref 3.9–12.7)

## 2019-05-16 PROCEDURE — 85025 COMPLETE CBC W/AUTO DIFF WBC: CPT | Mod: HCWC

## 2019-05-16 PROCEDURE — 82952 GTT-ADDED SAMPLES: CPT | Mod: HCWC

## 2019-05-16 PROCEDURE — 82951 GLUCOSE TOLERANCE TEST (GTT): CPT | Mod: HCWC

## 2019-05-16 PROCEDURE — 84443 ASSAY THYROID STIM HORMONE: CPT | Mod: HCWC

## 2019-05-16 PROCEDURE — 99999 PR PBB SHADOW E&M-EST. PATIENT-LVL III: CPT | Mod: PBBFAC,HCWC,,

## 2019-05-16 PROCEDURE — 84439 ASSAY OF FREE THYROXINE: CPT | Mod: HCWC

## 2019-05-16 PROCEDURE — 99999 PR PBB SHADOW E&M-EST. PATIENT-LVL III: ICD-10-PCS | Mod: PBBFAC,HCWC,,

## 2019-05-16 PROCEDURE — 96372 PR INJECTION,THERAP/PROPH/DIAG2ST, IM OR SUBCUT: ICD-10-PCS | Mod: HCWC,S$GLB,, | Performed by: OBSTETRICS & GYNECOLOGY

## 2019-05-16 PROCEDURE — 96372 THER/PROPH/DIAG INJ SC/IM: CPT | Mod: HCWC,S$GLB,, | Performed by: OBSTETRICS & GYNECOLOGY

## 2019-05-16 RX ADMIN — HYDROXYPROGESTERONE CAPROATE 250 MG: 250 INJECTION INTRAMUSCULAR at 01:05

## 2019-05-23 ENCOUNTER — ROUTINE PRENATAL (OUTPATIENT)
Dept: OBSTETRICS AND GYNECOLOGY | Facility: CLINIC | Age: 34
End: 2019-05-23
Payer: MEDICARE

## 2019-05-23 ENCOUNTER — CLINICAL SUPPORT (OUTPATIENT)
Dept: OBSTETRICS AND GYNECOLOGY | Facility: CLINIC | Age: 34
End: 2019-05-23
Payer: MEDICARE

## 2019-05-23 VITALS — DIASTOLIC BLOOD PRESSURE: 64 MMHG | SYSTOLIC BLOOD PRESSURE: 112 MMHG | BODY MASS INDEX: 38.47 KG/M2 | WEIGHT: 248 LBS

## 2019-05-23 DIAGNOSIS — O09.893 HIV RISK FACTORS AFFECTING PREGNANCY IN THIRD TRIMESTER: ICD-10-CM

## 2019-05-23 DIAGNOSIS — O99.213 OBESITY AFFECTING PREGNANCY IN THIRD TRIMESTER: ICD-10-CM

## 2019-05-23 DIAGNOSIS — O26.893 HEARTBURN DURING PREGNANCY IN THIRD TRIMESTER: ICD-10-CM

## 2019-05-23 DIAGNOSIS — R12 HEARTBURN DURING PREGNANCY IN THIRD TRIMESTER: ICD-10-CM

## 2019-05-23 DIAGNOSIS — O99.013 ANEMIA AFFECTING PREGNANCY IN THIRD TRIMESTER: ICD-10-CM

## 2019-05-23 DIAGNOSIS — O99.519 MATERNAL ASTHMA COMPLICATING PREGNANCY: ICD-10-CM

## 2019-05-23 DIAGNOSIS — O99.283 HYPERTHYROIDISM AFFECTING PREGNANCY IN THIRD TRIMESTER: ICD-10-CM

## 2019-05-23 DIAGNOSIS — J45.909 MATERNAL ASTHMA COMPLICATING PREGNANCY: ICD-10-CM

## 2019-05-23 DIAGNOSIS — O09.93 SUPERVISION OF HIGH RISK PREGNANCY IN THIRD TRIMESTER: Primary | ICD-10-CM

## 2019-05-23 DIAGNOSIS — E05.90 HYPERTHYROIDISM AFFECTING PREGNANCY IN THIRD TRIMESTER: ICD-10-CM

## 2019-05-23 DIAGNOSIS — Z98.891 HISTORY OF CLASSICAL CESAREAN SECTION: ICD-10-CM

## 2019-05-23 DIAGNOSIS — O09.299 HISTORY OF CERVICAL INCOMPETENCE IN PREGNANCY, CURRENTLY PREGNANT: ICD-10-CM

## 2019-05-23 PROCEDURE — 90471 TDAP VACCINE GREATER THAN OR EQUAL TO 7YO IM: ICD-10-PCS | Mod: HCWC,S$GLB,, | Performed by: OBSTETRICS & GYNECOLOGY

## 2019-05-23 PROCEDURE — 99999 PR PBB SHADOW E&M-EST. PATIENT-LVL I: CPT | Mod: PBBFAC,HCWC,,

## 2019-05-23 PROCEDURE — 3008F BODY MASS INDEX DOCD: CPT | Mod: HCWC,CPTII,S$GLB, | Performed by: OBSTETRICS & GYNECOLOGY

## 2019-05-23 PROCEDURE — 96372 PR INJECTION,THERAP/PROPH/DIAG2ST, IM OR SUBCUT: ICD-10-PCS | Mod: HCWC,S$GLB,, | Performed by: OBSTETRICS & GYNECOLOGY

## 2019-05-23 PROCEDURE — 99999 PR PBB SHADOW E&M-EST. PATIENT-LVL I: ICD-10-PCS | Mod: PBBFAC,HCWC,,

## 2019-05-23 PROCEDURE — 99999 PR PBB SHADOW E&M-EST. PATIENT-LVL II: CPT | Mod: PBBFAC,HCWC,, | Performed by: OBSTETRICS & GYNECOLOGY

## 2019-05-23 PROCEDURE — 90715 TDAP VACCINE 7 YRS/> IM: CPT | Mod: HCWC,S$GLB,, | Performed by: OBSTETRICS & GYNECOLOGY

## 2019-05-23 PROCEDURE — 0502F PR SUBSEQUENT PRENATAL CARE: ICD-10-PCS | Mod: HCWC,S$GLB,, | Performed by: OBSTETRICS & GYNECOLOGY

## 2019-05-23 PROCEDURE — 90471 IMMUNIZATION ADMIN: CPT | Mod: HCWC,S$GLB,, | Performed by: OBSTETRICS & GYNECOLOGY

## 2019-05-23 PROCEDURE — 96372 THER/PROPH/DIAG INJ SC/IM: CPT | Mod: HCWC,S$GLB,, | Performed by: OBSTETRICS & GYNECOLOGY

## 2019-05-23 PROCEDURE — 99999 PR PBB SHADOW E&M-EST. PATIENT-LVL II: ICD-10-PCS | Mod: PBBFAC,HCWC,, | Performed by: OBSTETRICS & GYNECOLOGY

## 2019-05-23 PROCEDURE — 90715 TDAP VACCINE GREATER THAN OR EQUAL TO 7YO IM: ICD-10-PCS | Mod: HCWC,S$GLB,, | Performed by: OBSTETRICS & GYNECOLOGY

## 2019-05-23 PROCEDURE — 0502F SUBSEQUENT PRENATAL CARE: CPT | Mod: HCWC,S$GLB,, | Performed by: OBSTETRICS & GYNECOLOGY

## 2019-05-23 PROCEDURE — 3008F PR BODY MASS INDEX (BMI) DOCUMENTED: ICD-10-PCS | Mod: HCWC,CPTII,S$GLB, | Performed by: OBSTETRICS & GYNECOLOGY

## 2019-05-23 RX ORDER — OMEPRAZOLE 20 MG/1
20 CAPSULE, DELAYED RELEASE ORAL DAILY
Qty: 30 CAPSULE | Refills: 11 | Status: SHIPPED | OUTPATIENT
Start: 2019-05-23 | End: 2022-01-06 | Stop reason: SDUPTHER

## 2019-05-23 RX ADMIN — HYDROXYPROGESTERONE CAPROATE 250 MG: 250 INJECTION INTRAMUSCULAR at 03:05

## 2019-05-23 NOTE — PROGRESS NOTES
Complaints today: none  Good fm.  Denies ctx, vb, lof.  Centering session 3 today:  Discussed relaxation, managing stress, breastfeeding, and thinking about your family.    /64   Wt 112.5 kg (248 lb 0.3 oz)   LMP 10/29/2018 (Exact Date)   BMI 38.47 kg/m²     33 y.o., at 29w3d by Estimated Date of Delivery: 19  Patient Active Problem List   Diagnosis    HIV (human immunodeficiency virus infection)    Hyperthyroidism    Moderate recurrent major depression    Class 2 obesity due to excess calories without serious comorbidity with body mass index (BMI) of 38.0 to 38.9 in adult    EMEKA II (cervical intraepithelial neoplasia II)    Pregnancy, supervision, high-risk/flu/bottle/btl signed     HIV (human immunodeficiency virus) risk factors complicating pregnancy    Maternal asthma complicating pregnancy - pulmonary referral in    Hyperthyroidism affecting pregnancy -     History of classical  section - repeat 36-37 weeks    History of cervical incompetence in pregnancy, currently pregnant - mfm consult/con    H/O LEEP (loop electrosurgical excision procedure) of cervix complicating pregnancy    Obesity complicating pregnancy    Hyperemesis of pregnancy    Cervical incompetence    Cervical cerclage suture present in second trimester    Anemia affecting pregnancy in third trimester     OB History    Para Term  AB Living   7 6 1 5   4   SAB TAB Ectopic Multiple Live Births         2 6      # Outcome Date GA Lbr Adrian/2nd Weight Sex Delivery Anes PTL Lv   7 Current            6 Term 14 37w5d  3.26 kg (7 lb 3 oz) F CS-LTranv EPI N ANAHY   5A   24w0d  0.51 kg (1 lb 2 oz) M CS-LTranv  Y DEC      Birth Comments: lived for 2 months in NICU      Complications: Premature rupture of membranes   5B   24w0d  0.992 kg (2 lb 3 oz) M CS-LTranv  Y DEC      Birth Comments: lived only 2 hours      Complications: Premature rupture of membranes   4  09  35w0d  2.608 kg (5 lb 12 oz) M CS-LTranv  Y ANAHY      Birth Comments: HIV, CS d/t viral load      Complications: Premature rupture of membranes   3  08 32w0d  2.722 kg (6 lb) M Vag-Spont  Y ANAHY      Birth Comments:  labor      Complications: Premature rupture of membranes   2  10/05/06 24w0d   M Vag-Spont  Y FD      Birth Comments: abruption and stillbirth      Complications: Abruptio Placenta   1  03 35w0d  2.722 kg (6 lb) M Vag-Spont  Y ANAHY      Birth Comments:  labor       Dating reviewed    Allergies and problem list reviewed and updated    Medical and surgical history reviewed    Prenatal labs reviewed and updated    Physical Exam:  ABD: soft, gravid, nontender,     Assessment:  Diagnoses and all orders for this visit:    Supervision of high risk pregnancy in third trimester    HIV risk factors affecting pregnancy in third trimester    Maternal asthma complicating pregnancy - pulmonary referral in    Hyperthyroidism affecting pregnancy in third trimester    History of classical  section - repeat 36-37 weeks    History of cervical incompetence in pregnancy, currently pregnant - mfm consult/con    Obesity affecting pregnancy in third trimester    Heartburn during pregnancy in third trimester  -     omeprazole (PRILOSEC) 20 MG capsule; Take 1 capsule (20 mg total) by mouth once daily.    Anemia affecting pregnancy in third trimester         Plan:   Missed last ID appt.  Will call to reschedule   follow up 2 Weeks, bleeding/pain  kick counts, labor precautions

## 2019-05-30 ENCOUNTER — CLINICAL SUPPORT (OUTPATIENT)
Dept: OBSTETRICS AND GYNECOLOGY | Facility: CLINIC | Age: 34
End: 2019-05-30
Payer: MEDICARE

## 2019-05-30 PROCEDURE — 99999 PR PBB SHADOW E&M-EST. PATIENT-LVL I: ICD-10-PCS | Mod: PBBFAC,HCWC,,

## 2019-05-30 PROCEDURE — 96372 PR INJECTION,THERAP/PROPH/DIAG2ST, IM OR SUBCUT: ICD-10-PCS | Mod: HCWC,S$GLB,, | Performed by: OBSTETRICS & GYNECOLOGY

## 2019-05-30 PROCEDURE — 96372 THER/PROPH/DIAG INJ SC/IM: CPT | Mod: HCWC,S$GLB,, | Performed by: OBSTETRICS & GYNECOLOGY

## 2019-05-30 PROCEDURE — 99999 PR PBB SHADOW E&M-EST. PATIENT-LVL I: CPT | Mod: PBBFAC,HCWC,,

## 2019-05-30 RX ADMIN — HYDROXYPROGESTERONE CAPROATE 250 MG: 250 INJECTION INTRAMUSCULAR at 01:05

## 2019-06-06 ENCOUNTER — ROUTINE PRENATAL (OUTPATIENT)
Dept: OBSTETRICS AND GYNECOLOGY | Facility: CLINIC | Age: 34
End: 2019-06-06
Payer: MEDICARE

## 2019-06-06 VITALS
BODY MASS INDEX: 38.35 KG/M2 | WEIGHT: 247.25 LBS | SYSTOLIC BLOOD PRESSURE: 135 MMHG | DIASTOLIC BLOOD PRESSURE: 70 MMHG

## 2019-06-06 DIAGNOSIS — J45.909 MATERNAL ASTHMA COMPLICATING PREGNANCY: ICD-10-CM

## 2019-06-06 DIAGNOSIS — O09.299 HISTORY OF CERVICAL INCOMPETENCE IN PREGNANCY, CURRENTLY PREGNANT: ICD-10-CM

## 2019-06-06 DIAGNOSIS — Z98.891 HISTORY OF CLASSICAL CESAREAN SECTION: ICD-10-CM

## 2019-06-06 DIAGNOSIS — O99.013 ANEMIA AFFECTING PREGNANCY IN THIRD TRIMESTER: ICD-10-CM

## 2019-06-06 DIAGNOSIS — O09.93 SUPERVISION OF HIGH RISK PREGNANCY IN THIRD TRIMESTER: Primary | ICD-10-CM

## 2019-06-06 DIAGNOSIS — O09.893 HIV RISK FACTORS AFFECTING PREGNANCY IN THIRD TRIMESTER: ICD-10-CM

## 2019-06-06 DIAGNOSIS — O99.519 MATERNAL ASTHMA COMPLICATING PREGNANCY: ICD-10-CM

## 2019-06-06 DIAGNOSIS — O99.283 HYPERTHYROIDISM AFFECTING PREGNANCY IN THIRD TRIMESTER: ICD-10-CM

## 2019-06-06 DIAGNOSIS — E05.90 HYPERTHYROIDISM AFFECTING PREGNANCY IN THIRD TRIMESTER: ICD-10-CM

## 2019-06-06 DIAGNOSIS — O99.213 OBESITY AFFECTING PREGNANCY IN THIRD TRIMESTER: ICD-10-CM

## 2019-06-06 PROCEDURE — 0502F PR SUBSEQUENT PRENATAL CARE: ICD-10-PCS | Mod: HCWC,CPTII,S$GLB, | Performed by: OBSTETRICS & GYNECOLOGY

## 2019-06-06 PROCEDURE — 99999 PR PBB SHADOW E&M-EST. PATIENT-LVL II: CPT | Mod: PBBFAC,HCWC,, | Performed by: OBSTETRICS & GYNECOLOGY

## 2019-06-06 PROCEDURE — 0502F SUBSEQUENT PRENATAL CARE: CPT | Mod: HCWC,CPTII,S$GLB, | Performed by: OBSTETRICS & GYNECOLOGY

## 2019-06-06 PROCEDURE — 99999 PR PBB SHADOW E&M-EST. PATIENT-LVL II: ICD-10-PCS | Mod: PBBFAC,HCWC,, | Performed by: OBSTETRICS & GYNECOLOGY

## 2019-06-06 NOTE — PROGRESS NOTES
Complaints today: none  Good fm.  Denies ctx, vb, lof.  She attended session 4 of centering.  We discussed family planning, safe sex, domestic violence and abuse, fetal brain development,  labor, and thinking about my family for 2 hour.    /70   Wt 112.1 kg (247 lb 3.9 oz)   LMP 10/29/2018 (Exact Date)   BMI 38.35 kg/m²     33 y.o., at 31w3d by Estimated Date of Delivery: 19  Patient Active Problem List   Diagnosis    HIV (human immunodeficiency virus infection)    Hyperthyroidism    Moderate recurrent major depression    Class 2 obesity due to excess calories without serious comorbidity with body mass index (BMI) of 38.0 to 38.9 in adult    EMEKA II (cervical intraepithelial neoplasia II)    Pregnancy, supervision, high-risk/flu/bottle/btl signed     HIV (human immunodeficiency virus) risk factors complicating pregnancy    Maternal asthma complicating pregnancy - pulmonary referral in    Hyperthyroidism affecting pregnancy -     History of classical  section - repeat 36-37 weeks    History of cervical incompetence in pregnancy, currently pregnant - mfm consult/con    H/O LEEP (loop electrosurgical excision procedure) of cervix complicating pregnancy    Obesity complicating pregnancy    Hyperemesis of pregnancy    Cervical incompetence    Cervical cerclage suture present in second trimester    Anemia affecting pregnancy in third trimester     OB History    Para Term  AB Living   7 6 1 5   4   SAB TAB Ectopic Multiple Live Births         2 6      # Outcome Date GA Lbr Adrian/2nd Weight Sex Delivery Anes PTL Lv   7 Current            6 Term 14 37w5d  3.26 kg (7 lb 3 oz) F CS-LTranv EPI N ANAHY   5A   24w0d  0.51 kg (1 lb 2 oz) M CS-LTranv  Y DEC      Birth Comments: lived for 2 months in NICU      Complications: Premature rupture of membranes   5B   24w0d  0.992 kg (2 lb 3 oz) M CS-LTranv  Y DEC      Birth Comments: lived only 2  hours      Complications: Premature rupture of membranes   4  09 35w0d  2.608 kg (5 lb 12 oz) M CS-LTranv  Y ANAHY      Birth Comments: HIV, CS d/t viral load      Complications: Premature rupture of membranes   3  08 32w0d  2.722 kg (6 lb) M Vag-Spont  Y ANAHY      Birth Comments:  labor      Complications: Premature rupture of membranes   2  10/05/06 24w0d   M Vag-Spont  Y FD      Birth Comments: abruption and stillbirth      Complications: Abruptio Placenta   1  03 35w0d  2.722 kg (6 lb) M Vag-Spont  Y ANAHY      Birth Comments:  labor       Dating reviewed    Allergies and problem list reviewed and updated    Medical and surgical history reviewed    Prenatal labs reviewed and updated    Physical Exam:  ABD: soft, gravid, nontender,     Assessment:  Diagnoses and all orders for this visit:    Supervision of high risk pregnancy in third trimester    HIV risk factors affecting pregnancy in third trimester    Maternal asthma complicating pregnancy - pulmonary referral in    Hyperthyroidism affecting pregnancy in third trimester    History of classical  section - repeat 36-37 weeks    History of cervical incompetence in pregnancy, currently pregnant - mfm consult/con    Obesity affecting pregnancy in third trimester    Anemia affecting pregnancy in third trimester         Plan:   To make appt with ID   follow up 2 Weeks, bleeding/pain  kick counts, labor precautions

## 2019-06-10 ENCOUNTER — PROCEDURE VISIT (OUTPATIENT)
Dept: MATERNAL FETAL MEDICINE | Facility: CLINIC | Age: 34
End: 2019-06-10
Attending: OBSTETRICS & GYNECOLOGY
Payer: MEDICARE

## 2019-06-10 DIAGNOSIS — Z36.89 ENCOUNTER FOR ULTRASOUND TO CHECK FETAL GROWTH: ICD-10-CM

## 2019-06-10 PROCEDURE — 99499 NO LOS: ICD-10-PCS | Mod: HCWC,S$GLB,, | Performed by: OBSTETRICS & GYNECOLOGY

## 2019-06-10 PROCEDURE — 76816 OB US FOLLOW-UP PER FETUS: CPT | Mod: HCWC,S$GLB,, | Performed by: OBSTETRICS & GYNECOLOGY

## 2019-06-10 PROCEDURE — 99499 UNLISTED E&M SERVICE: CPT | Mod: HCWC,S$GLB,, | Performed by: OBSTETRICS & GYNECOLOGY

## 2019-06-10 PROCEDURE — 76816 PR  US,PREGNANT UTERUS,F/U,TRANSABD APP: ICD-10-PCS | Mod: HCWC,S$GLB,, | Performed by: OBSTETRICS & GYNECOLOGY

## 2019-06-10 NOTE — PROGRESS NOTES
"Indication  ========    Follow-up evaluation for fetal growth    History  ======    General History  Height 170 cm  Height (ft) 5 ft  Height (in) 7 in  Medical History  Past surgical history: Previous surgeries performed  Surgery:  section  Details: 3  Previous Outcomes   8  Para 7  Leyva children born living (T) 1  Leyva children born (T) 1  [place]va children born (P) 6  Leyva living children (L) 4  [place] children born living (P) 3   deaths (P) 1  Total  deaths 2  Stillbirths 1  Preg. no. 1  Outcome: live birth  Date: Outcome date: 2003  Gest. age 35 w + 0 d  Details: , 6 lbs,  labor, "Jeremy"  Preg. no. 2  Outcome: intrapartum stillbirth  Date: Outcome date: 2006-10-05  Gest. age 24 w + 0 d  Gender: male  Details: , abruption & stillbirth  Preg. no. 3  Outcome: live birth  Date: Outcome date: 2008  Gest. age 32 w + 0 d  Gender: male  Details: , 6lbs, PPROM, "Edgar"  Preg. no. 4  Outcome: live birth  Date: Outcome date: 2009  Gest. age 35 w + 0 d  Gender: male  Details: c/s, 5lbs 12oz, PPROM, "Alfredo'ryn"/ viral load  Preg. no. 5  Outcome:  death  Gest. age 24 w + 0 d  Gender: male  Details: , twins, PPROM: 1lb 2oz male - lived for 2 months; 2lbs 3oz male - lived for 2 hours  Preg. no. 6  Outcome: live birth  Date: Outcome date: 2014  Gest. age 37 w + 5 d  Gender: female  Details: c/s, 7lbs 3oz, "Dalaiya"  Preg. no. 7  Outcome: miscarriage  Gest. age 8 w + 0 d  Details: 2016, blighted ovum  Risk Factors  History risk factors:  delivery in previous pregnancy  Details: hx c/s  Details:  - 2018  Details: hx D& C  Details: hyperthyroidism  Details: HIV dx in   Details: asthma  History risk factors: Obesity    Pregnancy History  ==============    Maternal Lab Tests  Test: Cell free fetal DNA analysis  Result:    [id number] negative    Maternal Assessment  =================    Height 170 cm  Height (ft) 5 " ft  Height (in) 7 in    Fetal Growth Overview  =================    Exam date        GA              BPD (mm)         HC (mm)        AC (mm)        FL (mm)         HL (mm)        EFW (g)  02/15/2019        15w 4d        32.2                  125.0             100.6             21.2                                   149  03/11/2019        19w 0d         41.7                 161.0             152.8             30.8               30.9              319  04/16/2019        24w 1d        59.4                  224.0             212.6             44.4                                   776    64%  05/13/2019        28w 0d        73.4                  270.6             240.6             54.1                                   1,258    54%  06/10/2019        32w 0d        83.2                  297.8             295.6             63.3                                   2,151    46%      Method  ======    Transabdominal ultrasound examination, [place] E10. View: Suboptimal view: limited by maternal body habitus    Pregnancy  =========    Leyva pregnancy. Number of fetuses: 1    Dating  ======    Cycle: regular cycle  Ultrasound examination on: 6/10/2019  GA by U/S based upon: AC, BPD, Femur, HC  GA by U/S 33 w + 1 d  ESTUARDO by U/S: 7/28/2019  Assigned: Dating performed on 12/18/2018, based on the LMP  Assigned GA 32 w + 0 d  Assigned ESTUARDO: 8/5/2019  Pregnancy length 280 d    General Evaluation  ==============    Cardiac activity present.  bpm.  Fetal movements visualized.  Presentation cephalic.  Placenta Placental site: anterior.  Umbilical cord Cord vessels: 3 vessel cord.  Amniotic fluid Amount of AF: normal amount. MVP 5.8 cm.    Fetal Biometry  ============    Standard  BPD 83.2 mm  33w 3d                Hadlock    .0 mm  34w 0d                Eliud    .8 mm  33w 0d                Hadlock    .6 mm  33w 4d                Hadlock    Femur 63.3 mm  32w 5d                Hadlock    HC /  AC 1.01    EFW 2,151 g          46%        Dalton    EFW (lb) 4 lb  EFW (oz) 12 oz  EFW by: Hadlock (BPD-HC-AC-FL)  Head / Face / Neck  Cephalic index 0.80    Extremities / Bony Struc  FL / BPD 0.76    FL / AC 0.21    Other Structures   bpm    Fetal Anatomy  ============    Cranium: normal  4-chamber view: documented previously  Stomach: normal  Kidneys: normal  Bladder: normal  Gender: male  Wants to know gender: yes    Impression  =========    Fetal size is AGA with the EFW at the 46th percentile.  Normal repeat limited fetal anatomic survey. AFV is normal.    Recommendation  ==============    Repeat ultrasound study as clinically indicated

## 2019-06-15 ENCOUNTER — HOSPITAL ENCOUNTER (INPATIENT)
Facility: OTHER | Age: 34
LOS: 3 days | Discharge: HOME | End: 2019-06-18
Attending: OBSTETRICS & GYNECOLOGY | Admitting: OBSTETRICS & GYNECOLOGY
Payer: MEDICARE

## 2019-06-15 ENCOUNTER — ANESTHESIA (OUTPATIENT)
Dept: OBSTETRICS AND GYNECOLOGY | Facility: OTHER | Age: 34
End: 2019-06-15
Payer: MEDICARE

## 2019-06-15 ENCOUNTER — ANESTHESIA EVENT (OUTPATIENT)
Dept: OBSTETRICS AND GYNECOLOGY | Facility: OTHER | Age: 34
End: 2019-06-15
Payer: MEDICARE

## 2019-06-15 DIAGNOSIS — O99.283 HYPERTHYROIDISM AFFECTING PREGNANCY IN THIRD TRIMESTER: ICD-10-CM

## 2019-06-15 DIAGNOSIS — O09.93 SUPERVISION OF HIGH RISK PREGNANCY IN THIRD TRIMESTER: ICD-10-CM

## 2019-06-15 DIAGNOSIS — B20 HIV (HUMAN IMMUNODEFICIENCY VIRUS INFECTION): ICD-10-CM

## 2019-06-15 DIAGNOSIS — E05.90 HYPERTHYROIDISM AFFECTING PREGNANCY IN THIRD TRIMESTER: ICD-10-CM

## 2019-06-15 DIAGNOSIS — O34.219 PREVIOUS CESAREAN DELIVERY AFFECTING PREGNANCY, ANTEPARTUM: Primary | ICD-10-CM

## 2019-06-15 DIAGNOSIS — Z98.891 HISTORY OF CLASSICAL CESAREAN SECTION: ICD-10-CM

## 2019-06-15 DIAGNOSIS — Z3A.32 32 WEEKS GESTATION OF PREGNANCY: ICD-10-CM

## 2019-06-15 DIAGNOSIS — N88.3 CERVICAL INCOMPETENCE: ICD-10-CM

## 2019-06-15 DIAGNOSIS — R10.9 ABDOMINAL PAIN DURING PREGNANCY IN THIRD TRIMESTER: ICD-10-CM

## 2019-06-15 DIAGNOSIS — O09.893 HIV RISK FACTORS AFFECTING PREGNANCY IN THIRD TRIMESTER: ICD-10-CM

## 2019-06-15 DIAGNOSIS — O26.893 ABDOMINAL PAIN DURING PREGNANCY IN THIRD TRIMESTER: ICD-10-CM

## 2019-06-15 LAB
ABO + RH BLD: NORMAL
ALBUMIN SERPL BCP-MCNC: 2.8 G/DL (ref 3.5–5.2)
ALP SERPL-CCNC: 88 U/L (ref 55–135)
ALT SERPL W/O P-5'-P-CCNC: 9 U/L (ref 10–44)
ANION GAP SERPL CALC-SCNC: 9 MMOL/L (ref 8–16)
AST SERPL-CCNC: 13 U/L (ref 10–40)
BILIRUB SERPL-MCNC: 0.3 MG/DL (ref 0.1–1)
BLD GP AB SCN CELLS X3 SERPL QL: NORMAL
BUN SERPL-MCNC: 9 MG/DL (ref 6–20)
CALCIUM SERPL-MCNC: 9.1 MG/DL (ref 8.7–10.5)
CHLORIDE SERPL-SCNC: 109 MMOL/L (ref 95–110)
CO2 SERPL-SCNC: 19 MMOL/L (ref 23–29)
CREAT SERPL-MCNC: 0.7 MG/DL (ref 0.5–1.4)
CREAT UR-MCNC: 160.7 MG/DL (ref 15–325)
EST. GFR  (AFRICAN AMERICAN): >60 ML/MIN/1.73 M^2
EST. GFR  (NON AFRICAN AMERICAN): >60 ML/MIN/1.73 M^2
FIBRONECTIN FETAL SPEC QL: POSITIVE
GLUCOSE SERPL-MCNC: 77 MG/DL (ref 70–110)
POTASSIUM SERPL-SCNC: 4 MMOL/L (ref 3.5–5.1)
PROT SERPL-MCNC: 7.3 G/DL (ref 6–8.4)
PROT UR-MCNC: 24 MG/DL (ref 0–15)
PROT/CREAT UR: 0.15 MG/G{CREAT} (ref 0–0.2)
SODIUM SERPL-SCNC: 137 MMOL/L (ref 136–145)

## 2019-06-15 PROCEDURE — 59025 FETAL NON-STRESS TEST: CPT | Mod: HCWC

## 2019-06-15 PROCEDURE — 86592 SYPHILIS TEST NON-TREP QUAL: CPT | Mod: HCWC

## 2019-06-15 PROCEDURE — 63600175 PHARM REV CODE 636 W HCPCS: Mod: HCWC | Performed by: STUDENT IN AN ORGANIZED HEALTH CARE EDUCATION/TRAINING PROGRAM

## 2019-06-15 PROCEDURE — 25000003 PHARM REV CODE 250: Mod: HCWC | Performed by: STUDENT IN AN ORGANIZED HEALTH CARE EDUCATION/TRAINING PROGRAM

## 2019-06-15 PROCEDURE — 63600175 PHARM REV CODE 636 W HCPCS: Mod: HCWC | Performed by: OBSTETRICS & GYNECOLOGY

## 2019-06-15 PROCEDURE — 27200710 HC EPIDURAL INFUSION PUMP SET: Mod: HCWC | Performed by: ANESTHESIOLOGY

## 2019-06-15 PROCEDURE — 96374 THER/PROPH/DIAG INJ IV PUSH: CPT | Mod: HCWC

## 2019-06-15 PROCEDURE — 86850 RBC ANTIBODY SCREEN: CPT | Mod: HCWC

## 2019-06-15 PROCEDURE — 37000009 HC ANESTHESIA EA ADD 15 MINS: Mod: HCWC | Performed by: OBSTETRICS & GYNECOLOGY

## 2019-06-15 PROCEDURE — 86920 COMPATIBILITY TEST SPIN: CPT | Mod: HCWC

## 2019-06-15 PROCEDURE — 59510 PR FULL ROUT OBSTE CARE,CESAREAN DELIV: ICD-10-PCS | Mod: HCWC,,, | Performed by: OBSTETRICS & GYNECOLOGY

## 2019-06-15 PROCEDURE — 59510 CESAREAN DELIVERY: CPT | Mod: HCWC,,, | Performed by: OBSTETRICS & GYNECOLOGY

## 2019-06-15 PROCEDURE — 27800517 HC TRAY,EPIDURAL-CONTINUOUS: Mod: HCWC | Performed by: ANESTHESIOLOGY

## 2019-06-15 PROCEDURE — 11000001 HC ACUTE MED/SURG PRIVATE ROOM: Mod: HCWC

## 2019-06-15 PROCEDURE — 99284 EMERGENCY DEPT VISIT MOD MDM: CPT | Mod: HCWC,25,, | Performed by: OBSTETRICS & GYNECOLOGY

## 2019-06-15 PROCEDURE — 71000039 HC RECOVERY, EACH ADD'L HOUR: Mod: HCWC | Performed by: OBSTETRICS & GYNECOLOGY

## 2019-06-15 PROCEDURE — 36004725 HC OB OR TIME LEV III - EA ADD 15 MIN: Mod: HCWC | Performed by: OBSTETRICS & GYNECOLOGY

## 2019-06-15 PROCEDURE — 82570 ASSAY OF URINE CREATININE: CPT | Mod: HCWC

## 2019-06-15 PROCEDURE — S0028 INJECTION, FAMOTIDINE, 20 MG: HCPCS | Mod: HCWC | Performed by: ANESTHESIOLOGY

## 2019-06-15 PROCEDURE — 99285 EMERGENCY DEPT VISIT HI MDM: CPT | Mod: 25,HCWC

## 2019-06-15 PROCEDURE — 36004724 HC OB OR TIME LEV III - 1ST 15 MIN: Mod: HCWC | Performed by: OBSTETRICS & GYNECOLOGY

## 2019-06-15 PROCEDURE — 59025 PR FETAL 2N-STRESS TEST: ICD-10-PCS | Mod: 26,HCWC,, | Performed by: OBSTETRICS & GYNECOLOGY

## 2019-06-15 PROCEDURE — 59025 FETAL NON-STRESS TEST: CPT | Mod: 26,HCWC,, | Performed by: OBSTETRICS & GYNECOLOGY

## 2019-06-15 PROCEDURE — 37000008 HC ANESTHESIA 1ST 15 MINUTES: Mod: HCWC | Performed by: OBSTETRICS & GYNECOLOGY

## 2019-06-15 PROCEDURE — 99284 PR EMERGENCY DEPT VISIT,LEVEL IV: ICD-10-PCS | Mod: HCWC,25,, | Performed by: OBSTETRICS & GYNECOLOGY

## 2019-06-15 PROCEDURE — 82731 ASSAY OF FETAL FIBRONECTIN: CPT | Mod: HCWC

## 2019-06-15 PROCEDURE — 80053 COMPREHEN METABOLIC PANEL: CPT | Mod: HCWC

## 2019-06-15 PROCEDURE — 25000003 PHARM REV CODE 250: Mod: HCWC | Performed by: ANESTHESIOLOGY

## 2019-06-15 PROCEDURE — 71000033 HC RECOVERY, INTIAL HOUR: Mod: HCWC | Performed by: OBSTETRICS & GYNECOLOGY

## 2019-06-15 PROCEDURE — 62326 NJX INTERLAMINAR LMBR/SAC: CPT | Mod: HCWC | Performed by: ANESTHESIOLOGY

## 2019-06-15 PROCEDURE — 87536 HIV-1 QUANT&REVRSE TRNSCRPJ: CPT | Mod: HCWC

## 2019-06-15 PROCEDURE — 96372 THER/PROPH/DIAG INJ SC/IM: CPT | Mod: HCWC

## 2019-06-15 RX ORDER — MUPIROCIN 20 MG/G
OINTMENT TOPICAL
Status: DISCONTINUED | OUTPATIENT
Start: 2019-06-15 | End: 2019-06-15

## 2019-06-15 RX ORDER — DOCUSATE SODIUM 100 MG/1
200 CAPSULE, LIQUID FILLED ORAL 2 TIMES DAILY
Status: DISCONTINUED | OUTPATIENT
Start: 2019-06-15 | End: 2019-06-18 | Stop reason: HOSPADM

## 2019-06-15 RX ORDER — SODIUM CITRATE AND CITRIC ACID MONOHYDRATE 334; 500 MG/5ML; MG/5ML
30 SOLUTION ORAL
Status: DISCONTINUED | OUTPATIENT
Start: 2019-06-15 | End: 2019-06-15

## 2019-06-15 RX ORDER — FENTANYL/BUPIVACAINE/NS/PF 2MCG/ML-.1
PLASTIC BAG, INJECTION (ML) INJECTION CONTINUOUS PRN
Status: DISCONTINUED | OUTPATIENT
Start: 2019-06-15 | End: 2019-06-16

## 2019-06-15 RX ORDER — NALBUPHINE HYDROCHLORIDE 10 MG/ML
5 INJECTION, SOLUTION INTRAMUSCULAR; INTRAVENOUS; SUBCUTANEOUS
Status: COMPLETED | OUTPATIENT
Start: 2019-06-15 | End: 2019-06-16

## 2019-06-15 RX ORDER — SIMETHICONE 80 MG
1 TABLET,CHEWABLE ORAL EVERY 6 HOURS PRN
Status: DISCONTINUED | OUTPATIENT
Start: 2019-06-15 | End: 2019-06-18 | Stop reason: HOSPADM

## 2019-06-15 RX ORDER — ONDANSETRON 2 MG/ML
4 INJECTION INTRAMUSCULAR; INTRAVENOUS ONCE
Status: COMPLETED | OUTPATIENT
Start: 2019-06-15 | End: 2019-06-15

## 2019-06-15 RX ORDER — BETAMETHASONE SODIUM PHOSPHATE AND BETAMETHASONE ACETATE 3; 3 MG/ML; MG/ML
12 INJECTION, SUSPENSION INTRA-ARTICULAR; INTRALESIONAL; INTRAMUSCULAR; SOFT TISSUE ONCE
Status: COMPLETED | OUTPATIENT
Start: 2019-06-15 | End: 2019-06-15

## 2019-06-15 RX ORDER — BUPIVACAINE HYDROCHLORIDE 2.5 MG/ML
INJECTION, SOLUTION EPIDURAL; INFILTRATION; INTRACAUDAL
Status: DISPENSED
Start: 2019-06-15 | End: 2019-06-15

## 2019-06-15 RX ORDER — FAMOTIDINE 20 MG/1
20 TABLET, FILM COATED ORAL 2 TIMES DAILY
Status: DISCONTINUED | OUTPATIENT
Start: 2019-06-15 | End: 2019-06-18 | Stop reason: HOSPADM

## 2019-06-15 RX ORDER — DIPHENHYDRAMINE HCL 25 MG
25 CAPSULE ORAL EVERY 4 HOURS PRN
Status: DISCONTINUED | OUTPATIENT
Start: 2019-06-15 | End: 2019-06-18 | Stop reason: HOSPADM

## 2019-06-15 RX ORDER — CEFAZOLIN SODIUM 2 G/50ML
2 SOLUTION INTRAVENOUS
Status: DISCONTINUED | OUTPATIENT
Start: 2019-06-15 | End: 2019-06-15

## 2019-06-15 RX ORDER — OXYTOCIN/RINGER'S LACTATE 20/1000 ML
333 PLASTIC BAG, INJECTION (ML) INTRAVENOUS CONTINUOUS
Status: DISCONTINUED | OUTPATIENT
Start: 2019-06-15 | End: 2019-06-15

## 2019-06-15 RX ORDER — SODIUM CHLORIDE, SODIUM LACTATE, POTASSIUM CHLORIDE, CALCIUM CHLORIDE 600; 310; 30; 20 MG/100ML; MG/100ML; MG/100ML; MG/100ML
INJECTION, SOLUTION INTRAVENOUS CONTINUOUS
Status: ACTIVE | OUTPATIENT
Start: 2019-06-15 | End: 2019-06-16

## 2019-06-15 RX ORDER — OXYTOCIN/RINGER'S LACTATE 20/1000 ML
41.7 PLASTIC BAG, INJECTION (ML) INTRAVENOUS CONTINUOUS
Status: DISCONTINUED | OUTPATIENT
Start: 2019-06-15 | End: 2019-06-15

## 2019-06-15 RX ORDER — FAMOTIDINE 10 MG/ML
20 INJECTION INTRAVENOUS ONCE
Status: COMPLETED | OUTPATIENT
Start: 2019-06-15 | End: 2019-06-15

## 2019-06-15 RX ORDER — SODIUM CITRATE AND CITRIC ACID MONOHYDRATE 334; 500 MG/5ML; MG/5ML
30 SOLUTION ORAL ONCE
Status: COMPLETED | OUTPATIENT
Start: 2019-06-15 | End: 2019-06-15

## 2019-06-15 RX ORDER — EMTRICITABINE AND TENOFOVIR DISOPROXIL FUMARATE 200; 300 MG/1; MG/1
1 TABLET, FILM COATED ORAL DAILY
Status: DISCONTINUED | OUTPATIENT
Start: 2019-06-16 | End: 2019-06-18 | Stop reason: HOSPADM

## 2019-06-15 RX ORDER — OXYTOCIN/RINGER'S LACTATE 20/1000 ML
41.65 PLASTIC BAG, INJECTION (ML) INTRAVENOUS CONTINUOUS
Status: ACTIVE | OUTPATIENT
Start: 2019-06-15 | End: 2019-06-16

## 2019-06-15 RX ORDER — ONDANSETRON 8 MG/1
8 TABLET, ORALLY DISINTEGRATING ORAL EVERY 8 HOURS PRN
Status: DISCONTINUED | OUTPATIENT
Start: 2019-06-15 | End: 2019-06-18 | Stop reason: HOSPADM

## 2019-06-15 RX ORDER — FENTANYL/BUPIVACAINE/NS/PF 2MCG/ML-.1
PLASTIC BAG, INJECTION (ML) INJECTION CONTINUOUS
Status: DISCONTINUED | OUTPATIENT
Start: 2019-06-15 | End: 2019-06-15

## 2019-06-15 RX ORDER — SULFAMETHOXAZOLE AND TRIMETHOPRIM 800; 160 MG/1; MG/1
1 TABLET ORAL DAILY
Status: DISCONTINUED | OUTPATIENT
Start: 2019-06-16 | End: 2019-06-18 | Stop reason: HOSPADM

## 2019-06-15 RX ORDER — SODIUM CHLORIDE, SODIUM LACTATE, POTASSIUM CHLORIDE, CALCIUM CHLORIDE 600; 310; 30; 20 MG/100ML; MG/100ML; MG/100ML; MG/100ML
INJECTION, SOLUTION INTRAVENOUS CONTINUOUS
Status: DISCONTINUED | OUTPATIENT
Start: 2019-06-15 | End: 2019-06-15

## 2019-06-15 RX ORDER — BISACODYL 10 MG
10 SUPPOSITORY, RECTAL RECTAL ONCE AS NEEDED
Status: DISCONTINUED | OUTPATIENT
Start: 2019-06-15 | End: 2019-06-18 | Stop reason: HOSPADM

## 2019-06-15 RX ORDER — KETOROLAC TROMETHAMINE 30 MG/ML
INJECTION, SOLUTION INTRAMUSCULAR; INTRAVENOUS
Status: DISCONTINUED | OUTPATIENT
Start: 2019-06-15 | End: 2019-06-16

## 2019-06-15 RX ORDER — KETOROLAC TROMETHAMINE 30 MG/ML
30 INJECTION, SOLUTION INTRAMUSCULAR; INTRAVENOUS EVERY 6 HOURS
Status: COMPLETED | OUTPATIENT
Start: 2019-06-16 | End: 2019-06-16

## 2019-06-15 RX ORDER — MUPIROCIN 20 MG/G
1 OINTMENT TOPICAL 2 TIMES DAILY
Status: DISCONTINUED | OUTPATIENT
Start: 2019-06-15 | End: 2019-06-15

## 2019-06-15 RX ORDER — MORPHINE SULFATE 0.5 MG/ML
INJECTION, SOLUTION EPIDURAL; INTRATHECAL; INTRAVENOUS
Status: DISCONTINUED | OUTPATIENT
Start: 2019-06-15 | End: 2019-06-16

## 2019-06-15 RX ORDER — FENTANYL CITRATE 50 UG/ML
INJECTION, SOLUTION INTRAMUSCULAR; INTRAVENOUS
Status: DISCONTINUED | OUTPATIENT
Start: 2019-06-15 | End: 2019-06-16

## 2019-06-15 RX ORDER — OXYCODONE HYDROCHLORIDE 5 MG/1
10 TABLET ORAL EVERY 4 HOURS PRN
Status: DISPENSED | OUTPATIENT
Start: 2019-06-15 | End: 2019-06-16

## 2019-06-15 RX ORDER — IPRATROPIUM BROMIDE AND ALBUTEROL SULFATE 2.5; .5 MG/3ML; MG/3ML
3 SOLUTION RESPIRATORY (INHALATION) EVERY 4 HOURS PRN
Status: DISCONTINUED | OUTPATIENT
Start: 2019-06-15 | End: 2019-06-18 | Stop reason: HOSPADM

## 2019-06-15 RX ORDER — OXYCODONE HYDROCHLORIDE 5 MG/1
5 TABLET ORAL EVERY 4 HOURS PRN
Status: ACTIVE | OUTPATIENT
Start: 2019-06-15 | End: 2019-06-16

## 2019-06-15 RX ORDER — IBUPROFEN 600 MG/1
600 TABLET ORAL EVERY 6 HOURS
Status: DISCONTINUED | OUTPATIENT
Start: 2019-06-16 | End: 2019-06-18 | Stop reason: HOSPADM

## 2019-06-15 RX ORDER — DIPHENHYDRAMINE HYDROCHLORIDE 50 MG/ML
25 INJECTION INTRAMUSCULAR; INTRAVENOUS EVERY 4 HOURS PRN
Status: DISCONTINUED | OUTPATIENT
Start: 2019-06-15 | End: 2019-06-18 | Stop reason: HOSPADM

## 2019-06-15 RX ORDER — OXYCODONE AND ACETAMINOPHEN 10; 325 MG/1; MG/1
1 TABLET ORAL EVERY 4 HOURS PRN
Status: DISCONTINUED | OUTPATIENT
Start: 2019-06-16 | End: 2019-06-18 | Stop reason: HOSPADM

## 2019-06-15 RX ORDER — ONDANSETRON 2 MG/ML
INJECTION INTRAMUSCULAR; INTRAVENOUS
Status: DISCONTINUED | OUTPATIENT
Start: 2019-06-15 | End: 2019-06-16

## 2019-06-15 RX ORDER — FENTANYL/BUPIVACAINE/NS/PF 2MCG/ML-.1
PLASTIC BAG, INJECTION (ML) INJECTION
Status: DISPENSED
Start: 2019-06-15 | End: 2019-06-15

## 2019-06-15 RX ORDER — HYDROCORTISONE 25 MG/G
CREAM TOPICAL 3 TIMES DAILY PRN
Status: DISCONTINUED | OUTPATIENT
Start: 2019-06-15 | End: 2019-06-18 | Stop reason: HOSPADM

## 2019-06-15 RX ORDER — FERROUS SULFATE 325(65) MG
325 TABLET, DELAYED RELEASE (ENTERIC COATED) ORAL DAILY
Status: DISCONTINUED | OUTPATIENT
Start: 2019-06-16 | End: 2019-06-18 | Stop reason: HOSPADM

## 2019-06-15 RX ORDER — FENTANYL CITRATE 50 UG/ML
INJECTION, SOLUTION INTRAMUSCULAR; INTRAVENOUS
Status: COMPLETED
Start: 2019-06-15 | End: 2019-06-15

## 2019-06-15 RX ORDER — HYDROCODONE BITARTRATE AND ACETAMINOPHEN 500; 5 MG/1; MG/1
TABLET ORAL
Status: DISCONTINUED | OUTPATIENT
Start: 2019-06-15 | End: 2019-06-18 | Stop reason: HOSPADM

## 2019-06-15 RX ORDER — METOCLOPRAMIDE 10 MG/1
10 TABLET ORAL 3 TIMES DAILY PRN
Status: DISCONTINUED | OUTPATIENT
Start: 2019-06-15 | End: 2019-06-18 | Stop reason: HOSPADM

## 2019-06-15 RX ORDER — ACETAMINOPHEN 325 MG/1
650 TABLET ORAL EVERY 6 HOURS
Status: DISPENSED | OUTPATIENT
Start: 2019-06-16 | End: 2019-06-17

## 2019-06-15 RX ORDER — BUTORPHANOL TARTRATE 2 MG/ML
1 INJECTION INTRAMUSCULAR; INTRAVENOUS
Status: DISCONTINUED | OUTPATIENT
Start: 2019-06-15 | End: 2019-06-18 | Stop reason: HOSPADM

## 2019-06-15 RX ORDER — RITONAVIR 100 MG/1
100 TABLET ORAL 2 TIMES DAILY
Status: DISCONTINUED | OUTPATIENT
Start: 2019-06-15 | End: 2019-06-18 | Stop reason: HOSPADM

## 2019-06-15 RX ORDER — OXYTOCIN/RINGER'S LACTATE 20/1000 ML
PLASTIC BAG, INJECTION (ML) INTRAVENOUS
Status: DISPENSED
Start: 2019-06-15 | End: 2019-06-16

## 2019-06-15 RX ORDER — ADHESIVE BANDAGE
30 BANDAGE TOPICAL 2 TIMES DAILY PRN
Status: DISCONTINUED | OUTPATIENT
Start: 2019-06-16 | End: 2019-06-18 | Stop reason: HOSPADM

## 2019-06-15 RX ORDER — OXYCODONE AND ACETAMINOPHEN 5; 325 MG/1; MG/1
1 TABLET ORAL EVERY 4 HOURS PRN
Status: DISCONTINUED | OUTPATIENT
Start: 2019-06-16 | End: 2019-06-18 | Stop reason: HOSPADM

## 2019-06-15 RX ORDER — DARUNAVIR 600 MG/1
600 TABLET, FILM COATED ORAL 2 TIMES DAILY
Status: DISCONTINUED | OUTPATIENT
Start: 2019-06-15 | End: 2019-06-18 | Stop reason: HOSPADM

## 2019-06-15 RX ORDER — AMOXICILLIN 250 MG
1 CAPSULE ORAL NIGHTLY PRN
Status: DISCONTINUED | OUTPATIENT
Start: 2019-06-15 | End: 2019-06-18 | Stop reason: HOSPADM

## 2019-06-15 RX ORDER — MISOPROSTOL 200 UG/1
800 TABLET ORAL
Status: DISCONTINUED | OUTPATIENT
Start: 2019-06-15 | End: 2019-06-15

## 2019-06-15 RX ORDER — ONDANSETRON 2 MG/ML
4 INJECTION INTRAMUSCULAR; INTRAVENOUS EVERY 6 HOURS PRN
Status: ACTIVE | OUTPATIENT
Start: 2019-06-15 | End: 2019-06-16

## 2019-06-15 RX ADMIN — SODIUM CHLORIDE, SODIUM LACTATE, POTASSIUM CHLORIDE, AND CALCIUM CHLORIDE 1000 ML: .6; .31; .03; .02 INJECTION, SOLUTION INTRAVENOUS at 10:06

## 2019-06-15 RX ADMIN — FENTANYL CITRATE 100 MCG: 50 INJECTION, SOLUTION INTRAMUSCULAR; INTRAVENOUS at 11:06

## 2019-06-15 RX ADMIN — LIDOCAINE HYDROCHLORIDE AND EPINEPHRINE 3 ML: 15; 5 INJECTION, SOLUTION EPIDURAL at 11:06

## 2019-06-15 RX ADMIN — Medication 5 ML: at 11:06

## 2019-06-15 RX ADMIN — SODIUM CITRATE AND CITRIC ACID MONOHYDRATE 30 ML: 500; 334 SOLUTION ORAL at 07:06

## 2019-06-15 RX ADMIN — DEXTROSE 2.5 MILLION UNITS: 50 INJECTION, SOLUTION INTRAVENOUS at 03:06

## 2019-06-15 RX ADMIN — ONDANSETRON 4 MG: 2 INJECTION INTRAMUSCULAR; INTRAVENOUS at 07:06

## 2019-06-15 RX ADMIN — BETAMETHASONE SODIUM PHOSPHATE AND BETAMETHASONE ACETATE 12 MG: 3; 3 INJECTION, SUSPENSION INTRA-ARTICULAR; INTRALESIONAL; INTRAMUSCULAR at 09:06

## 2019-06-15 RX ADMIN — KETOROLAC TROMETHAMINE 30 MG: 30 INJECTION, SOLUTION INTRAMUSCULAR; INTRAVENOUS at 07:06

## 2019-06-15 RX ADMIN — ZIDOVUDINE 1 MG/KG/HR: 10 INJECTION, SOLUTION INTRAVENOUS at 11:06

## 2019-06-15 RX ADMIN — DEXTROSE 5 MILLION UNITS: 50 INJECTION, SOLUTION INTRAVENOUS at 11:06

## 2019-06-15 RX ADMIN — SODIUM CHLORIDE, SODIUM LACTATE, POTASSIUM CHLORIDE, AND CALCIUM CHLORIDE 1000 ML: .6; .31; .03; .02 INJECTION, SOLUTION INTRAVENOUS at 11:06

## 2019-06-15 RX ADMIN — OXYCODONE HYDROCHLORIDE 10 MG: 5 TABLET ORAL at 11:06

## 2019-06-15 RX ADMIN — ZIDOVUDINE 220 MG: 10 INJECTION, SOLUTION INTRAVENOUS at 10:06

## 2019-06-15 RX ADMIN — Medication 41.65 MILLI-UNITS/MIN: at 09:06

## 2019-06-15 RX ADMIN — ONDANSETRON 4 MG: 2 INJECTION INTRAMUSCULAR; INTRAVENOUS at 10:06

## 2019-06-15 RX ADMIN — SODIUM CHLORIDE, SODIUM LACTATE, POTASSIUM CHLORIDE, AND CALCIUM CHLORIDE 1000 ML: .6; .31; .03; .02 INJECTION, SOLUTION INTRAVENOUS at 09:06

## 2019-06-15 RX ADMIN — FAMOTIDINE 20 MG: 10 INJECTION, SOLUTION INTRAVENOUS at 07:06

## 2019-06-15 RX ADMIN — DOCUSATE SODIUM 200 MG: 100 CAPSULE, LIQUID FILLED ORAL at 11:06

## 2019-06-15 RX ADMIN — RITONAVIR 100 MG: 100 TABLET, FILM COATED ORAL at 11:06

## 2019-06-15 RX ADMIN — DARUNAVIR 600 MG: 600 TABLET, FILM COATED ORAL at 11:06

## 2019-06-15 RX ADMIN — Medication 1 MG: at 07:06

## 2019-06-15 RX ADMIN — Medication 10 ML/HR: at 11:06

## 2019-06-15 RX ADMIN — FENTANYL CITRATE 100 MCG: 50 INJECTION, SOLUTION INTRAMUSCULAR; INTRAVENOUS at 07:06

## 2019-06-15 RX ADMIN — NALBUPHINE HYDROCHLORIDE 5 MG: 10 INJECTION, SOLUTION INTRAMUSCULAR; INTRAVENOUS; SUBCUTANEOUS at 11:06

## 2019-06-15 RX ADMIN — ONDANSETRON 4 MG: 2 INJECTION INTRAMUSCULAR; INTRAVENOUS at 04:06

## 2019-06-15 NOTE — ANESTHESIA PROCEDURE NOTES
Epidural    Patient location during procedure: OB   Reason for block: primary anesthetic   Diagnosis: previous classical section in labor   Start time: 6/15/2019 11:04 AM  Timeout: 6/15/2019 11:03 AM  End time: 6/15/2019 11:22 AM  Staffing  Anesthesiologist: Whitney Plascencia MD  Resident/CRNA: Clark Cristobal MD  Performed: resident/CRNA   Preanesthetic Checklist  Completed: patient identified, site marked, surgical consent, pre-op evaluation, timeout performed, IV checked, risks and benefits discussed, monitors and equipment checked, anesthesia consent given, hand hygiene performed and patient being monitored  Preparation  Patient position: sitting  Prep: ChloraPrep  Patient monitoring: Pulse Ox and Blood Pressure  Epidural  Skin Anesthetic: lidocaine 1%  Skin Wheal: 3 mL  Administration type: continuous  Interspace: L3-4    Injection technique: SHIRLENE saline  Needle and Epidural Catheter  Needle type: Tuohy   Needle gauge: 17  Needle length: 3.5 inches  Needle insertion depth: 6 cm  Catheter type: springwound  Catheter size: 19 G  Catheter at skin depth: 10 cm  Test dose: 3 mL of lidocaine 1.5% with Epi 1-to-200,000  Additional Documentation: incremental injection, negative aspiration for heme and CSF, no paresthesia on injection, no signs/symptoms of IV or SA injection, no significant complaints from patient and no significant pain on injection  Needle localization: anatomical landmarks  Medications:  Volume per aspiration: 5 mL  Time between aspirations: 3 minutes  Assessment  Ease of block: easy  Patient's tolerance of the procedure: comfortable throughout block and no complaintsNo inadvertent dural puncture with Tuohy.  Dural puncture not performed with spinal needle

## 2019-06-15 NOTE — ANESTHESIA PREPROCEDURE EVALUATION
Jm Newton is a 33 y.o. female at 32w5d presenting with waxing and waning severe pelvic pressure.This IUP is complicated by asthma, HIV (viral load in 1300, reports compliance with HAART therapy), hyperthyroidism, h/o multiple  deliveries, history of  section x 3 (one classical ), depression, obesity, and unwanted fertility.    No difficulties with anesthesia in the past.     OB History    Para Term  AB Living   7 6 1 5   4   SAB TAB Ectopic Multiple Live Births         2 6      # Outcome Date GA Lbr Adrian/2nd Weight Sex Delivery Anes PTL Lv   7 Current            6 Term 14 37w5d  3.26 kg (7 lb 3 oz) F CS-LTranv EPI N ANAHY   5A   24w0d  0.51 kg (1 lb 2 oz) M CS-LTranv  Y DEC      Birth Comments: lived for 2 months in NICU      Complications: Premature rupture of membranes   5B   24w0d  0.992 kg (2 lb 3 oz) M CS-LTranv  Y DEC      Birth Comments: lived only 2 hours      Complications: Premature rupture of membranes   4  09 35w0d  2.608 kg (5 lb 12 oz) M CS-LTranv  Y ANAHY      Birth Comments: HIV, CS d/t viral load      Complications: Premature rupture of membranes   3  08 32w0d  2.722 kg (6 lb) M Vag-Spont  Y ANAHY      Birth Comments:  labor      Complications: Premature rupture of membranes   2  10/05/06 24w0d   M Vag-Spont  Y FD      Birth Comments: abruption and stillbirth      Complications: Abruptio Placenta   1  03 35w0d  2.722 kg (6 lb) M Vag-Spont  Y ANAHY      Birth Comments:  labor       Wt Readings from Last 1 Encounters:   06/15/19 1000 110.2 kg (243 lb)       BP Readings from Last 3 Encounters:   06/15/19 (!) 147/72   19 135/70   19 112/64       Patient Active Problem List   Diagnosis    HIV (human immunodeficiency virus infection)    Hyperthyroidism    Moderate recurrent major depression    Class 2 obesity due to excess calories without serious  comorbidity with body mass index (BMI) of 38.0 to 38.9 in adult    EMEKA II (cervical intraepithelial neoplasia II)    Pregnancy, supervision, high-risk/flu/bottle/btl signed     HIV (human immunodeficiency virus) risk factors complicating pregnancy    Maternal asthma complicating pregnancy - pulmonary referral in    Hyperthyroidism affecting pregnancy -     History of classical  section - repeat 36-37 weeks    History of cervical incompetence in pregnancy, currently pregnant - mfm consult/con    H/O LEEP (loop electrosurgical excision procedure) of cervix complicating pregnancy    Obesity complicating pregnancy    Hyperemesis of pregnancy    Cervical incompetence    Cervical cerclage suture present in second trimester    Anemia affecting pregnancy in third trimester    Indication for care in labor or delivery       Past Surgical History:   Procedure Laterality Date    CERCLAGE, CERVIX N/A 2019    [facility] L[facility]D    CERCLAGE, CERVIX N/A 2014    [facility] L[facility]    CERVICAL CERCLAGE      emergent with twins     SECTION, CLASSIC      Last section with classical extention, from [facility]    CONE BIOPSY, CERVIX, USING COLD KNIFE N/A 2018    [facility]rell [facility] at [facility] OR    D & C (SUCTION) N/A 11/15/2016    [facility] Pancho Albert [facility] at [facility] OR    DELIVERY-CEASAREAN SECTION N/A 2014    [facility] L&D    DILATION AND CURETTAGE OF UTERUS      LEEP CONIZATION, CERVIX N/A 2018    [facility] at [facility] OR       Social History     Socioeconomic History    Marital status: Single     Spouse name: Not on file    Number of children: Not on file    Years of education: Not on file    Highest education level: Not on file   Occupational History    Not on file   Social Needs    Financial resource strain: Not on file    Food insecurity:      Worry: Not on file     Inability: Not on file    Transportation needs:     Medical: Not on file     Non-medical: Not on file   Tobacco Use    Smoking status: Never Smoker    Smokeless tobacco: Never Used   Substance and Sexual Activity    Alcohol use: No     Alcohol/week: 0.0 oz     Frequency: Never     Comment: socially    Drug use: No    Sexual activity: Not Currently     Partners: Male     Birth control/protection: None   Lifestyle    Physical activity:     Days per week: Not on file     Minutes per session: Not on file    Stress: Not on file   Relationships    Social connections:     Talks on phone: Not on file     Gets together: Not on file     Attends Hoahaoism service: Not on file     Active member of club or organization: Not on file     Attends meetings of clubs or organizations: Not on file     Relationship status: Not on file   Other Topics Concern    Not on file   Social History Narrative    Not on file         Chemistry        Component Value Date/Time     06/15/2019 1042    K 4.0 06/15/2019 1042     06/15/2019 1042    CO2 19 (L) 06/15/2019 1042    BUN 9 06/15/2019 1042    CREATININE 0.7 06/15/2019 1042    GLU 77 06/15/2019 1042        Component Value Date/Time    CALCIUM 9.1 06/15/2019 1042    ALKPHOS 88 06/15/2019 1042    AST 13 06/15/2019 1042    ALT 9 (L) 06/15/2019 1042    BILITOT 0.3 06/15/2019 1042    ESTGFRAFRICA >60 06/15/2019 1042    EGFRNONAA >60 06/15/2019 1042            Lab Results   Component Value Date    WBC 6.59 05/16/2019    HGB 9.8 (L) 05/16/2019    HCT 32.1 (L) 05/16/2019    MCV 76 (L) 05/16/2019     05/16/2019       No results for input(s): PT, INR, PROTIME, APTT in the last 72 hours.                  Anesthesia Evaluation    I have reviewed the Patient Summary Reports.     I have reviewed the Medications.     Review of Systems  Anesthesia Hx:  No problems with previous Anesthesia Denies Hx of Anesthetic complications  History of prior surgery of  interest to airway management or planning: Denies Family Hx of Anesthesia complications.   Denies Personal Hx of Anesthesia complications.   Social:  No Alcohol Use, Non-Smoker HIV positive, on retroviral therapy but viral load remains high   Hematology/Oncology:  Hematology Normal   Oncology Normal     EENT/Dental:EENT/Dental Normal   Cardiovascular:  Cardiovascular Normal Exercise tolerance: good     Pulmonary:   Asthma    Renal/:  Renal/ Normal     Hepatic/GI:  Hepatic/GI Normal    OB/GYN/PEDS:   presenting for history indicated cerclage   Neurological:  Neurology Normal    Endocrine:   Hyperthyroidism    Psych:   Psychiatric History depression          Physical Exam  General:  Obesity    Airway/Jaw/Neck:  Airway Findings: Mouth Opening: Normal Tongue: Normal  General Airway Assessment: Adult  Mallampati: II  TM Distance: Normal, at least 6 cm  Jaw/Neck Findings:  Neck ROM: Normal ROM  Neck Findings: Normal    Eyes/Ears/Nose:  EYES/EARS/NOSE FINDINGS: Normal   Dental:  Dental Findings: In tact, Periodontal disease, Mild   Chest/Lungs:  Chest/Lungs Findings: Clear to auscultation, Normal Respiratory Rate     Heart/Vascular:  Heart Findings: Rate: Normal  Rhythm: Regular Rhythm  Sounds: Normal  Heart murmur: negative Vascular Findings: Normal    Abdomen:  Abdomen Findings: Normal    Musculoskeletal:  Musculoskeletal Findings: Normal   Skin:  Skin Findings: Normal    Mental Status:  Mental Status Findings:  Cooperative, Alert and Oriented, Anxious         Anesthesia Plan  Type of Anesthesia, risks & benefits discussed:  Anesthesia Type:  CSE, epidural, general, spinal  Patient's Preference:   Intra-op Monitoring Plan: standard ASA monitors  Intra-op Monitoring Plan Comments:   Post Op Pain Control Plan: multimodal analgesia and per primary service following discharge from PACU  Post Op Pain Control Plan Comments:   Induction:   IV  Beta Blocker:  Patient is not currently on a Beta-Blocker (No further  documentation required).       Informed Consent: Patient understands risks and agrees with Anesthesia plan.  Questions answered. Anesthesia consent signed with patient.  ASA Score: 2     Day of Surgery Review of History & Physical:    H&P update referred to the surgeon.         Ready For Surgery From Anesthesia Perspective.

## 2019-06-15 NOTE — ED PROVIDER NOTES
Encounter Date: 6/15/2019       History     Chief Complaint   Patient presents with    Abdominal Cramping     Jm Newton is a 33 y.o. [id number] at 32w5d presenting with severe pelvic pressure that comes and goes. Was mild yesterday but has worsened overnight. 8/10 in severity. Does not think it is contraction pain. No vaginal bleeding or LOF. Normal fetal movement. This IUP is complicated by asthma, HIV (viral load in 1300, reports compliance with HAART therapy), hyperthyroidism, h/o multiple  deliveries, history of  section x 3 (one classical ), depression, obesity, and unwanted fertility. Pt also has a cerclage in place.         Review of patient's allergies indicates:   Allergen Reactions    Azithromycin Edema     Past Medical History:   Diagnosis Date    Asthma     HIV infection     dx     Hyperthyroidism in pregnancy, antepartum      Past Surgical History:   Procedure Laterality Date    CERCLAGE, CERVIX N/A 2019    [facility] L[facility]    CERCLAGE, CERVIX N/A 2014    [facility]    CERVICAL CERCLAGE      emergent with twins     SECTION, CLASSIC      [facility]ention[facility]     SECTION, WITH TUBAL LIGATION N/A 6/15/2019    Performed by Madeline Walden [facility] at [facility] L[facility]D    CONE BIOPSY, CERVIX, USING COLD KNIFE N/A 2018    [facility] at [facility] OR    D & C (SUCTION) N/A 11/15/2016    [facility] at [facility]    DELIVERY-CEASAREAN SECTION N/A 2014    [facility] at [facility] L[facility]D    DILATION AND CURETTAGE OF UTERUS      LEEP CONIZATION, CERVIX N/A 2018    [facility] at [facility] OR     Family History   Problem Relation Age of Onset    Hypertension Maternal Grandmother     Diabetes Maternal Grandmother     Colon cancer Neg Hx     Ovarian cancer Neg Hx       Social History     Tobacco Use    Smoking status: Never Smoker    Smokeless tobacco: Never Used   Substance Use Topics    Alcohol use: No     Alcohol/week: 0.0 oz     Frequency: Never     Comment: socially    Drug use: No     Review of Systems   Constitutional: Negative for chills and fever.   HENT: Negative for congestion.    Eyes: Negative for visual disturbance.   Respiratory: Negative for shortness of breath.    Cardiovascular: Negative for chest pain.   Gastrointestinal: Positive for abdominal pain. Negative for diarrhea, nausea and vomiting.   Genitourinary: Positive for pelvic pain. Negative for dysuria, hematuria, vaginal bleeding and vaginal discharge.   Musculoskeletal: Negative for back pain.   Neurological: Negative for dizziness and headaches.       Physical Exam     Initial Vitals   BP Pulse Resp Temp SpO2   06/15/19 0854 06/15/19 0854 06/15/19 1032 06/15/19 0900 06/15/19 0855   (!) 147/72 (!) 126 16 98.8 °F (37.1 °C) 99 %      MAP       --                Physical Exam    Vitals reviewed.  Constitutional: She appears well-developed and well-nourished. She is not diaphoretic. She appears distressed (appears uncomfortable, in pain, tearful).   HENT:   Head: Normocephalic and atraumatic.   Eyes: EOM are normal.   Cardiovascular: Normal rate.   Pulmonary/Chest: No respiratory distress.   Abdominal: Soft. She exhibits no distension. There is tenderness (lower abdomen). There is no rebound and no guarding.   gravid   Musculoskeletal: Normal range of motion.   Neurological: She is alert and oriented to person, place, and time.   Skin: Skin is warm and dry.   Psychiatric: She has a normal mood and affect. Her behavior is normal. Judgment and thought content normal.     OB LABOR EXAM:   Pre-Term Labor: No.   Membranes ruptured: No.   Method: Sterile vaginal exam per MD and Sterile speculum exam per MD.   Vaginal Bleeding: none present.     Dilatation: 0.   Station: -3.   Effacement: 60%.   Amniotic  Fluid Color: no fluid.   Amniotic Fluid Amount: none noted.   Comments: Cerclage knot visualized at 11 o'clock  No bleeding or pulling of cerclage  Vaginal mucosa/cervix normal       ED Course   Obtain Fetal nonstress test (NST)  Date/Time: 6/15/2019 4:26 PM  Performed by: Gibson Dunn MD  Authorized by: Gibson Dunn MD     Nonstress Test:     Variability:  6-25 BPM    Decelerations: + prolonged decel.    Accelerations:  15 bpm    Baseline:  130    Uterine Irritability: Yes      Contractions:  Not present  Biophysical Profile:     Nonstress Test Interpretation: reactive      Overall Impression:  Reassuring  Post-procedure:     Patient tolerance:  Patient tolerated the procedure well with no immediate complications      Labs Reviewed   PROTEIN / CREATININE RATIO, URINE - Abnormal; Notable for the following components:       Result Value    Protein, Urine Random 24 (*)     All other components within normal limits   CBC W/ AUTO DIFFERENTIAL          Imaging Results    None          Medical Decision Making:   ED Management:  Normal exam  1 cm dilated  1 L IVF bolus  + decel on fetal heart tracing with continued pain  Concern given patient's history of multiple  sections including 1 classical  Discussed with Dr. Henry of [facility]  Will admit for BMZ, monitoring, epidural placement, possible delivery. AZT infusion started.  Other:   I have discussed this case with another health care provider.       <> Summary of the Discussion: Staffed with Dr. Rain Perez              Attending Attestation:   Physician Attestation Statement for Resident:  As the supervising MD   Physician Attestation Statement: I have personally seen and examined this patient.   I agree with the above history. -:   As the supervising MD I agree with the above PE.    As the supervising MD I agree with the above treatment, course, plan, and disposition.  I was personally present during the critical portions of the procedure(s) performed by  the resident and was immediately available in the ED to provide services and assistance as needed during the entire procedure.  I have reviewed and agree with the residents interpretation of the following: lab data and rhythm strips.  I have reviewed the following: old records at this facility.                    ED Course as of 910   Sat Mauricio 15, 2019   1000 I evaluated Ms. Newton and discussed plan with Dr. Dunn.  Pt presents at 32w5d with abdominal pain, HIV, elevated BP's, and previous CD.   She states pain is 8/10, and she does appear to be uncomfortable.  Pain is lower abdomen and very tender cervix.  She also had fetal heart rate decel for about 3 minutes at 0936.   At this point, I am very concerned for possible rupturing uterine incision.  Case d/w Dr. Mitchell and anesthesia.  Will take pt to recovery and get epidural.  Will try to get 3h of AZT.  If fetus does not tolerate, will move towards delivery.  Also d/w pt, all in agreement    [HU]      ED Course User Index  [HU] Rain Perez DO     Clinical Impression:       ICD-10-CM ICD-9-CM   1. Previous  delivery affecting pregnancy, antepartum O34.219 654.23   2 Cervical incompetence N88.3 622.5   3. History of classical  section - repeat 36-37 weeks Z98.891 V45.89   4. HIV risk factors affecting pregnancy in third trimester O09.893 V23.89   5. HIV (human immunodeficiency virus infection) B20 V08   6. Hyperthyroidism affecting pregnancy in third trimester O99.283 648.13    E05.90 242.90   7. Abdominal pain during pregnancy in third trimester O26.893 646.83    R10.9 789.00   8. 32 weeks gestation of pregnancy Z3A.32 V22.2         Disposition:   Disposition: Admitted  Condition: Stable       Gibson Dunn MD  PGY2, OBGYN [facility]                   Gibson Dunn MD  Resident  06/15/19 1718       Rain Perez DO  19 0912

## 2019-06-15 NOTE — PROGRESS NOTES
Pt presents to BEV c/o 8/10 lower back pain and vaginal pressure starting at 0600 today for which she took 650 mg of Tylenol at 0600.. Reports cerclage in place. Placed on CEFM, labs drawn, bolus started. Dr. Dunn at bedside. Awaiting further orders. Will continue to monitor.

## 2019-06-15 NOTE — NURSING
3 hours of AZT infusion complete. Per verbal order from Dr. Suarez, AZT infusion stopped and epidural turned off. Anesthesia MD and charge nurse notified.

## 2019-06-16 LAB
BASOPHILS # BLD AUTO: 0 K/UL (ref 0–0.2)
BASOPHILS # BLD AUTO: 0.01 K/UL (ref 0–0.2)
BASOPHILS NFR BLD: 0 % (ref 0–1.9)
BASOPHILS NFR BLD: 0.1 % (ref 0–1.9)
DIFFERENTIAL METHOD: ABNORMAL
DIFFERENTIAL METHOD: ABNORMAL
EOSINOPHIL # BLD AUTO: 0 K/UL (ref 0–0.5)
EOSINOPHIL # BLD AUTO: 0 K/UL (ref 0–0.5)
EOSINOPHIL NFR BLD: 0 % (ref 0–8)
EOSINOPHIL NFR BLD: 0 % (ref 0–8)
ERYTHROCYTE [DISTWIDTH] IN BLOOD BY AUTOMATED COUNT: 16.5 % (ref 11.5–14.5)
ERYTHROCYTE [DISTWIDTH] IN BLOOD BY AUTOMATED COUNT: 16.5 % (ref 11.5–14.5)
HCT VFR BLD AUTO: 28.2 % (ref 37–48.5)
HCT VFR BLD AUTO: 28.3 % (ref 37–48.5)
HGB BLD-MCNC: 8.9 G/DL (ref 12–16)
HGB BLD-MCNC: 9.1 G/DL (ref 12–16)
LYMPHOCYTES # BLD AUTO: 1.3 K/UL (ref 1–4.8)
LYMPHOCYTES # BLD AUTO: 1.5 K/UL (ref 1–4.8)
LYMPHOCYTES NFR BLD: 13.4 % (ref 18–48)
LYMPHOCYTES NFR BLD: 9.8 % (ref 18–48)
MCH RBC QN AUTO: 22.3 PG (ref 27–31)
MCH RBC QN AUTO: 22.8 PG (ref 27–31)
MCHC RBC AUTO-ENTMCNC: 31.6 G/DL (ref 32–36)
MCHC RBC AUTO-ENTMCNC: 32.2 G/DL (ref 32–36)
MCV RBC AUTO: 71 FL (ref 82–98)
MCV RBC AUTO: 71 FL (ref 82–98)
MONOCYTES # BLD AUTO: 0.8 K/UL (ref 0.3–1)
MONOCYTES # BLD AUTO: 0.9 K/UL (ref 0.3–1)
MONOCYTES NFR BLD: 6 % (ref 4–15)
MONOCYTES NFR BLD: 7.8 % (ref 4–15)
NEUTROPHILS # BLD AUTO: 11.1 K/UL (ref 1.8–7.7)
NEUTROPHILS # BLD AUTO: 8.8 K/UL (ref 1.8–7.7)
NEUTROPHILS NFR BLD: 78.4 % (ref 38–73)
NEUTROPHILS NFR BLD: 83.9 % (ref 38–73)
PLATELET # BLD AUTO: 272 K/UL (ref 150–350)
PLATELET # BLD AUTO: 282 K/UL (ref 150–350)
PMV BLD AUTO: 9.1 FL (ref 9.2–12.9)
PMV BLD AUTO: 9.3 FL (ref 9.2–12.9)
POCT GLUCOSE: 113 MG/DL (ref 70–110)
RBC # BLD AUTO: 3.99 M/UL (ref 4–5.4)
RBC # BLD AUTO: 4 M/UL (ref 4–5.4)
WBC # BLD AUTO: 11.24 K/UL (ref 3.9–12.7)
WBC # BLD AUTO: 13.22 K/UL (ref 3.9–12.7)

## 2019-06-16 PROCEDURE — 94640 AIRWAY INHALATION TREATMENT: CPT | Mod: HCWC

## 2019-06-16 PROCEDURE — 25000003 PHARM REV CODE 250: Mod: HCWC | Performed by: STUDENT IN AN ORGANIZED HEALTH CARE EDUCATION/TRAINING PROGRAM

## 2019-06-16 PROCEDURE — 25000242 PHARM REV CODE 250 ALT 637 W/ HCPCS: Mod: HCWC | Performed by: STUDENT IN AN ORGANIZED HEALTH CARE EDUCATION/TRAINING PROGRAM

## 2019-06-16 PROCEDURE — 36415 COLL VENOUS BLD VENIPUNCTURE: CPT | Mod: HCWC

## 2019-06-16 PROCEDURE — 11000001 HC ACUTE MED/SURG PRIVATE ROOM: Mod: HCWC

## 2019-06-16 PROCEDURE — 63600175 PHARM REV CODE 636 W HCPCS: Mod: HCWC | Performed by: STUDENT IN AN ORGANIZED HEALTH CARE EDUCATION/TRAINING PROGRAM

## 2019-06-16 PROCEDURE — 85025 COMPLETE CBC W/AUTO DIFF WBC: CPT | Mod: 91,HCWC

## 2019-06-16 PROCEDURE — 94761 N-INVAS EAR/PLS OXIMETRY MLT: CPT | Mod: HCWC

## 2019-06-16 PROCEDURE — 99232 SBSQ HOSP IP/OBS MODERATE 35: CPT | Mod: HCWC,,, | Performed by: OBSTETRICS & GYNECOLOGY

## 2019-06-16 PROCEDURE — 99232 PR SUBSEQUENT HOSPITAL CARE,LEVL II: ICD-10-PCS | Mod: HCWC,,, | Performed by: OBSTETRICS & GYNECOLOGY

## 2019-06-16 PROCEDURE — 99900035 HC TECH TIME PER 15 MIN (STAT): Mod: HCWC

## 2019-06-16 RX ORDER — FLUTICASONE PROPIONATE 220 UG/1
1 AEROSOL, METERED RESPIRATORY (INHALATION) 2 TIMES DAILY
Status: DISCONTINUED | OUTPATIENT
Start: 2019-06-17 | End: 2019-06-18 | Stop reason: HOSPADM

## 2019-06-16 RX ADMIN — FERROUS SULFATE TAB EC 324 MG (65 MG FE EQUIVALENT) 325 MG: 324 (65 FE) TABLET DELAYED RESPONSE at 10:06

## 2019-06-16 RX ADMIN — EMTRICITABINE AND TENOFOVIR DISOPROXIL FUMARATE 1 TABLET: 200; 300 TABLET, FILM COATED ORAL at 10:06

## 2019-06-16 RX ADMIN — RITONAVIR 100 MG: 100 TABLET, FILM COATED ORAL at 10:06

## 2019-06-16 RX ADMIN — OXYCODONE HYDROCHLORIDE AND ACETAMINOPHEN 1 TABLET: 10; 325 TABLET ORAL at 11:06

## 2019-06-16 RX ADMIN — NALBUPHINE HYDROCHLORIDE 5 MG: 10 INJECTION, SOLUTION INTRAMUSCULAR; INTRAVENOUS; SUBCUTANEOUS at 01:06

## 2019-06-16 RX ADMIN — ACETAMINOPHEN 650 MG: 325 TABLET, FILM COATED ORAL at 06:06

## 2019-06-16 RX ADMIN — DARUNAVIR 600 MG: 600 TABLET, FILM COATED ORAL at 08:06

## 2019-06-16 RX ADMIN — KETOROLAC TROMETHAMINE 30 MG: 30 INJECTION, SOLUTION INTRAMUSCULAR; INTRAVENOUS at 11:06

## 2019-06-16 RX ADMIN — ACETAMINOPHEN 650 MG: 325 TABLET, FILM COATED ORAL at 11:06

## 2019-06-16 RX ADMIN — IBUPROFEN 600 MG: 600 TABLET ORAL at 11:06

## 2019-06-16 RX ADMIN — DOCUSATE SODIUM 200 MG: 100 CAPSULE, LIQUID FILLED ORAL at 10:06

## 2019-06-16 RX ADMIN — DARUNAVIR 600 MG: 600 TABLET, FILM COATED ORAL at 10:06

## 2019-06-16 RX ADMIN — KETOROLAC TROMETHAMINE 30 MG: 30 INJECTION, SOLUTION INTRAMUSCULAR; INTRAVENOUS at 06:06

## 2019-06-16 RX ADMIN — SULFAMETHOXAZOLE AND TRIMETHOPRIM 1 TABLET: 800; 160 TABLET ORAL at 10:06

## 2019-06-16 RX ADMIN — IPRATROPIUM BROMIDE AND ALBUTEROL SULFATE 3 ML: .5; 3 SOLUTION RESPIRATORY (INHALATION) at 08:06

## 2019-06-16 RX ADMIN — RITONAVIR 100 MG: 100 TABLET, FILM COATED ORAL at 08:06

## 2019-06-16 RX ADMIN — METOCLOPRAMIDE 10 MG: 10 TABLET ORAL at 07:06

## 2019-06-16 RX ADMIN — NALBUPHINE HYDROCHLORIDE 5 MG: 10 INJECTION, SOLUTION INTRAMUSCULAR; INTRAVENOUS; SUBCUTANEOUS at 05:06

## 2019-06-16 RX ADMIN — KETOROLAC TROMETHAMINE 30 MG: 30 INJECTION, SOLUTION INTRAMUSCULAR; INTRAVENOUS at 05:06

## 2019-06-16 RX ADMIN — ACETAMINOPHEN 650 MG: 325 TABLET, FILM COATED ORAL at 05:06

## 2019-06-16 RX ADMIN — FAMOTIDINE 20 MG: 20 TABLET, FILM COATED ORAL at 08:06

## 2019-06-16 RX ADMIN — DOCUSATE SODIUM 200 MG: 100 CAPSULE, LIQUID FILLED ORAL at 08:06

## 2019-06-16 RX ADMIN — OXYCODONE HYDROCHLORIDE 10 MG: 5 TABLET ORAL at 10:06

## 2019-06-16 NOTE — PLAN OF CARE
Problem: Adult Inpatient Plan of Care  Goal: Plan of Care Review  Outcome: Ongoing (interventions implemented as appropriate)  Prn tx not required.

## 2019-06-16 NOTE — PROGRESS NOTES
LATE ENTRY    From BEV, pt admitted to labor unit. Epidural placed per anesthesia and 2 units cross matched. Continuous fetal monitoring. AZT infusing over next 3 hours. Uterine irritability resolved, no contractions picked up. 2 x fetal decelerations but reassuring in between.     Pt does not seem to be laboring. Epidural turned off. Pt remained without pain. Stopped PCN and AZT. Moved back to labor room. Will continue to monitor.     ~1800: FHT reviewed. Change in baseline to ~100. Decreased variability. Back to bedside to check on patient. Reporting pain in right leg that comes and goes. Says it started in her pelvis/low abdomen and has moved to her leg. Appears to be uncomfortable in bed. Repeat cervical check shows cervix 2 cm dilated. Cerclage string noted to be crossing cervical os.     Discussed with staff. Will move towards delivery via cesaeran section 2/2 failed cerclage in setting of  labor and nonreassuring fetal status. Anesthesia & nursing notified.    Restart AZT load/infusion.     Discussed plan with patient who agrees to move forward. Has decided, however, not to move forward with tubal ligation. Will proceed with  delivery and remove cerclage after delivery in OR.    Gibson Dunn MD  PGY2, OBGYN [facility]

## 2019-06-16 NOTE — H&P
HISTORY AND PHYSICAL                                                OBSTETRICS          Subjective:       Jm Newton is a 33 y.o.  female with IUP at 32w5d weeks gestation who presents with painful contractions.    This IUP is complicated by:  Incompetent cervix: The patient has a cerclage in place for a history of incompetent cervix, has been on [place]  HIV/AIDS: on prezista, norvir, truvada; detectable viral load 1390, last CD4 193  H/O LTCS x2 and CCS x1  H/O LEEP and H/O CKC  Maternal Obesity: BMI 38.1    Patient reports contractions, denies vaginal bleeding, denies LOF.   Fetal Movement: normal.    Review of Systems   Constitutional: Negative for activity change, chills and fatigue.   Eyes: Negative for visual disturbance.   Respiratory: Negative for shortness of breath.    Cardiovascular: Negative for chest pain and palpitations.   Gastrointestinal: Positive for abdominal pain. Negative for constipation, diarrhea, nausea and vomiting.   Genitourinary: Positive for pelvic pain and vaginal pain. Negative for dysuria, vaginal bleeding, vaginal discharge and vaginal odor.   Musculoskeletal: Negative for myalgias.   Integumentary:  Negative for rash.   Neurological: Negative for headaches.   Psychiatric/Behavioral: Negative for depression.     PMHx:   Past Medical History:   Diagnosis Date    Asthma     HIV infection     dx     Hyperthyroidism in pregnancy, antepartum      PSHx:   Past Surgical History:   Procedure Laterality Date    CERCLAGE, CERVIX N/A 2019    Performed by Obey Henry [facility] at [facility]    CERCLAGE, CERVIX N/A 2014    Performed by [facility]bey Henry [facility] at [facility]    CERVICAL CERCLAGE      emergent with twins     SECTION, CLASSIC      Last section with classical extention, from [facility]    CONE BIOPSY, CERVIX, USING COLD KNIFE N/A 2018    Performed by Chantal Worrell MD at [facility] OR    D & C (SUCTION) N/A 11/15/2016    Performed by  Pancho Albert MD at [facility] OR    DELIVERY-CEASAREAN SECTION N/A 7/21/2014    Performed by [facility]al Worrell [facility] at [facility]    DILATION AND CURETTAGE OF UTERUS      LEEP CONIZATION, CERVIX N/A 6/25/2018    Performed by [facility]al Worrell MD at [facility] OR     All:   Review of patient's allergies indicates:   Allergen Reactions    Azithromycin Edema     Meds:   Facility-Administered Medications Prior to Admission   Medication Dose Route Frequency Provider Last Rate Last Dose    fluticasone 220 mcg/actuation inhaler 1 puff  1 puff Inhalation BID Thai Santana MD        [DISCONTINUED] HYDROXYprogesterone caproate ([place]) injection 250 mg  250 mg Intramuscular Q7 Days Chantal Worrell MD   250 mg at 05/30/19 1332     Medications Prior to Admission   Medication Sig Dispense Refill Last Dose    darunavir ethanolate 150 mg Tab Take 600 mg by mouth 2 (two) times daily.    Taking    emtricitabine-tenofovir 200-300 mg (TRUVADA) 200-300 mg Tab 1 tablet   Taking    ferrous sulfate (FEOSOL) 325 mg (65 mg iron) Tab tablet Take 1 tablet (325 mg total) by mouth 2 (two) times daily. 30 tablet 3 Taking    fluticasone (FLONASE) 50 mcg/actuation nasal spray 1 spray (50 mcg total) by Each Nare route 2 (two) times daily. 18.2 mL 3 Taking    inhaler,assist device,lg mask (OPTICHAMBER HERBERT LG MASK MISC) U UTD BID   Taking    metoclopramide HCl (REGLAN) 10 MG tablet Take 1 tablet (10 mg total) by mouth 3 (three) times daily before meals. 60 tablet 2 Taking    omeprazole (PRILOSEC) 20 MG capsule Take 1 capsule (20 mg total) by mouth once daily. 30 capsule 11     ondansetron (ZOFRAN) 4 MG tablet Take 1 tablet (4 mg total) by mouth daily as needed for Nausea. 30 tablet 1 Taking    OPTICHAMBER HERBERT LG MASK Spcr U UTD BID  3 Taking    prenatal 25/iron fum/folic/dha (PRENATAL-1 ORAL) Take by mouth.   Taking    PREZISTA 600 mg Tab   5 Taking    PROCHAMBER    Taking    promethazine (PHENERGAN) 25 MG tablet Take 1 tablet (25 mg total)  by mouth every 6 (six) hours as needed for Nausea. 30 tablet 6 Taking    ranitidine (ZANTAC) 150 MG tablet TAKE 1 TABLET(150 MG) BY MOUTH TWICE DAILY 180 tablet 1 Taking    ritonavir (NORVIR) 100 mg Tab tablet   6 Not Taking    sulfamethoxazole-trimethoprim 400-80mg (BACTRIM,SEPTRA) 400-80 mg per tablet   5 Not Taking    VENTOLIN HFA 90 mcg/actuation inhaler   5 Taking    [DISCONTINUED] hydroxyprogest,PF,,preg presv, (KARI) 250 mg/mL (1 mL) Oil Inject 1 mL (250 mg total) into the muscle every 7 days. 5 mL 6 Taking     SH:   Social History     Socioeconomic History    Marital status: Single     Spouse name: Not on file    Number of children: Not on file    Years of education: Not on file    Highest education level: Not on file   Occupational History    Not on file   Social Needs    Financial resource strain: Not on file    Food insecurity:     Worry: Not on file     Inability: Not on file    Transportation needs:     Medical: Not on file     Non-medical: Not on file   Tobacco Use    Smoking status: Never Smoker    Smokeless tobacco: Never Used   Substance and Sexual Activity    Alcohol use: No     Alcohol/week: 0.0 oz     Frequency: Never     Comment: socially    Drug use: No    Sexual activity: Not Currently     Partners: Male     Birth control/protection: None   Lifestyle    Physical activity:     Days per week: Not on file     Minutes per session: Not on file    Stress: Not on file   Relationships    Social connections:     Talks on phone: Not on file     Gets together: Not on file     Attends Jain service: Not on file     Active member of club or organization: Not on file     Attends meetings of clubs or organizations: Not on file     Relationship status: Not on file   Other Topics Concern    Not on file   Social History Narrative    Not on file     FH:   Family History   Problem Relation Age of Onset    Hypertension Maternal Grandmother     Diabetes Maternal Grandmother     Colon  "cancer Neg Hx     Ovarian cancer Neg Hx      OBHx:   OB History    Para Term  AB Living   7 6 1 5 0 4   SAB TAB Ectopic Multiple Live Births   0 0 0 2 6      # Outcome Date GA Lbr Adrian/2nd Weight Sex Delivery Anes PTL Lv   7 Current            6 Term 14 37w5d  3.26 kg (7 lb 3 oz) F CS-LTranv EPI N ANAHY      Name: Ruben      Apgar1: 9  Apgar5: 9   5A   24w0d  0.51 kg (1 lb 2 oz) M CS-LTranv  Y DEC      Birth Comments: lived for 2 months in NICU      Complications: Premature rupture of membranes      Name: Humphrey    5B   24w0d  0.992 kg (2 lb 3 oz) M CS-LTranv  Y DEC      Birth Comments: lived only 2 hours      Complications: Premature rupture of membranes      Name: Cat   4  09 35w0d  2.608 kg (5 lb 12 oz) M CS-LTranv  Y ANAHY      Birth Comments: HIV, CS d/t viral load      Complications: Premature rupture of membranes      Name: Omega   3  08 32w0d  2.722 kg (6 lb) M Vag-Spont  Y ANAHY      Birth Comments:  labor      Complications: Premature rupture of membranes      Name: Edgar   2  10/05/06 24w0d   M Vag-Spont  Y FD      Birth Comments: abruption and stillbirth      Complications: Abruptio Placenta      Name: Mak   1  03 35w0d  2.722 kg (6 lb) M Vag-Spont  Y ANAHY      Birth Comments:  labor      Name: Jeremy       Objective:       BP (!) 114/58   Pulse 88   Temp 98.8 °F (37.1 °C)   Resp 16   Ht 5' 7" (1.702 m)   Wt 110.2 kg (243 lb)   LMP 10/29/2018 (Exact Date)   SpO2 100%   Breastfeeding? No   BMI 38.06 kg/m²     Vitals:    06/15/19 1907 06/15/19 2048 06/15/19 2104 06/15/19 2108   BP:  119/69 (!) 114/58    Pulse: 97 103 90 88   Resp:  16 16    Temp:  98.8 °F (37.1 °C) 98.8 °F (37.1 °C)    TempSrc:       SpO2: 99% 98%  100%   Weight:       Height:           General:   alert, appears stated age and cooperative, mild distress due to pelvic pain   HENT:  normocephalic, atraumatic   Eyes:  extraocular " movements and conjunctivae normal   Neck:  supple, range of motion normal, no thyromegaly   Lungs:   no respiratory distress   Heart:   regular rate   Abdomen:  soft, non-tender, non-distended but gravid, no rebound or guarding    Extremities negative edema, negative erythema   FHT: 120 bpm, moderate BTBV, +accels, +decels;  Cat 2                  TOCO: Not picking up distinct contractions, but moderate irritability   Presentations: cephalic by ultrasound   Cervix:     Dilation: 1cm with cerclage palpable; no tension apparent on stitch    Effacement: 50%    Station:  -3    Consistency: medium    Position: middle   Sterile Speculum Exam: no vaginal bleeding, cerclage visualized    EFW by Leopold's: 7#    Lab Review  Blood Type O POS  GBBS: unknown   Rubella: Immune  RPR: NR  HIV: positive  HepB: negative     Assessment:       32w5d weeks gestation, concern for labor on a cerclage with a history of LTCS x2 and h/o CCS.    Active Hospital Problems    Diagnosis  POA    *Indication for care in labor or delivery [O75.9]  Yes     delivery delivered [O82]  Yes    Hyperthyroidism [E05.90]  Yes      Resolved Hospital Problems   No resolved problems to display.      Plan:      labor with significant surgical history (LCTS x2, CCS x1, [place], CKC):  - Risks, benefits, alternatives and possible complications have been discussed in detail with the patient.   - Consents signed and to chart  - Admit to Labor and Delivery unit  - to recovery in preparation for RLTCS  - will remove cerclage at the time of  delivery   - Epidural per Anesthesia  - Draw CBC, T&S  - Notify Staff    HIV/AIDS:  - continue home meds as above  - continue daily bactrim  -  continue home reglan  - famotidine BID    Maternal obesity:  - BMI 38.1    Post-Partum Hemorrhage risk - low    Dora Suarez MD, PhD  OBGYN, PGY-3

## 2019-06-16 NOTE — PROGRESS NOTES
POSTPARTUM PROGRESS NOTE     Jm Newton is a 33 y.o. female POD #1 status post Repeat  section at 32w6d in a pregnancy complicated by HIV/AIDS, history of multiple  deliveries, cerclage placement. Patient is doing well this morning. She denies nausea, vomiting, fever or chills.  Patient reports mild abdominal pain that is adequately relieved by oral pain medications. Lochia is mild and stable. Patient is not yet voiding or ambulating - bullard still in place.     Objective:       Temp:  [97.7 °F (36.5 °C)-98.8 °F (37.1 °C)] 97.7 °F (36.5 °C)  Pulse:  [] 93  Resp:  [16-18] 18  SpO2:  [95 %-100 %] 95 %  BP: (113-147)/(55-72) 120/64    Physical Exam  A&Ox3, NAD  nonlabored breathing, no respiratory distress  Abd soft, nontender, nondistended  Fundus firm below umbilicus  Incision covered in bandage without shadowing  Appropriate mood & affect    Lab Review  No results found for this or any previous visit (from the past 4 hour(s)).    I/O    Intake/Output Summary (Last 24 hours) at 2019 0629  Last data filed at 2019 0500  Gross per 24 hour   Intake 50 ml   Output 3635 ml   Net -3585 ml        Assessment:     Patient Active Problem List   Diagnosis    HIV (human immunodeficiency virus infection)    Hyperthyroidism    Moderate recurrent major depression    Class 2 obesity due to excess calories without serious comorbidity with body mass index (BMI) of 38.0 to 38.9 in adult    EMEKA II (cervical intraepithelial neoplasia II)    Pregnancy, supervision, high-risk/flu/bottle/btl signed     HIV (human immunodeficiency virus) risk factors complicating pregnancy    Maternal asthma complicating pregnancy - pulmonary referral in    Hyperthyroidism affecting pregnancy -     History of classical  section - repeat 36-37 weeks    History of cervical incompetence in pregnancy, currently pregnant - mfm consult/con    H/O LEEP (loop electrosurgical excision procedure)  of cervix complicating pregnancy    Obesity complicating pregnancy    Hyperemesis of pregnancy    Cervical incompetence    Cervical cerclage suture present in second trimester    Anemia affecting pregnancy in third trimester    Indication for care in labor or delivery     delivery delivered        Plan:   1. Postpartum care:  - Patient doing well. Continue routine management and advances.  - Continue PO pain meds. Pain well controlled.  - Heme: H/h . Postop CBC pending.  - Encourage ambulation  - Contraception: declines tubal ligation. Considering nuva ring.  - Lactation: pt not a candidate for breastfeeding 2/2 HIV+ status    2. HIV/AIDS  - continue home medications. (Med rec completed with patient)  - continue daily bactrim, reglan  - famotidine BID    3. Undesired fertility  - pt decided against tubal ligation at last minute.   - considering Nuva Ring    Dispo: As patient meets milestones, will plan to discharge POD#2-4    Gibson Dunn MD  PGY2, OBGYN [facility]

## 2019-06-16 NOTE — PLAN OF CARE
Problem: Adult Inpatient Plan of Care  Goal: Plan of Care Review  Outcome: Ongoing (interventions implemented as appropriate)  PRN tx not needed

## 2019-06-16 NOTE — ANESTHESIA POSTPROCEDURE EVALUATION
Anesthesia Post Evaluation    Patient: Jm Newton    Procedure(s) Performed: * No procedures listed *    Final Anesthesia Type: epidural  Patient location during evaluation: floor  Patient participation: Yes- Able to Participate  Level of consciousness: awake and alert and oriented  Post-procedure vital signs: reviewed and stable  Pain management: adequate  Airway patency: patent  PONV status at discharge: No PONV  Anesthetic complications: no      Cardiovascular status: blood pressure returned to baseline and hemodynamically stable  Respiratory status: unassisted, spontaneous ventilation and room air  Hydration status: euvolemic  Follow-up not needed.          Vitals Value Taken Time   /64 6/16/2019  8:00 AM   Temp 36.8 °C (98.2 °F) 6/16/2019  8:00 AM   Pulse 87 6/16/2019  8:00 AM   Resp 18 6/16/2019  8:00 AM   SpO2 96 % 6/16/2019  8:00 AM         No case tracking events are documented in the log.      Pain/Katherine Score: Pain Rating Prior to Med Admin: 8 (6/16/2019 10:43 AM)  Pain Rating Post Med Admin: 0 (not itchy anymore) (6/16/2019  6:11 AM)

## 2019-06-16 NOTE — L&D DELIVERY NOTE
[facility]   Section   Operative Note     Section Operative Note  Procedure Date: 06/15/2019    Procedure:   1. Repeat  Section via Pfannenstiel skin incision  2. Removal of cervical cerclage    Indications:   1. History of  section x 3  2. History of classical  section x 1  3.  labor  4. Cerclage in place  5. Nonreassuring fetal status    Pre-operative Diagnosis:   1. IUP at 32 week 6 day pregnancy  2.  labor  3. HIV    Post-operative Diagnosis: same    Surgeon: Dr. Madeline Walden MD     Assistants: Gibson Dunn MD PGY2    Anesthesia: Epidural anesthesia    Findings:   1. Single viable male infant  2. Normal appearing tubes, ovaries, uterus    Estimated Blood Loss: 485 cc    Specimens: single viable male infant, placenta which was discarded    PreOp CBC:   Lab Results   Component Value Date    WBC 11.24 2019    HGB 9.1 (L) 2019    HCT 28.3 (L) 2019    MCV 71 (L) 2019     2019                     Complications:  None; patient tolerated the procedure well.           Disposition: PACU - hemodynamically stable.           Condition: stable    Procedure Details   The patient was seen in the Holding Room. The risks, benefits, complications, treatment options, and expected outcomes were discussed with the patient.  The patient concurred with the proposed plan, giving informed consent. The patient was taken to Operating Room, identified as Jm Newton and the procedure verified as  Delivery and cerclage removal. A Time Out was held and the above information confirmed.    The patient was prepped and draped in the usual sterile manner while placed in a dorsal supine position with a left lateral tilt.  Preoperative antibiotics were administered and an allis test was performed yielding adequate anesthesia.  A Pfannenstiel incision was made and carried down through the subcutaneous tissue to the  fascia. Fascial incision was made and extended transversely. The fascia was grasped with Kocher clamps and  from the underlying rectus tissue superiorly and inferiorly. The peritoneum was identified, found to be free of adherent bowel and entered. Peritoneal incision was extended longitudinally. The bladder blade was inserted to keep the bladder out of the operative field. The lower uterine segment was noted to be extremely thin. A low transverse uterine incision was made with knife and extended with finger fracture. The infant's head was brought to the incision and elevated out of the pelvis. The patient delivered a single viable male infant without difficulty.  Infant weighed 2260 grams. After the umbilical cord was clamped and cut cord blood was obtained for evaluation. The placenta was removed intact and appeared normal and was discarded. The uterus was exteriorized. The uterine outline, tubes and ovaries appeared normal. The uterine incision was closed with running locked sutures of #1 chromic. Hemostasis was observed. The uterus was returned to the abdominal cavity. Incision was reinspected and good hemostasis was noted. The abdominal cavity was irrigated to remove clots. The peritoneum and muscle were reapproximated en bloc with #1 chromic. The fascia was then reapproximated with running sutures of #1 PSD. The subcutaneous fat and skin were reapproximated with 2-0 Vicryl and 4-0 monocryl respectively.    The patient was then put in the dorsal lithotomy position and sterile speculum was inserted into the vagina. The cervix was visually 2 cm. The tail of the cerclage was grasped with ring forceps and one of the strings was cut just behind the knot. The cerclage was then easily removed from the cervix.     All counts were correct at the end of the case.     Pt tolerated the procedure well and Dr. Dunn discussed with family that pt was stable and in good condition after the procedure.         Delivery  Information for  Rebel Newton    Birth information:  YOB: 2019   Time of birth: 7:49 PM   Sex: male   Head Delivery Date/Time: 6/15/2019  7:49 PM   Delivery type: , Low Transverse   Gestational Age: 32w5d    Delivery Providers    Delivering clinician:  Madeline Walden MD   Provider Role    MD Eva Deal, RN     Brittani Li             Measurements    Weight:    Length:           Apgars    Living status:  Living  Apgars:   1 min.:   5 min.:   10 min.:   15 min.:   20 min.:     Skin color:          Heart rate:          Reflex irritability:          Muscle tone:          Respiratory effort:          Total:          Apgars assigned by:  NICU         Operative Delivery    Forceps attempted?:  No  Vacuum extractor attempted?:  No         Shoulder Dystocia    Shoulder dystocia present?:  No           Presentation    Presentation:  Vertex  Position:  Occiput Anterior           Interventions/Resuscitation    Method:  NICU Attended       Cord    Vessels:  3 vessels  Complications:  None  Delayed Cord Clamping?:  No  Cord Clamped Date/Time:  6/15/2019  7:50 PM  Cord Blood Disposition:  Sent with Baby  Gases Sent?:  No  Stem Cell Collection (by MD):  No       Placenta    Placenta delivery date/time:  6/15/2019 1951  Placenta removal:  Manual removal  Placenta appearance:  Intact  Placenta disposition:  discarded           Labor Events:       labor: Yes     Labor Onset Date/Time:         Dilation Complete Date/Time:         Start Pushing Date/Time:       Rupture Date/Time:              Rupture type:           Fluid Amount:        Fluid Color:        Fluid Odor:        Membrane Status (PeriCalm):        Rupture Date/Time (PeriCalm):        Fluid Amount (PeriCalm):        Fluid Color (PeriCalm):         steroids: Partial Course     Antibiotics given for GBS: Yes     Induction: none     Indications for induction:        Augmentation:       Indications  for augmentation:       Labor complications: None     Additional complications:          Cervical ripening:                     Delivery:      Episiotomy: None     Indication for Episiotomy:       Perineal Lacerations: None Repaired:      Periurethral Laceration:   Repaired:     Labial Laceration:   Repaired:     Sulcus Laceration:   Repaired:     Vaginal Laceration:   Repaired:     Cervical Laceration:   Repaired:     Repair suture:       Repair # of packets:       Last Value - EBL - Nursing (mL):       Sum - EBL - Nursing (mL): 0     Last Value - EBL - Anesthesia (mL):      Calculated QBL (mL): 485      Vaginal Sweep Performed:       Surgicount Correct:         Other providers:       Anesthesia    Method:  Epidural          Details (if applicable):  Trial of Labor No    Categorization: Repeat    Priority: Routine   Indications for : Repeat Section   Incision Type: low transverse     Additional  information:  Forceps:    Vacuum:    Breech:    Observed anomalies    Other (Comments):         Gibson Dunn MD  PGY2, OBGYN [facility]

## 2019-06-17 LAB
BLD PROD TYP BPU: NORMAL
BLD PROD TYP BPU: NORMAL
BLOOD UNIT EXPIRATION DATE: NORMAL
BLOOD UNIT EXPIRATION DATE: NORMAL
BLOOD UNIT TYPE CODE: 5100
BLOOD UNIT TYPE CODE: 5100
BLOOD UNIT TYPE: NORMAL
BLOOD UNIT TYPE: NORMAL
CD3+CD4+ CELLS # BLD: 257 CELLS/UL (ref 300–1400)
CD3+CD4+ CELLS NFR BLD: 15.2 % (ref 28–57)
CODING SYSTEM: NORMAL
CODING SYSTEM: NORMAL
DISPENSE STATUS: NORMAL
DISPENSE STATUS: NORMAL
NUM UNITS TRANS PACKED RBC: NORMAL
RPR SER QL: NORMAL
TRANS ERYTHROCYTES VOL PATIENT: NORMAL ML

## 2019-06-17 PROCEDURE — 11000001 HC ACUTE MED/SURG PRIVATE ROOM: Mod: HCWC

## 2019-06-17 PROCEDURE — 86361 T CELL ABSOLUTE COUNT: CPT | Mod: HCWC

## 2019-06-17 PROCEDURE — 25000003 PHARM REV CODE 250: Mod: HCWC | Performed by: STUDENT IN AN ORGANIZED HEALTH CARE EDUCATION/TRAINING PROGRAM

## 2019-06-17 PROCEDURE — 94761 N-INVAS EAR/PLS OXIMETRY MLT: CPT | Mod: HCWC

## 2019-06-17 PROCEDURE — 94640 AIRWAY INHALATION TREATMENT: CPT | Mod: HCWC

## 2019-06-17 PROCEDURE — 25000242 PHARM REV CODE 250 ALT 637 W/ HCPCS: Mod: HCWC | Performed by: STUDENT IN AN ORGANIZED HEALTH CARE EDUCATION/TRAINING PROGRAM

## 2019-06-17 RX ORDER — OXYCODONE AND ACETAMINOPHEN 5; 325 MG/1; MG/1
1 TABLET ORAL EVERY 4 HOURS PRN
Qty: 20 TABLET | Refills: 0 | Status: SHIPPED | OUTPATIENT
Start: 2019-06-17 | End: 2019-10-15 | Stop reason: CLARIF

## 2019-06-17 RX ORDER — IBUPROFEN 600 MG/1
600 TABLET ORAL EVERY 6 HOURS
Qty: 30 TABLET | Refills: 2 | Status: ON HOLD | OUTPATIENT
Start: 2019-06-17 | End: 2019-10-23 | Stop reason: HOSPADM

## 2019-06-17 RX ADMIN — DARUNAVIR 600 MG: 600 TABLET, FILM COATED ORAL at 08:06

## 2019-06-17 RX ADMIN — IBUPROFEN 600 MG: 600 TABLET ORAL at 06:06

## 2019-06-17 RX ADMIN — RITONAVIR 100 MG: 100 TABLET, FILM COATED ORAL at 10:06

## 2019-06-17 RX ADMIN — DOCUSATE SODIUM 200 MG: 100 CAPSULE, LIQUID FILLED ORAL at 08:06

## 2019-06-17 RX ADMIN — DARUNAVIR 600 MG: 600 TABLET, FILM COATED ORAL at 10:06

## 2019-06-17 RX ADMIN — FERROUS SULFATE TAB EC 324 MG (65 MG FE EQUIVALENT) 325 MG: 324 (65 FE) TABLET DELAYED RESPONSE at 08:06

## 2019-06-17 RX ADMIN — RITONAVIR 100 MG: 100 TABLET, FILM COATED ORAL at 08:06

## 2019-06-17 RX ADMIN — FAMOTIDINE 20 MG: 20 TABLET, FILM COATED ORAL at 10:06

## 2019-06-17 RX ADMIN — DOCUSATE SODIUM 200 MG: 100 CAPSULE, LIQUID FILLED ORAL at 10:06

## 2019-06-17 RX ADMIN — FLUTICASONE PROPIONATE 1 PUFF: 220 AEROSOL, METERED RESPIRATORY (INHALATION) at 08:06

## 2019-06-17 RX ADMIN — IBUPROFEN 600 MG: 600 TABLET ORAL at 12:06

## 2019-06-17 RX ADMIN — OXYCODONE HYDROCHLORIDE AND ACETAMINOPHEN 1 TABLET: 10; 325 TABLET ORAL at 10:06

## 2019-06-17 RX ADMIN — EMTRICITABINE AND TENOFOVIR DISOPROXIL FUMARATE 1 TABLET: 200; 300 TABLET, FILM COATED ORAL at 08:06

## 2019-06-17 RX ADMIN — FAMOTIDINE 20 MG: 20 TABLET, FILM COATED ORAL at 08:06

## 2019-06-17 RX ADMIN — FLUTICASONE PROPIONATE 1 PUFF: 220 AEROSOL, METERED RESPIRATORY (INHALATION) at 10:06

## 2019-06-17 NOTE — PLAN OF CARE
COPIED FROM INFANT'S CHART    SOCIAL WORK DISCHARGE PLANNING ASSESSMENT     Sw completed discharge planning assessment with pt's mother at pt's bedside.  Pt's mother was easily engaged. Education on the role of  was provided. Emotional support provided throughout assessment.        Legal Name: Victoriano Newton                          :  6/15/2019           Birth Hospital:[facility]           ESTUARDO: [birth year]     Birth Weight:   2.26 kg (4 lb 15.7 oz)              Birth Length: 44.5cm               Gestational Age: 32w5d           Apgars    Living status:  Living  Apgars:   1 min.:   5 min.:   10 min.:   15 min.:   20 min.:     Skin color:               Heart rate:               Reflex irritability:               Muscle tone:               Respiratory effort:               Total:               Apgars assigned by:  NICU            Mother: Jm Newton, age 33,  1985  Address: [address] [place], [place] [address] [address]  Phone: [phone]  Employer: [employer]                  Job Title: Telemetry Tech   Education: some college     Signed Birth Certificate: No; mom reported that dad will not be involved.     Support person(s): Aníbal Baez (friend of the family) [phone]     Sibling(s): Jeremy (age 16), Edgar (age 10), Ines (age 9), and Ruebn (age 4)     Spiritual Affiliation: Yes  Synagogue     Commercial Insurance Coverage: No      [place] Primary: Yes Secondary: No   A[employer]na      Pediatrician: [facility]       Nutrition: Formula               Breast Pump:              N/A                              WIC:              Mom not certified; however will apply for         Essential Items: (includes car seat, crib/bassinet/pack-n-play, clothing, bottles, diapers, etc.)  Acquired      Transportation: Personal vehicle      Education: Information given on CPR classes; Physician/NNP daily rounds; and Postpartum Depression signs.      Resources  Given: Faces      Potential Eligibility for SSI Benefits: No     Potential Discharge Needs:  None      Sw and mom discussed mom's care for her HIV. Mom expressed that she receives care from [facility]. Mom reported that she knows how to contact MD and receive her medications. No needs reported     Carlitos Goff LMSW  NICU   Phone [phone] Ext. [phone]  [email or web address]

## 2019-06-17 NOTE — PHYSICIAN QUERY
"PT Name: Jm Newton  MR #: [id number]    Physician Query Form - Hematology Clarification      CDS/: ALICIA Oshea,[email or web address]         Contact information:[email or web address]    This form is a permanent document in the medical record.      Query Date: June 17, 2019    By submitting this query, we are merely seeking further clarification of documentation. Please utilize your independent clinical judgment when addressing the question(s) below.    The Medical record contains the following:   Indicators  Supporting Clinical Findings Location in Medical Record   X "Anemia" documented Anemia affecting pregnancy in third trimester OB Progress note 6/16@808am   X H & H = Hgb=8.9-9.1  Hct=28.2-28.3 LAB 6/16    BP =                     HR=      "GI bleeding" documented      Acute bleeding (Non GI site)      Transfusion(s)     X Treatment: ferrous sulfate EC tablet 325 mg    MAR 6/16-6/17    Other:        Provider, please specify diagnosis or diagnoses associated with above clinical findings.    [x] Iron deficiency anemia     [  ] Other Hematological Diagnosis (please specify):     [  ] Clinically Undetermined       Please document in your progress notes daily for the duration of treatment, until resolved, and include in your discharge summary.                                                                                                      "

## 2019-06-17 NOTE — PLAN OF CARE
Problem: Adult Inpatient Plan of Care  Goal: Plan of Care Review  Outcome: Ongoing (interventions implemented as appropriate)  Pt received on room air with adequate saturation. Flovent inhaler given with spacer, tolerated well. Denies any SOB/chest tightness at this time. PRN treatment not indicated or requested.

## 2019-06-17 NOTE — PROGRESS NOTES
POSTPARTUM PROGRESS NOTE     Jm Newton is a 33 y.o. female POD #2 status post Repeat  section at 32w6d in a pregnancy complicated by HIV/AIDS, history of multiple  deliveries, cerclage placement. Patient is doing well this morning. She denies nausea, vomiting, fever or chills.  Patient reports mild abdominal pain that is adequately relieved by oral pain medications. Lochia is mild and stable. Patient is not yet voiding or ambulating - bullard still in place.     Objective:       Temp:  [97.6 °F (36.4 °C)-98.4 °F (36.9 °C)] 97.8 °F (36.6 °C)  Pulse:  [82-97] 97  Resp:  [18-22] 20  SpO2:  [96 %-99 %] 98 %  BP: (119-129)/(56-64) 119/59    Physical Exam  A&Ox3, NAD  nonlabored breathing, no respiratory distress  Abd soft, nontender, nondistended  Fundus firm below umbilicus  Incision clean, dry and intact   Appropriate mood & affect    Lab Review  No results found for this or any previous visit (from the past 4 hour(s)).    I/O    Intake/Output Summary (Last 24 hours) at 2019 0648  Last data filed at 2019 0302  Gross per 24 hour   Intake --   Output 2550 ml   Net -2550 ml        Assessment:     Patient Active Problem List   Diagnosis    HIV (human immunodeficiency virus infection)    Hyperthyroidism    Moderate recurrent major depression    Class 2 obesity due to excess calories without serious comorbidity with body mass index (BMI) of 38.0 to 38.9 in adult    EMEKA II (cervical intraepithelial neoplasia II)    Pregnancy, supervision, high-risk/flu/bottle/btl signed     HIV (human immunodeficiency virus) risk factors complicating pregnancy    Maternal asthma complicating pregnancy - pulmonary referral in    Hyperthyroidism affecting pregnancy -     History of classical  section - repeat 36-37 weeks    History of cervical incompetence in pregnancy, currently pregnant - mfm consult/con    H/O LEEP (loop electrosurgical excision procedure) of cervix  complicating pregnancy    Obesity complicating pregnancy    Hyperemesis of pregnancy    Cervical incompetence    Cervical cerclage suture present in second trimester    Anemia affecting pregnancy in third trimester    Indication for care in labor or delivery     delivery delivered        Plan:   1. Postpartum care:  - Patient doing well. Continue routine management and advances.  - Continue PO pain meds. Pain well controlled.  - Heme: H/h . Postop CBC   - Encourage ambulation  - Contraception: declines tubal ligation. Considering nuva ring.  - Lactation: pt not a candidate for breastfeeding 2/2 HIV+ status    2. HIV/AIDS  - continue home medications. (Med rec completed with patient)  - continue daily bactrim, reglan  - famotidine BID    3. Undesired fertility  - pt decided against tubal ligation at last minute.   - considering Nuva Ring    Dispo: As patient meets milestones, will plan to discharge POD#2-4    Grace Landry MD  OBGYN, PGY-1

## 2019-06-17 NOTE — PLAN OF CARE
Problem: Adult Inpatient Plan of Care  Goal: Plan of Care Review  Outcome: Ongoing (interventions implemented as appropriate)  Pt in no distress on room air, one prn tx given. Will continue to monitor.

## 2019-06-17 NOTE — PLAN OF CARE
Problem: Adult Inpatient Plan of Care  Goal: Plan of Care Review  Outcome: Ongoing (interventions implemented as appropriate)  VS stable.  Pt ambulating in room and to NICU without assistance.  Pt voiding and passing flatus.  Pt tolerating PO well and there are no s/s of distress at this time.  Pt's pain well controlled with oral pain medication.  Pt's bleeding has been light throughout shift and fundus is firm.

## 2019-06-18 VITALS
SYSTOLIC BLOOD PRESSURE: 127 MMHG | BODY MASS INDEX: 38.14 KG/M2 | RESPIRATION RATE: 18 BRPM | OXYGEN SATURATION: 97 % | WEIGHT: 243 LBS | HEIGHT: 67 IN | HEART RATE: 86 BPM | DIASTOLIC BLOOD PRESSURE: 77 MMHG | TEMPERATURE: 98 F

## 2019-06-18 PROCEDURE — 99024 POSTOP FOLLOW-UP VISIT: CPT | Mod: HCWC,,, | Performed by: OBSTETRICS & GYNECOLOGY

## 2019-06-18 PROCEDURE — 99900035 HC TECH TIME PER 15 MIN (STAT): Mod: HCWC

## 2019-06-18 PROCEDURE — 94761 N-INVAS EAR/PLS OXIMETRY MLT: CPT | Mod: HCWC

## 2019-06-18 PROCEDURE — 25000003 PHARM REV CODE 250: Mod: HCWC | Performed by: STUDENT IN AN ORGANIZED HEALTH CARE EDUCATION/TRAINING PROGRAM

## 2019-06-18 PROCEDURE — 99024 PR POST-OP FOLLOW-UP VISIT: ICD-10-PCS | Mod: HCWC,,, | Performed by: OBSTETRICS & GYNECOLOGY

## 2019-06-18 PROCEDURE — 94640 AIRWAY INHALATION TREATMENT: CPT | Mod: HCWC

## 2019-06-18 RX ADMIN — FAMOTIDINE 20 MG: 20 TABLET, FILM COATED ORAL at 08:06

## 2019-06-18 RX ADMIN — IBUPROFEN 600 MG: 600 TABLET ORAL at 12:06

## 2019-06-18 RX ADMIN — DOCUSATE SODIUM 200 MG: 100 CAPSULE, LIQUID FILLED ORAL at 08:06

## 2019-06-18 RX ADMIN — DARUNAVIR 600 MG: 600 TABLET, FILM COATED ORAL at 08:06

## 2019-06-18 RX ADMIN — OXYCODONE HYDROCHLORIDE AND ACETAMINOPHEN 1 TABLET: 10; 325 TABLET ORAL at 02:06

## 2019-06-18 RX ADMIN — FLUTICASONE PROPIONATE 1 PUFF: 220 AEROSOL, METERED RESPIRATORY (INHALATION) at 08:06

## 2019-06-18 RX ADMIN — EMTRICITABINE AND TENOFOVIR DISOPROXIL FUMARATE 1 TABLET: 200; 300 TABLET, FILM COATED ORAL at 08:06

## 2019-06-18 RX ADMIN — SULFAMETHOXAZOLE AND TRIMETHOPRIM 1 TABLET: 800; 160 TABLET ORAL at 08:06

## 2019-06-18 RX ADMIN — IBUPROFEN 600 MG: 600 TABLET ORAL at 06:06

## 2019-06-18 RX ADMIN — FERROUS SULFATE TAB EC 324 MG (65 MG FE EQUIVALENT) 325 MG: 324 (65 FE) TABLET DELAYED RESPONSE at 08:06

## 2019-06-18 RX ADMIN — RITONAVIR 100 MG: 100 TABLET, FILM COATED ORAL at 08:06

## 2019-06-18 NOTE — PLAN OF CARE
Problem: Adult Inpatient Plan of Care  Goal: Plan of Care Review  Outcome: Ongoing (interventions implemented as appropriate)  Pt in no distress on room air, prn tx not required. Inhaler given and tolerated well.

## 2019-06-18 NOTE — PROGRESS NOTES
POSTPARTUM PROGRESS NOTE     Jm Newton is a 33 y.o. female POD #3 status post Repeat  section at 32w6d in a pregnancy complicated by HIV/AIDS, history of multiple  deliveries, cerclage placement. Patient is doing well this morning. She denies nausea, vomiting, fever or chills.  Patient reports mild abdominal pain that is adequately relieved by oral pain medications. Lochia is mild and stable. Patient is voiding and ambulating without issue. She has been visiting baby in the NICU. She does not plan to breastfeed. She decided against BTL at time of  and is still considering contraceptive options.     Objective:       Temp:  [97.3 °F (36.3 °C)-98.6 °F (37 °C)] 97.3 °F (36.3 °C)  Pulse:  [85-92] 85  Resp:  [18] 18  SpO2:  [96 %-99 %] 98 %  BP: (129-142)/(66-78) 142/78    Physical Exam  A&Ox3, NAD  nonlabored breathing, no respiratory distress  Abd soft, nontender, nondistended  Fundus firm below umbilicus  Incision clean, dry and intact   Appropriate mood & affect    Lab Review  No results found for this or any previous visit (from the past 4 hour(s)).    I/O  No intake or output data in the 24 hours ending 19 0700     Assessment:     Patient Active Problem List   Diagnosis    HIV (human immunodeficiency virus infection)    Hyperthyroidism    Moderate recurrent major depression    Class 2 obesity due to excess calories without serious comorbidity with body mass index (BMI) of 38.0 to 38.9 in adult    EMEKA II (cervical intraepithelial neoplasia II)    Pregnancy, supervision, high-risk/flu/bottle/btl signed     HIV (human immunodeficiency virus) risk factors complicating pregnancy    Maternal asthma complicating pregnancy - pulmonary referral in    Hyperthyroidism affecting pregnancy -     History of classical  section - repeat 36-37 weeks    History of cervical incompetence in pregnancy, currently pregnant - mfm consult/con    H/O LEEP (loop  electrosurgical excision procedure) of cervix complicating pregnancy    Obesity complicating pregnancy    Hyperemesis of pregnancy    Cervical incompetence    Cervical cerclage suture present in second trimester    Anemia affecting pregnancy in third trimester    Indication for care in labor or delivery     delivery delivered        Plan:   1. Postpartum care:  - Patient doing well. Continue routine management and advances.  - Continue PO pain meds. Pain well controlled.  - Heme: H/h . Postop CBC   - Encourage ambulation  - Contraception: declines tubal ligation. Considering nuva ring.  - Lactation: pt not a candidate for breastfeeding 2/2 HIV+ status    2. HIV/AIDS  - continue home medications. (Med rec completed with patient)  - continue daily bactrim, reglan  - famotidine BID  - patient plans to make appointment with ID upon discharge this afternoon     3. Undesired fertility  - pt decided against tubal ligation at last minute.   - considering Nuva Ring    Dispo: As patient meets milestones, will plan to discharge POD#2-4    Grace Landry MD  OBGYN, PGY-1      Doing well, no questions,no problems.  I have reviewed the resident's note, evaluated the patient and agree with the diagnosis and management plan

## 2019-06-18 NOTE — DISCHARGE SUMMARY
Delivery Discharge Summary  Obstetrics      Primary OB Clinician: Chantal Worrell MD      Admission date: 6/15/2019  Discharge date: 2019    Disposition: To home, self care    Discharge Diagnosis List:      Patient Active Problem List   Diagnosis    HIV (human immunodeficiency virus infection)    Hyperthyroidism    Moderate recurrent major depression    Class 2 obesity due to excess calories without serious comorbidity with body mass index (BMI) of 38.0 to 38.9 in adult    EMEKA II (cervical intraepithelial neoplasia II)    Pregnancy, supervision, high-risk/flu/bottle/btl signed     HIV (human immunodeficiency virus) risk factors complicating pregnancy    Maternal asthma complicating pregnancy - pulmonary referral in    Hyperthyroidism affecting pregnancy -     History of classical  section - repeat 36-37 weeks    History of cervical incompetence in pregnancy, currently pregnant - mfm consult/con    H/O LEEP (loop electrosurgical excision procedure) of cervix complicating pregnancy    Obesity complicating pregnancy    Hyperemesis of pregnancy    Cervical incompetence    Cervical cerclage suture present in second trimester    Anemia affecting pregnancy in third trimester    Indication for care in labor or delivery     delivery delivered       Procedure: , due to history of prior  delivery     Hospital Course:  Jm Newton is a 33 y.o. now , POD #3 who was admitted on 6/15/2019 at 32w5d for painful contractions. Patient was subsequently admitted to labor and delivery unit with signed consents.     Labor course was uncomplicated and resulted in repeat  section without complications.     Please see delivery note for further details. Her postpartum course was uncomplicated. On discharge day, patient's pain is controlled with oral pain medications. Pt is tolerating ambulation without SOB or CP, and regular diet without N/V. Reports  lochia is mild. Denies any HA, vision changes, F/C, LE swelling. Denies any breast pain/soreness.    Pt in stable condition and ready for discharge. She has been instructed to start and/or continue medications and follow up with her obstetrics provider as listed below.    Pertinent studies:  CBC  Recent Labs   Lab 19  0133 19  0829   WBC 13.22* 11.24   HGB 8.9* 9.1*   HCT 28.2* 28.3*   MCV 71* 71*    272          Immunization History   Administered Date(s) Administered    DTaP 1985, 1986, 1986, 1987, 1987, 1991    Hepatitis B, Adolescent/High Risk Infant 03/15/2000    Hepatitis B, Pediatric/Adolescent 1999, 1999, 1999    IPV 1985, 1986, 1987, 1987, 1991, 2000    Influenza - High Dose 2014    Influenza - Quadrivalent - PF 11/15/2016    Influenza - Quadrivalent - PF (6-35 months) 2017    Influenza - Trivalent (ADULT) 10/15/2014    Influenza A (H1N1) 2009 Monovalent - IM - PF 10/21/2009    MMR 1986, 1991, 2000, 03/15/2000    OPV 2000, 03/15/2000    PPD Test 2000, 2014    Td (ADULT) 1999, 2000, 03/15/2000    Td - PF (ADULT) 2000    Tdap 2014, 2019    Varicella 2000, 03/15/2000        Delivery:    Episiotomy: None   Lacerations: None   Repair suture:     Repair # of packets:     Blood loss (ml):       Birth information:  YOB: 2019   Time of birth: 7:49 PM   Sex: male   Delivery type: , Low Transverse   Gestational Age: 32w5d    Delivery Clinician:      Other providers:       Additional  information:  Forceps:    Vacuum:    Breech:    Observed anomalies      Living?:           APGARS  One minute Five minutes Ten minutes   Skin color:         Heart rate:         Grimace:         Muscle tone:         Breathing:         Totals: 8  7        Placenta: Delivered:       appearance      Patient  Instructions:   Current Discharge Medication List      START taking these medications    Details   ibuprofen (ADVIL,MOTRIN) 600 MG tablet Take 1 tablet (600 mg total) by mouth every 6 (six) hours.  Qty: 30 tablet, Refills: 2      oxyCODONE-acetaminophen (PERCOCET) 5-325 mg per tablet Take 1 tablet by mouth every 4 (four) hours as needed.  Qty: 20 tablet, Refills: 0         CONTINUE these medications which have NOT CHANGED    Details   !! darunavir ethanolate 150 mg Tab Take 600 mg by mouth 2 (two) times daily.       emtricitabine-tenofovir 200-300 mg (TRUVADA) 200-300 mg Tab 1 tablet      ferrous sulfate (FEOSOL) 325 mg (65 mg iron) Tab tablet Take 1 tablet (325 mg total) by mouth 2 (two) times daily.  Qty: 30 tablet, Refills: 3      fluticasone (FLONASE) 50 mcg/actuation nasal spray 1 spray (50 mcg total) by Each Nare route 2 (two) times daily.  Qty: 18.2 mL, Refills: 3      !! inhaler,assist device,lg mask (OPTICHAMBER HERBERT LG MASK MISC) U UTD BID      metoclopramide HCl (REGLAN) 10 MG tablet Take 1 tablet (10 mg total) by mouth 3 (three) times daily before meals.  Qty: 60 tablet, Refills: 2      omeprazole (PRILOSEC) 20 MG capsule Take 1 capsule (20 mg total) by mouth once daily.  Qty: 30 capsule, Refills: 11    Associated Diagnoses: Heartburn during pregnancy in third trimester      ondansetron (ZOFRAN) 4 MG tablet Take 1 tablet (4 mg total) by mouth daily as needed for Nausea.  Qty: 30 tablet, Refills: 1      !! OPTICHAMBER HERBERT LG MASK Spcr U UTD BID  Refills: 3      prenatal 25/iron fum/folic/dha (PRENATAL-1 ORAL) Take by mouth.      !! PREZISTA 600 mg Tab Refills: 5      PROCHAMBER       promethazine (PHENERGAN) 25 MG tablet Take 1 tablet (25 mg total) by mouth every 6 (six) hours as needed for Nausea.  Qty: 30 tablet, Refills: 6      ranitidine (ZANTAC) 150 MG tablet TAKE 1 TABLET(150 MG) BY MOUTH TWICE DAILY  Qty: 180 tablet, Refills: 1    Comments: **Patient requests 90 days supply**  Associated  Diagnoses: Heartburn during pregnancy in second trimester      ritonavir (NORVIR) 100 mg Tab tablet Refills: 6      sulfamethoxazole-trimethoprim 400-80mg (BACTRIM,SEPTRA) 400-80 mg per tablet Refills: 5      VENTOLIN HFA 90 mcg/actuation inhaler Refills: 5       !! - Potential duplicate medications found. Please discuss with provider.      STOP taking these medications       hydroxyprogest,PF,,preg presv, (KARI) 250 mg/mL (1 mL) Oil Comments:   Reason for Stopping:               Discharge Procedure Orders   Other restrictions (specify):   Order Comments: Nothing in the vagina for six weeks     Notify your health care provider if you experience any of the following:  temperature >100.4     Notify your health care provider if you experience any of the following:  persistent nausea and vomiting or diarrhea     Notify your health care provider if you experience any of the following:  severe uncontrolled pain     Notify your health care provider if you experience any of the following:  redness, tenderness, or signs of infection (pain, swelling, redness, odor or green/yellow discharge around incision site)     Notify your health care provider if you experience any of the following:  difficulty breathing or increased cough     Notify your health care provider if you experience any of the following:  severe persistent headache     Notify your health care provider if you experience any of the following:  persistent dizziness, light-headedness, or visual disturbances     Notify your health care provider if you experience any of the following:  increased confusion or weakness     Notify your health care provider if you experience any of the following:   Order Comments: Heavy vaginal bleeding >1 pad/hour for greater than 2 hours     Activity as tolerated       Follow-up Information     [facility] In 6 weeks.    Specialty:  Obstetrics and Gynecology  Why:  post partum visit  Contact information:  [address] [facility]  [address]  [facility] [address]  [phone]                  Grace Landry MD  OBGYN, PGY-1    Doing well, no complaints, ready for D/C.

## 2019-06-19 LAB
HIV UQ DATE RECEIVED: ABNORMAL
HIV UQ DATE REPORTED: ABNORMAL
HIV1 RNA # SERPL NAA+PROBE: <40 COPIES/ML
HIV1 RNA SERPL NAA+PROBE-LOG#: <1.6 LOG (10) COPIES/ML
HIV1 RNA SERPL QL NAA+PROBE: DETECTED

## 2019-06-24 ENCOUNTER — TELEPHONE (OUTPATIENT)
Dept: OBSTETRICS AND GYNECOLOGY | Facility: OTHER | Age: 34
End: 2019-06-24

## 2019-07-25 ENCOUNTER — POSTPARTUM VISIT (OUTPATIENT)
Dept: OBSTETRICS AND GYNECOLOGY | Facility: CLINIC | Age: 34
End: 2019-07-25
Payer: MEDICARE

## 2019-07-25 VITALS
BODY MASS INDEX: 36.36 KG/M2 | WEIGHT: 232.13 LBS | SYSTOLIC BLOOD PRESSURE: 130 MMHG | DIASTOLIC BLOOD PRESSURE: 84 MMHG

## 2019-07-25 DIAGNOSIS — Z30.015 ENCOUNTER FOR INITIAL PRESCRIPTION OF VAGINAL RING HORMONAL CONTRACEPTIVE: ICD-10-CM

## 2019-07-25 DIAGNOSIS — F33.1 MODERATE RECURRENT MAJOR DEPRESSION: ICD-10-CM

## 2019-07-25 PROBLEM — O34.40 H/O LEEP (LOOP ELECTROSURGICAL EXCISION PROCEDURE) OF CERVIX COMPLICATING PREGNANCY: Status: RESOLVED | Noted: 2018-12-20 | Resolved: 2019-07-25

## 2019-07-25 PROBLEM — Z98.891 HISTORY OF CLASSICAL CESAREAN SECTION: Status: RESOLVED | Noted: 2018-12-20 | Resolved: 2019-07-25

## 2019-07-25 PROBLEM — O99.210 OBESITY COMPLICATING PREGNANCY: Status: RESOLVED | Noted: 2018-12-20 | Resolved: 2019-07-25

## 2019-07-25 PROBLEM — E05.90 HYPERTHYROIDISM AFFECTING PREGNANCY: Status: RESOLVED | Noted: 2018-12-20 | Resolved: 2019-07-25

## 2019-07-25 PROBLEM — O09.90 PREGNANCY, SUPERVISION, HIGH-RISK: Status: RESOLVED | Noted: 2018-12-20 | Resolved: 2019-07-25

## 2019-07-25 PROBLEM — O99.280 HYPERTHYROIDISM AFFECTING PREGNANCY: Status: RESOLVED | Noted: 2018-12-20 | Resolved: 2019-07-25

## 2019-07-25 PROBLEM — J45.909 MATERNAL ASTHMA COMPLICATING PREGNANCY: Status: RESOLVED | Noted: 2018-12-20 | Resolved: 2019-07-25

## 2019-07-25 PROBLEM — O21.0 HYPEREMESIS OF PREGNANCY: Status: RESOLVED | Noted: 2019-01-25 | Resolved: 2019-07-25

## 2019-07-25 PROBLEM — O09.299 HISTORY OF CERVICAL INCOMPETENCE IN PREGNANCY, CURRENTLY PREGNANT: Status: RESOLVED | Noted: 2018-12-20 | Resolved: 2019-07-25

## 2019-07-25 PROBLEM — O34.32 CERVICAL CERCLAGE SUTURE PRESENT IN SECOND TRIMESTER: Status: RESOLVED | Noted: 2019-01-31 | Resolved: 2019-07-25

## 2019-07-25 PROBLEM — N88.3 CERVICAL INCOMPETENCE: Status: RESOLVED | Noted: 2019-01-31 | Resolved: 2019-07-25

## 2019-07-25 PROBLEM — O99.013 ANEMIA AFFECTING PREGNANCY IN THIRD TRIMESTER: Status: RESOLVED | Noted: 2019-05-23 | Resolved: 2019-07-25

## 2019-07-25 PROBLEM — O99.519 MATERNAL ASTHMA COMPLICATING PREGNANCY: Status: RESOLVED | Noted: 2018-12-20 | Resolved: 2019-07-25

## 2019-07-25 PROBLEM — O09.899 HIV (HUMAN IMMUNODEFICIENCY VIRUS) RISK FACTORS COMPLICATING PREGNANCY: Status: RESOLVED | Noted: 2018-12-20 | Resolved: 2019-07-25

## 2019-07-25 PROBLEM — Z98.890 H/O LEEP (LOOP ELECTROSURGICAL EXCISION PROCEDURE) OF CERVIX COMPLICATING PREGNANCY: Status: RESOLVED | Noted: 2018-12-20 | Resolved: 2019-07-25

## 2019-07-25 PROCEDURE — 99999 PR PBB SHADOW E&M-EST. PATIENT-LVL II: ICD-10-PCS | Mod: PBBFAC,HCWC,, | Performed by: OBSTETRICS & GYNECOLOGY

## 2019-07-25 PROCEDURE — 99213 PR OFFICE/OUTPT VISIT, EST, LEVL III, 20-29 MIN: ICD-10-PCS | Mod: 24,HCWC,S$GLB, | Performed by: OBSTETRICS & GYNECOLOGY

## 2019-07-25 PROCEDURE — 99213 OFFICE O/P EST LOW 20 MIN: CPT | Mod: 24,HCWC,S$GLB, | Performed by: OBSTETRICS & GYNECOLOGY

## 2019-07-25 PROCEDURE — 87624 HPV HI-RISK TYP POOLED RSLT: CPT | Mod: HCWC

## 2019-07-25 PROCEDURE — 88141 LIQUID-BASED PAP SMEAR, SCREENING: ICD-10-PCS | Mod: HCWC,,, | Performed by: PATHOLOGY

## 2019-07-25 PROCEDURE — 88175 CYTOPATH C/V AUTO FLUID REDO: CPT | Mod: HCWC | Performed by: PATHOLOGY

## 2019-07-25 PROCEDURE — 99999 PR PBB SHADOW E&M-EST. PATIENT-LVL II: CPT | Mod: PBBFAC,HCWC,, | Performed by: OBSTETRICS & GYNECOLOGY

## 2019-07-25 PROCEDURE — 88141 CYTOPATH C/V INTERPRET: CPT | Mod: HCWC,,, | Performed by: PATHOLOGY

## 2019-07-25 RX ORDER — SERTRALINE HYDROCHLORIDE 50 MG/1
50 TABLET, FILM COATED ORAL DAILY
Qty: 30 TABLET | Refills: 2 | Status: ON HOLD | OUTPATIENT
Start: 2019-07-25 | End: 2019-10-22

## 2019-07-25 RX ORDER — ETONOGESTREL AND ETHINYL ESTRADIOL VAGINAL RING .015; .12 MG/D; MG/D
1 RING VAGINAL
Qty: 1 EACH | Refills: 11 | Status: SHIPPED | OUTPATIENT
Start: 2019-07-25 | End: 2019-08-19
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
## 2019-07-29 LAB
HPV HR 12 DNA CVX QL NAA+PROBE: POSITIVE
HPV16 AG SPEC QL: NEGATIVE
HPV18 DNA SPEC QL NAA+PROBE: NEGATIVE

## 2019-08-07 ENCOUNTER — TELEPHONE (OUTPATIENT)
Dept: OBSTETRICS AND GYNECOLOGY | Facility: CLINIC | Age: 34
End: 2019-08-07

## 2019-08-07 NOTE — TELEPHONE ENCOUNTER
----- Message from Herman Osullvian MA sent at 8/7/2019  1:34 PM CDT -----      ----- Message -----  From: Sunitha Ponce  Sent: 8/7/2019   1:09 PM  To: An[facility] GRIMES Staff    Pt returning Dr Worrell phone call. Pt can be reached at [phone].

## 2019-08-09 ENCOUNTER — TELEPHONE (OUTPATIENT)
Dept: OBSTETRICS AND GYNECOLOGY | Facility: CLINIC | Age: 34
End: 2019-08-09

## 2019-08-19 ENCOUNTER — PROCEDURE VISIT (OUTPATIENT)
Dept: OBSTETRICS AND GYNECOLOGY | Facility: CLINIC | Age: 34
End: 2019-08-19
Attending: OBSTETRICS & GYNECOLOGY
Payer: MEDICARE

## 2019-08-19 VITALS
HEIGHT: 67 IN | DIASTOLIC BLOOD PRESSURE: 80 MMHG | BODY MASS INDEX: 36.47 KG/M2 | WEIGHT: 232.38 LBS | SYSTOLIC BLOOD PRESSURE: 146 MMHG

## 2019-08-19 DIAGNOSIS — E05.90 HYPERTHYROIDISM: ICD-10-CM

## 2019-08-19 DIAGNOSIS — F33.1 MODERATE RECURRENT MAJOR DEPRESSION: ICD-10-CM

## 2019-08-19 DIAGNOSIS — R87.613 HGSIL (HIGH GRADE SQUAMOUS INTRAEPITHELIAL LESION) ON PAP SMEAR OF CERVIX: Primary | ICD-10-CM

## 2019-08-19 DIAGNOSIS — B20 HIV (HUMAN IMMUNODEFICIENCY VIRUS INFECTION): ICD-10-CM

## 2019-08-19 LAB
B-HCG UR QL: NEGATIVE
CTP QC/QA: YES

## 2019-08-19 PROCEDURE — 81025 URINE PREGNANCY TEST: CPT | Mod: HCWC,S$GLB,, | Performed by: OBSTETRICS & GYNECOLOGY

## 2019-08-19 PROCEDURE — 88305 TISSUE EXAM BY PATHOLOGIST: CPT | Mod: HCWC | Performed by: PATHOLOGY

## 2019-08-19 PROCEDURE — 81025 POCT URINE PREGNANCY: ICD-10-PCS | Mod: HCWC,S$GLB,, | Performed by: OBSTETRICS & GYNECOLOGY

## 2019-08-19 PROCEDURE — 57454 COLPOSCOPY: ICD-10-PCS | Mod: HCWC,S$GLB,, | Performed by: OBSTETRICS & GYNECOLOGY

## 2019-08-19 PROCEDURE — 88305 TISSUE SPECIMEN TO PATHOLOGY, OBSTETRICS/GYNECOLOGY: ICD-10-PCS | Mod: 26,HCWC,, | Performed by: PATHOLOGY

## 2019-08-19 PROCEDURE — 57454 BX/CURETT OF CERVIX W/SCOPE: CPT | Mod: HCWC,S$GLB,, | Performed by: OBSTETRICS & GYNECOLOGY

## 2019-08-19 RX ORDER — LEVONORGESTREL AND ETHINYL ESTRADIOL 0.1-0.02MG
1 KIT ORAL DAILY
Qty: 28 TABLET | Refills: 12 | Status: SHIPPED | OUTPATIENT
Start: 2019-08-19 | End: 2019-10-15 | Stop reason: CLARIF

## 2019-08-19 NOTE — PROCEDURES
Colposcopy  Date/Time: 8/19/2019 10:35 AM  Performed by: Chantal Worrell MD  Authorized by: Chantal Worrell MD     Consent Done?:  Yes (Written)  Assistants?: No      Colposcopy Site:  Cervix  Position:  Supine  Acrowhite Lesion: No    Atypical Vessels: No    Transformation Zone Adequate?: No    Biopsy?: Yes         Location:  Cervix ((2 00, 5 00 and 10 00))  ECC Performed?: Yes    LEEP Performed?: No     Patient tolerated the procedure well with no immediate complications.   Post-operative instructions were provided for the patient.   Patient was discharged and will follow up if any complications occur    follow up results.  Will ultimately recommend hysterectomy

## 2019-08-27 ENCOUNTER — TELEPHONE (OUTPATIENT)
Dept: OBSTETRICS AND GYNECOLOGY | Facility: CLINIC | Age: 34
End: 2019-08-27

## 2019-08-27 NOTE — TELEPHONE ENCOUNTER
Notified of biopsy results.  Recommend that she have hysterectomy for pain, persistent hpv, hiv, aids.  Discussed with Dr. Lima.  Please schedule surgery consult with sushma lima

## 2019-08-27 NOTE — TELEPHONE ENCOUNTER
----- Message from Herman Osullivan MA sent at 8/27/2019  3:20 PM CDT -----  Contact: ZAY MERAZ [[id number]]      ----- Message -----  From: Talib Knox  Sent: 8/27/2019   3:01 PM  To: Anaid GRIMES Staff    Type:  Patient Returning Call    Who Called: ZAY MERAZ [[id number]]    Who Left Message for Patient: Dr. Worrell    Does the patient know what this is regarding?: no    Would the patient rather a call back or a response via [facility]? call    Best Call Back Number: [phone]    Additional Information:

## 2019-09-09 ENCOUNTER — TELEPHONE (OUTPATIENT)
Dept: OBSTETRICS AND GYNECOLOGY | Facility: CLINIC | Age: 34
End: 2019-09-09

## 2019-09-09 NOTE — TELEPHONE ENCOUNTER
----- Message from Radha Romero MA sent at 9/9/2019 11:35 AM CDT -----  Pt want you to give her a call. She didn't state the nature of the call.

## 2019-09-16 ENCOUNTER — OFFICE VISIT (OUTPATIENT)
Dept: OBSTETRICS AND GYNECOLOGY | Facility: CLINIC | Age: 34
End: 2019-09-16
Attending: OBSTETRICS & GYNECOLOGY
Payer: MEDICARE

## 2019-09-16 ENCOUNTER — TELEPHONE (OUTPATIENT)
Dept: OBSTETRICS AND GYNECOLOGY | Facility: CLINIC | Age: 34
End: 2019-09-16

## 2019-09-16 VITALS
WEIGHT: 226.19 LBS | BODY MASS INDEX: 35.5 KG/M2 | DIASTOLIC BLOOD PRESSURE: 74 MMHG | HEIGHT: 67 IN | SYSTOLIC BLOOD PRESSURE: 118 MMHG

## 2019-09-16 DIAGNOSIS — R87.613 HGSIL (HIGH GRADE SQUAMOUS INTRAEPITHELIAL LESION) ON PAP SMEAR OF CERVIX: Primary | ICD-10-CM

## 2019-09-16 DIAGNOSIS — B20 HIV (HUMAN IMMUNODEFICIENCY VIRUS INFECTION): ICD-10-CM

## 2019-09-16 PROCEDURE — 99214 OFFICE O/P EST MOD 30 MIN: CPT | Mod: HCWC,S$GLB,, | Performed by: OBSTETRICS & GYNECOLOGY

## 2019-09-16 PROCEDURE — 99999 PR PBB SHADOW E&M-EST. PATIENT-LVL III: CPT | Mod: PBBFAC,HCWC,, | Performed by: OBSTETRICS & GYNECOLOGY

## 2019-09-16 PROCEDURE — 99214 PR OFFICE/OUTPT VISIT, EST, LEVL IV, 30-39 MIN: ICD-10-PCS | Mod: HCWC,S$GLB,, | Performed by: OBSTETRICS & GYNECOLOGY

## 2019-09-16 PROCEDURE — 3008F BODY MASS INDEX DOCD: CPT | Mod: HCWC,CPTII,S$GLB, | Performed by: OBSTETRICS & GYNECOLOGY

## 2019-09-16 PROCEDURE — 99999 PR PBB SHADOW E&M-EST. PATIENT-LVL III: ICD-10-PCS | Mod: PBBFAC,HCWC,, | Performed by: OBSTETRICS & GYNECOLOGY

## 2019-09-16 PROCEDURE — 3008F PR BODY MASS INDEX (BMI) DOCUMENTED: ICD-10-PCS | Mod: HCWC,CPTII,S$GLB, | Performed by: OBSTETRICS & GYNECOLOGY

## 2019-09-16 NOTE — PROGRESS NOTES
HISTORY OF PRESENT ILLNESS:    Jm Newton is a 34 y.o. female, , Patient's last menstrual period was 2019.,  presents for surgery consult for recurrent dysplasia and dysmenorrhea.  She does not desire future fertility.     Biopsy & ECC EMEKA 1  3/17 Biopsy EMEKA 1, ECC neg   LEEP moderate dysplasia, ECC neg   Pap HGSIL, HPV+.   Colpo biopsy EMEKA 1, ECC negative    Patient with regular cycles, normal flow, but painful.    Hx HIV diagnosed .  Followed by Dr. Ramos.      Past Medical History:   Diagnosis Date    Asthma     HIV infection     dx     Hyperthyroidism in pregnancy, antepartum        Past Surgical History:   Procedure Laterality Date    CERCLAGE, CERVIX N/A 2019    Performed by [facility]bey Henry [facility] at [facility]    CERCLAGE, CERVIX N/A 2014    Performed by Obey Henry [facility] at [facility]    CERVICAL CERCLAGE      emergent with twins     SECTION, CLASSIC      Last section with classical extention, from [facility]     SECTION, WITH TUBAL LIGATION N/A 6/15/2019    Performed by Madeline Walden MD at [facility]&    CONE BIOPSY, CERVIX, USING COLD KNIFE N/A 2018    Performed by Chantal Worrell MD at [facility]    D & C (SUCTION) N/A 11/15/2016    Performed by Pancho Albert MD at [facility]    DELIVERY-CEASAREAN SECTION N/A 2014    Performed by [facility]al Worrell MD at [facility]    DILATION AND CURETTAGE OF UTERUS      LEEP CONIZATION, CERVIX N/A 2018    Performed by Chantal Worrell MD at [facility]       MEDICATIONS AND ALLERGIES:      Current Outpatient Medications:     darunavir ethanolate 150 mg Tab, Take 600 mg by mouth 2 (two) times daily. , Disp: , Rfl:     emtricitabine-tenofovir 200-300 mg (TRUVADA) 200-300 mg Tab, 1 tablet, Disp: , Rfl:     ferrous sulfate (FEOSOL) 325 mg (65 mg iron) Tab tablet, Take 1 tablet (325 mg total) by mouth 2 (two) times daily., Disp: 30 tablet, Rfl: 3    fluticasone (FLONASE) 50  mcg/actuation nasal spray, 1 spray (50 mcg total) by Each Nare route 2 (two) times daily., Disp: 18.2 mL, Rfl: 3    ibuprofen (ADVIL,MOTRIN) 600 MG tablet, Take 1 tablet (600 mg total) by mouth every 6 (six) hours., Disp: 30 tablet, Rfl: 2    inhaler,assist device,lg mask (OPTICHAMBER HERBERT LG MASK MISC), U UTD BID, Disp: , Rfl:     levonorgestrel-ethinyl estradiol (AVIANE,ALESSE,LESSINA) 0.1-20 mg-mcg per tablet, Take 1 tablet by mouth once daily., Disp: 28 tablet, Rfl: 12    metoclopramide HCl (REGLAN) 10 MG tablet, Take 1 tablet (10 mg total) by mouth 3 (three) times daily before meals., Disp: 60 tablet, Rfl: 2    omeprazole (PRILOSEC) 20 MG capsule, Take 1 capsule (20 mg total) by mouth once daily., Disp: 30 capsule, Rfl: 11    OPTICHAMBER HERBERT LG MASK Spcr, U UTD BID, Disp: , Rfl: 3    oxyCODONE-acetaminophen (PERCOCET) 5-325 mg per tablet, Take 1 tablet by mouth every 4 (four) hours as needed., Disp: 20 tablet, Rfl: 0    prenatal 25/iron fum/folic/dha (PRENATAL-1 ORAL), Take by mouth., Disp: , Rfl:     PREZISTA 600 mg Tab, , Disp: , Rfl: 5    PROCHAMBER, , Disp: , Rfl:     promethazine (PHENERGAN) 25 MG tablet, Take 1 tablet (25 mg total) by mouth every 6 (six) hours as needed for Nausea., Disp: 30 tablet, Rfl: 6    ranitidine (ZANTAC) 150 MG tablet, TAKE 1 TABLET(150 MG) BY MOUTH TWICE DAILY, Disp: 180 tablet, Rfl: 1    ritonavir (NORVIR) 100 mg Tab tablet, , Disp: , Rfl: 6    sertraline (ZOLOFT) 50 MG tablet, Take 1 tablet (50 mg total) by mouth once daily., Disp: 30 tablet, Rfl: 2    sulfamethoxazole-trimethoprim 400-80mg (BACTRIM,SEPTRA) 400-80 mg per tablet, , Disp: , Rfl: 5    VENTOLIN HFA 90 mcg/actuation inhaler, , Disp: , Rfl: 5    Current Facility-Administered Medications:     fluticasone 220 mcg/actuation inhaler 1 puff, 1 puff, Inhalation, BID, Thai Santana MD    Review of patient's allergies indicates:   Allergen Reactions    Azithromycin Edema       Family History    Problem Relation Age of Onset    Hypertension Maternal Grandmother     Diabetes Maternal Grandmother     Colon cancer Neg Hx     Ovarian cancer Neg Hx        Social History     Socioeconomic History    Marital status: Single     Spouse name: Not on file    Number of children: Not on file    Years of education: Not on file    Highest education level: Not on file   Occupational History    Not on file   Social Needs    Financial resource strain: Not on file    Food insecurity:     Worry: Not on file     Inability: Not on file    Transportation needs:     Medical: Not on file     Non-medical: Not on file   Tobacco Use    Smoking status: Never Smoker    Smokeless tobacco: Never Used   Substance and Sexual Activity    Alcohol use: Yes     Alcohol/week: 0.0 oz     Frequency: Monthly or less     Comment: socially    Drug use: No    Sexual activity: Not Currently     Partners: Male     Birth control/protection: None   Lifestyle    Physical activity:     Days per week: Not on file     Minutes per session: Not on file    Stress: Not on file   Relationships    Social connections:     Talks on phone: Not on file     Gets together: Not on file     Attends Islam service: Not on file     Active member of club or organization: Not on file     Attends meetings of clubs or organizations: Not on file     Relationship status: Not on file   Other Topics Concern    Not on file   Social History Narrative    Not on file       ROS:  GENERAL: No weight changes. No swelling. No fatigue. No fever.  CARDIOVASCULAR: No chest pain. No shortness of breath. No leg cramps.   NEUROLOGICAL: No headaches. No vision changes.  BREASTS: No pain. No lumps. No discharge.  ABDOMEN: No pain. No nausea. No vomiting. No diarrhea. No constipation.  REPRODUCTIVE: No abnormal bleeding.   VULVA: No pain. No lesions. No itching.  VAGINA: No relaxation. No itching. No odor. No discharge. No lesions.  URINARY: No incontinence. No nocturia.  "No frequency. No dysuria.    /74   Ht 5' 7" (1.702 m)   Wt 102.6 kg (226 lb 3.1 oz)   LMP 09/02/2019   Breastfeeding? No   BMI 35.43 kg/m²     PE:  APPEARANCE: Well nourished, well developed, in no acute distress.  ABDOMEN: Soft. No tenderness or masses. No hepatosplenomegaly. No hernias.  BREASTS, FUNDOSCOPIC, RECTAL DEFERRED  PELVIC: External female genitalia without lesions.  Female hair distribution. Adequate perineal body, Normal urethral meatus. Vagina moist and well rugated without lesions or discharge.  No significant cystocele or rectocele present. Cervix pink without lesions, discharge or tenderness. Uterus is normal size, regular, slight mobility and nontender. Adnexa without masses or tenderness.  EXTREMITIES: No edema      DIAGNOSIS & PLAN  1. HGSIL (high grade squamous intraepithelial lesion) on Pap smear of cervix  Case Request Operating Room: XI ROBOTIC HYSTERECTOMY   2. HIV (human immunodeficiency virus infection)  Case Request Operating Room: XI ROBOTIC HYSTERECTOMY       COUNSELING:  Patient counseled and she desires definitive management with hysterectomy.  Plan RALH/bilateral salpingectomy.  "

## 2019-09-24 ENCOUNTER — HOSPITAL ENCOUNTER (OUTPATIENT)
Dept: RADIOLOGY | Facility: OTHER | Age: 34
Discharge: HOME | End: 2019-09-24
Attending: OBSTETRICS & GYNECOLOGY
Payer: MEDICARE

## 2019-09-24 DIAGNOSIS — N93.9 ABNORMAL UTERINE BLEEDING (AUB): ICD-10-CM

## 2019-09-24 PROCEDURE — 76830 US PELVIS COMP WITH TRANSVAG NON-OB (XPD): ICD-10-PCS | Mod: 26,HCWC,, | Performed by: RADIOLOGY

## 2019-09-24 PROCEDURE — 76830 TRANSVAGINAL US NON-OB: CPT | Mod: TC,HCWC

## 2019-09-24 PROCEDURE — 76856 US PELVIS COMP WITH TRANSVAG NON-OB (XPD): ICD-10-PCS | Mod: 26,HCWC,, | Performed by: RADIOLOGY

## 2019-09-24 PROCEDURE — 76830 TRANSVAGINAL US NON-OB: CPT | Mod: 26,HCWC,, | Performed by: RADIOLOGY

## 2019-09-24 PROCEDURE — 76856 US EXAM PELVIC COMPLETE: CPT | Mod: 26,HCWC,, | Performed by: RADIOLOGY

## 2019-10-15 ENCOUNTER — OFFICE VISIT (OUTPATIENT)
Dept: OBSTETRICS AND GYNECOLOGY | Facility: CLINIC | Age: 34
End: 2019-10-15
Attending: OBSTETRICS & GYNECOLOGY
Payer: MEDICARE

## 2019-10-15 ENCOUNTER — HOSPITAL ENCOUNTER (OUTPATIENT)
Dept: PREADMISSION TESTING | Facility: OTHER | Age: 34
Discharge: HOME | End: 2019-10-15
Attending: OBSTETRICS & GYNECOLOGY
Payer: MEDICARE

## 2019-10-15 ENCOUNTER — ANESTHESIA EVENT (OUTPATIENT)
Dept: SURGERY | Facility: OTHER | Age: 34
End: 2019-10-15
Payer: MEDICARE

## 2019-10-15 VITALS
SYSTOLIC BLOOD PRESSURE: 133 MMHG | HEART RATE: 81 BPM | HEIGHT: 67 IN | WEIGHT: 216 LBS | DIASTOLIC BLOOD PRESSURE: 60 MMHG | BODY MASS INDEX: 33.9 KG/M2 | OXYGEN SATURATION: 98 % | TEMPERATURE: 98 F

## 2019-10-15 VITALS
WEIGHT: 217.38 LBS | DIASTOLIC BLOOD PRESSURE: 76 MMHG | BODY MASS INDEX: 34.05 KG/M2 | SYSTOLIC BLOOD PRESSURE: 118 MMHG

## 2019-10-15 DIAGNOSIS — Z01.818 PREOP TESTING: Primary | ICD-10-CM

## 2019-10-15 DIAGNOSIS — B20 HIV INFECTION, UNSPECIFIED SYMPTOM STATUS: ICD-10-CM

## 2019-10-15 DIAGNOSIS — N87.1 CIN II (CERVICAL INTRAEPITHELIAL NEOPLASIA II): ICD-10-CM

## 2019-10-15 DIAGNOSIS — R87.613 HGSIL (HIGH GRADE SQUAMOUS INTRAEPITHELIAL LESION) ON PAP SMEAR OF CERVIX: Primary | ICD-10-CM

## 2019-10-15 LAB
ABO + RH BLD: NORMAL
BASOPHILS # BLD AUTO: 0.01 K/UL (ref 0–0.2)
BASOPHILS NFR BLD: 0.4 % (ref 0–1.9)
BLD GP AB SCN CELLS X3 SERPL QL: NORMAL
DIFFERENTIAL METHOD: ABNORMAL
EOSINOPHIL # BLD AUTO: 0 K/UL (ref 0–0.5)
EOSINOPHIL NFR BLD: 1.5 % (ref 0–8)
ERYTHROCYTE [DISTWIDTH] IN BLOOD BY AUTOMATED COUNT: 15.4 % (ref 11.5–14.5)
GIANT PLATELETS BLD QL SMEAR: PRESENT
HCT VFR BLD AUTO: 31.8 % (ref 37–48.5)
HGB BLD-MCNC: 10.3 G/DL (ref 12–16)
IMM GRANULOCYTES # BLD AUTO: 0 K/UL (ref 0–0.04)
IMM GRANULOCYTES NFR BLD AUTO: 0 % (ref 0–0.5)
LYMPHOCYTES # BLD AUTO: 1.2 K/UL (ref 1–4.8)
LYMPHOCYTES NFR BLD: 44.6 % (ref 18–48)
MCH RBC QN AUTO: 23.3 PG (ref 27–31)
MCHC RBC AUTO-ENTMCNC: 32.4 G/DL (ref 32–36)
MCV RBC AUTO: 72 FL (ref 82–98)
MONOCYTES # BLD AUTO: 0.3 K/UL (ref 0.3–1)
MONOCYTES NFR BLD: 12.7 % (ref 4–15)
NEUTROPHILS # BLD AUTO: 1.1 K/UL (ref 1.8–7.7)
NEUTROPHILS NFR BLD: 40.8 % (ref 38–73)
NRBC BLD-RTO: 0 /100 WBC
PLATELET # BLD AUTO: 170 K/UL (ref 150–350)
PLATELET BLD QL SMEAR: ABNORMAL
PMV BLD AUTO: 10.9 FL (ref 9.2–12.9)
RBC # BLD AUTO: 4.42 M/UL (ref 4–5.4)
WBC # BLD AUTO: 2.6 K/UL (ref 3.9–12.7)

## 2019-10-15 PROCEDURE — 99999 PR PBB SHADOW E&M-EST. PATIENT-LVL II: ICD-10-PCS | Mod: PBBFAC,HCWC,, | Performed by: OBSTETRICS & GYNECOLOGY

## 2019-10-15 PROCEDURE — 85025 COMPLETE CBC W/AUTO DIFF WBC: CPT | Mod: HCWC

## 2019-10-15 PROCEDURE — 99499 UNLISTED E&M SERVICE: CPT | Mod: HCWC,S$GLB,, | Performed by: OBSTETRICS & GYNECOLOGY

## 2019-10-15 PROCEDURE — 36415 COLL VENOUS BLD VENIPUNCTURE: CPT | Mod: HCWC

## 2019-10-15 PROCEDURE — 99499 NO LOS: ICD-10-PCS | Mod: HCWC,S$GLB,, | Performed by: OBSTETRICS & GYNECOLOGY

## 2019-10-15 PROCEDURE — 86850 RBC ANTIBODY SCREEN: CPT | Mod: HCWC

## 2019-10-15 PROCEDURE — 99999 PR PBB SHADOW E&M-EST. PATIENT-LVL II: CPT | Mod: PBBFAC,HCWC,, | Performed by: OBSTETRICS & GYNECOLOGY

## 2019-10-15 RX ORDER — PROMETHAZINE HYDROCHLORIDE 25 MG/1
25 TABLET ORAL EVERY 6 HOURS PRN
COMMUNITY
Start: 2018-12-15 | End: 2022-06-23

## 2019-10-15 RX ORDER — RITONAVIR 100 MG/1
100 TABLET ORAL 2 TIMES DAILY WITH MEALS
COMMUNITY
Start: 2018-12-20 | End: 2023-06-27

## 2019-10-15 RX ORDER — SODIUM CHLORIDE, SODIUM LACTATE, POTASSIUM CHLORIDE, CALCIUM CHLORIDE 600; 310; 30; 20 MG/100ML; MG/100ML; MG/100ML; MG/100ML
INJECTION, SOLUTION INTRAVENOUS CONTINUOUS
Status: CANCELLED | OUTPATIENT
Start: 2019-10-15

## 2019-10-15 RX ORDER — EMTRICITABINE AND TENOFOVIR DISOPROXIL FUMARATE 200; 300 MG/1; MG/1
TABLET, FILM COATED ORAL
Status: ON HOLD | COMMUNITY
Start: 2018-12-20 | End: 2019-10-22

## 2019-10-15 RX ORDER — LIDOCAINE HYDROCHLORIDE 10 MG/ML
0.5 INJECTION, SOLUTION EPIDURAL; INFILTRATION; INTRACAUDAL; PERINEURAL ONCE
Status: CANCELLED | OUTPATIENT
Start: 2019-10-15 | End: 2019-10-15

## 2019-10-15 RX ORDER — PREGABALIN 75 MG/1
75 CAPSULE ORAL
Status: CANCELLED | OUTPATIENT
Start: 2019-10-15 | End: 2019-10-15

## 2019-10-15 RX ORDER — FAMOTIDINE 20 MG/1
20 TABLET, FILM COATED ORAL
Status: CANCELLED | OUTPATIENT
Start: 2019-10-15 | End: 2019-10-15

## 2019-10-15 RX ORDER — MUPIROCIN 20 MG/G
OINTMENT TOPICAL
Status: CANCELLED | OUTPATIENT
Start: 2019-10-15

## 2019-10-15 RX ORDER — ATAZANAVIR 300 MG/1
CAPSULE ORAL
Status: ON HOLD | COMMUNITY
Start: 2018-12-19 | End: 2019-10-22

## 2019-10-15 RX ORDER — ONDANSETRON 4 MG/1
TABLET, FILM COATED ORAL
Status: ON HOLD | COMMUNITY
Start: 2018-12-15 | End: 2019-10-22

## 2019-10-15 RX ORDER — ACETAMINOPHEN 500 MG
1000 TABLET ORAL
Status: CANCELLED | OUTPATIENT
Start: 2019-10-15 | End: 2019-10-15

## 2019-10-15 RX ORDER — CELECOXIB 200 MG/1
400 CAPSULE ORAL
Status: CANCELLED | OUTPATIENT
Start: 2019-10-15 | End: 2019-10-15

## 2019-10-15 RX ORDER — EMTRICITABINE AND TENOFOVIR ALAFENAMIDE 200; 25 MG/1; MG/1
TABLET ORAL DAILY
Refills: 3 | COMMUNITY
Start: 2019-10-10

## 2019-10-15 NOTE — DISCHARGE INSTRUCTIONS
PRE-ADMIT TESTING -  [phone]    [address]  [place] [facility]          Your surgery has been scheduled at [facility]. We are pleased to have the opportunity to serve you. For Further Information please call [phone].    On the day of surgery please report to the Information Desk on the 1st floor.    · CONTACT YOUR PHYSICIAN'S OFFICE THE DAY PRIOR TO YOUR SURGERY TO OBTAIN YOUR ARRIVAL TIME.     · The evening before surgery do not eat anything after 9 p.m. ( this includes hard candy, chewing gum and mints).  You may only have GATORADE, POWERADE AND WATER  from 9 p.m. until you leave your home.   DO NOT DRINK ANY LIQUIDS ON THE WAY TO THE HOSPITAL.      SPECIAL MEDICATION INSTRUCTIONS: TAKE medications checked off by the Anesthesiologist on your Medication List.    Angiogram Patients: Take medications as instructed by your physician, including aspirin.     Surgery Patients:    If you take ASPIRIN - Your PHYSICIAN/SURGEON will need to inform you IF/OR when you need to stop taking aspirin prior to your surgery.     Do Not take any medications containing IBUPROFEN.  Do Not Wear any make-up or dark nail polish   (especially eye make-up) to surgery. If you come to surgery with makeup on you will be required to remove the makeup or nail polish.    Do not shave your surgical area at least 5 days prior to your surgery. The surgical prep will be performed at the hospital according to Infection Control regulations.    Leave all valuables at home.   Do Not wear any jewelry or watches, including any metal in body piercings. Jewelry must be removed prior to coming to the hospital.  There is a possibility that rings that are unable to be removed may be cut off if they are on the surgical extremity.    Contact Lens must be removed before surgery. Either do not wear the contact lens or bring a case and solution for storage.  Please bring a container for eyeglasses or dentures as required.  Bring  any paperwork your physician has provided, such as consent forms,  history and physicals, doctor's orders, etc.   Bring comfortable clothes that are loose fitting to wear upon discharge. Take into consideration the type of surgery being performed.  Maintain your diet as advised per your physician the day prior to surgery.      Adequate rest the night before surgery is advised.   Park in the Parking lot behind the hospital or in the [facility] across the street from the parking lot. Parking is complimentary.  If you will be discharged the same day as your procedure, please arrange for a responsible adult to drive you home or to accompany you if traveling by taxi.   YOU WILL NOT BE PERMITTED TO DRIVE OR TO LEAVE THE HOSPITAL ALONE AFTER SURGERY.   It is strongly recommended that you arrange for someone to remain with you for the first 24 hrs following your surgery.    The Surgeon will speak to your family/visitor after your surgery regarding the outcome of your surgery and post op care.  The Surgeon may speak to you after your surgery, but there is a possibility you may not remember the details.  Please check with your family members regarding the conversation with the Surgeon.    We strongly recommend whoever is bringing you home be present for discharge instructions.  This will ensure a thorough understanding for your post op home care.      Thank you for your cooperation.  The Staff of [facility].                Bathing Instructions with Hibiclens     Shower the evening before and morning of your procedure with Hibiclens:   Wash your face with water and your regular face wash/soap   Apply Hibiclens directly on your skin or on a wet washcloth and wash gently. When showering: Move away from the shower stream when applying Hibiclens to avoid rinsing off too soon.   Rinse thoroughly with warm water   Do not dilute Hibiclens         Dry off as usual, do not use any deodorant,  powder, body lotions, perfume, after shave or cologne.

## 2019-10-15 NOTE — PROGRESS NOTES
HISTORY OF PRESENT ILLNESS:    Jm Newton is a 34 y.o. female, , No LMP recorded.,  presents for pre-op for RALH/BS.    Patient with history of recurrent dysplasia and dysmenorrhea.  She does not desire future fertility.      Biopsy & ECC EMEKA 1  3/17 Biopsy EMEKA 1, ECC neg   LEEP moderate dysplasia, ECC neg   Pap HGSIL, HPV+.   Colpo biopsy EMEKA 1, ECC negative     Patient with regular cycles, normal flow, but painful.     Hx HIV diagnosed .  Followed by Dr. Ramos.    Past Medical History:   Diagnosis Date    Asthma     HIV infection     dx     Hyperthyroidism in pregnancy, antepartum        Past Surgical History:   Procedure Laterality Date    CERVICAL CERCLAGE      emergent with twins    CERVICAL CERCLAGE N/A 2019    Procedure: CERCLAGE, CERVIX;  Surgeon: Obey Henry MD;  [facility];  Service: OB/GYN;  Laterality: N/A;     SECTION WITH TUBAL LIGATION N/A 6/15/2019    Procedure:  SECTION, WITH TUBAL LIGATION;  Surgeon: Madeline Walden MD;  [facility];  Service: OB/GYN;  Laterality: N/A;     SECTION, CLASSIC      Last section with classical extention, from [facility]    COLD KNIFE CONIZATION OF CERVIX N/A 2018    Procedure: CONE BIOPSY, CERVIX, USING COLD KNIFE;  Surgeon: Chantal Worrell MD;  Location: [facility];  Service: OB/GYN;  Laterality: N/A;    CONIZATION OF CERVIX USING LOOP ELECTROSURGICAL EXCISION PROCEDURE (LEEP) N/A 2018    Procedure: LEEP CONIZATION, CERVIX;  Surgeon: Chantal Worrell MD;  Location: [facility];  Service: OB/GYN;  Laterality: N/A;    DILATION AND CURETTAGE OF UTERUS         MEDICATIONS AND ALLERGIES:      Current Outpatient Medications:     atazanavir (REYATAZ) 300 MG Cap, 1 capsule with food, Disp: , Rfl:     darunavir ethanolate 150 mg Tab, Take 600 mg by mouth 2 (two) times daily. , Disp: , Rfl:     DESCOVY 200-25 mg Tab, , Disp: , Rfl: 3    emtricitabine-tenofovir 200-300  mg (TRUVADA) 200-300 mg Tab, 1 tablet, Disp: , Rfl:     emtricitabine-tenofovir 200-300 mg (TRUVADA) 200-300 mg Tab, , Disp: , Rfl:     ferrous sulfate (FEOSOL) 325 mg (65 mg iron) Tab tablet, Take 1 tablet (325 mg total) by mouth 2 (two) times daily., Disp: 30 tablet, Rfl: 3    fluticasone (FLONASE) 50 mcg/actuation nasal spray, 1 spray (50 mcg total) by Each Nare route 2 (two) times daily., Disp: 18.2 mL, Rfl: 3    ibuprofen (ADVIL,MOTRIN) 600 MG tablet, Take 1 tablet (600 mg total) by mouth every 6 (six) hours., Disp: 30 tablet, Rfl: 2    inhaler,assist device,lg mask (OPTICHAMBER HERBERT LG MASK MISC), U UTD BID, Disp: , Rfl:     metoclopramide HCl (REGLAN) 10 MG tablet, Take 1 tablet (10 mg total) by mouth 3 (three) times daily before meals., Disp: 60 tablet, Rfl: 2    omeprazole (PRILOSEC) 20 MG capsule, Take 1 capsule (20 mg total) by mouth once daily., Disp: 30 capsule, Rfl: 11    ondansetron (ZOFRAN) 4 MG tablet, , Disp: , Rfl:     OPTICHAMBER HERBERT LG MASK Spcr, U UTD BID, Disp: , Rfl: 3    oxyCODONE-acetaminophen (PERCOCET) 5-325 mg per tablet, Take 1 tablet by mouth every 4 (four) hours as needed., Disp: 20 tablet, Rfl: 0    prenatal 25/iron fum/folic/dha (PRENATAL-1 ORAL), Take by mouth., Disp: , Rfl:     PREZISTA 600 mg Tab, , Disp: , Rfl: 5    PROCHAMBER, , Disp: , Rfl:     promethazine (PHENERGAN) 25 MG tablet, Take 1 tablet (25 mg total) by mouth every 6 (six) hours as needed for Nausea., Disp: 30 tablet, Rfl: 6    promethazine (PHENERGAN) 25 MG tablet, , Disp: , Rfl:     ranitidine (ZANTAC) 150 MG tablet, TAKE 1 TABLET(150 MG) BY MOUTH TWICE DAILY, Disp: 180 tablet, Rfl: 1    ritonavir (NORVIR) 100 mg Tab tablet, , Disp: , Rfl: 6    ritonavir (NORVIR) 100 mg Tab tablet, , Disp: , Rfl:     sertraline (ZOLOFT) 50 MG tablet, Take 1 tablet (50 mg total) by mouth once daily., Disp: 30 tablet, Rfl: 2    sulfamethoxazole-trimethoprim 400-80mg (BACTRIM,SEPTRA) 400-80 mg per tablet, ,  Disp: , Rfl: 5    VENTOLIN HFA 90 mcg/actuation inhaler, , Disp: , Rfl: 5    levonorgestrel-ethinyl estradiol (AVIANE,ALESSE,LESSINA) 0.1-20 mg-mcg per tablet, Take 1 tablet by mouth once daily., Disp: 28 tablet, Rfl: 12    Current Facility-Administered Medications:     fluticasone 220 mcg/actuation inhaler 1 puff, 1 puff, Inhalation, BID, Thai Santana MD    Review of patient's allergies indicates:   Allergen Reactions    Azithromycin Edema       Family History   Problem Relation Age of Onset    Hypertension Maternal Grandmother     Diabetes Maternal Grandmother     Colon cancer Neg Hx     Ovarian cancer Neg Hx        Social History     Socioeconomic History    Marital status: Single     Spouse name: Not on file    Number of children: Not on file    Years of education: Not on file    Highest education level: Not on file   Occupational History    Not on file   Social Needs    Financial resource strain: Not on file    Food insecurity:     Worry: Not on file     Inability: Not on file    Transportation needs:     Medical: Not on file     Non-medical: Not on file   Tobacco Use    Smoking status: Never Smoker    Smokeless tobacco: Never Used   Substance and Sexual Activity    Alcohol use: Yes     Alcohol/week: 0.0 standard drinks     Frequency: Monthly or less     Comment: socially    Drug use: No    Sexual activity: Not Currently     Partners: Male     Birth control/protection: None   Lifestyle    Physical activity:     Days per week: Not on file     Minutes per session: Not on file    Stress: Not on file   Relationships    Social connections:     Talks on phone: Not on file     Gets together: Not on file     Attends Uatsdin service: Not on file     Active member of club or organization: Not on file     Attends meetings of clubs or organizations: Not on file     Relationship status: Not on file   Other Topics Concern    Not on file   Social History Narrative    Not on file        ROS:  GENERAL: No weight changes. No swelling. No fatigue. No fever.  CARDIOVASCULAR: No chest pain. No shortness of breath. No leg cramps.   NEUROLOGICAL: No headaches. No vision changes.  BREASTS: No pain. No lumps. No discharge.  ABDOMEN: No pain. No nausea. No vomiting. No diarrhea. No constipation.  REPRODUCTIVE: No abnormal bleeding.   VULVA: No pain. No lesions. No itching.  VAGINA: No relaxation. No itching. No odor. No discharge. No lesions.  URINARY: No incontinence. No nocturia. No frequency. No dysuria.    /76   Wt 98.6 kg (217 lb 6 oz)   Breastfeeding? No   BMI 34.05 kg/m²     PE:  APPEARANCE: Well nourished, well developed, in no acute distress.  ABDOMEN: Soft. No tenderness or masses. No hepatosplenomegaly. No hernias.  BREASTS, FUNDOSCOPIC, RECTAL DEFERRED  PELVIC: External female genitalia without lesions.  Female hair distribution. Adequate perineal body, Normal urethral meatus. Vagina moist and well rugated without lesions or discharge.  No significant cystocele or rectocele present. Cervix pink without lesions, discharge or tenderness. Uterus is normal size, regular, mobile and nontender. Adnexa without masses or tenderness.  EXTREMITIES: No edema      DIAGNOSIS & PLAN  1. HGSIL (high grade squamous intraepithelial lesion) on Pap smear of cervix  Place in Outpatient    Vital signs    [facility] to [place]    POCT glucose    Notify physician if BS > 180 for hysterectomy patients    Notify Physician/Vital Signs Parameters Urine output less than 0.5mL/kg/hr (with indwelling catheter) or 30 mL/hr (without indwelling catheter) or blood glucose greater than 200 mg/dL    Notify physician    Notify Physician - Potential Need of Opioid Reversal    Diet NPO    Chlorohexidine Gluconate Bath    Pregnancy, urine rapid    Full code    Place ARTURO hose    Place sequential compression device   2. EMEKA II (cervical intraepithelial neoplasia II)  Place in Outpatient    Vital signs    [facility] to [place]     POCT glucose    Notify physician if BS > 180 for hysterectomy patients    Notify Physician/Vital Signs Parameters Urine output less than 0.5mL/kg/hr (with indwelling catheter) or 30 mL/hr (without indwelling catheter) or blood glucose greater than 200 mg/dL    Notify physician    Notify Physician - Potential Need of Opioid Reversal    Diet NPO    Chlorohexidine Gluconate Bath    Pregnancy, urine rapid    Full code    Place ARTURO hose    Place sequential compression device   3. HIV infection, unspecified symptom status         COUNSELING:  Patient counseled for RALH/bilateral salpingectomy.  Counseled on risks/benefits of BSO.  Desires ovarian conservation unless abnormal at the time of surgery.  Counseled on operative risks of laparotomy, infection, bleeding, transfusion, organ injury and anesthesia complications.  She desires to proceed.

## 2019-10-15 NOTE — ANESTHESIA PREPROCEDURE EVALUATION
10/15/2019  Jm Newton is a 34 y.o., female.    Anesthesia Evaluation    I have reviewed the Patient Summary Reports.    I have reviewed the Nursing Notes.   I have reviewed the Medications.     Review of Systems  Anesthesia Hx:  No problems with previous Anesthesia    Social:  Social Alcohol Use, Non-Smoker    Hematology/Oncology:         -- Anemia: Hematology Comments: HIV Current/Recent Cancer. Oncology Comments: Cervical ca.     EENT/Dental:EENT/Dental Normal   Cardiovascular:  Cardiovascular Normal Exercise tolerance: good     Pulmonary:   Asthma asymptomatic    Renal/:  Renal/ Normal     Hepatic/GI:  Hepatic/GI Normal    Musculoskeletal:  Musculoskeletal Normal    Neurological:  Neurology Normal    Endocrine:   Hyperthyroidism    Psych:   Psychiatric History depression          Physical Exam  General:  Obesity    Airway/Jaw/Neck:  Airway Findings: Mouth Opening: Normal Tongue: Normal  General Airway Assessment: Adult, Good  Mallampati: I  TM Distance: Normal, at least 6 cm  Jaw/Neck Findings:  Neck ROM: Normal ROM      Dental:  Dental Findings: In tact        Mental Status:  Mental Status Findings:  Cooperative, Alert and Oriented         Anesthesia Plan  Type of Anesthesia, risks & benefits discussed:  Anesthesia Type:  general  Patient's Preference:   Intra-op Monitoring Plan: standard ASA monitors  Intra-op Monitoring Plan Comments:   Post Op Pain Control Plan: multimodal analgesia and per primary service following discharge from PACU  Post Op Pain Control Plan Comments:   Induction:    Beta Blocker:         Informed Consent: Patient understands risks and agrees with Anesthesia plan.  Questions answered. Anesthesia consent signed with patient.  ASA Score: 2     Day of Surgery Review of History & Physical:    H&P update referred to the surgeon.     Anesthesia Plan Notes: Labs  pending. HIV.    Day of surgery update: hct 31.8        Ready For Surgery From Anesthesia Perspective.

## 2019-10-15 NOTE — H&P (VIEW-ONLY)
HISTORY OF PRESENT ILLNESS:    Jm Newton is a 34 y.o. female, , No LMP recorded.,  presents for pre-op for RALH/BS.    Patient with history of recurrent dysplasia and dysmenorrhea.  She does not desire future fertility.      Biopsy & ECC EMEKA 1  3/17 Biopsy EMEKA 1, ECC neg   LEEP moderate dysplasia, ECC neg   Pap HGSIL, HPV+.   Colpo biopsy EMEKA 1, ECC negative     Patient with regular cycles, normal flow, but painful.     Hx HIV diagnosed .  Followed by Dr. Ramos.    Past Medical History:   Diagnosis Date    Asthma     HIV infection     dx     Hyperthyroidism in pregnancy, antepartum        Past Surgical History:   Procedure Laterality Date    CERVICAL CERCLAGE      emergent with twins    CERVICAL CERCLAGE N/A 2019    Procedure: CERCLAGE, CERVIX;  Surgeon: Obey Henry MD;  Location: [facility];  Service: OB/GYN;  Laterality: N/A;     SECTION WITH TUBAL LIGATION N/A 6/15/2019    Procedure:  SECTION, WITH TUBAL LIGATION;  Surgeon: Madeline Walden MD;  Location: [facility];  Service: OB/GYN;  Laterality: N/A;     SECTION, CLASSIC      Last section with classical extention, from [facility]    COLD KNIFE CONIZATION OF CERVIX N/A 2018    Procedure: CONE BIOPSY, CERVIX, USING COLD KNIFE;  Surgeon: Chantal Worrell MD;  Location: [facility];  Service: OB/GYN;  Laterality: N/A;    CONIZATION OF CERVIX USING LOOP ELECTROSURGICAL EXCISION PROCEDURE (LEEP) N/A 2018    Procedure: LEEP CONIZATION, CERVIX;  Surgeon: Chantal Worrell MD;  Location: [facility];  Service: OB/GYN;  Laterality: N/A;    DILATION AND CURETTAGE OF UTERUS         MEDICATIONS AND ALLERGIES:      Current Outpatient Medications:     atazanavir (REYATAZ) 300 MG Cap, 1 capsule with food, Disp: , Rfl:     darunavir ethanolate 150 mg Tab, Take 600 mg by mouth 2 (two) times daily. , Disp: , Rfl:     DESCOVY 200-25 mg Tab, , Disp: , Rfl: 3    emtricitabine-tenofovir 200-300  mg (TRUVADA) 200-300 mg Tab, 1 tablet, Disp: , Rfl:     emtricitabine-tenofovir 200-300 mg (TRUVADA) 200-300 mg Tab, , Disp: , Rfl:     ferrous sulfate (FEOSOL) 325 mg (65 mg iron) Tab tablet, Take 1 tablet (325 mg total) by mouth 2 (two) times daily., Disp: 30 tablet, Rfl: 3    fluticasone (FLONASE) 50 mcg/actuation nasal spray, 1 spray (50 mcg total) by Each Nare route 2 (two) times daily., Disp: 18.2 mL, Rfl: 3    ibuprofen (ADVIL,MOTRIN) 600 MG tablet, Take 1 tablet (600 mg total) by mouth every 6 (six) hours., Disp: 30 tablet, Rfl: 2    inhaler,assist device,lg mask (OPTICHAMBER HERBERT LG MASK MISC), U UTD BID, Disp: , Rfl:     metoclopramide HCl (REGLAN) 10 MG tablet, Take 1 tablet (10 mg total) by mouth 3 (three) times daily before meals., Disp: 60 tablet, Rfl: 2    omeprazole (PRILOSEC) 20 MG capsule, Take 1 capsule (20 mg total) by mouth once daily., Disp: 30 capsule, Rfl: 11    ondansetron (ZOFRAN) 4 MG tablet, , Disp: , Rfl:     OPTICHAMBER HERBERT LG MASK Spcr, U UTD BID, Disp: , Rfl: 3    oxyCODONE-acetaminophen (PERCOCET) 5-325 mg per tablet, Take 1 tablet by mouth every 4 (four) hours as needed., Disp: 20 tablet, Rfl: 0    prenatal 25/iron fum/folic/dha (PRENATAL-1 ORAL), Take by mouth., Disp: , Rfl:     PREZISTA 600 mg Tab, , Disp: , Rfl: 5    PROCHAMBER, , Disp: , Rfl:     promethazine (PHENERGAN) 25 MG tablet, Take 1 tablet (25 mg total) by mouth every 6 (six) hours as needed for Nausea., Disp: 30 tablet, Rfl: 6    promethazine (PHENERGAN) 25 MG tablet, , Disp: , Rfl:     ranitidine (ZANTAC) 150 MG tablet, TAKE 1 TABLET(150 MG) BY MOUTH TWICE DAILY, Disp: 180 tablet, Rfl: 1    ritonavir (NORVIR) 100 mg Tab tablet, , Disp: , Rfl: 6    ritonavir (NORVIR) 100 mg Tab tablet, , Disp: , Rfl:     sertraline (ZOLOFT) 50 MG tablet, Take 1 tablet (50 mg total) by mouth once daily., Disp: 30 tablet, Rfl: 2    sulfamethoxazole-trimethoprim 400-80mg (BACTRIM,SEPTRA) 400-80 mg per tablet, ,  Disp: , Rfl: 5    VENTOLIN HFA 90 mcg/actuation inhaler, , Disp: , Rfl: 5    levonorgestrel-ethinyl estradiol (AVIANE,ALESSE,LESSINA) 0.1-20 mg-mcg per tablet, Take 1 tablet by mouth once daily., Disp: 28 tablet, Rfl: 12    Current Facility-Administered Medications:     fluticasone 220 mcg/actuation inhaler 1 puff, 1 puff, Inhalation, BID, Thai Santana MD    Review of patient's allergies indicates:   Allergen Reactions    Azithromycin Edema       Family History   Problem Relation Age of Onset    Hypertension Maternal Grandmother     Diabetes Maternal Grandmother     Colon cancer Neg Hx     Ovarian cancer Neg Hx        Social History     Socioeconomic History    Marital status: Single     Spouse name: Not on file    Number of children: Not on file    Years of education: Not on file    Highest education level: Not on file   Occupational History    Not on file   Social Needs    Financial resource strain: Not on file    Food insecurity:     Worry: Not on file     Inability: Not on file    Transportation needs:     Medical: Not on file     Non-medical: Not on file   Tobacco Use    Smoking status: Never Smoker    Smokeless tobacco: Never Used   Substance and Sexual Activity    Alcohol use: Yes     Alcohol/week: 0.0 standard drinks     Frequency: Monthly or less     Comment: socially    Drug use: No    Sexual activity: Not Currently     Partners: Male     Birth control/protection: None   Lifestyle    Physical activity:     Days per week: Not on file     Minutes per session: Not on file    Stress: Not on file   Relationships    Social connections:     Talks on phone: Not on file     Gets together: Not on file     Attends Congregation service: Not on file     Active member of club or organization: Not on file     Attends meetings of clubs or organizations: Not on file     Relationship status: Not on file   Other Topics Concern    Not on file   Social History Narrative    Not on file        ROS:  GENERAL: No weight changes. No swelling. No fatigue. No fever.  CARDIOVASCULAR: No chest pain. No shortness of breath. No leg cramps.   NEUROLOGICAL: No headaches. No vision changes.  BREASTS: No pain. No lumps. No discharge.  ABDOMEN: No pain. No nausea. No vomiting. No diarrhea. No constipation.  REPRODUCTIVE: No abnormal bleeding.   VULVA: No pain. No lesions. No itching.  VAGINA: No relaxation. No itching. No odor. No discharge. No lesions.  URINARY: No incontinence. No nocturia. No frequency. No dysuria.    /76   Wt 98.6 kg (217 lb 6 oz)   Breastfeeding? No   BMI 34.05 kg/m²     PE:  APPEARANCE: Well nourished, well developed, in no acute distress.  ABDOMEN: Soft. No tenderness or masses. No hepatosplenomegaly. No hernias.  BREASTS, FUNDOSCOPIC, RECTAL DEFERRED  PELVIC: External female genitalia without lesions.  Female hair distribution. Adequate perineal body, Normal urethral meatus. Vagina moist and well rugated without lesions or discharge.  No significant cystocele or rectocele present. Cervix pink without lesions, discharge or tenderness. Uterus is normal size, regular, mobile and nontender. Adnexa without masses or tenderness.  EXTREMITIES: No edema      DIAGNOSIS & PLAN  1. HGSIL (high grade squamous intraepithelial lesion) on Pap smear of cervix  Place in Outpatient    Vital signs    Carl to Stanton    POCT glucose    Notify physician if BS > 180 for hysterectomy patients    Notify Physician/Vital Signs Parameters Urine output less than 0.5mL/kg/hr (with indwelling catheter) or 30 mL/hr (without indwelling catheter) or blood glucose greater than 200 mg/dL    Notify physician    Notify Physician - Potential Need of Opioid Reversal    Diet NPO    Chlorohexidine Gluconate Bath    Pregnancy, urine rapid    Full code    Place ARTURO hose    Place sequential compression device   2. EMEKA II (cervical intraepithelial neoplasia II)  Place in Outpatient    Vital signs    Carl to Stanton     POCT glucose    Notify physician if BS > 180 for hysterectomy patients    Notify Physician/Vital Signs Parameters Urine output less than 0.5mL/kg/hr (with indwelling catheter) or 30 mL/hr (without indwelling catheter) or blood glucose greater than 200 mg/dL    Notify physician    Notify Physician - Potential Need of Opioid Reversal    Diet NPO    Chlorohexidine Gluconate Bath    Pregnancy, urine rapid    Full code    Place ARTURO hose    Place sequential compression device   3. HIV infection, unspecified symptom status         COUNSELING:  Patient counseled for RALH/bilateral salpingectomy.  Counseled on risks/benefits of BSO.  Desires ovarian conservation unless abnormal at the time of surgery.  Counseled on operative risks of laparotomy, infection, bleeding, transfusion, organ injury and anesthesia complications.  She desires to proceed.

## 2019-10-22 ENCOUNTER — ANESTHESIA (OUTPATIENT)
Dept: SURGERY | Facility: OTHER | Age: 34
End: 2019-10-22
Payer: MEDICARE

## 2019-10-22 ENCOUNTER — HOSPITAL ENCOUNTER (OUTPATIENT)
Facility: OTHER | Age: 34
Discharge: HOME | End: 2019-10-23
Attending: OBSTETRICS & GYNECOLOGY | Admitting: OBSTETRICS & GYNECOLOGY
Payer: MEDICARE

## 2019-10-22 DIAGNOSIS — N87.1 CIN II (CERVICAL INTRAEPITHELIAL NEOPLASIA II): ICD-10-CM

## 2019-10-22 DIAGNOSIS — Z90.710 S/P LAPAROSCOPIC HYSTERECTOMY: Primary | ICD-10-CM

## 2019-10-22 DIAGNOSIS — R87.613 HGSIL (HIGH GRADE SQUAMOUS INTRAEPITHELIAL LESION) ON PAP SMEAR OF CERVIX: ICD-10-CM

## 2019-10-22 LAB
B-HCG UR QL: NEGATIVE
CTP QC/QA: YES
POCT GLUCOSE: 89 MG/DL (ref 70–110)

## 2019-10-22 PROCEDURE — 63600175 PHARM REV CODE 636 W HCPCS: Mod: HCWC | Performed by: NURSE ANESTHETIST, CERTIFIED REGISTERED

## 2019-10-22 PROCEDURE — 94761 N-INVAS EAR/PLS OXIMETRY MLT: CPT

## 2019-10-22 PROCEDURE — 63600175 PHARM REV CODE 636 W HCPCS: Mod: HCWC | Performed by: STUDENT IN AN ORGANIZED HEALTH CARE EDUCATION/TRAINING PROGRAM

## 2019-10-22 PROCEDURE — 25000003 PHARM REV CODE 250: Mod: HCWC | Performed by: SPECIALIST

## 2019-10-22 PROCEDURE — 36000713 HC OR TIME LEV V EA ADD 15 MIN: Performed by: OBSTETRICS & GYNECOLOGY

## 2019-10-22 PROCEDURE — 00840 ANES IPER PX LOWER ABD NOS: CPT | Performed by: OBSTETRICS & GYNECOLOGY

## 2019-10-22 PROCEDURE — 36000712 HC OR TIME LEV V 1ST 15 MIN: Performed by: OBSTETRICS & GYNECOLOGY

## 2019-10-22 PROCEDURE — 81025 URINE PREGNANCY TEST: CPT | Mod: HCWC | Performed by: SPECIALIST

## 2019-10-22 PROCEDURE — 37000009 HC ANESTHESIA EA ADD 15 MINS: Performed by: OBSTETRICS & GYNECOLOGY

## 2019-10-22 PROCEDURE — 58571 TLH W/T/O 250 G OR LESS: CPT | Mod: ,,, | Performed by: OBSTETRICS & GYNECOLOGY

## 2019-10-22 PROCEDURE — 25000003 PHARM REV CODE 250: Mod: HCWC | Performed by: OBSTETRICS & GYNECOLOGY

## 2019-10-22 PROCEDURE — 25000003 PHARM REV CODE 250: Mod: HCWC | Performed by: STUDENT IN AN ORGANIZED HEALTH CARE EDUCATION/TRAINING PROGRAM

## 2019-10-22 PROCEDURE — 58571 PR LAPAROSCOPY W TOT HYSTERECTUTERUS <=250 GRAM  W TUBE/OVARY: ICD-10-PCS | Mod: ,,, | Performed by: OBSTETRICS & GYNECOLOGY

## 2019-10-22 PROCEDURE — 25000003 PHARM REV CODE 250: Mod: HCWC | Performed by: ANESTHESIOLOGY

## 2019-10-22 PROCEDURE — 27201423 OPTIME MED/SURG SUP & DEVICES STERILE SUPPLY: Performed by: OBSTETRICS & GYNECOLOGY

## 2019-10-22 PROCEDURE — 88307 TISSUE SPECIMEN TO PATHOLOGY - SURGERY: ICD-10-PCS | Mod: 26,,, | Performed by: PATHOLOGY

## 2019-10-22 PROCEDURE — P9045 ALBUMIN (HUMAN), 5%, 250 ML: HCPCS | Mod: JG,HCWC | Performed by: NURSE ANESTHETIST, CERTIFIED REGISTERED

## 2019-10-22 PROCEDURE — 99900035 HC TECH TIME PER 15 MIN (STAT)

## 2019-10-22 PROCEDURE — 71000033 HC RECOVERY, INTIAL HOUR: Performed by: OBSTETRICS & GYNECOLOGY

## 2019-10-22 PROCEDURE — 71000039 HC RECOVERY, EACH ADD'L HOUR: Performed by: OBSTETRICS & GYNECOLOGY

## 2019-10-22 PROCEDURE — 88307 TISSUE EXAM BY PATHOLOGIST: CPT | Mod: 26,,, | Performed by: PATHOLOGY

## 2019-10-22 PROCEDURE — 63600175 PHARM REV CODE 636 W HCPCS: Mod: HCWC | Performed by: ANESTHESIOLOGY

## 2019-10-22 PROCEDURE — 63600175 PHARM REV CODE 636 W HCPCS: Mod: HCWC | Performed by: SPECIALIST

## 2019-10-22 PROCEDURE — 88307 TISSUE EXAM BY PATHOLOGIST: CPT | Performed by: PATHOLOGY

## 2019-10-22 PROCEDURE — 25000003 PHARM REV CODE 250: Mod: HCWC | Performed by: NURSE ANESTHETIST, CERTIFIED REGISTERED

## 2019-10-22 PROCEDURE — 37000008 HC ANESTHESIA 1ST 15 MINUTES: Performed by: OBSTETRICS & GYNECOLOGY

## 2019-10-22 PROCEDURE — 63600175 PHARM REV CODE 636 W HCPCS: Mod: HCWC | Performed by: OBSTETRICS & GYNECOLOGY

## 2019-10-22 RX ORDER — CELECOXIB 200 MG/1
400 CAPSULE ORAL
Status: COMPLETED | OUTPATIENT
Start: 2019-10-22 | End: 2019-10-22

## 2019-10-22 RX ORDER — ALBUMIN HUMAN 50 G/1000ML
SOLUTION INTRAVENOUS CONTINUOUS PRN
Status: DISCONTINUED | OUTPATIENT
Start: 2019-10-22 | End: 2019-10-22

## 2019-10-22 RX ORDER — OXYCODONE HYDROCHLORIDE 5 MG/1
5 TABLET ORAL
Status: DISCONTINUED | OUTPATIENT
Start: 2019-10-22 | End: 2019-10-22 | Stop reason: HOSPADM

## 2019-10-22 RX ORDER — AMOXICILLIN 250 MG
1 CAPSULE ORAL 2 TIMES DAILY
Status: DISCONTINUED | OUTPATIENT
Start: 2019-10-22 | End: 2019-10-23 | Stop reason: HOSPADM

## 2019-10-22 RX ORDER — RITONAVIR 100 MG/1
100 TABLET ORAL 2 TIMES DAILY WITH MEALS
Status: DISCONTINUED | OUTPATIENT
Start: 2019-10-22 | End: 2019-10-23 | Stop reason: HOSPADM

## 2019-10-22 RX ORDER — FENTANYL CITRATE 50 UG/ML
INJECTION, SOLUTION INTRAMUSCULAR; INTRAVENOUS
Status: DISCONTINUED | OUTPATIENT
Start: 2019-10-22 | End: 2019-10-22

## 2019-10-22 RX ORDER — ROCURONIUM BROMIDE 10 MG/ML
INJECTION, SOLUTION INTRAVENOUS
Status: DISCONTINUED | OUTPATIENT
Start: 2019-10-22 | End: 2019-10-22

## 2019-10-22 RX ORDER — DIPHENHYDRAMINE HYDROCHLORIDE 50 MG/ML
12.5 INJECTION INTRAMUSCULAR; INTRAVENOUS EVERY 30 MIN PRN
Status: DISCONTINUED | OUTPATIENT
Start: 2019-10-22 | End: 2019-10-22 | Stop reason: HOSPADM

## 2019-10-22 RX ORDER — BISACODYL 10 MG
10 SUPPOSITORY, RECTAL RECTAL DAILY PRN
Status: DISCONTINUED | OUTPATIENT
Start: 2019-10-22 | End: 2019-10-23 | Stop reason: HOSPADM

## 2019-10-22 RX ORDER — ONDANSETRON 8 MG/1
8 TABLET, ORALLY DISINTEGRATING ORAL EVERY 8 HOURS PRN
Status: DISCONTINUED | OUTPATIENT
Start: 2019-10-22 | End: 2019-10-23 | Stop reason: HOSPADM

## 2019-10-22 RX ORDER — SODIUM CHLORIDE 9 MG/ML
INJECTION, SOLUTION INTRAVENOUS CONTINUOUS
Status: DISCONTINUED | OUTPATIENT
Start: 2019-10-22 | End: 2019-10-23 | Stop reason: HOSPADM

## 2019-10-22 RX ORDER — FAMOTIDINE 20 MG/1
20 TABLET, FILM COATED ORAL 2 TIMES DAILY
Status: DISCONTINUED | OUTPATIENT
Start: 2019-10-22 | End: 2019-10-23 | Stop reason: HOSPADM

## 2019-10-22 RX ORDER — HYDROCODONE BITARTRATE AND ACETAMINOPHEN 10; 325 MG/1; MG/1
1 TABLET ORAL EVERY 4 HOURS PRN
Status: DISCONTINUED | OUTPATIENT
Start: 2019-10-22 | End: 2019-10-23 | Stop reason: HOSPADM

## 2019-10-22 RX ORDER — DIPHENHYDRAMINE HCL 25 MG
25 CAPSULE ORAL EVERY 4 HOURS PRN
Status: DISCONTINUED | OUTPATIENT
Start: 2019-10-22 | End: 2019-10-23 | Stop reason: HOSPADM

## 2019-10-22 RX ORDER — ALBUTEROL SULFATE 90 UG/1
2 AEROSOL, METERED RESPIRATORY (INHALATION) EVERY 6 HOURS PRN
Status: DISCONTINUED | OUTPATIENT
Start: 2019-10-22 | End: 2019-10-23 | Stop reason: HOSPADM

## 2019-10-22 RX ORDER — DARUNAVIR 600 MG/1
600 TABLET, FILM COATED ORAL 2 TIMES DAILY WITH MEALS
Status: DISCONTINUED | OUTPATIENT
Start: 2019-10-22 | End: 2019-10-23 | Stop reason: HOSPADM

## 2019-10-22 RX ORDER — NEOSTIGMINE METHYLSULFATE 1 MG/ML
INJECTION, SOLUTION INTRAVENOUS
Status: DISCONTINUED | OUTPATIENT
Start: 2019-10-22 | End: 2019-10-22

## 2019-10-22 RX ORDER — MEPERIDINE HYDROCHLORIDE 25 MG/ML
12.5 INJECTION INTRAMUSCULAR; INTRAVENOUS; SUBCUTANEOUS ONCE AS NEEDED
Status: DISCONTINUED | OUTPATIENT
Start: 2019-10-22 | End: 2019-10-22 | Stop reason: HOSPADM

## 2019-10-22 RX ORDER — SODIUM CHLORIDE 0.9 % (FLUSH) 0.9 %
3 SYRINGE (ML) INJECTION
Status: DISCONTINUED | OUTPATIENT
Start: 2019-10-22 | End: 2019-10-22

## 2019-10-22 RX ORDER — LIDOCAINE HCL/PF 100 MG/5ML
SYRINGE (ML) INTRAVENOUS
Status: DISCONTINUED | OUTPATIENT
Start: 2019-10-22 | End: 2019-10-22

## 2019-10-22 RX ORDER — SODIUM CHLORIDE, SODIUM LACTATE, POTASSIUM CHLORIDE, CALCIUM CHLORIDE 600; 310; 30; 20 MG/100ML; MG/100ML; MG/100ML; MG/100ML
INJECTION, SOLUTION INTRAVENOUS CONTINUOUS
Status: DISCONTINUED | OUTPATIENT
Start: 2019-10-22 | End: 2019-10-22

## 2019-10-22 RX ORDER — MUPIROCIN 20 MG/G
1 OINTMENT TOPICAL 2 TIMES DAILY
Status: DISCONTINUED | OUTPATIENT
Start: 2019-10-22 | End: 2019-10-23 | Stop reason: HOSPADM

## 2019-10-22 RX ORDER — PREGABALIN 75 MG/1
75 CAPSULE ORAL
Status: COMPLETED | OUTPATIENT
Start: 2019-10-22 | End: 2019-10-22

## 2019-10-22 RX ORDER — PROPOFOL 10 MG/ML
VIAL (ML) INTRAVENOUS
Status: DISCONTINUED | OUTPATIENT
Start: 2019-10-22 | End: 2019-10-22

## 2019-10-22 RX ORDER — CEFAZOLIN SODIUM 2 G/50ML
2 SOLUTION INTRAVENOUS
Status: COMPLETED | OUTPATIENT
Start: 2019-10-22 | End: 2019-10-22

## 2019-10-22 RX ORDER — MUPIROCIN 20 MG/G
OINTMENT TOPICAL
Status: DISCONTINUED | OUTPATIENT
Start: 2019-10-22 | End: 2019-10-22 | Stop reason: HOSPADM

## 2019-10-22 RX ORDER — HYDROMORPHONE HYDROCHLORIDE 2 MG/ML
0.4 INJECTION, SOLUTION INTRAMUSCULAR; INTRAVENOUS; SUBCUTANEOUS EVERY 5 MIN PRN
Status: DISCONTINUED | OUTPATIENT
Start: 2019-10-22 | End: 2019-10-22 | Stop reason: HOSPADM

## 2019-10-22 RX ORDER — IBUPROFEN 600 MG/1
600 TABLET ORAL EVERY 6 HOURS
Status: DISCONTINUED | OUTPATIENT
Start: 2019-10-23 | End: 2019-10-23 | Stop reason: HOSPADM

## 2019-10-22 RX ORDER — DIPHENHYDRAMINE HYDROCHLORIDE 50 MG/ML
25 INJECTION INTRAMUSCULAR; INTRAVENOUS EVERY 4 HOURS PRN
Status: DISCONTINUED | OUTPATIENT
Start: 2019-10-22 | End: 2019-10-23 | Stop reason: HOSPADM

## 2019-10-22 RX ORDER — LIDOCAINE HYDROCHLORIDE 10 MG/ML
0.5 INJECTION, SOLUTION EPIDURAL; INFILTRATION; INTRACAUDAL; PERINEURAL ONCE
Status: DISCONTINUED | OUTPATIENT
Start: 2019-10-22 | End: 2019-10-22 | Stop reason: HOSPADM

## 2019-10-22 RX ORDER — FAMOTIDINE 20 MG/1
20 TABLET, FILM COATED ORAL
Status: COMPLETED | OUTPATIENT
Start: 2019-10-22 | End: 2019-10-22

## 2019-10-22 RX ORDER — HYDROMORPHONE HYDROCHLORIDE 2 MG/ML
INJECTION, SOLUTION INTRAMUSCULAR; INTRAVENOUS; SUBCUTANEOUS
Status: DISCONTINUED | OUTPATIENT
Start: 2019-10-22 | End: 2019-10-22

## 2019-10-22 RX ORDER — MIDAZOLAM HYDROCHLORIDE 1 MG/ML
INJECTION INTRAMUSCULAR; INTRAVENOUS
Status: DISCONTINUED | OUTPATIENT
Start: 2019-10-22 | End: 2019-10-22

## 2019-10-22 RX ORDER — ACETAMINOPHEN 500 MG
1000 TABLET ORAL
Status: COMPLETED | OUTPATIENT
Start: 2019-10-22 | End: 2019-10-22

## 2019-10-22 RX ORDER — ONDANSETRON 2 MG/ML
4 INJECTION INTRAMUSCULAR; INTRAVENOUS DAILY PRN
Status: DISCONTINUED | OUTPATIENT
Start: 2019-10-22 | End: 2019-10-22 | Stop reason: HOSPADM

## 2019-10-22 RX ORDER — KETOROLAC TROMETHAMINE 30 MG/ML
30 INJECTION, SOLUTION INTRAMUSCULAR; INTRAVENOUS EVERY 6 HOURS
Status: COMPLETED | OUTPATIENT
Start: 2019-10-22 | End: 2019-10-23

## 2019-10-22 RX ORDER — HYDROCODONE BITARTRATE AND ACETAMINOPHEN 5; 325 MG/1; MG/1
1 TABLET ORAL EVERY 4 HOURS PRN
Status: DISCONTINUED | OUTPATIENT
Start: 2019-10-22 | End: 2019-10-23 | Stop reason: HOSPADM

## 2019-10-22 RX ORDER — GLYCOPYRROLATE 0.2 MG/ML
INJECTION INTRAMUSCULAR; INTRAVENOUS
Status: DISCONTINUED | OUTPATIENT
Start: 2019-10-22 | End: 2019-10-22

## 2019-10-22 RX ORDER — DEXAMETHASONE SODIUM PHOSPHATE 4 MG/ML
INJECTION, SOLUTION INTRA-ARTICULAR; INTRALESIONAL; INTRAMUSCULAR; INTRAVENOUS; SOFT TISSUE
Status: DISCONTINUED | OUTPATIENT
Start: 2019-10-22 | End: 2019-10-22

## 2019-10-22 RX ADMIN — FENTANYL CITRATE 50 MCG: 50 INJECTION, SOLUTION INTRAMUSCULAR; INTRAVENOUS at 08:10

## 2019-10-22 RX ADMIN — FENTANYL CITRATE 100 MCG: 50 INJECTION, SOLUTION INTRAMUSCULAR; INTRAVENOUS at 07:10

## 2019-10-22 RX ADMIN — KETOROLAC TROMETHAMINE 30 MG: 30 INJECTION, SOLUTION INTRAMUSCULAR at 05:10

## 2019-10-22 RX ADMIN — ROCURONIUM BROMIDE 10 MG: 10 INJECTION, SOLUTION INTRAVENOUS at 08:10

## 2019-10-22 RX ADMIN — HYDROMORPHONE HYDROCHLORIDE 0.4 MG: 2 INJECTION, SOLUTION INTRAMUSCULAR; INTRAVENOUS; SUBCUTANEOUS at 11:10

## 2019-10-22 RX ADMIN — OXYCODONE HYDROCHLORIDE 5 MG: 5 TABLET ORAL at 11:10

## 2019-10-22 RX ADMIN — ROCURONIUM BROMIDE 50 MG: 10 INJECTION, SOLUTION INTRAVENOUS at 07:10

## 2019-10-22 RX ADMIN — ACETAMINOPHEN 1000 MG: 500 TABLET, FILM COATED ORAL at 07:10

## 2019-10-22 RX ADMIN — SODIUM CHLORIDE: 0.9 INJECTION, SOLUTION INTRAVENOUS at 10:10

## 2019-10-22 RX ADMIN — ROCURONIUM BROMIDE 10 MG: 10 INJECTION, SOLUTION INTRAVENOUS at 09:10

## 2019-10-22 RX ADMIN — FAMOTIDINE 20 MG: 20 TABLET ORAL at 07:10

## 2019-10-22 RX ADMIN — MIDAZOLAM HYDROCHLORIDE 2 MG: 1 INJECTION, SOLUTION INTRAMUSCULAR; INTRAVENOUS at 07:10

## 2019-10-22 RX ADMIN — LIGHT MINERAL OIL, WHITE PETROLATUM 0.2 ML: 150; 850 OINTMENT OPHTHALMIC at 07:10

## 2019-10-22 RX ADMIN — NEOSTIGMINE METHYLSULFATE 5 MG: 1 INJECTION INTRAVENOUS at 10:10

## 2019-10-22 RX ADMIN — ALBUMIN (HUMAN): 12.5 SOLUTION INTRAVENOUS at 09:10

## 2019-10-22 RX ADMIN — LIDOCAINE HYDROCHLORIDE 75 MG: 20 INJECTION, SOLUTION INTRAVENOUS at 07:10

## 2019-10-22 RX ADMIN — CEFAZOLIN SODIUM 2 G: 2 SOLUTION INTRAVENOUS at 08:10

## 2019-10-22 RX ADMIN — SODIUM CHLORIDE, SODIUM LACTATE, POTASSIUM CHLORIDE, AND CALCIUM CHLORIDE: 600; 310; 30; 20 INJECTION, SOLUTION INTRAVENOUS at 07:10

## 2019-10-22 RX ADMIN — PROPOFOL 50 MG: 10 INJECTION, EMULSION INTRAVENOUS at 07:10

## 2019-10-22 RX ADMIN — PROMETHAZINE HYDROCHLORIDE 12.5 MG: 25 INJECTION INTRAMUSCULAR; INTRAVENOUS at 08:10

## 2019-10-22 RX ADMIN — ONDANSETRON 8 MG: 8 TABLET, ORALLY DISINTEGRATING ORAL at 03:10

## 2019-10-22 RX ADMIN — SENNOSIDES,DOCUSATE SODIUM 1 TABLET: 8.6; 5 TABLET, FILM COATED ORAL at 10:10

## 2019-10-22 RX ADMIN — HYDROMORPHONE HYDROCHLORIDE 0.4 MG: 2 INJECTION INTRAMUSCULAR; INTRAVENOUS; SUBCUTANEOUS at 12:10

## 2019-10-22 RX ADMIN — KETOROLAC TROMETHAMINE 30 MG: 30 INJECTION, SOLUTION INTRAMUSCULAR at 12:10

## 2019-10-22 RX ADMIN — MUPIROCIN: 20 OINTMENT TOPICAL at 07:10

## 2019-10-22 RX ADMIN — DEXAMETHASONE SODIUM PHOSPHATE 8 MG: 4 INJECTION, SOLUTION INTRAMUSCULAR; INTRAVENOUS at 08:10

## 2019-10-22 RX ADMIN — HYDROMORPHONE HYDROCHLORIDE 0.4 MG: 2 INJECTION INTRAMUSCULAR; INTRAVENOUS; SUBCUTANEOUS at 11:10

## 2019-10-22 RX ADMIN — SODIUM CHLORIDE, SODIUM LACTATE, POTASSIUM CHLORIDE, AND CALCIUM CHLORIDE: 600; 310; 30; 20 INJECTION, SOLUTION INTRAVENOUS at 09:10

## 2019-10-22 RX ADMIN — FAMOTIDINE 20 MG: 20 TABLET ORAL at 10:10

## 2019-10-22 RX ADMIN — DARUNAVIR 600 MG: 600 TABLET, FILM COATED ORAL at 05:10

## 2019-10-22 RX ADMIN — PREGABALIN 75 MG: 75 CAPSULE ORAL at 07:10

## 2019-10-22 RX ADMIN — RITONAVIR 100 MG: 100 TABLET, FILM COATED ORAL at 05:10

## 2019-10-22 RX ADMIN — MUPIROCIN 1 G: 20 OINTMENT TOPICAL at 10:10

## 2019-10-22 RX ADMIN — FENTANYL CITRATE 50 MCG: 50 INJECTION, SOLUTION INTRAMUSCULAR; INTRAVENOUS at 09:10

## 2019-10-22 RX ADMIN — CELECOXIB 400 MG: 200 CAPSULE ORAL at 07:10

## 2019-10-22 RX ADMIN — HYDROMORPHONE HYDROCHLORIDE 0.8 MG: 2 INJECTION, SOLUTION INTRAMUSCULAR; INTRAVENOUS; SUBCUTANEOUS at 10:10

## 2019-10-22 RX ADMIN — PROPOFOL 150 MG: 10 INJECTION, EMULSION INTRAVENOUS at 07:10

## 2019-10-22 RX ADMIN — SODIUM CHLORIDE: 0.9 INJECTION, SOLUTION INTRAVENOUS at 01:10

## 2019-10-22 RX ADMIN — GLYCOPYRROLATE 0.8 MG: 0.2 INJECTION, SOLUTION INTRAMUSCULAR; INTRAVENOUS at 10:10

## 2019-10-22 NOTE — TRANSFER OF CARE
"Anesthesia Transfer of Care Note    Patient: Jm Newton    Procedure(s) Performed: Procedure(s) (LRB):  XI ROBOTIC HYSTERECTOMY (N/A)  CYSTOSCOPY (N/A)  XI ROBOTIC SALPINGECTOMY (Bilateral)    Patient location: PACU    Anesthesia Type: general    Transport from OR: Transported from OR on 2-3 L/min O2 by NC with adequate spontaneous ventilation    Post pain: adequate analgesia    Post assessment: no apparent anesthetic complications    Post vital signs: stable    Level of consciousness: awake, alert and oriented    Nausea/Vomiting: no nausea/vomiting    Complications: none    Transfer of care protocol was followed      Last vitals:   Visit Vitals  /67 (BP Location: Left arm, Patient Position: Lying)   Pulse 75   Temp 37 °C (98.6 °F) (Oral)   Resp 18   Ht 5' 7" (1.702 m)   Wt 98 kg (216 lb)   LMP 10/02/2019   SpO2 99%   Breastfeeding? No   BMI 33.83 kg/m²     "

## 2019-10-22 NOTE — NURSING
Pt arrived to unit via strecther, asllep arousable, NAD noted. Pt skin warm and dry to touch, respirations even and unlabored. Carl catheter intact draining clear yellow urine. Bed low and locked, call bell within reach. Thien continue to monitor.

## 2019-10-22 NOTE — ANESTHESIA POSTPROCEDURE EVALUATION
Anesthesia Post Evaluation    Patient: Jm Newton    Procedure(s) Performed: Procedure(s) (LRB):  XI ROBOTIC HYSTERECTOMY (N/A)  CYSTOSCOPY (N/A)  XI ROBOTIC SALPINGECTOMY (Bilateral)    Final Anesthesia Type: general  Patient location during evaluation: PACU  Patient participation: Yes- Able to Participate  Level of consciousness: awake and alert and oriented  Post-procedure vital signs: reviewed and stable  Pain management: adequate  Airway patency: patent  PONV status at discharge: No PONV  Anesthetic complications: no      Cardiovascular status: blood pressure returned to baseline and hemodynamically stable  Respiratory status: unassisted, spontaneous ventilation and room air  Hydration status: euvolemic  Follow-up not needed.          Vitals Value Taken Time   /61 10/22/2019 12:21 PM   Temp 37.1 °C (98.8 °F) 10/22/2019 11:18 AM   Pulse 67 10/22/2019 12:32 PM   Resp 18 10/22/2019 12:20 PM   SpO2 97 % 10/22/2019 12:32 PM   Vitals shown include unvalidated device data.      No case tracking events are documented in the log.      Pain/Katherine Score: Pain Rating Prior to Med Admin: 0 (10/22/2019 12:26 PM)  Pain Rating Post Med Admin: 0 (Denies pain) (10/22/2019 12:15 PM)  Katherine Score: 10 (10/22/2019 12:20 PM)

## 2019-10-22 NOTE — BRIEF OP NOTE
Brief Operative Note       Surgery Date: 10/22/2019     Surgeon(s) and Role:     * Georgia Howard MD - Primary     * Hung Mahoney MD - Resident - Assisting     * Hung Mahoney MD - Resident    Pre-op Diagnosis:  HGSIL (high grade squamous intraepithelial lesion) on Pap smear of cervix [R87.613]  HIV (human immunodeficiency virus infection) [B20]    Post-op Diagnosis: Post-Op Diagnosis Codes:     * HGSIL (high grade squamous intraepithelial lesion) on Pap smear of cervix [R87.613]     * HIV (human immunodeficiency virus infection) [B20]    Procedure(s) (LRB):  XI ROBOTIC HYSTERECTOMY (N/A)  CYSTOSCOPY (N/A)  XI ROBOTIC SALPINGECTOMY (Bilateral)    Anesthesia: General    Findings:  1.  Overall normal appearing uterus, with normal bilateral ovaries and fallopian tubes.  2.  Omental adhesions anteriorly starting below the umbilicus and extending towards the pelvis to the left anterior portion of the uterus.  3.  Extensive scarring of the bladder to the anterior portion of the uterus requiring back filling.  4.  Posthysterectomy cystoscopy shows no evidence of bladder injury and bilateral ureteral efflux.    Estimated Blood Loss: 200 mL           Specimens (From admission, onward)     Start     Ordered    10/22/19 1022  Specimen to Pathology - Surgery  Once      10/22/19 1022              Implants: * No implants in log *           Disposition: PACU - hemodynamically stable.           Condition: Good    Attestation:  I was present and scrubbed for the entire procedure.

## 2019-10-22 NOTE — INTERVAL H&P NOTE
The patient has been examined and the H&P has been reviewed:    I concur with the findings and no changes have occurred since H&P was written.    Anesthesia/Surgery risks, benefits and alternative options discussed and understood by patient/family.          Active Hospital Problems    Diagnosis  POA    HGSIL (high grade squamous intraepithelial lesion) on Pap smear of cervix [R87.613]  Yes      Resolved Hospital Problems   No resolved problems to display.

## 2019-10-23 VITALS
WEIGHT: 216 LBS | HEART RATE: 78 BPM | DIASTOLIC BLOOD PRESSURE: 84 MMHG | HEIGHT: 67 IN | BODY MASS INDEX: 33.9 KG/M2 | TEMPERATURE: 98 F | RESPIRATION RATE: 20 BRPM | SYSTOLIC BLOOD PRESSURE: 160 MMHG | OXYGEN SATURATION: 97 %

## 2019-10-23 LAB
BASOPHILS # BLD AUTO: 0 K/UL (ref 0–0.2)
BASOPHILS NFR BLD: 0 % (ref 0–1.9)
DIFFERENTIAL METHOD: ABNORMAL
EOSINOPHIL # BLD AUTO: 0 K/UL (ref 0–0.5)
EOSINOPHIL NFR BLD: 0 % (ref 0–8)
ERYTHROCYTE [DISTWIDTH] IN BLOOD BY AUTOMATED COUNT: 15.3 % (ref 11.5–14.5)
HCT VFR BLD AUTO: 28.4 % (ref 37–48.5)
HGB BLD-MCNC: 8.9 G/DL (ref 12–16)
IMM GRANULOCYTES # BLD AUTO: 0.02 K/UL (ref 0–0.04)
IMM GRANULOCYTES NFR BLD AUTO: 0.4 % (ref 0–0.5)
LYMPHOCYTES # BLD AUTO: 1 K/UL (ref 1–4.8)
LYMPHOCYTES NFR BLD: 19.3 % (ref 18–48)
MCH RBC QN AUTO: 22.9 PG (ref 27–31)
MCHC RBC AUTO-ENTMCNC: 31.3 G/DL (ref 32–36)
MCV RBC AUTO: 73 FL (ref 82–98)
MONOCYTES # BLD AUTO: 0.4 K/UL (ref 0.3–1)
MONOCYTES NFR BLD: 7.2 % (ref 4–15)
NEUTROPHILS # BLD AUTO: 3.7 K/UL (ref 1.8–7.7)
NEUTROPHILS NFR BLD: 73.1 % (ref 38–73)
NRBC BLD-RTO: 0 /100 WBC
PLATELET # BLD AUTO: 152 K/UL (ref 150–350)
PMV BLD AUTO: 10.6 FL (ref 9.2–12.9)
RBC # BLD AUTO: 3.89 M/UL (ref 4–5.4)
WBC # BLD AUTO: 5.03 K/UL (ref 3.9–12.7)

## 2019-10-23 PROCEDURE — 63600175 PHARM REV CODE 636 W HCPCS: Performed by: STUDENT IN AN ORGANIZED HEALTH CARE EDUCATION/TRAINING PROGRAM

## 2019-10-23 PROCEDURE — 36415 COLL VENOUS BLD VENIPUNCTURE: CPT

## 2019-10-23 PROCEDURE — 25000003 PHARM REV CODE 250: Performed by: STUDENT IN AN ORGANIZED HEALTH CARE EDUCATION/TRAINING PROGRAM

## 2019-10-23 PROCEDURE — 85025 COMPLETE CBC W/AUTO DIFF WBC: CPT

## 2019-10-23 RX ORDER — IBUPROFEN 600 MG/1
600 TABLET ORAL EVERY 6 HOURS PRN
Qty: 60 TABLET | Refills: 0 | Status: SHIPPED | OUTPATIENT
Start: 2019-10-23 | End: 2021-01-25

## 2019-10-23 RX ORDER — FERROUS SULFATE 325(65) MG
325 TABLET ORAL 2 TIMES DAILY
Qty: 30 TABLET | Refills: 3 | Status: SHIPPED | OUTPATIENT
Start: 2019-10-23 | End: 2020-10-22

## 2019-10-23 RX ORDER — HYDROCODONE BITARTRATE AND ACETAMINOPHEN 5; 325 MG/1; MG/1
1 TABLET ORAL EVERY 4 HOURS PRN
Qty: 30 TABLET | Refills: 0 | OUTPATIENT
Start: 2019-10-23 | End: 2020-08-29

## 2019-10-23 RX ADMIN — KETOROLAC TROMETHAMINE 30 MG: 30 INJECTION, SOLUTION INTRAMUSCULAR at 05:10

## 2019-10-23 RX ADMIN — KETOROLAC TROMETHAMINE 30 MG: 30 INJECTION, SOLUTION INTRAMUSCULAR at 12:10

## 2019-10-23 RX ADMIN — HYDROCODONE BITARTRATE AND ACETAMINOPHEN 1 TABLET: 10; 325 TABLET ORAL at 11:10

## 2019-10-23 RX ADMIN — RITONAVIR 100 MG: 100 TABLET, FILM COATED ORAL at 07:10

## 2019-10-23 RX ADMIN — FAMOTIDINE 20 MG: 20 TABLET ORAL at 10:10

## 2019-10-23 RX ADMIN — EMTRICITABINE AND TENOFOVIR ALAFENAMIDE 1 TABLET: 200; 25 TABLET ORAL at 07:10

## 2019-10-23 RX ADMIN — HYDROCODONE BITARTRATE AND ACETAMINOPHEN 1 TABLET: 10; 325 TABLET ORAL at 07:10

## 2019-10-23 RX ADMIN — SENNOSIDES,DOCUSATE SODIUM 1 TABLET: 8.6; 5 TABLET, FILM COATED ORAL at 10:10

## 2019-10-23 RX ADMIN — DARUNAVIR 600 MG: 600 TABLET, FILM COATED ORAL at 07:10

## 2019-10-23 NOTE — OP NOTE
DATE OF PROCEDURE:  10/22/19     SURGEON: Georgia Howard.        ASSISTANTS: Hemant Mahoney MD (resident)     PREOPERATIVE DIAGNOSES: HGSIL of cervix, HIV     POSTOPERATIVE DIAGNOSES: same     PROCEDURES:  Robotic-assisted total laparoscopic hysterectomy and bilateral   salpingectomy, lysis of adhesions, diagnostic cystoscopy.     COMPLICATIONS: None     ESTIMATED BLOOD LOSS: 200 cc     ANESTHESIA: GETA     INTRAOPERATIVE FINDINGS:  10 cm uterus, normal bilateral tubes and ovaries. Omental adhesions anteriorly starting below the umbilicus and extending towards the pelvis to the left anterior portion of the uterus. Extensive scarring of the bladder to the anterior portion of the uterus requiring back filling.  Bilateral ureteral efflux and intact bladder noted on diagnostic cystoscopy post hysterectomy.     PROCEDURE IN DETAIL: Informed consent was obtained and the patient was taken to the Operating Suite.  General anesthesia was administered.  Once felt to be   adequate, she was placed in dorsal lithotomy position with her arms tucked.  The abdomen and pelvis were prepped and draped in the usual fashion and a speculum   was placed in the vagina.  The cervix was visualized, grasped with a single-tooth tenaculum and the uterus sounded to approximately 8 cm.  Serial dilation of cervix was performed and a large VCare manipulator was placed without difficulty.  Carl catheter was placed to gravity drainage and attention was turned to the abdominal portion of the procedure. A Veress needle was placed through the umbilicus an opening pressure was 1.  Pneumoperitoneum was obtained with carbon dioxide and once this was felt to be adequate, an 8 mm incision was made above the umbilicus and a robotic trocar was placed through this.  Intraperitoneal placement was confirmed with the camera.  Lateral robotic trocars x 2 were placed through 8 mm incisions.  A left upper quadrant 8 mm AirSeal accessory port was placed.  The patient  was placed in steep Trendelenburg.  The robot was docked and instruments were passed in the operative field.  Findings as above were noted.  Adhesions were  taken down sharply with scissors.  Attention was first turned to the left side where the left round ligament was coagulated with bipolar cautery then ligated with monopolar scissors. The broad ligament was ligated and the left ureter identified.  The left utero-ovarian ligament was then coagulated with bipolar cautery and then ligated with monopolar scissors.  The left tube was grasped and the mesosalpynx coagulated and ligated.  Tubal segment removed intact through the accessory port.  Attention was turned to the right side, which was taken down in an identical manner.  Right tube removed intact through the accessory port.  The bladder was filled with 180cc sterile milk, retrograde through the bullard.  The bladder border was visualized.  Anteriorly, the vesicouterine peritoneum was incised and the bladder was mobilized inferiorly to below the V-care ring. Uterine vessels were coagulated and ligated with bipolar cautery.  Monopolar scissors were used to perform circumferential colpotomy at the V-care ring.  Specimen removed intact vaginally.  The cuff was then closed with a 0 PDS suture tied in the midline.  The pelvis was irrigated and noted to be hemostatic.  Attention was turned to Cystoscopy.  The bullard catheter was removed.  Diagnostic cystoscopy was performed.  Bilateral ureteral efflux and an intact bladder were noted.  The cystoscope was removed and bullard replaced.  The robot was undocked and the pneumoperitoneum was evacuated.  The patient was flattened.  All ports were removed and port sites were inspected and made hemostatic with electrocautery and closed with subcuticular 4-0 Monocryl suture.  Steri-Strips and pressure dressing were applied and the patient was awoken and taken to Recovery Room in stable condition.  Of note, I was present for and  performed all key aspects of procedure.

## 2019-10-23 NOTE — DISCHARGE SUMMARY
[facility]  Obstetrics & Gynecology  Discharge Summary    Patient Name: Jm Newton  MRN: [id number]  Admission Date: 10/22/2019  Hospital Length of Stay: 0 days  Discharge Date and Time: 10/23/2019  2:49 PM  Attending Physician: Jenniffer att. providers found   Discharging Provider: Hung Mahoney MD  Primary Care Provider: Primary Doctor No    Hospital Course: Patient presented for scheduled procedure. Patient was passed back to OR for TLH. Please see OP note for further details. Tolerated procedure well and patient was taken to recovery in a stable condition. Prior to discharge on POD#1, patient was able to void, ambulate, tolerate PO and pain was well controlled with PO meds. Patient was given routine post-op instructions for which patient voiced understanding. Patient was subsequently discharged home.    Procedure(s) (LRB):  XI ROBOTIC HYSTERECTOMY (N/A)  CYSTOSCOPY (N/A)  XI ROBOTIC SALPINGECTOMY (Bilateral)     Consults:     Significant Diagnostic Studies: Labs:   CBC   Recent Labs   Lab 10/23/19  0529   WBC 5.03   HGB 8.9*   HCT 28.4*          Pending Diagnostic Studies:     None        Final Active Diagnoses:    Diagnosis Date Noted POA    PRINCIPAL PROBLEM:  S/P RATLH/BS/CYSTO [Z90.710] 10/22/2019 No    HGSIL (high grade squamous intraepithelial lesion) on Pap smear of cervix [R87.613] 10/22/2019 Yes      Problems Resolved During this Admission:        Discharged Condition: good    Disposition: Home or Self Care    Follow Up:  Follow-up Information     Call Georgia Howard MD.    Specialty:  Obstetrics and Gynecology  Why:  To schedule postop appointment  Contact information:  [address]  [facility] [address]  [phone]                 Patient Instructions:      Call MD for:  temperature >100.4     Call MD for:  persistent nausea and vomiting or diarrhea     Call MD for:  severe uncontrolled pain     Call MD for:  redness, tenderness, or signs of infection  (pain, swelling, redness, odor or green/yellow discharge around incision site)     Call MD for:  difficulty breathing or increased cough     Call MD for:  severe persistent headache     Call MD for:  persistent dizziness, light-headedness, or visual disturbances     Call MD for:  increased confusion or weakness     Medications:  Reconciled Home Medications:      Medication List      START taking these medications    HYDROcodone-acetaminophen 5-325 mg per tablet  Commonly known as:  NORCO  Take 1 tablet by mouth every 4 (four) hours as needed.        CHANGE how you take these medications    ibuprofen 600 MG tablet  Commonly known as:  ADVIL,MOTRIN  Take 1 tablet (600 mg total) by mouth every 6 (six) hours as needed for Pain.  What changed:    · when to take this  · reasons to take this        CONTINUE taking these medications    DESCOVY 200-25 mg Tab  Generic drug:  emtricitabine-tenofovir alafen  once daily.     ferrous sulfate 325 mg (65 mg iron) Tab tablet  Commonly known as:  FEOSOL  Take 1 tablet (325 mg total) by mouth 2 (two) times daily.     omeprazole 20 MG capsule  Commonly known as:  PRILOSEC  Take 1 capsule (20 mg total) by mouth once daily.     * OPTICHAMBER HERBERT LG MASK MISC  U UTD BID     * OPTICHAMBER HERBERT LG MASK Spcr  Generic drug:  inhalat.spacing dev,large mask  U UTD BID     PRENATAL-1 ORAL  Take by mouth.     PREZISTA 600 MG Tab  Generic drug:  darunavir ethanolate  600 mg 2 (two) times daily with meals.     PROCHAMBER  Generic drug:  inhalation spacing device     promethazine 25 MG tablet  Commonly known as:  PHENERGAN  Take 25 mg by mouth every 6 (six) hours as needed.     ranitidine 150 MG tablet  Commonly known as:  ZANTAC  TAKE 1 TABLET(150 MG) BY MOUTH TWICE DAILY     ritonavir 100 mg Tab tablet  Commonly known as:  NORVIR  100 mg 2 (two) times daily with meals.     sulfamethoxazole-trimethoprim 400-80mg 400-80 mg per tablet  Commonly known as:  BACTRIM,SEPTRA     VENTOLIN HFA 90  mcg/actuation inhaler  Generic drug:  albuterol  Inhale 2 puffs into the lungs every 6 (six) hours as needed.         * This list has 2 medication(s) that are the same as other medications prescribed for you. Read the directions carefully, and ask your doctor or other care provider to review them with you.                Hung Mahoney MD  Obstetrics & Gynecology  [facility]

## 2019-10-23 NOTE — PROGRESS NOTES
[facility]  Obstetrics & Gynecology  Progress Note    Patient Name: Jm Newton  MRN: [id number]  Admission Date: 10/22/2019  Primary Care Provider: Primary Doctor No  Principal Problem: S/P laparoscopic hysterectomy    Subjective:     Interval History: POD #1. Patient is doing well this morning. Pain is well controlled. Tolerating PO without complications. She denies fever/chills, nausea/vomiting, CP, and SOB. She is ambulating and voiding.     Scheduled Meds:   darunavir ethanolate  600 mg Oral BID WM    emtricitabine-tenofovir alafen  1 tablet Oral Daily    famotidine  20 mg Oral BID    ibuprofen  600 mg Oral Q6H    mupirocin  1 g Nasal BID    ritonavir  100 mg Oral BID WM    senna-docusate 8.6-50 mg  1 tablet Oral BID     Continuous Infusions:   sodium chloride 0.9% 125 mL/hr at 10/22/19 2200     PRN Meds:albuterol, bisacodyl, diphenhydrAMINE, diphenhydrAMINE, HYDROcodone-acetaminophen, HYDROcodone-acetaminophen, ondansetron, promethazine (PHENERGAN) IVPB    Review of patient's allergies indicates:   Allergen Reactions    Azithromycin Swelling and Edema       Objective:     Vital Signs (Most Recent):  Temp: 98.6 °F (37 °C) (10/23/19 0429)  Pulse: 77 (10/23/19 0429)  Resp: 18 (10/23/19 0429)  BP: (!) 141/81 (10/23/19 0429)  SpO2: 95 % (10/23/19 0429) Vital Signs (24h Range):  Temp:  [97.6 °F (36.4 °C)-98.8 °F (37.1 °C)] 98.6 °F (37 °C)  Pulse:  [61-77] 77  Resp:  [14-18] 18  SpO2:  [95 %-100 %] 95 %  BP: (109-147)/(61-83) 141/81     Weight: 98 kg (216 lb)  Body mass index is 33.83 kg/m².  Patient's last menstrual period was 10/02/2019.    I&O (Last 24H):    Intake/Output Summary (Last 24 hours) at 10/23/2019 0723  Last data filed at 10/23/2019 0500  Gross per 24 hour   Intake 4753.75 ml   Output 2560 ml   Net 2193.75 ml       Physical Exam    Laboratory:  CBC:   Recent Labs   Lab 10/23/19  0529   WBC 5.03   RBC 3.89*   HGB 8.9*   HCT 28.4*      MCV 73*   MCH 22.9*    [facility] 31.3*       Assessment/Plan:     Active Diagnoses:    Diagnosis Date Noted POA    PRINCIPAL PROBLEM:  S/P RATLH/BS/CYSTO [Z90.710] 10/22/2019 No    HGSIL (high grade squamous intraepithelial lesion) on Pap smear of cervix [R87.613] 10/22/2019 Yes      Problems Resolved During this Admission:         - Pain controlled - continue current medication  - Carl removed  - Encourage ambulation with nurse assistance  - Spirometer at bedside  - Regular diet  - Home HIV meds ordered  - Inhaler PRN    Dispo: Anticipate discharge today as patient continues to meet post-operative goals.        Hung Mahoney MD  Obstetrics & Gynecology  [facility]

## 2019-10-30 ENCOUNTER — TELEPHONE (OUTPATIENT)
Dept: OBSTETRICS AND GYNECOLOGY | Facility: CLINIC | Age: 34
End: 2019-10-30

## 2019-11-11 ENCOUNTER — OFFICE VISIT (OUTPATIENT)
Dept: OBSTETRICS AND GYNECOLOGY | Facility: CLINIC | Age: 34
End: 2019-11-11
Attending: OBSTETRICS & GYNECOLOGY
Payer: MEDICARE

## 2019-11-11 VITALS
SYSTOLIC BLOOD PRESSURE: 120 MMHG | HEIGHT: 67 IN | BODY MASS INDEX: 34.22 KG/M2 | DIASTOLIC BLOOD PRESSURE: 70 MMHG | WEIGHT: 218.06 LBS

## 2019-11-11 DIAGNOSIS — N87.1 CIN II (CERVICAL INTRAEPITHELIAL NEOPLASIA II): ICD-10-CM

## 2019-11-11 DIAGNOSIS — Z98.890 POST-OPERATIVE STATE: ICD-10-CM

## 2019-11-11 DIAGNOSIS — Z90.710 S/P LAPAROSCOPIC HYSTERECTOMY: Primary | ICD-10-CM

## 2019-11-11 PROCEDURE — 99024 PR POST-OP FOLLOW-UP VISIT: ICD-10-PCS | Mod: HCWC,S$GLB,, | Performed by: OBSTETRICS & GYNECOLOGY

## 2019-11-11 PROCEDURE — 99999 PR PBB SHADOW E&M-EST. PATIENT-LVL II: ICD-10-PCS | Mod: PBBFAC,HCWC,, | Performed by: OBSTETRICS & GYNECOLOGY

## 2019-11-11 PROCEDURE — 99024 POSTOP FOLLOW-UP VISIT: CPT | Mod: HCWC,S$GLB,, | Performed by: OBSTETRICS & GYNECOLOGY

## 2019-11-11 PROCEDURE — 99999 PR PBB SHADOW E&M-EST. PATIENT-LVL II: CPT | Mod: PBBFAC,HCWC,, | Performed by: OBSTETRICS & GYNECOLOGY
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
## 2019-12-09 ENCOUNTER — OFFICE VISIT (OUTPATIENT)
Dept: OBSTETRICS AND GYNECOLOGY | Facility: CLINIC | Age: 34
End: 2019-12-09
Attending: OBSTETRICS & GYNECOLOGY
Payer: MEDICARE

## 2019-12-09 VITALS
DIASTOLIC BLOOD PRESSURE: 78 MMHG | BODY MASS INDEX: 34.08 KG/M2 | WEIGHT: 217.13 LBS | HEIGHT: 67 IN | SYSTOLIC BLOOD PRESSURE: 116 MMHG

## 2019-12-09 DIAGNOSIS — Z90.710 S/P LAPAROSCOPIC HYSTERECTOMY: Primary | ICD-10-CM

## 2019-12-09 DIAGNOSIS — Z98.890 POST-OPERATIVE STATE: ICD-10-CM

## 2019-12-09 PROCEDURE — 99024 POSTOP FOLLOW-UP VISIT: CPT | Mod: HCWC,S$GLB,, | Performed by: OBSTETRICS & GYNECOLOGY

## 2019-12-09 PROCEDURE — 99024 PR POST-OP FOLLOW-UP VISIT: ICD-10-PCS | Mod: HCWC,S$GLB,, | Performed by: OBSTETRICS & GYNECOLOGY

## 2019-12-09 PROCEDURE — 99999 PR PBB SHADOW E&M-EST. PATIENT-LVL III: CPT | Mod: PBBFAC,HCWC,, | Performed by: OBSTETRICS & GYNECOLOGY

## 2019-12-09 PROCEDURE — 99999 PR PBB SHADOW E&M-EST. PATIENT-LVL III: ICD-10-PCS | Mod: PBBFAC,HCWC,, | Performed by: OBSTETRICS & GYNECOLOGY

## 2019-12-09 NOTE — PROGRESS NOTES
CC: Postop visit    Jm Newton is a 34 y.o. female  post-op from [facility]/ on 10/22/19.  Patient is doing well postoperatively.  No pain.  No bowel or bladder complaints.  No fever.       Pathology: High-grade squamous intraepithelial lesion (CIN2/moderate dysplasia)  No endometrial hyperplasia, no malignancy.  Secretory phase endometrium with adenomyosis.  Leiomyoma, intramural, 0.8 cm.  Benign bilateral fallopian tubes    Past Medical History:   Diagnosis Date    Asthma     Encounter for blood transfusion     HIV infection     dx     Hyperthyroidism in pregnancy, antepartum      Past Surgical History:   Procedure Laterality Date    CERVICAL CERCLAGE      emergent with twins    CERVICAL CERCLAGE N/A 2019    Procedure: CERCLAGE, CERVIX;  Surgeon: Obey Henry MD;  [facility];  Service: OB/GYN;  Laterality: N/A;     SECTION WITH TUBAL LIGATION N/A 6/15/2019    Procedure:  SECTION, WITH TUBAL LIGATION;  Surgeon: Madeline Walden MD;  [facility];  Service: OB/GYN;  Laterality: N/A;     SECTION, CLASSIC      Last section with [facility] extention[facility]    COLD KNIFE CONIZATION OF CERVIX N/A 2018    Procedure: CONE BIOPSY, CERVIX, USING COLD KNIFE;  Surgeon: Chantal Worrell MD;  Location: [facility];  Service: OB/GYN;  Laterality: N/A;    CONIZATION OF CERVIX USING LOOP ELECTROSURGICAL EXCISION PROCEDURE (LEEP) N/A 2018    Procedure: LEEP CONIZATION, CERVIX;  Surgeon: Chantal Worrell MD;  Location: [facility];  Service: OB/GYN;  Laterality: N/A;    CYSTOSCOPY N/A 10/22/2019    Procedure: CYSTOSCOPY;  Surgeon: Georgia Howard MD;  Location: [facility];  Service: OB/GYN;  Laterality: N/A;    DILATION AND CURETTAGE OF UTERUS      ROBOT-ASSISTED LAPAROSCOPIC ABDOMINAL HYSTERECTOMY USING DA GALINDO XI N/A 10/22/2019    Procedure: XI ROBOTIC HYSTERECTOMY;  Surgeon: Georgia Howard MD;  Location: [facility];  Service: OB/GYN;   "Laterality: N/A;    ROBOT-ASSISTED SURGICAL REMOVAL OF FALLOPIAN TUBE USING DA GALINDO XI Bilateral 10/22/2019    Procedure: XI ROBOTIC SALPINGECTOMY;  Surgeon: Georgia Howard MD;  Location: [facility] OR;  Service: OB/GYN;  Laterality: Bilateral;    WISDOM TOOTH EXTRACTION       Family History   Problem Relation Age of Onset    Hypertension Maternal Grandmother     Diabetes Maternal Grandmother     Colon cancer Neg Hx     Ovarian cancer Neg Hx      Social History     Tobacco Use    Smoking status: Never Smoker    Smokeless tobacco: Never Used   Substance Use Topics    Alcohol use: Yes     Alcohol/week: 0.0 standard drinks     Frequency: Monthly or less     Comment: socially    Drug use: No     OB History    Para Term  AB Living   7 7 1 6   5   SAB TAB Ectopic Multiple Live Births         2 7      # Outcome Date GA Lbr Adrian/2nd Weight Sex Delivery Anes PTL Lv   7  06/15/19 32w5d   M CS-LTranv EPI Y ANAHY   6 Term 14 37w5d  3.26 kg (7 lb 3 oz) F CS-LTranv EPI N ANAHY   5A   24w0d  0.51 kg (1 lb 2 oz) M CS-LTranv  Y DEC      Birth Comments: lived for 2 months in NICU      Complications: Premature rupture of membranes   5B   24w0d  0.992 kg (2 lb 3 oz) M CS-LTranv  Y DEC      Birth Comments: lived only 2 hours      Complications: Premature rupture of membranes   4  09 35w0d  2.608 kg (5 lb 12 oz) M CS-LTranv  Y ANAHY      Birth Comments: HIV, CS d/t viral load      Complications: Premature rupture of membranes   3  08 32w0d  2.722 kg (6 lb) M Vag-Spont  Y ANAHY      Birth Comments:  labor      Complications: Premature rupture of membranes   2  10/05/06 24w0d   M Vag-Spont  Y FD      Birth Comments: abruption and stillbirth      Complications: Abruptio Placenta   1  03 35w0d  2.722 kg (6 lb) M Vag-Spont  Y ANAHY      Birth Comments:  labor       /78   Ht 5' 7" (1.702 m)   Wt 98.5 kg (217 lb 2.5 oz)   LMP " 10/02/2019   Breastfeeding? No   BMI 34.01 kg/m²     ROS:  GENERAL: No fever, chills, fatigability or weight loss.  VULVAR: No pain, no lesions and no itching.  VAGINAL: No relaxation, no itching, no discharge, no abnormal bleeding and no lesions.  ABDOMEN: No abdominal pain. Denies nausea. Denies vomiting. No diarrhea. No constipation  BREAST: Denies pain. No lumps. No discharge.  URINARY: No incontinence, no nocturia, no frequency and no dysuria.  CARDIOVASCULAR: No chest pain. No shortness of breath. No leg cramps.  NEUROLOGICAL: No headaches. No vision changes.    PE:   GEN:  NAD, A&O x 3  ABDOMEN:  Soft, non-tender, non-distended.  PELVIC: Normal external genitalia without lesions.  Normal hair distribution.  Adequate perineal body, normal urethral meatus.  Vagina moist and well rugated without lesions or discharge.  Cuff intact and healing well.  Cervix and uterus absent.  Pelvis without masses or tenderness.      ICD-10-CM ICD-9-CM    1. S/P RATLH/BS/CYSTO Z90.710 V88.01    2. Post-operative state Z98.890 V45.89          Patient doing well.  Follow-up 1 year for annual with Dr Worrell

## 2019-12-09 NOTE — Clinical Note
Jm came in for her last post-op visit today.  She's doing well and will see you in 1 year for her annual.

## 2020-01-06 ENCOUNTER — TELEPHONE (OUTPATIENT)
Dept: OBSTETRICS AND GYNECOLOGY | Facility: CLINIC | Age: 35
End: 2020-01-06

## 2020-01-06 NOTE — TELEPHONE ENCOUNTER
----- Message from Avery Castillo sent at 1/6/2020  8:12 AM CST -----  Contact: ZAY MERAZ [[id number]]  Name of Who is Calling:ZAY MERAZ [[id number]]      What is the request in detail:Pt is heavy bleeding and pain from hysterectomy. Please Contact and Advise      Can the clinic reply by [facility]:      What Number to Call Back if not in [facility]:[phone]

## 2020-01-06 NOTE — TELEPHONE ENCOUNTER
Patient had sex 11 weeks post op of hysterectomy and she bled for 30 mins on yesterday and have some pelvic pain, patient scheduled for tomorrow with Dr. Howard

## 2020-01-07 ENCOUNTER — OFFICE VISIT (OUTPATIENT)
Dept: OBSTETRICS AND GYNECOLOGY | Facility: CLINIC | Age: 35
End: 2020-01-07
Attending: OBSTETRICS & GYNECOLOGY
Payer: MEDICARE

## 2020-01-07 VITALS
SYSTOLIC BLOOD PRESSURE: 118 MMHG | DIASTOLIC BLOOD PRESSURE: 78 MMHG | BODY MASS INDEX: 34.39 KG/M2 | HEIGHT: 67 IN | WEIGHT: 219.13 LBS

## 2020-01-07 DIAGNOSIS — R30.0 DYSURIA: Primary | ICD-10-CM

## 2020-01-07 DIAGNOSIS — R10.2 PELVIC PAIN: ICD-10-CM

## 2020-01-07 PROCEDURE — 87186 SC STD MICRODIL/AGAR DIL: CPT | Mod: HCWC

## 2020-01-07 PROCEDURE — 99024 POSTOP FOLLOW-UP VISIT: CPT | Mod: HCWC,S$GLB,, | Performed by: OBSTETRICS & GYNECOLOGY

## 2020-01-07 PROCEDURE — 99024 PR POST-OP FOLLOW-UP VISIT: ICD-10-PCS | Mod: HCWC,S$GLB,, | Performed by: OBSTETRICS & GYNECOLOGY

## 2020-01-07 PROCEDURE — 99999 PR PBB SHADOW E&M-EST. PATIENT-LVL III: CPT | Mod: PBBFAC,HCWC,, | Performed by: OBSTETRICS & GYNECOLOGY

## 2020-01-07 PROCEDURE — 87481 CANDIDA DNA AMP PROBE: CPT | Mod: 59,HCWC

## 2020-01-07 PROCEDURE — 87088 URINE BACTERIA CULTURE: CPT | Mod: HCWC

## 2020-01-07 PROCEDURE — 99999 PR PBB SHADOW E&M-EST. PATIENT-LVL III: ICD-10-PCS | Mod: PBBFAC,HCWC,, | Performed by: OBSTETRICS & GYNECOLOGY

## 2020-01-07 PROCEDURE — 87661 TRICHOMONAS VAGINALIS AMPLIF: CPT | Mod: HCWC

## 2020-01-07 PROCEDURE — 87086 URINE CULTURE/COLONY COUNT: CPT | Mod: HCWC

## 2020-01-07 PROCEDURE — 87077 CULTURE AEROBIC IDENTIFY: CPT | Mod: HCWC

## 2020-01-07 NOTE — PROGRESS NOTES
CC: Postop visit    Jm Newton is a 34 y.o. female   11 weeks post-op from a RALH/BS, c/o pain after having intercourse.  Had intercourse for the first time post op last weekend.  Did not have pain at the time but had bleeding for about 30 minutes afterwards.  Since then has felt pelvic pressure, especially standing all day at work (works at ).  ? Dysuria.  Slight brown discharge.  Normal bowel function.  No fever.        Past Medical History:   Diagnosis Date    Asthma     Encounter for blood transfusion     HIV infection     dx     Hyperthyroidism in pregnancy, antepartum      Past Surgical History:   Procedure Laterality Date    CERVICAL CERCLAGE      emergent with twins    CERVICAL CERCLAGE N/A 2019    Procedure: CERCLAGE, CERVIX;  Surgeon: Obey Henry MD;  Location: [facility];  Service: OB/GYN;  Laterality: N/A;     SECTION WITH TUBAL LIGATION N/A 6/15/2019    Procedure:  SECTION, WITH TUBAL LIGATION;  Surgeon: Madeline Walden MD;  Location: [facility];  Service: OB/GYN;  Laterality: N/A;     SECTION, CLASSIC      Last section with classical extention, from [facility]    COLD KNIFE CONIZATION OF CERVIX N/A 2018    Procedure: CONE BIOPSY, CERVIX, USING COLD KNIFE;  Surgeon: Chantal Worrell MD;  Location: [facility];  Service: OB/GYN;  Laterality: N/A;    CONIZATION OF CERVIX USING LOOP ELECTROSURGICAL EXCISION PROCEDURE (LEEP) N/A 2018    Procedure: LEEP CONIZATION, CERVIX;  Surgeon: Chantal Worrell MD;  Location: [facility];  Service: OB/GYN;  Laterality: N/A;    CYSTOSCOPY N/A 10/22/2019    Procedure: CYSTOSCOPY;  Surgeon: Georgia Howard MD;  Location: [facility];  Service: OB/GYN;  Laterality: N/A;    DILATION AND CURETTAGE OF UTERUS      ROBOT-ASSISTED LAPAROSCOPIC ABDOMINAL HYSTERECTOMY USING DA GALINDO XI N/A 10/22/2019    Procedure: XI ROBOTIC HYSTERECTOMY;  Surgeon: Georgia Howard MD;  Location: [facility];  Service:  "OB/GYN;  Laterality: N/A;    ROBOT-ASSISTED SURGICAL REMOVAL OF FALLOPIAN TUBE USING DA GALINDO XI Bilateral 10/22/2019    Procedure: XI ROBOTIC SALPINGECTOMY;  Surgeon: Georgia Howard MD;  Location: [facility];  Service: OB/GYN;  Laterality: Bilateral;    WISDOM TOOTH EXTRACTION       Family History   Problem Relation Age of Onset    Hypertension Maternal Grandmother     Diabetes Maternal Grandmother     Colon cancer Neg Hx     Ovarian cancer Neg Hx      Social History     Tobacco Use    Smoking status: Never Smoker    Smokeless tobacco: Never Used   Substance Use Topics    Alcohol use: Yes     Alcohol/week: 0.0 standard drinks     Frequency: Monthly or less     Comment: socially    Drug use: No     OB History    Para Term  AB Living   7 7 1 6   5   SAB TAB Ectopic Multiple Live Births         2 7      # Outcome Date GA Lbr Adrian/2nd Weight Sex Delivery Anes PTL Lv   7  06/15/19 32w5d   M CS-LTranv EPI Y ANAHY   6 Term 14 37w5d  3.26 kg (7 lb 3 oz) F CS-LTranv EPI N ANAHY   5A   24w0d  0.51 kg (1 lb 2 oz) M CS-LTranv  Y DEC      Birth Comments: lived for 2 months in NICU      Complications: Premature rupture of membranes   5B   24w0d  0.992 kg (2 lb 3 oz) M CS-LTranv  Y DEC      Birth Comments: lived only 2 hours      Complications: Premature rupture of membranes   4  09 35w0d  2.608 kg (5 lb 12 oz) M CS-LTranv  Y ANAHY      Birth Comments: HIV, CS d/t viral load      Complications: Premature rupture of membranes   3  08 32w0d  2.722 kg (6 lb) M Vag-Spont  Y ANAHY      Birth Comments:  labor      Complications: Premature rupture of membranes   2  10/05/06 24w0d   M Vag-Spont  Y FD      Birth Comments: abruption and stillbirth      Complications: Abruptio Placenta   1  03 35w0d  2.722 kg (6 lb) M Vag-Spont  Y ANAHY      Birth Comments:  labor       /78   Ht 5' 7" (1.702 m)   Wt 99.4 kg (219 lb 2.2 oz)   " LMP 10/02/2019   BMI 34.32 kg/m²     ROS:  GENERAL: No fever, chills, fatigability or weight loss.  VULVAR: No pain, no lesions and no itching.  VAGINAL: No relaxation, no itching, no discharge, no abnormal bleeding and no lesions.  ABDOMEN:  Denies nausea. Denies vomiting. No diarrhea. No constipation  BREAST: Denies pain. No lumps. No discharge.  URINARY: No incontinence, no nocturia, no frequency and no dysuria.  CARDIOVASCULAR: No chest pain. No shortness of breath. No leg cramps.  NEUROLOGICAL: No headaches. No vision changes.    PE:   GEN:  NAD, A&O x 3  ABDOMEN:  Soft, non-tender, non-distended.  PELVIC: Normal external genitalia without lesions.  Normal hair distribution.  Adequate perineal body, normal urethral meatus.  Vagina moist and well rugated without lesions or discharge.  Cuff intact and healing well.  Cervix and uterus absent.  Tender anteriorly/bladder.      ICD-10-CM ICD-9-CM    1. Dysuria R30.0 788.1 Urine culture      Vaginosis Screen by DNA Probe   2. Pelvic pain R10.2 [id number] Urine culture      Vaginosis Screen by DNA Probe       Precautions given.  Follow-up swab and urine cx.  Abstain from intercourse until better

## 2020-01-08 LAB
BACTERIAL VAGINOSIS DNA: NEGATIVE
CANDIDA GLABRATA DNA: NEGATIVE
CANDIDA KRUSEI DNA: NEGATIVE
CANDIDA RRNA VAG QL PROBE: NEGATIVE
T VAGINALIS RRNA GENITAL QL PROBE: NEGATIVE

## 2020-01-10 LAB — BACTERIA UR CULT: ABNORMAL

## 2020-01-12 ENCOUNTER — TELEPHONE (OUTPATIENT)
Dept: OBSTETRICS AND GYNECOLOGY | Facility: CLINIC | Age: 35
End: 2020-01-12

## 2020-01-12 RX ORDER — NITROFURANTOIN 25; 75 MG/1; MG/1
100 CAPSULE ORAL 2 TIMES DAILY
Qty: 14 CAPSULE | Refills: 0 | Status: SHIPPED | OUTPATIENT
Start: 2020-01-12 | End: 2020-01-19

## 2020-04-21 DIAGNOSIS — Z01.84 ANTIBODY RESPONSE EXAMINATION: ICD-10-CM

## 2020-05-21 DIAGNOSIS — Z01.84 ANTIBODY RESPONSE EXAMINATION: ICD-10-CM

## 2020-05-31 DIAGNOSIS — R50.9 FEVER, UNSPECIFIED: Primary | ICD-10-CM

## 2020-06-02 ENCOUNTER — LAB VISIT (OUTPATIENT)
Dept: INTERNAL MEDICINE | Facility: CLINIC | Age: 35
End: 2020-06-02
Payer: MEDICARE

## 2020-06-02 DIAGNOSIS — R50.9 FEVER, UNSPECIFIED: ICD-10-CM

## 2020-06-02 PROCEDURE — U0003 INFECTIOUS AGENT DETECTION BY NUCLEIC ACID (DNA OR RNA); SEVERE ACUTE RESPIRATORY SYNDROME CORONAVIRUS 2 (SARS-COV-2) (CORONAVIRUS DISEASE [COVID-19]), AMPLIFIED PROBE TECHNIQUE, MAKING USE OF HIGH THROUGHPUT TECHNOLOGIES AS DESCRIBED BY CMS-2020-01-R: HCPCS | Mod: HCWC

## 2020-06-03 ENCOUNTER — TELEPHONE (OUTPATIENT)
Dept: URGENT CARE | Facility: CLINIC | Age: 35
End: 2020-06-03

## 2020-06-03 LAB — SARS-COV-2 RNA RESP QL NAA+PROBE: NOT DETECTED

## 2020-06-03 NOTE — TELEPHONE ENCOUNTER
Called pt on behalf of [employer] to discuss COVID-19 result. All questions were answered and return to work policies were reviewed. Pt instructed to f/u with [facility] once criteria are met.GERALDT

## 2020-06-20 DIAGNOSIS — Z01.84 ANTIBODY RESPONSE EXAMINATION: ICD-10-CM

## 2020-07-20 DIAGNOSIS — Z01.84 ANTIBODY RESPONSE EXAMINATION: ICD-10-CM

## 2020-08-19 DIAGNOSIS — Z01.84 ANTIBODY RESPONSE EXAMINATION: ICD-10-CM

## 2020-08-29 ENCOUNTER — HOSPITAL ENCOUNTER (EMERGENCY)
Facility: OTHER | Age: 35
Discharge: HOME | End: 2020-08-29
Attending: EMERGENCY MEDICINE
Payer: MEDICARE

## 2020-08-29 VITALS
TEMPERATURE: 98 F | HEIGHT: 67 IN | RESPIRATION RATE: 16 BRPM | BODY MASS INDEX: 30.92 KG/M2 | SYSTOLIC BLOOD PRESSURE: 130 MMHG | DIASTOLIC BLOOD PRESSURE: 83 MMHG | WEIGHT: 197 LBS | HEART RATE: 80 BPM | OXYGEN SATURATION: 99 %

## 2020-08-29 DIAGNOSIS — S09.93XA DENTAL INJURY, INITIAL ENCOUNTER: ICD-10-CM
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
## 2020-08-29 PROCEDURE — 25000003 PHARM REV CODE 250: Mod: HCWC | Performed by: EMERGENCY MEDICINE

## 2020-08-29 PROCEDURE — 99284 EMERGENCY DEPT VISIT MOD MDM: CPT | Mod: 25

## 2020-08-29 PROCEDURE — 96372 THER/PROPH/DIAG INJ SC/IM: CPT

## 2020-08-29 PROCEDURE — 63600175 PHARM REV CODE 636 W HCPCS: Mod: HCWC | Performed by: EMERGENCY MEDICINE

## 2020-08-29 RX ORDER — HYDROCODONE BITARTRATE AND ACETAMINOPHEN 5; 325 MG/1; MG/1
1 TABLET ORAL EVERY 4 HOURS PRN
Qty: 12 TABLET | Refills: 0 | Status: SHIPPED | OUTPATIENT
Start: 2020-08-29 | End: 2021-01-25

## 2020-08-29 RX ORDER — AMOXICILLIN AND CLAVULANATE POTASSIUM 875; 125 MG/1; MG/1
1 TABLET, FILM COATED ORAL
Status: COMPLETED | OUTPATIENT
Start: 2020-08-29 | End: 2020-08-29

## 2020-08-29 RX ORDER — KETOROLAC TROMETHAMINE 30 MG/ML
30 INJECTION, SOLUTION INTRAMUSCULAR; INTRAVENOUS
Status: COMPLETED | OUTPATIENT
Start: 2020-08-29 | End: 2020-08-29

## 2020-08-29 RX ORDER — AMOXICILLIN AND CLAVULANATE POTASSIUM 875; 125 MG/1; MG/1
1 TABLET, FILM COATED ORAL 2 TIMES DAILY
Qty: 14 TABLET | Refills: 0 | Status: SHIPPED | OUTPATIENT
Start: 2020-08-29 | End: 2021-01-25

## 2020-08-29 RX ADMIN — AMOXICILLIN AND CLAVULANATE POTASSIUM 1 TABLET: 875; 125 TABLET, FILM COATED ORAL at 06:08

## 2020-08-29 RX ADMIN — KETOROLAC TROMETHAMINE 30 MG: 30 INJECTION, SOLUTION INTRAMUSCULAR at 06:08
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
## 2020-09-18 DIAGNOSIS — Z01.84 ANTIBODY RESPONSE EXAMINATION: ICD-10-CM

## 2020-10-18 DIAGNOSIS — Z01.84 ANTIBODY RESPONSE EXAMINATION: ICD-10-CM

## 2020-11-17 DIAGNOSIS — Z01.84 ANTIBODY RESPONSE EXAMINATION: ICD-10-CM

## 2020-11-20 ENCOUNTER — OCCUPATIONAL HEALTH (OUTPATIENT)
Dept: URGENT CARE | Facility: CLINIC | Age: 35
End: 2020-11-20

## 2020-11-20 DIAGNOSIS — Z02.83 ENCOUNTER FOR DRUG SCREENING: Primary | ICD-10-CM

## 2020-11-20 PROCEDURE — 80305 OOH NON-DOT DRUG SCREEN: ICD-10-PCS | Mod: S$GLB,,, | Performed by: PREVENTIVE MEDICINE

## 2020-11-20 PROCEDURE — 80305 DRUG TEST PRSMV DIR OPT OBS: CPT | Mod: S$GLB,,, | Performed by: PREVENTIVE MEDICINE

## 2020-11-23 ENCOUNTER — TELEPHONE (OUTPATIENT)
Dept: OBSTETRICS AND GYNECOLOGY | Facility: CLINIC | Age: 35
End: 2020-11-23

## 2020-11-23 DIAGNOSIS — E05.90 HYPERTHYROIDISM: Primary | ICD-10-CM

## 2020-12-14 ENCOUNTER — OFFICE VISIT (OUTPATIENT)
Dept: PULMONOLOGY | Facility: CLINIC | Age: 35
End: 2020-12-14
Payer: MEDICARE

## 2020-12-14 ENCOUNTER — TELEPHONE (OUTPATIENT)
Dept: PULMONOLOGY | Facility: CLINIC | Age: 35
End: 2020-12-14

## 2020-12-14 VITALS
SYSTOLIC BLOOD PRESSURE: 114 MMHG | DIASTOLIC BLOOD PRESSURE: 68 MMHG | BODY MASS INDEX: 34.5 KG/M2 | HEART RATE: 71 BPM | OXYGEN SATURATION: 99 % | WEIGHT: 219.81 LBS | HEIGHT: 67 IN

## 2020-12-14 DIAGNOSIS — R06.02 SHORTNESS OF BREATH: ICD-10-CM

## 2020-12-14 DIAGNOSIS — J45.40 MODERATE PERSISTENT ASTHMA WITHOUT COMPLICATION: Primary | ICD-10-CM

## 2020-12-14 DIAGNOSIS — J45.40 MODERATE PERSISTENT ASTHMA WITHOUT COMPLICATION: ICD-10-CM

## 2020-12-14 PROCEDURE — G0008 ADMIN INFLUENZA VIRUS VAC: HCPCS | Mod: S$GLB,,, | Performed by: INTERNAL MEDICINE

## 2020-12-14 PROCEDURE — 99213 PR OFFICE/OUTPT VISIT, EST, LEVL III, 20-29 MIN: ICD-10-PCS | Mod: 25,S$GLB,, | Performed by: INTERNAL MEDICINE

## 2020-12-14 PROCEDURE — 90686 IIV4 VACC NO PRSV 0.5 ML IM: CPT | Mod: S$GLB,,, | Performed by: INTERNAL MEDICINE

## 2020-12-14 PROCEDURE — 99999 PR PBB SHADOW E&M-EST. PATIENT-LVL IV: CPT | Mod: PBBFAC,,, | Performed by: INTERNAL MEDICINE

## 2020-12-14 PROCEDURE — 1126F AMNT PAIN NOTED NONE PRSNT: CPT | Mod: S$GLB,,, | Performed by: INTERNAL MEDICINE

## 2020-12-14 PROCEDURE — 3008F PR BODY MASS INDEX (BMI) DOCUMENTED: ICD-10-PCS | Mod: CPTII,S$GLB,, | Performed by: INTERNAL MEDICINE

## 2020-12-14 PROCEDURE — G0008 FLU VACCINE (QUAD) GREATER THAN OR EQUAL TO 3YO PRESERVATIVE FREE IM: ICD-10-PCS | Mod: S$GLB,,, | Performed by: INTERNAL MEDICINE

## 2020-12-14 PROCEDURE — 99213 OFFICE O/P EST LOW 20 MIN: CPT | Mod: 25,S$GLB,, | Performed by: INTERNAL MEDICINE

## 2020-12-14 PROCEDURE — 1126F PR PAIN SEVERITY QUANTIFIED, NO PAIN PRESENT: ICD-10-PCS | Mod: S$GLB,,, | Performed by: INTERNAL MEDICINE

## 2020-12-14 PROCEDURE — 3008F BODY MASS INDEX DOCD: CPT | Mod: CPTII,S$GLB,, | Performed by: INTERNAL MEDICINE

## 2020-12-14 PROCEDURE — 99999 PR PBB SHADOW E&M-EST. PATIENT-LVL IV: ICD-10-PCS | Mod: PBBFAC,,, | Performed by: INTERNAL MEDICINE

## 2020-12-14 PROCEDURE — 90686 FLU VACCINE (QUAD) GREATER THAN OR EQUAL TO 3YO PRESERVATIVE FREE IM: ICD-10-PCS | Mod: S$GLB,,, | Performed by: INTERNAL MEDICINE

## 2020-12-14 RX ORDER — FLUTICASONE PROPIONATE AND SALMETEROL XINAFOATE 115; 21 UG/1; UG/1
2 AEROSOL, METERED RESPIRATORY (INHALATION) EVERY 12 HOURS
Qty: 3 INHALER | Refills: 4 | Status: SHIPPED | OUTPATIENT
Start: 2020-12-14 | End: 2023-03-02

## 2020-12-14 RX ORDER — HYDROXYZINE HYDROCHLORIDE 25 MG/1
1 TABLET, FILM COATED ORAL NIGHTLY
COMMUNITY
Start: 2020-10-07

## 2020-12-14 RX ORDER — ALBUTEROL SULFATE 5 MG/ML
0.5 SOLUTION RESPIRATORY (INHALATION) 4 TIMES DAILY PRN
COMMUNITY
Start: 2020-12-11 | End: 2021-12-02 | Stop reason: SDUPTHER

## 2020-12-14 RX ORDER — ALBUTEROL SULFATE 90 UG/1
1 AEROSOL, METERED RESPIRATORY (INHALATION) DAILY
Qty: 8 G | Refills: 11 | Status: SHIPPED | OUTPATIENT
Start: 2020-12-14 | End: 2021-12-14

## 2020-12-14 NOTE — PATIENT INSTRUCTIONS
Take advair 2 puffs as needed every 12 hours  Use rescue inhaler in between for relief of symptoms

## 2020-12-14 NOTE — ASSESSMENT & PLAN NOTE
Take advair 2 puffs as needed every 12 hours  Use rescue inhaler in between for relief of symptoms  Prescribed nebulizer for home use.    Plan for PFTs prior to next visit.

## 2020-12-14 NOTE — PROGRESS NOTES
Subjective:       Patient ID: Jm Newton is a 35 y.o. female.    Chief Complaint: Asthma    HPI:   Jm Newton is a 35 y.o. female who presents with for follow up of asthma.    History of HIV on ritonavir.     Seen in the [facility] ED in 12/10 with chest tightness and shortness of breath. She was albuterol and prednisone.  She completed the steroid course yesterday and states that her chest tightness is coming back.     Previously prescribed flovent, but has not taken flovent in the last year.  Using rescue inhaler 3-4 times/day.  Her asthma is triggered back: seasonal changes, cold weather, second hand smoke.      Never smoker  Telemetry monitor at   No family history of lung disease      Review of Systems   Constitutional: Negative for fever, chills and activity change.   HENT: Negative for postnasal drip, rhinorrhea, sinus pressure and congestion.    Respiratory: Positive for cough, shortness of breath, wheezing and asthma nighttime symptoms. Negative for hemoptysis and dyspnea on extertion.    Cardiovascular: Negative for chest pain and leg swelling.   Genitourinary: Negative for difficulty urinating.   Endocrine: Negative for cold intolerance and heat intolerance.    Musculoskeletal: Negative for joint swelling and myalgias.   Gastrointestinal: Negative for nausea, vomiting and abdominal pain.   Neurological: Negative for headaches.   Hematological: Negative for adenopathy. No excessive bruising.   Psychiatric/Behavioral: Negative for confusion and sleep disturbance.         Social History     Tobacco Use    Smoking status: Never Smoker    Smokeless tobacco: Never Used   Substance Use Topics    Alcohol use: Yes     Alcohol/week: 0.0 standard drinks     Frequency: Monthly or less     Comment: socially       Review of patient's allergies indicates:   Allergen Reactions    Azithromycin Swelling and Edema     Past Medical History:   Diagnosis Date    Asthma     Encounter for  blood transfusion     HIV infection     dx     Hyperthyroidism in pregnancy, antepartum      Past Surgical History:   Procedure Laterality Date    CERVICAL CERCLAGE      emergent with twins    CERVICAL CERCLAGE N/A 2019    Procedure: CERCLAGE, CERVIX;  Surgeon: Obey Henry MD;  Location: [facility] L&D;  Service: OB/GYN;  Laterality: N/A;     SECTION WITH TUBAL LIGATION N/A 6/15/2019    Procedure:  SECTION, WITH TUBAL LIGATION;  Surgeon: Madeline Walden MD;  Location: [facility] L&D;  Service: OB/GYN;  Laterality: N/A;     SECTION, CLASSIC      Last section with classical extention, from [facility]    COLD KNIFE CONIZATION OF CERVIX N/A 2018    Procedure: CONE BIOPSY, CERVIX, USING COLD KNIFE;  Surgeon: Chantal Worrell MD;  Location: [facility];  Service: OB/GYN;  Laterality: N/A;    CONIZATION OF CERVIX USING LOOP ELECTROSURGICAL EXCISION PROCEDURE (LEEP) N/A 2018    Procedure: LEEP CONIZATION, CERVIX;  Surgeon: Chantal Worrell MD;  Location: [facility];  Service: OB/GYN;  Laterality: N/A;    CYSTOSCOPY N/A 10/22/2019    Procedure: CYSTOSCOPY;  Surgeon: Georgia Howard MD;  Location: [facility];  Service: OB/GYN;  Laterality: N/A;    DILATION AND CURETTAGE OF UTERUS      ROBOT-ASSISTED LAPAROSCOPIC ABDOMINAL HYSTERECTOMY USING DA GALINDO XI N/A 10/22/2019    Procedure: XI ROBOTIC HYSTERECTOMY;  Surgeon: Georgia Howard MD;  Location: [facility];  Service: OB/GYN;  Laterality: N/A;    ROBOT-ASSISTED SURGICAL REMOVAL OF FALLOPIAN TUBE USING DA GALINDO XI Bilateral 10/22/2019    Procedure: XI ROBOTIC SALPINGECTOMY;  Surgeon: Georgia Howard MD;  Location: [facility];  Service: OB/GYN;  Laterality: Bilateral;    WISDOM TOOTH EXTRACTION       Current Outpatient Medications on File Prior to Visit   Medication Sig    hydrOXYzine HCL (ATARAX) 25 MG tablet 1 tablet every evening.    albuterol (PROVENTIL) 5 mg/mL nebulizer solution 0.5 mLs 4 (four) times daily as needed.     amoxicillin-clavulanate 875-125mg (AUGMENTIN) 875-125 mg per tablet Take 1 tablet by mouth 2 (two) times daily.    DESCOVY 200-25 mg Tab once daily.     HYDROcodone-acetaminophen (NORCO) 5-325 mg per tablet Take 1 tablet by mouth every 4 (four) hours as needed for Pain.    ibuprofen (ADVIL,MOTRIN) 600 MG tablet Take 1 tablet (600 mg total) by mouth every 6 (six) hours as needed for Pain.    inhaler,assist device,lg mask (OPTICHAMBER HERBERT LG MASK MISC) U UTD BID    omeprazole (PRILOSEC) 20 MG capsule Take 1 capsule (20 mg total) by mouth once daily.    OPTICHAMBER HERBERT LG MASK Spcr U UTD BID    prenatal 25/iron fum/folic/dha (PRENATAL-1 ORAL) Take by mouth.    PREZISTA 600 mg Tab 600 mg 2 (two) times daily with meals.     PROCHAMBER     promethazine (PHENERGAN) 25 MG tablet Take 25 mg by mouth every 6 (six) hours as needed.     ritonavir (NORVIR) 100 mg Tab tablet 100 mg 2 (two) times daily with meals.     VENTOLIN HFA 90 mcg/actuation inhaler Inhale 2 puffs into the lungs every 6 (six) hours as needed.     [DISCONTINUED] ranitidine (ZANTAC) 150 MG tablet TAKE 1 TABLET(150 MG) BY MOUTH TWICE DAILY    [DISCONTINUED] sulfamethoxazole-trimethoprim 400-80mg (BACTRIM,SEPTRA) 400-80 mg per tablet      Current Facility-Administered Medications on File Prior to Visit   Medication    fluticasone 220 mcg/actuation inhaler 1 puff       Objective:      Vitals:    12/14/20 1301   BP: 114/68   Pulse: 71     Physical Exam   Constitutional: She is oriented to person, place, and time. She appears well-developed and well-nourished.   HENT:   Head: Normocephalic.   Mouth/Throat: Mallampati Score: IV.   Neck: Normal range of motion. Neck supple. No tracheal deviation present.   Cardiovascular: Normal rate and regular rhythm.   No murmur heard.  Pulmonary/Chest: Normal expansion, effort normal and breath sounds normal. She has no wheezes. She has no rales.   Abdominal: Soft. Bowel sounds are normal. She exhibits no  "distension. There is no abdominal tenderness.   Musculoskeletal: Normal range of motion.         General: No edema.   Neurological: She is alert and oriented to person, place, and time.   Skin: Skin is warm and dry.   Psychiatric: She has a normal mood and affect. Her behavior is normal. Judgment and thought content normal.   Vitals reviewed.    Personal Diagnostic Review  CXR: 12/10/20: [facility]  Assessment:     Orders Placed This Encounter   Procedures    NEBULIZER FOR HOME USE     Order Specific Question:   Height:     Answer:   5' 7" (1.702 m)     Order Specific Question:   Weight:     Answer:   99.7 kg (219 lb 12.8 oz)     Order Specific Question:   Length of need (1-99 months):     Answer:   99     Order Specific Question:   Vendor:     Answer:   Other (use comments)     Order Specific Question:   Expected Date of Delivery:     Answer:   12/14/2020    NEBULIZER KIT (SUPPLIES) FOR HOME USE     Order Specific Question:   Height:     Answer:   5' 7" (1.702 m)     Order Specific Question:   Weight:     Answer:   99.7 kg (219 lb 12.8 oz)     Order Specific Question:   Length of need (1-99 months):     Answer:   99     Order Specific Question:   Mask or Mouthpiece?     Answer:   Mouthpiece     Order Specific Question:   Vendor:     Answer:   Other (use comments)     Order Specific Question:   Expected Date of Delivery:     Answer:   12/14/2020    Influenza (FLUAD) - Trivalent (Adjuvanted)    DLCO-Carbon Monoxide Diffusing Capacity     Standing Status:   Future     Standing Expiration Date:   12/14/2021    LUNG VOLUMES     Standing Status:   Future     Standing Expiration Date:   12/14/2021    Spirometry with/without bronchodilator     Standing Status:   Future     Standing Expiration Date:   12/14/2021     1. Moderate persistent asthma without complication        Plan:         Problem List Items Addressed This Visit        Pulmonary    Moderate persistent asthma without complication    Current Assessment & Plan    "  Take advair 2 puffs as needed every 12 hours  Use rescue inhaler in between for relief of symptoms  Prescribed nebulizer for home use.    Plan for PFTs prior to next visit.         Relevant Orders    NEBULIZER FOR HOME USE    NEBULIZER KIT (SUPPLIES) FOR HOME USE    DLCO-Carbon Monoxide Diffusing Capacity    LUNG VOLUMES    Spirometry with/without bronchodilator

## 2020-12-15 ENCOUNTER — TELEPHONE (OUTPATIENT)
Dept: PULMONOLOGY | Facility: CLINIC | Age: 35
End: 2020-12-15

## 2020-12-15 ENCOUNTER — LAB VISIT (OUTPATIENT)
Dept: PRIMARY CARE CLINIC | Facility: CLINIC | Age: 35
End: 2020-12-15
Payer: MEDICARE

## 2020-12-15 DIAGNOSIS — R06.02 SHORTNESS OF BREATH: ICD-10-CM

## 2020-12-15 PROCEDURE — U0003 INFECTIOUS AGENT DETECTION BY NUCLEIC ACID (DNA OR RNA); SEVERE ACUTE RESPIRATORY SYNDROME CORONAVIRUS 2 (SARS-COV-2) (CORONAVIRUS DISEASE [COVID-19]), AMPLIFIED PROBE TECHNIQUE, MAKING USE OF HIGH THROUGHPUT TECHNOLOGIES AS DESCRIBED BY CMS-2020-01-R: HCPCS

## 2020-12-15 NOTE — TELEPHONE ENCOUNTER
----- Message from Apurva Zully sent at 12/15/2020  9:16 AM CST -----  Contact: [phone]  Caller says when she checked at the pharmacy, there is no  perscription there for her for a  updated nebulizer machine and kit.   Received the nebulizer solution.      Asking why not, since you said you would be sending it to [facility].   Pls call to discuss this today.

## 2020-12-15 NOTE — TELEPHONE ENCOUNTER
Spoke with patient, informed her that I have received her message. I advised patient that her nebulizer machine comes from [facility] and that she can only receive nebulizer solution from the pharmacy. I also advised patient that I faxed her nebulizer order to [facility] and that staff will reach out to schedule delivery time. Patient verbalized that she understand.

## 2020-12-16 LAB — SARS-COV-2 RNA RESP QL NAA+PROBE: NOT DETECTED

## 2020-12-18 ENCOUNTER — HOSPITAL ENCOUNTER (OUTPATIENT)
Dept: PULMONOLOGY | Facility: CLINIC | Age: 35
Discharge: HOME | End: 2020-12-18
Payer: MEDICARE

## 2020-12-18 DIAGNOSIS — J45.40 MODERATE PERSISTENT ASTHMA WITHOUT COMPLICATION: ICD-10-CM

## 2020-12-18 PROCEDURE — 94729 DIFFUSING CAPACITY: CPT | Mod: S$GLB,,, | Performed by: INTERNAL MEDICINE

## 2020-12-18 PROCEDURE — 94060 PR EVAL OF BRONCHOSPASM: ICD-10-PCS | Mod: S$GLB,,, | Performed by: INTERNAL MEDICINE

## 2020-12-18 PROCEDURE — 94060 EVALUATION OF WHEEZING: CPT | Mod: S$GLB,,, | Performed by: INTERNAL MEDICINE

## 2020-12-18 PROCEDURE — 94727 GAS DIL/WSHOT DETER LNG VOL: CPT | Mod: S$GLB,,, | Performed by: INTERNAL MEDICINE

## 2020-12-18 PROCEDURE — 94729 PR C02/MEMBANE DIFFUSE CAPACITY: ICD-10-PCS | Mod: S$GLB,,, | Performed by: INTERNAL MEDICINE

## 2020-12-18 PROCEDURE — 94727 PR PULM FUNCTION TEST BY GAS: ICD-10-PCS | Mod: S$GLB,,, | Performed by: INTERNAL MEDICINE

## 2021-01-25 ENCOUNTER — LAB VISIT (OUTPATIENT)
Dept: LAB | Facility: HOSPITAL | Age: 36
End: 2021-01-25
Payer: MEDICARE

## 2021-01-25 ENCOUNTER — OFFICE VISIT (OUTPATIENT)
Dept: ENDOCRINOLOGY | Facility: CLINIC | Age: 36
End: 2021-01-25
Payer: MEDICARE

## 2021-01-25 VITALS
HEART RATE: 95 BPM | OXYGEN SATURATION: 98 % | WEIGHT: 223 LBS | DIASTOLIC BLOOD PRESSURE: 76 MMHG | BODY MASS INDEX: 34.93 KG/M2 | RESPIRATION RATE: 16 BRPM | SYSTOLIC BLOOD PRESSURE: 138 MMHG

## 2021-01-25 DIAGNOSIS — E05.90 HYPERTHYROIDISM: ICD-10-CM

## 2021-01-25 LAB
ALBUMIN SERPL BCP-MCNC: 4.4 G/DL (ref 3.5–5.2)
ALP SERPL-CCNC: 66 U/L (ref 55–135)
ALT SERPL W/O P-5'-P-CCNC: 12 U/L (ref 10–44)
ANION GAP SERPL CALC-SCNC: 10 MMOL/L (ref 8–16)
AST SERPL-CCNC: 18 U/L (ref 10–40)
BASOPHILS # BLD AUTO: 0.02 K/UL (ref 0–0.2)
BASOPHILS NFR BLD: 0.4 % (ref 0–1.9)
BILIRUB SERPL-MCNC: 0.3 MG/DL (ref 0.1–1)
BUN SERPL-MCNC: 14 MG/DL (ref 6–20)
CALCIUM SERPL-MCNC: 9.8 MG/DL (ref 8.7–10.5)
CHLORIDE SERPL-SCNC: 104 MMOL/L (ref 95–110)
CO2 SERPL-SCNC: 23 MMOL/L (ref 23–29)
CREAT SERPL-MCNC: 0.7 MG/DL (ref 0.5–1.4)
DIFFERENTIAL METHOD: NORMAL
EOSINOPHIL # BLD AUTO: 0.1 K/UL (ref 0–0.5)
EOSINOPHIL NFR BLD: 1.8 % (ref 0–8)
ERYTHROCYTE [DISTWIDTH] IN BLOOD BY AUTOMATED COUNT: 13 % (ref 11.5–14.5)
EST. GFR  (AFRICAN AMERICAN): >60 ML/MIN/1.73 M^2
EST. GFR  (NON AFRICAN AMERICAN): >60 ML/MIN/1.73 M^2
GLUCOSE SERPL-MCNC: 82 MG/DL (ref 70–110)
HCT VFR BLD AUTO: 38.8 % (ref 37–48.5)
HGB BLD-MCNC: 13.2 G/DL (ref 12–16)
IMM GRANULOCYTES # BLD AUTO: 0.01 K/UL (ref 0–0.04)
IMM GRANULOCYTES NFR BLD AUTO: 0.2 % (ref 0–0.5)
LYMPHOCYTES # BLD AUTO: 1.6 K/UL (ref 1–4.8)
LYMPHOCYTES NFR BLD: 36 % (ref 18–48)
MCH RBC QN AUTO: 29.3 PG (ref 27–31)
MCHC RBC AUTO-ENTMCNC: 34 G/DL (ref 32–36)
MCV RBC AUTO: 86 FL (ref 82–98)
MONOCYTES # BLD AUTO: 0.4 K/UL (ref 0.3–1)
MONOCYTES NFR BLD: 7.7 % (ref 4–15)
NEUTROPHILS # BLD AUTO: 2.4 K/UL (ref 1.8–7.7)
NEUTROPHILS NFR BLD: 53.9 % (ref 38–73)
NRBC BLD-RTO: 0 /100 WBC
PLATELET # BLD AUTO: 229 K/UL (ref 150–350)
PMV BLD AUTO: 10.7 FL (ref 9.2–12.9)
POTASSIUM SERPL-SCNC: 3.5 MMOL/L (ref 3.5–5.1)
PROT SERPL-MCNC: 10 G/DL (ref 6–8.4)
RBC # BLD AUTO: 4.51 M/UL (ref 4–5.4)
SODIUM SERPL-SCNC: 137 MMOL/L (ref 136–145)
T3FREE SERPL-MCNC: 3.1 PG/ML (ref 2.3–4.2)
T4 FREE SERPL-MCNC: 0.85 NG/DL (ref 0.71–1.51)
TSH SERPL DL<=0.005 MIU/L-ACNC: 0.21 UIU/ML (ref 0.4–4)
WBC # BLD AUTO: 4.53 K/UL (ref 3.9–12.7)

## 2021-01-25 PROCEDURE — 3008F BODY MASS INDEX DOCD: CPT | Mod: CPTII,S$GLB,, | Performed by: INTERNAL MEDICINE

## 2021-01-25 PROCEDURE — 1126F AMNT PAIN NOTED NONE PRSNT: CPT | Mod: S$GLB,,, | Performed by: INTERNAL MEDICINE

## 2021-01-25 PROCEDURE — 3008F PR BODY MASS INDEX (BMI) DOCUMENTED: ICD-10-PCS | Mod: CPTII,S$GLB,, | Performed by: INTERNAL MEDICINE

## 2021-01-25 PROCEDURE — 84439 ASSAY OF FREE THYROXINE: CPT

## 2021-01-25 PROCEDURE — 99204 OFFICE O/P NEW MOD 45 MIN: CPT | Mod: S$GLB,,, | Performed by: INTERNAL MEDICINE

## 2021-01-25 PROCEDURE — 99999 PR PBB SHADOW E&M-EST. PATIENT-LVL V: CPT | Mod: PBBFAC,,, | Performed by: INTERNAL MEDICINE

## 2021-01-25 PROCEDURE — 84481 FREE ASSAY (FT-3): CPT

## 2021-01-25 PROCEDURE — 83520 IMMUNOASSAY QUANT NOS NONAB: CPT

## 2021-01-25 PROCEDURE — 1126F PR PAIN SEVERITY QUANTIFIED, NO PAIN PRESENT: ICD-10-PCS | Mod: S$GLB,,, | Performed by: INTERNAL MEDICINE

## 2021-01-25 PROCEDURE — 80053 COMPREHEN METABOLIC PANEL: CPT

## 2021-01-25 PROCEDURE — 85025 COMPLETE CBC W/AUTO DIFF WBC: CPT

## 2021-01-25 PROCEDURE — 99204 PR OFFICE/OUTPT VISIT, NEW, LEVL IV, 45-59 MIN: ICD-10-PCS | Mod: S$GLB,,, | Performed by: INTERNAL MEDICINE

## 2021-01-25 PROCEDURE — 99999 PR PBB SHADOW E&M-EST. PATIENT-LVL V: ICD-10-PCS | Mod: PBBFAC,,, | Performed by: INTERNAL MEDICINE

## 2021-01-25 PROCEDURE — 84443 ASSAY THYROID STIM HORMONE: CPT

## 2021-01-26 ENCOUNTER — HOSPITAL ENCOUNTER (OUTPATIENT)
Dept: ENDOCRINOLOGY | Facility: CLINIC | Age: 36
Discharge: HOME | End: 2021-01-26
Attending: INTERNAL MEDICINE
Payer: MEDICARE

## 2021-01-26 DIAGNOSIS — E05.90 HYPERTHYROIDISM: ICD-10-CM

## 2021-01-26 PROCEDURE — 76536 US SOFT TISSUE HEAD NECK THYROID: ICD-10-PCS | Mod: S$GLB,,, | Performed by: INTERNAL MEDICINE

## 2021-01-26 PROCEDURE — 76536 US EXAM OF HEAD AND NECK: CPT | Mod: S$GLB,,, | Performed by: INTERNAL MEDICINE

## 2021-01-27 LAB — TSH RECEP AB SER-ACNC: 2.46 IU/L (ref 0–1.75)

## 2021-02-03 ENCOUNTER — TELEPHONE (OUTPATIENT)
Dept: ENDOCRINOLOGY | Facility: CLINIC | Age: 36
End: 2021-02-03

## 2021-02-03 DIAGNOSIS — E04.1 THYROID NODULE: Primary | ICD-10-CM

## 2021-02-04 ENCOUNTER — OFFICE VISIT (OUTPATIENT)
Dept: URGENT CARE | Facility: CLINIC | Age: 36
End: 2021-02-04
Payer: MEDICARE

## 2021-02-04 ENCOUNTER — CLINICAL SUPPORT (OUTPATIENT)
Dept: URGENT CARE | Facility: CLINIC | Age: 36
End: 2021-02-04
Payer: MEDICARE

## 2021-02-04 VITALS
SYSTOLIC BLOOD PRESSURE: 129 MMHG | RESPIRATION RATE: 18 BRPM | HEIGHT: 67 IN | DIASTOLIC BLOOD PRESSURE: 86 MMHG | HEART RATE: 67 BPM | TEMPERATURE: 99 F | WEIGHT: 223 LBS | BODY MASS INDEX: 35 KG/M2 | OXYGEN SATURATION: 97 %

## 2021-02-04 DIAGNOSIS — Z11.59 SCREENING FOR VIRAL DISEASE: Primary | ICD-10-CM

## 2021-02-04 DIAGNOSIS — J30.2 SEASONAL ALLERGIES: ICD-10-CM

## 2021-02-04 DIAGNOSIS — R05.9 COUGH: ICD-10-CM

## 2021-02-04 DIAGNOSIS — J45.40 MODERATE PERSISTENT ASTHMA WITHOUT COMPLICATION: Primary | ICD-10-CM

## 2021-02-04 LAB
CTP QC/QA: YES
SARS-COV-2 RDRP RESP QL NAA+PROBE: NEGATIVE

## 2021-02-04 PROCEDURE — U0002 COVID-19 LAB TEST NON-CDC: HCPCS | Mod: QW,S$GLB,, | Performed by: NURSE PRACTITIONER

## 2021-02-04 PROCEDURE — U0002: ICD-10-PCS | Mod: QW,S$GLB,, | Performed by: NURSE PRACTITIONER

## 2021-02-04 PROCEDURE — 99214 OFFICE O/P EST MOD 30 MIN: CPT | Mod: S$GLB,,, | Performed by: NURSE PRACTITIONER

## 2021-02-04 PROCEDURE — 3008F PR BODY MASS INDEX (BMI) DOCUMENTED: ICD-10-PCS | Mod: CPTII,S$GLB,, | Performed by: NURSE PRACTITIONER

## 2021-02-04 PROCEDURE — 3008F BODY MASS INDEX DOCD: CPT | Mod: CPTII,S$GLB,, | Performed by: NURSE PRACTITIONER

## 2021-02-04 PROCEDURE — 99214 PR OFFICE/OUTPT VISIT, EST, LEVL IV, 30-39 MIN: ICD-10-PCS | Mod: S$GLB,,, | Performed by: NURSE PRACTITIONER

## 2021-02-04 RX ORDER — FLUTICASONE PROPIONATE 50 MCG
2 SPRAY, SUSPENSION (ML) NASAL DAILY
Qty: 15.8 ML | Refills: 0 | Status: SHIPPED | OUTPATIENT
Start: 2021-02-04 | End: 2022-06-23

## 2021-02-11 ENCOUNTER — HOSPITAL ENCOUNTER (OUTPATIENT)
Dept: RADIOLOGY | Facility: HOSPITAL | Age: 36
Discharge: HOME | End: 2021-02-11
Attending: INTERNAL MEDICINE
Payer: MEDICARE

## 2021-02-11 DIAGNOSIS — E04.1 THYROID NODULE: ICD-10-CM

## 2021-02-11 PROCEDURE — 78014 NM THYROID UPTAKE AND SCAN: ICD-10-PCS | Mod: 26,,, | Performed by: RADIOLOGY

## 2021-02-11 PROCEDURE — 78014 THYROID IMAGING W/BLOOD FLOW: CPT | Mod: TC

## 2021-02-11 PROCEDURE — 78014 THYROID IMAGING W/BLOOD FLOW: CPT | Mod: 26,,, | Performed by: RADIOLOGY

## 2021-02-12 ENCOUNTER — HOSPITAL ENCOUNTER (OUTPATIENT)
Dept: RADIOLOGY | Facility: HOSPITAL | Age: 36
Discharge: HOME | End: 2021-02-12
Attending: INTERNAL MEDICINE
Payer: MEDICARE

## 2021-03-09 ENCOUNTER — HOSPITAL ENCOUNTER (OUTPATIENT)
Dept: ENDOCRINOLOGY | Facility: CLINIC | Age: 36
Discharge: HOME | End: 2021-03-09
Attending: INTERNAL MEDICINE
Payer: MEDICARE

## 2021-03-09 DIAGNOSIS — R22.9 LOCALIZED SWELLING, MASS AND LUMP, UNSPECIFIED: ICD-10-CM

## 2021-03-09 DIAGNOSIS — E04.1 THYROID NODULE: ICD-10-CM

## 2021-03-09 PROCEDURE — 88173 CYTOPATH EVAL FNA REPORT: CPT | Mod: 26,,, | Performed by: PATHOLOGY

## 2021-03-09 PROCEDURE — 88173 CYTOPATH EVAL FNA REPORT: CPT | Performed by: PATHOLOGY

## 2021-03-09 PROCEDURE — 10005 FNA BX W/US GDN 1ST LES: CPT | Mod: S$GLB,,, | Performed by: INTERNAL MEDICINE

## 2021-03-09 PROCEDURE — 88173 PR  INTERPRETATION OF FNA SMEAR: ICD-10-PCS | Mod: 26,,, | Performed by: PATHOLOGY

## 2021-03-09 PROCEDURE — 10005 US FINE NEEDLE ASPIRATION THYROID, FIRST LESION: ICD-10-PCS | Mod: S$GLB,,, | Performed by: INTERNAL MEDICINE

## 2021-03-10 ENCOUNTER — HOSPITAL ENCOUNTER (OUTPATIENT)
Dept: RADIOLOGY | Facility: HOSPITAL | Age: 36
Discharge: HOME | End: 2021-03-10
Attending: INTERNAL MEDICINE
Payer: MEDICARE

## 2021-03-10 DIAGNOSIS — R22.9 LOCALIZED SWELLING, MASS AND LUMP, UNSPECIFIED: ICD-10-CM

## 2021-03-10 PROCEDURE — 25500020 PHARM REV CODE 255: Performed by: INTERNAL MEDICINE

## 2021-03-10 PROCEDURE — 70491 CT SOFT TISSUE NECK W/DYE: CPT | Mod: 26,,, | Performed by: RADIOLOGY

## 2021-03-10 PROCEDURE — 70491 CT SOFT TISSUE NECK W/DYE: CPT | Mod: TC

## 2021-03-10 PROCEDURE — 70491 CT SOFT TISSUE NECK WITH CONTRAST: ICD-10-PCS | Mod: 26,,, | Performed by: RADIOLOGY

## 2021-03-10 RX ADMIN — IOHEXOL 75 ML: 350 INJECTION, SOLUTION INTRAVENOUS at 04:03

## 2021-03-11 ENCOUNTER — OFFICE VISIT (OUTPATIENT)
Dept: OTOLARYNGOLOGY | Facility: CLINIC | Age: 36
End: 2021-03-11
Payer: MEDICARE

## 2021-03-11 VITALS
BODY MASS INDEX: 37.08 KG/M2 | HEART RATE: 69 BPM | WEIGHT: 236.75 LBS | DIASTOLIC BLOOD PRESSURE: 75 MMHG | SYSTOLIC BLOOD PRESSURE: 135 MMHG

## 2021-03-11 DIAGNOSIS — E05.10 THYROID NODULE, TOXIC OR WITH HYPERTHYROIDISM: Primary | ICD-10-CM

## 2021-03-11 DIAGNOSIS — Z03.818 ENCOUNTER FOR OBSERVATION FOR SUSPECTED EXPOSURE TO OTHER BIOLOGICAL AGENTS RULED OUT: ICD-10-CM

## 2021-03-11 DIAGNOSIS — E05.00 GRAVES' DISEASE: ICD-10-CM

## 2021-03-11 LAB — FINAL PATHOLOGIC DIAGNOSIS: NORMAL

## 2021-03-11 PROCEDURE — 1126F PR PAIN SEVERITY QUANTIFIED, NO PAIN PRESENT: ICD-10-PCS | Mod: S$GLB,,, | Performed by: OTOLARYNGOLOGY

## 2021-03-11 PROCEDURE — 1126F AMNT PAIN NOTED NONE PRSNT: CPT | Mod: S$GLB,,, | Performed by: OTOLARYNGOLOGY

## 2021-03-11 PROCEDURE — 99999 PR PBB SHADOW E&M-EST. PATIENT-LVL V: ICD-10-PCS | Mod: PBBFAC,,, | Performed by: OTOLARYNGOLOGY

## 2021-03-11 PROCEDURE — 99204 OFFICE O/P NEW MOD 45 MIN: CPT | Mod: S$GLB,,, | Performed by: OTOLARYNGOLOGY

## 2021-03-11 PROCEDURE — 99204 PR OFFICE/OUTPT VISIT, NEW, LEVL IV, 45-59 MIN: ICD-10-PCS | Mod: S$GLB,,, | Performed by: OTOLARYNGOLOGY

## 2021-03-11 PROCEDURE — 3008F PR BODY MASS INDEX (BMI) DOCUMENTED: ICD-10-PCS | Mod: CPTII,S$GLB,, | Performed by: OTOLARYNGOLOGY

## 2021-03-11 PROCEDURE — 3008F BODY MASS INDEX DOCD: CPT | Mod: CPTII,S$GLB,, | Performed by: OTOLARYNGOLOGY

## 2021-03-11 PROCEDURE — 99999 PR PBB SHADOW E&M-EST. PATIENT-LVL V: CPT | Mod: PBBFAC,,, | Performed by: OTOLARYNGOLOGY

## 2021-03-11 RX ORDER — LIDOCAINE HYDROCHLORIDE 10 MG/ML
1 INJECTION, SOLUTION EPIDURAL; INFILTRATION; INTRACAUDAL; PERINEURAL ONCE
Status: CANCELLED | OUTPATIENT
Start: 2021-03-11 | End: 2021-03-11

## 2021-03-11 RX ORDER — DEXAMETHASONE SODIUM PHOSPHATE 4 MG/ML
12 INJECTION, SOLUTION INTRA-ARTICULAR; INTRALESIONAL; INTRAMUSCULAR; INTRAVENOUS; SOFT TISSUE ONCE
Status: CANCELLED | OUTPATIENT
Start: 2021-04-13

## 2021-03-11 RX ORDER — SODIUM CHLORIDE 0.9 % (FLUSH) 0.9 %
10 SYRINGE (ML) INJECTION
Status: CANCELLED | OUTPATIENT
Start: 2021-03-11

## 2021-03-17 ENCOUNTER — TELEPHONE (OUTPATIENT)
Dept: ENDOCRINOLOGY | Facility: CLINIC | Age: 36
End: 2021-03-17

## 2021-03-17 ENCOUNTER — PATIENT MESSAGE (OUTPATIENT)
Dept: ENDOCRINOLOGY | Facility: CLINIC | Age: 36
End: 2021-03-17

## 2021-03-17 DIAGNOSIS — E05.90 HYPERTHYROIDISM: Primary | ICD-10-CM

## 2021-03-17 RX ORDER — METHIMAZOLE 5 MG/1
5 TABLET ORAL DAILY
Qty: 30 TABLET | Refills: 11 | Status: SHIPPED | OUTPATIENT
Start: 2021-03-17 | End: 2021-04-26

## 2021-03-28 ENCOUNTER — ANESTHESIA EVENT (OUTPATIENT)
Dept: SURGERY | Facility: HOSPITAL | Age: 36
End: 2021-03-28
Payer: MEDICARE

## 2021-03-31 RX ORDER — ASCORBIC ACID 250 MG
250 TABLET ORAL DAILY
COMMUNITY
End: 2022-06-23

## 2021-03-31 RX ORDER — MULTIVITAMIN
1 TABLET ORAL DAILY
COMMUNITY
End: 2021-08-13

## 2021-04-05 ENCOUNTER — TELEPHONE (OUTPATIENT)
Dept: ENDOCRINOLOGY | Facility: CLINIC | Age: 36
End: 2021-04-05

## 2021-04-05 RX ORDER — POVIDONE-IODINE 10 %
SOLUTION, NON-ORAL TOPICAL
Refills: 0 | Status: CANCELLED | OUTPATIENT
Start: 2021-04-05

## 2021-04-10 ENCOUNTER — LAB VISIT (OUTPATIENT)
Dept: INTERNAL MEDICINE | Facility: CLINIC | Age: 36
End: 2021-04-10
Payer: MEDICARE

## 2021-04-10 DIAGNOSIS — E05.90 HYPERTHYROIDISM: ICD-10-CM

## 2021-04-10 DIAGNOSIS — Z03.818 ENCOUNTER FOR OBSERVATION FOR SUSPECTED EXPOSURE TO OTHER BIOLOGICAL AGENTS RULED OUT: ICD-10-CM

## 2021-04-10 LAB — SARS-COV-2 RNA RESP QL NAA+PROBE: NOT DETECTED

## 2021-04-10 PROCEDURE — U0005 INFEC AGEN DETEC AMPLI PROBE: HCPCS | Performed by: OTOLARYNGOLOGY

## 2021-04-10 PROCEDURE — U0003 INFECTIOUS AGENT DETECTION BY NUCLEIC ACID (DNA OR RNA); SEVERE ACUTE RESPIRATORY SYNDROME CORONAVIRUS 2 (SARS-COV-2) (CORONAVIRUS DISEASE [COVID-19]), AMPLIFIED PROBE TECHNIQUE, MAKING USE OF HIGH THROUGHPUT TECHNOLOGIES AS DESCRIBED BY CMS-2020-01-R: HCPCS | Performed by: OTOLARYNGOLOGY

## 2021-04-13 ENCOUNTER — ANESTHESIA (OUTPATIENT)
Dept: SURGERY | Facility: HOSPITAL | Age: 36
End: 2021-04-13
Payer: MEDICARE

## 2021-04-13 ENCOUNTER — HOSPITAL ENCOUNTER (OUTPATIENT)
Facility: HOSPITAL | Age: 36
Discharge: HOME | End: 2021-04-14
Attending: OTOLARYNGOLOGY | Admitting: OTOLARYNGOLOGY
Payer: MEDICARE

## 2021-04-13 DIAGNOSIS — E05.10 THYROID NODULE, TOXIC OR WITH HYPERTHYROIDISM: Primary | ICD-10-CM

## 2021-04-13 DIAGNOSIS — E05.00 GRAVES' DISEASE: ICD-10-CM

## 2021-04-13 LAB
T3 SERPL-MCNC: 221 NG/DL (ref 60–180)
T4 FREE SERPL-MCNC: 1.39 NG/DL (ref 0.71–1.51)
TSH SERPL DL<=0.005 MIU/L-ACNC: <0.01 UIU/ML (ref 0.4–4)

## 2021-04-13 PROCEDURE — 88331 PATH CONSLTJ SURG 1 BLK 1SPC: CPT | Mod: 26,,, | Performed by: PATHOLOGY

## 2021-04-13 PROCEDURE — 36000707: Performed by: OTOLARYNGOLOGY

## 2021-04-13 PROCEDURE — 84439 ASSAY OF FREE THYROXINE: CPT | Performed by: STUDENT IN AN ORGANIZED HEALTH CARE EDUCATION/TRAINING PROGRAM

## 2021-04-13 PROCEDURE — D9220A PRA ANESTHESIA: Mod: ,,, | Performed by: ANESTHESIOLOGY

## 2021-04-13 PROCEDURE — 38510 PR BIOPSY/REM LYMPH NODES, CERVICAL: ICD-10-PCS | Mod: 51,RT,, | Performed by: OTOLARYNGOLOGY

## 2021-04-13 PROCEDURE — 25000003 PHARM REV CODE 250: Performed by: STUDENT IN AN ORGANIZED HEALTH CARE EDUCATION/TRAINING PROGRAM

## 2021-04-13 PROCEDURE — 88305 TISSUE EXAM BY PATHOLOGIST: ICD-10-PCS | Mod: 26,,, | Performed by: STUDENT IN AN ORGANIZED HEALTH CARE EDUCATION/TRAINING PROGRAM

## 2021-04-13 PROCEDURE — 25000003 PHARM REV CODE 250: Performed by: NURSE ANESTHETIST, CERTIFIED REGISTERED

## 2021-04-13 PROCEDURE — 60220 PR THYROID LOBECTOMY,UNILAT: ICD-10-PCS | Mod: RT,,, | Performed by: OTOLARYNGOLOGY

## 2021-04-13 PROCEDURE — 37000008 HC ANESTHESIA 1ST 15 MINUTES: Performed by: OTOLARYNGOLOGY

## 2021-04-13 PROCEDURE — D9220A PRA ANESTHESIA: ICD-10-PCS | Mod: ,,, | Performed by: ANESTHESIOLOGY

## 2021-04-13 PROCEDURE — 88307 TISSUE EXAM BY PATHOLOGIST: CPT | Mod: 26,,, | Performed by: STUDENT IN AN ORGANIZED HEALTH CARE EDUCATION/TRAINING PROGRAM

## 2021-04-13 PROCEDURE — 88305 TISSUE EXAM BY PATHOLOGIST: CPT | Mod: 26,,, | Performed by: STUDENT IN AN ORGANIZED HEALTH CARE EDUCATION/TRAINING PROGRAM

## 2021-04-13 PROCEDURE — 71000016 HC POSTOP RECOV ADDL HR: Performed by: OTOLARYNGOLOGY

## 2021-04-13 PROCEDURE — 63600175 PHARM REV CODE 636 W HCPCS: Performed by: NURSE ANESTHETIST, CERTIFIED REGISTERED

## 2021-04-13 PROCEDURE — 88307 PR  SURG PATH,LEVEL V: ICD-10-PCS | Mod: 26,,, | Performed by: STUDENT IN AN ORGANIZED HEALTH CARE EDUCATION/TRAINING PROGRAM

## 2021-04-13 PROCEDURE — 84443 ASSAY THYROID STIM HORMONE: CPT | Performed by: STUDENT IN AN ORGANIZED HEALTH CARE EDUCATION/TRAINING PROGRAM

## 2021-04-13 PROCEDURE — 37000009 HC ANESTHESIA EA ADD 15 MINS: Performed by: OTOLARYNGOLOGY

## 2021-04-13 PROCEDURE — 63600175 PHARM REV CODE 636 W HCPCS: Performed by: OTOLARYNGOLOGY

## 2021-04-13 PROCEDURE — 88331 PR  PATH CONSULT IN SURG,W FRZ SEC: ICD-10-PCS | Mod: 26,,, | Performed by: PATHOLOGY

## 2021-04-13 PROCEDURE — 60220 PARTIAL REMOVAL OF THYROID: CPT | Mod: RT,,, | Performed by: OTOLARYNGOLOGY

## 2021-04-13 PROCEDURE — 38510 BIOPSY/REMOVAL LYMPH NODES: CPT | Mod: 51,RT,, | Performed by: OTOLARYNGOLOGY

## 2021-04-13 PROCEDURE — 88307 TISSUE EXAM BY PATHOLOGIST: CPT | Performed by: STUDENT IN AN ORGANIZED HEALTH CARE EDUCATION/TRAINING PROGRAM

## 2021-04-13 PROCEDURE — 36000706: Performed by: OTOLARYNGOLOGY

## 2021-04-13 PROCEDURE — 84480 ASSAY TRIIODOTHYRONINE (T3): CPT | Performed by: STUDENT IN AN ORGANIZED HEALTH CARE EDUCATION/TRAINING PROGRAM

## 2021-04-13 PROCEDURE — 88305 TISSUE EXAM BY PATHOLOGIST: CPT | Performed by: STUDENT IN AN ORGANIZED HEALTH CARE EDUCATION/TRAINING PROGRAM

## 2021-04-13 PROCEDURE — 71000015 HC POSTOP RECOV 1ST HR: Performed by: OTOLARYNGOLOGY

## 2021-04-13 PROCEDURE — 25000003 PHARM REV CODE 250: Performed by: OTOLARYNGOLOGY

## 2021-04-13 PROCEDURE — 88331 PATH CONSLTJ SURG 1 BLK 1SPC: CPT | Performed by: STUDENT IN AN ORGANIZED HEALTH CARE EDUCATION/TRAINING PROGRAM

## 2021-04-13 PROCEDURE — 63600175 PHARM REV CODE 636 W HCPCS: Performed by: ANESTHESIOLOGY

## 2021-04-13 PROCEDURE — 36415 COLL VENOUS BLD VENIPUNCTURE: CPT | Performed by: STUDENT IN AN ORGANIZED HEALTH CARE EDUCATION/TRAINING PROGRAM

## 2021-04-13 PROCEDURE — 63600175 PHARM REV CODE 636 W HCPCS: Performed by: STUDENT IN AN ORGANIZED HEALTH CARE EDUCATION/TRAINING PROGRAM

## 2021-04-13 PROCEDURE — 71000033 HC RECOVERY, INTIAL HOUR: Performed by: OTOLARYNGOLOGY

## 2021-04-13 RX ORDER — ALBUTEROL SULFATE 90 UG/1
1 AEROSOL, METERED RESPIRATORY (INHALATION) DAILY
Status: DISCONTINUED | OUTPATIENT
Start: 2021-04-13 | End: 2021-04-14 | Stop reason: HOSPADM

## 2021-04-13 RX ORDER — SUCCINYLCHOLINE CHLORIDE 20 MG/ML
INJECTION INTRAMUSCULAR; INTRAVENOUS
Status: DISCONTINUED | OUTPATIENT
Start: 2021-04-13 | End: 2021-04-13

## 2021-04-13 RX ORDER — SODIUM CHLORIDE 0.9 % (FLUSH) 0.9 %
10 SYRINGE (ML) INJECTION
Status: DISCONTINUED | OUTPATIENT
Start: 2021-04-13 | End: 2021-04-13

## 2021-04-13 RX ORDER — DEXMEDETOMIDINE HYDROCHLORIDE 100 UG/ML
INJECTION, SOLUTION INTRAVENOUS
Status: DISCONTINUED | OUTPATIENT
Start: 2021-04-13 | End: 2021-04-13

## 2021-04-13 RX ORDER — MAG HYDROX/ALUMINUM HYD/SIMETH 200-200-20
30 SUSPENSION, ORAL (FINAL DOSE FORM) ORAL
Status: DISCONTINUED | OUTPATIENT
Start: 2021-04-13 | End: 2021-04-14 | Stop reason: HOSPADM

## 2021-04-13 RX ORDER — FENTANYL CITRATE 50 UG/ML
INJECTION, SOLUTION INTRAMUSCULAR; INTRAVENOUS
Status: DISCONTINUED | OUTPATIENT
Start: 2021-04-13 | End: 2021-04-13

## 2021-04-13 RX ORDER — DARUNAVIR 600 MG/1
600 TABLET, FILM COATED ORAL 2 TIMES DAILY WITH MEALS
Status: DISCONTINUED | OUTPATIENT
Start: 2021-04-13 | End: 2021-04-14 | Stop reason: HOSPADM

## 2021-04-13 RX ORDER — HYDROMORPHONE HYDROCHLORIDE 2 MG/ML
INJECTION, SOLUTION INTRAMUSCULAR; INTRAVENOUS; SUBCUTANEOUS
Status: DISCONTINUED | OUTPATIENT
Start: 2021-04-13 | End: 2021-04-13

## 2021-04-13 RX ORDER — LIDOCAINE HYDROCHLORIDE 20 MG/ML
INJECTION, SOLUTION EPIDURAL; INFILTRATION; INTRACAUDAL; PERINEURAL
Status: DISCONTINUED | OUTPATIENT
Start: 2021-04-13 | End: 2021-04-13

## 2021-04-13 RX ORDER — ONDANSETRON 2 MG/ML
INJECTION INTRAMUSCULAR; INTRAVENOUS
Status: DISCONTINUED | OUTPATIENT
Start: 2021-04-13 | End: 2021-04-13

## 2021-04-13 RX ORDER — ACETAMINOPHEN 10 MG/ML
INJECTION, SOLUTION INTRAVENOUS
Status: DISCONTINUED | OUTPATIENT
Start: 2021-04-13 | End: 2021-04-13

## 2021-04-13 RX ORDER — KETAMINE HCL IN 0.9 % NACL 50 MG/5 ML
SYRINGE (ML) INTRAVENOUS
Status: DISCONTINUED | OUTPATIENT
Start: 2021-04-13 | End: 2021-04-13

## 2021-04-13 RX ORDER — ALBUTEROL SULFATE 5 MG/ML
2.5 SOLUTION RESPIRATORY (INHALATION) 4 TIMES DAILY PRN
Status: DISCONTINUED | OUTPATIENT
Start: 2021-04-13 | End: 2021-04-14 | Stop reason: HOSPADM

## 2021-04-13 RX ORDER — PANTOPRAZOLE SODIUM 40 MG/1
40 TABLET, DELAYED RELEASE ORAL DAILY
Refills: 11 | Status: DISCONTINUED | OUTPATIENT
Start: 2021-04-14 | End: 2021-04-14 | Stop reason: HOSPADM

## 2021-04-13 RX ORDER — RITONAVIR 100 MG/1
100 TABLET ORAL 2 TIMES DAILY
Status: DISCONTINUED | OUTPATIENT
Start: 2021-04-13 | End: 2021-04-14 | Stop reason: HOSPADM

## 2021-04-13 RX ORDER — ENOXAPARIN SODIUM 100 MG/ML
40 INJECTION SUBCUTANEOUS EVERY 24 HOURS
Status: DISCONTINUED | OUTPATIENT
Start: 2021-04-13 | End: 2021-04-14 | Stop reason: HOSPADM

## 2021-04-13 RX ORDER — ROCURONIUM BROMIDE 10 MG/ML
INJECTION, SOLUTION INTRAVENOUS
Status: DISCONTINUED | OUTPATIENT
Start: 2021-04-13 | End: 2021-04-13

## 2021-04-13 RX ORDER — PROPOFOL 10 MG/ML
VIAL (ML) INTRAVENOUS
Status: DISCONTINUED | OUTPATIENT
Start: 2021-04-13 | End: 2021-04-13

## 2021-04-13 RX ORDER — OXYCODONE AND ACETAMINOPHEN 5; 325 MG/1; MG/1
2 TABLET ORAL EVERY 6 HOURS PRN
Status: DISCONTINUED | OUTPATIENT
Start: 2021-04-13 | End: 2021-04-14 | Stop reason: HOSPADM

## 2021-04-13 RX ORDER — HYDROMORPHONE HYDROCHLORIDE 1 MG/ML
0.2 INJECTION, SOLUTION INTRAMUSCULAR; INTRAVENOUS; SUBCUTANEOUS EVERY 5 MIN PRN
Status: DISCONTINUED | OUTPATIENT
Start: 2021-04-13 | End: 2021-04-13 | Stop reason: HOSPADM

## 2021-04-13 RX ORDER — MIDAZOLAM HYDROCHLORIDE 1 MG/ML
INJECTION, SOLUTION INTRAMUSCULAR; INTRAVENOUS
Status: DISCONTINUED | OUTPATIENT
Start: 2021-04-13 | End: 2021-04-13

## 2021-04-13 RX ORDER — DEXAMETHASONE SODIUM PHOSPHATE 4 MG/ML
12 INJECTION, SOLUTION INTRA-ARTICULAR; INTRALESIONAL; INTRAMUSCULAR; INTRAVENOUS; SOFT TISSUE ONCE
Status: COMPLETED | OUTPATIENT
Start: 2021-04-13 | End: 2021-04-13

## 2021-04-13 RX ORDER — LIDOCAINE HYDROCHLORIDE 10 MG/ML
1 INJECTION, SOLUTION EPIDURAL; INFILTRATION; INTRACAUDAL; PERINEURAL ONCE
Status: DISCONTINUED | OUTPATIENT
Start: 2021-04-13 | End: 2021-04-13

## 2021-04-13 RX ORDER — LIDOCAINE HYDROCHLORIDE AND EPINEPHRINE 10; 10 MG/ML; UG/ML
INJECTION, SOLUTION INFILTRATION; PERINEURAL
Status: DISCONTINUED | OUTPATIENT
Start: 2021-04-13 | End: 2021-04-13 | Stop reason: HOSPADM

## 2021-04-13 RX ORDER — SODIUM CHLORIDE 0.9 % (FLUSH) 0.9 %
10 SYRINGE (ML) INJECTION
Status: DISCONTINUED | OUTPATIENT
Start: 2021-04-13 | End: 2021-04-13 | Stop reason: HOSPADM

## 2021-04-13 RX ORDER — MORPHINE SULFATE 2 MG/ML
2 INJECTION, SOLUTION INTRAMUSCULAR; INTRAVENOUS EVERY 6 HOURS PRN
Status: DISCONTINUED | OUTPATIENT
Start: 2021-04-13 | End: 2021-04-14 | Stop reason: HOSPADM

## 2021-04-13 RX ORDER — PROMETHAZINE HYDROCHLORIDE 12.5 MG/1
25 TABLET ORAL EVERY 6 HOURS PRN
Status: DISCONTINUED | OUTPATIENT
Start: 2021-04-13 | End: 2021-04-14 | Stop reason: HOSPADM

## 2021-04-13 RX ORDER — ONDANSETRON 2 MG/ML
4 INJECTION INTRAMUSCULAR; INTRAVENOUS EVERY 12 HOURS PRN
Status: DISCONTINUED | OUTPATIENT
Start: 2021-04-13 | End: 2021-04-14 | Stop reason: HOSPADM

## 2021-04-13 RX ORDER — OXYCODONE AND ACETAMINOPHEN 5; 325 MG/1; MG/1
1 TABLET ORAL EVERY 6 HOURS PRN
Status: DISCONTINUED | OUTPATIENT
Start: 2021-04-13 | End: 2021-04-14 | Stop reason: HOSPADM

## 2021-04-13 RX ORDER — CEFAZOLIN SODIUM 1 G/3ML
2 INJECTION, POWDER, FOR SOLUTION INTRAMUSCULAR; INTRAVENOUS
Status: COMPLETED | OUTPATIENT
Start: 2021-04-13 | End: 2021-04-13

## 2021-04-13 RX ADMIN — DARUNAVIR 600 MG: 600 TABLET, FILM COATED ORAL at 07:04

## 2021-04-13 RX ADMIN — MORPHINE SULFATE 2 MG: 2 INJECTION, SOLUTION INTRAMUSCULAR; INTRAVENOUS at 08:04

## 2021-04-13 RX ADMIN — Medication 20 MG: at 12:04

## 2021-04-13 RX ADMIN — DEXMEDETOMIDINE HYDROCHLORIDE 8 MCG: 100 INJECTION, SOLUTION INTRAVENOUS at 01:04

## 2021-04-13 RX ADMIN — GLYCOPYRROLATE 0.2 MG: 0.2 INJECTION, SOLUTION INTRAMUSCULAR; INTRAVITREAL at 12:04

## 2021-04-13 RX ADMIN — DEXAMETHASONE SODIUM PHOSPHATE 12 MG: 4 INJECTION INTRA-ARTICULAR; INTRALESIONAL; INTRAMUSCULAR; INTRAVENOUS; SOFT TISSUE at 12:04

## 2021-04-13 RX ADMIN — OXYCODONE HYDROCHLORIDE AND ACETAMINOPHEN 1 TABLET: 5; 325 TABLET ORAL at 04:04

## 2021-04-13 RX ADMIN — HYDROMORPHONE HYDROCHLORIDE 0.2 MG: 1 INJECTION, SOLUTION INTRAMUSCULAR; INTRAVENOUS; SUBCUTANEOUS at 01:04

## 2021-04-13 RX ADMIN — HYDROMORPHONE HYDROCHLORIDE 0.2 MG: 2 INJECTION INTRAMUSCULAR; INTRAVENOUS; SUBCUTANEOUS at 01:04

## 2021-04-13 RX ADMIN — ROCURONIUM BROMIDE 10 MG: 10 INJECTION, SOLUTION INTRAVENOUS at 12:04

## 2021-04-13 RX ADMIN — ACETAMINOPHEN 1000 MG: 10 INJECTION INTRAVENOUS at 02:04

## 2021-04-13 RX ADMIN — ONDANSETRON 4 MG: 2 INJECTION INTRAMUSCULAR; INTRAVENOUS at 02:04

## 2021-04-13 RX ADMIN — Medication 10 MG: at 01:04

## 2021-04-13 RX ADMIN — LIDOCAINE HYDROCHLORIDE 100 MG: 20 INJECTION, SOLUTION EPIDURAL; INFILTRATION; INTRACAUDAL at 12:04

## 2021-04-13 RX ADMIN — DEXMEDETOMIDINE HYDROCHLORIDE 8 MCG: 100 INJECTION, SOLUTION INTRAVENOUS at 12:04

## 2021-04-13 RX ADMIN — FENTANYL CITRATE 100 MCG: 50 INJECTION, SOLUTION INTRAMUSCULAR; INTRAVENOUS at 12:04

## 2021-04-13 RX ADMIN — SUCCINYLCHOLINE CHLORIDE 140 MG: 20 INJECTION, SOLUTION INTRAMUSCULAR; INTRAVENOUS; PARENTERAL at 12:04

## 2021-04-13 RX ADMIN — ALUMINUM HYDROXIDE, MAGNESIUM HYDROXIDE, AND SIMETHICONE 30 ML: 200; 200; 20 SUSPENSION ORAL at 09:04

## 2021-04-13 RX ADMIN — CEFAZOLIN 2 G: 330 INJECTION, POWDER, FOR SOLUTION INTRAMUSCULAR; INTRAVENOUS at 12:04

## 2021-04-13 RX ADMIN — HYDROMORPHONE HYDROCHLORIDE 0.2 MG: 1 INJECTION, SOLUTION INTRAMUSCULAR; INTRAVENOUS; SUBCUTANEOUS at 04:04

## 2021-04-13 RX ADMIN — MIDAZOLAM 2 MG: 1 INJECTION INTRAMUSCULAR; INTRAVENOUS at 12:04

## 2021-04-13 RX ADMIN — PROPOFOL 20 MG: 10 INJECTION, EMULSION INTRAVENOUS at 01:04

## 2021-04-13 RX ADMIN — ENOXAPARIN SODIUM 40 MG: 40 INJECTION SUBCUTANEOUS at 04:04

## 2021-04-13 RX ADMIN — HYDROMORPHONE HYDROCHLORIDE 0.6 MG: 2 INJECTION INTRAMUSCULAR; INTRAVENOUS; SUBCUTANEOUS at 01:04

## 2021-04-13 RX ADMIN — Medication 20 MG: at 01:04

## 2021-04-13 RX ADMIN — ALUMINUM HYDROXIDE, MAGNESIUM HYDROXIDE, AND SIMETHICONE 30 ML: 200; 200; 20 SUSPENSION ORAL at 04:04

## 2021-04-13 RX ADMIN — PROPOFOL 200 MG: 10 INJECTION, EMULSION INTRAVENOUS at 12:04

## 2021-04-13 RX ADMIN — HYDROMORPHONE HYDROCHLORIDE 0.2 MG: 1 INJECTION, SOLUTION INTRAMUSCULAR; INTRAVENOUS; SUBCUTANEOUS at 02:04

## 2021-04-13 RX ADMIN — RITONAVIR 100 MG: 100 TABLET, FILM COATED ORAL at 10:04

## 2021-04-13 RX ADMIN — SODIUM CHLORIDE: 0.9 INJECTION, SOLUTION INTRAVENOUS at 12:04

## 2021-04-14 VITALS
OXYGEN SATURATION: 94 % | SYSTOLIC BLOOD PRESSURE: 136 MMHG | TEMPERATURE: 98 F | DIASTOLIC BLOOD PRESSURE: 70 MMHG | RESPIRATION RATE: 16 BRPM | BODY MASS INDEX: 38.45 KG/M2 | HEART RATE: 87 BPM | WEIGHT: 245 LBS | HEIGHT: 67 IN

## 2021-04-14 PROCEDURE — 94799 UNLISTED PULMONARY SVC/PX: CPT

## 2021-04-14 PROCEDURE — 99900035 HC TECH TIME PER 15 MIN (STAT)

## 2021-04-14 PROCEDURE — 25000003 PHARM REV CODE 250: Performed by: STUDENT IN AN ORGANIZED HEALTH CARE EDUCATION/TRAINING PROGRAM

## 2021-04-14 PROCEDURE — 94761 N-INVAS EAR/PLS OXIMETRY MLT: CPT

## 2021-04-14 RX ORDER — ONDANSETRON 4 MG/1
4 TABLET, ORALLY DISINTEGRATING ORAL EVERY 6 HOURS PRN
Qty: 10 TABLET | Refills: 0 | Status: SHIPPED | OUTPATIENT
Start: 2021-04-14 | End: 2022-06-27 | Stop reason: SDUPTHER

## 2021-04-14 RX ORDER — OXYCODONE AND ACETAMINOPHEN 5; 325 MG/1; MG/1
1 TABLET ORAL EVERY 6 HOURS PRN
Qty: 15 TABLET | Refills: 0 | Status: SHIPPED | OUTPATIENT
Start: 2021-04-14 | End: 2022-06-23

## 2021-04-14 RX ADMIN — RITONAVIR 100 MG: 100 TABLET, FILM COATED ORAL at 08:04

## 2021-04-14 RX ADMIN — OXYCODONE HYDROCHLORIDE AND ACETAMINOPHEN 1 TABLET: 5; 325 TABLET ORAL at 05:04

## 2021-04-14 RX ADMIN — PANTOPRAZOLE SODIUM 40 MG: 40 TABLET, DELAYED RELEASE ORAL at 08:04

## 2021-04-14 RX ADMIN — ALUMINUM HYDROXIDE, MAGNESIUM HYDROXIDE, AND SIMETHICONE 30 ML: 200; 200; 20 SUSPENSION ORAL at 05:04

## 2021-04-14 RX ADMIN — DARUNAVIR 600 MG: 600 TABLET, FILM COATED ORAL at 08:04

## 2021-04-19 LAB
FINAL PATHOLOGIC DIAGNOSIS: NORMAL
FROZEN SECTION DIAGNOSIS: NORMAL
FROZEN SECTION FOOTNOTE: NORMAL
GROSS: NORMAL
Lab: NORMAL
MICROSCOPIC EXAM: NORMAL

## 2021-04-20 ENCOUNTER — TELEPHONE (OUTPATIENT)
Dept: OTOLARYNGOLOGY | Facility: CLINIC | Age: 36
End: 2021-04-20

## 2021-04-26 ENCOUNTER — OFFICE VISIT (OUTPATIENT)
Dept: ENDOCRINOLOGY | Facility: CLINIC | Age: 36
End: 2021-04-26
Payer: MEDICARE

## 2021-04-26 ENCOUNTER — PATIENT MESSAGE (OUTPATIENT)
Dept: OTOLARYNGOLOGY | Facility: CLINIC | Age: 36
End: 2021-04-26

## 2021-04-26 ENCOUNTER — PATIENT MESSAGE (OUTPATIENT)
Dept: RESEARCH | Facility: HOSPITAL | Age: 36
End: 2021-04-26

## 2021-04-26 DIAGNOSIS — Z79.818 LONG TERM (CURRENT) USE OF OTHER AGENTS AFFECTING ESTROGEN RECEPTORS AND ESTROGEN LEVELS: ICD-10-CM

## 2021-04-26 DIAGNOSIS — E05.00 GRAVES' DISEASE: ICD-10-CM

## 2021-04-26 DIAGNOSIS — E05.10 THYROID NODULE, TOXIC OR WITH HYPERTHYROIDISM: ICD-10-CM

## 2021-04-26 PROCEDURE — 99214 PR OFFICE/OUTPT VISIT, EST, LEVL IV, 30-39 MIN: ICD-10-PCS | Mod: 95,,, | Performed by: INTERNAL MEDICINE

## 2021-04-26 PROCEDURE — 99214 OFFICE O/P EST MOD 30 MIN: CPT | Mod: 95,,, | Performed by: INTERNAL MEDICINE

## 2021-04-26 RX ORDER — METHIMAZOLE 10 MG/1
10 TABLET ORAL DAILY
Qty: 90 TABLET | Refills: 1 | Status: CANCELLED | OUTPATIENT
Start: 2021-04-26 | End: 2022-04-26

## 2021-04-27 ENCOUNTER — TELEPHONE (OUTPATIENT)
Dept: OBSTETRICS AND GYNECOLOGY | Facility: CLINIC | Age: 36
End: 2021-04-27

## 2021-04-29 ENCOUNTER — OFFICE VISIT (OUTPATIENT)
Dept: OBSTETRICS AND GYNECOLOGY | Facility: CLINIC | Age: 36
End: 2021-04-29
Attending: OBSTETRICS & GYNECOLOGY
Payer: MEDICARE

## 2021-04-29 VITALS
SYSTOLIC BLOOD PRESSURE: 136 MMHG | BODY MASS INDEX: 38.2 KG/M2 | WEIGHT: 243.38 LBS | DIASTOLIC BLOOD PRESSURE: 80 MMHG | HEIGHT: 67 IN

## 2021-04-29 DIAGNOSIS — B20 HIV INFECTION, UNSPECIFIED SYMPTOM STATUS: ICD-10-CM

## 2021-04-29 DIAGNOSIS — N87.1 CIN II (CERVICAL INTRAEPITHELIAL NEOPLASIA II): ICD-10-CM

## 2021-04-29 DIAGNOSIS — Z01.419 ENCOUNTER FOR ROUTINE GYNECOLOGIC EXAMINATION IN MEDICARE PATIENT: Primary | ICD-10-CM

## 2021-04-29 PROBLEM — O99.280 HYPERTHYROIDISM IN PREGNANCY, ANTEPARTUM: Status: RESOLVED | Noted: 2018-12-20 | Resolved: 2021-04-29

## 2021-04-29 PROBLEM — E05.90 HYPERTHYROIDISM IN PREGNANCY, ANTEPARTUM: Status: RESOLVED | Noted: 2018-12-20 | Resolved: 2021-04-29

## 2021-04-29 PROBLEM — R87.613 HGSIL (HIGH GRADE SQUAMOUS INTRAEPITHELIAL LESION) ON PAP SMEAR OF CERVIX: Status: RESOLVED | Noted: 2019-10-22 | Resolved: 2021-04-29

## 2021-04-29 PROBLEM — Z90.710 S/P LAPAROSCOPIC HYSTERECTOMY: Status: RESOLVED | Noted: 2019-10-22 | Resolved: 2021-04-29

## 2021-04-29 PROCEDURE — 3008F PR BODY MASS INDEX (BMI) DOCUMENTED: ICD-10-PCS | Mod: CPTII,S$GLB,, | Performed by: OBSTETRICS & GYNECOLOGY

## 2021-04-29 PROCEDURE — 99999 PR PBB SHADOW E&M-EST. PATIENT-LVL III: CPT | Mod: PBBFAC,,, | Performed by: OBSTETRICS & GYNECOLOGY

## 2021-04-29 PROCEDURE — 88175 CYTOPATH C/V AUTO FLUID REDO: CPT | Performed by: OBSTETRICS & GYNECOLOGY

## 2021-04-29 PROCEDURE — G0101 CA SCREEN;PELVIC/BREAST EXAM: HCPCS | Mod: S$GLB,,, | Performed by: OBSTETRICS & GYNECOLOGY

## 2021-04-29 PROCEDURE — 3008F BODY MASS INDEX DOCD: CPT | Mod: CPTII,S$GLB,, | Performed by: OBSTETRICS & GYNECOLOGY

## 2021-04-29 PROCEDURE — G0101 PR CA SCREEN;PELVIC/BREAST EXAM: ICD-10-PCS | Mod: S$GLB,,, | Performed by: OBSTETRICS & GYNECOLOGY

## 2021-04-29 PROCEDURE — 1126F AMNT PAIN NOTED NONE PRSNT: CPT | Mod: S$GLB,,, | Performed by: OBSTETRICS & GYNECOLOGY

## 2021-04-29 PROCEDURE — 1126F PR PAIN SEVERITY QUANTIFIED, NO PAIN PRESENT: ICD-10-PCS | Mod: S$GLB,,, | Performed by: OBSTETRICS & GYNECOLOGY

## 2021-04-29 PROCEDURE — 99999 PR PBB SHADOW E&M-EST. PATIENT-LVL III: ICD-10-PCS | Mod: PBBFAC,,, | Performed by: OBSTETRICS & GYNECOLOGY

## 2021-05-04 LAB
CLINICAL INFO: NORMAL
CYTO CVX: NORMAL
CYTOLOGIST CVX/VAG CYTO: NORMAL
CYTOLOGY CMNT CVX/VAG CYTO-IMP: NORMAL
CYTOLOGY PAP THIN PREP EXPLANATION: NORMAL
DATE OF PREVIOUS PAP: NORMAL
DATE PREVIOUS BX: NO
LMP START DATE: NORMAL
SPECIMEN SOURCE CVX/VAG CYTO: NORMAL
STAT OF ADQ CVX/VAG CYTO-IMP: NORMAL

## 2021-05-06 ENCOUNTER — OFFICE VISIT (OUTPATIENT)
Dept: OTOLARYNGOLOGY | Facility: CLINIC | Age: 36
End: 2021-05-06
Payer: MEDICARE

## 2021-05-06 VITALS
BODY MASS INDEX: 38.53 KG/M2 | SYSTOLIC BLOOD PRESSURE: 116 MMHG | HEART RATE: 86 BPM | DIASTOLIC BLOOD PRESSURE: 78 MMHG | WEIGHT: 246.06 LBS

## 2021-05-06 DIAGNOSIS — E04.1 THYROID NODULE: ICD-10-CM

## 2021-05-06 PROCEDURE — 3008F PR BODY MASS INDEX (BMI) DOCUMENTED: ICD-10-PCS | Mod: CPTII,S$GLB,, | Performed by: NURSE PRACTITIONER

## 2021-05-06 PROCEDURE — 99024 POSTOP FOLLOW-UP VISIT: CPT | Mod: S$GLB,,, | Performed by: NURSE PRACTITIONER

## 2021-05-06 PROCEDURE — 3008F BODY MASS INDEX DOCD: CPT | Mod: CPTII,S$GLB,, | Performed by: NURSE PRACTITIONER

## 2021-05-06 PROCEDURE — 1126F PR PAIN SEVERITY QUANTIFIED, NO PAIN PRESENT: ICD-10-PCS | Mod: S$GLB,,, | Performed by: NURSE PRACTITIONER

## 2021-05-06 PROCEDURE — 99024 PR POST-OP FOLLOW-UP VISIT: ICD-10-PCS | Mod: S$GLB,,, | Performed by: NURSE PRACTITIONER

## 2021-05-06 PROCEDURE — 99999 PR PBB SHADOW E&M-EST. PATIENT-LVL III: ICD-10-PCS | Mod: PBBFAC,,, | Performed by: NURSE PRACTITIONER

## 2021-05-06 PROCEDURE — 99999 PR PBB SHADOW E&M-EST. PATIENT-LVL III: CPT | Mod: PBBFAC,,, | Performed by: NURSE PRACTITIONER

## 2021-05-06 PROCEDURE — 1126F AMNT PAIN NOTED NONE PRSNT: CPT | Mod: S$GLB,,, | Performed by: NURSE PRACTITIONER

## 2021-05-28 ENCOUNTER — PATIENT MESSAGE (OUTPATIENT)
Dept: ENDOCRINOLOGY | Facility: CLINIC | Age: 36
End: 2021-05-28

## 2021-05-28 ENCOUNTER — HOSPITAL ENCOUNTER (OUTPATIENT)
Dept: RADIOLOGY | Facility: CLINIC | Age: 36
Discharge: HOME | End: 2021-05-28
Attending: INTERNAL MEDICINE
Payer: MEDICARE

## 2021-05-28 DIAGNOSIS — E05.00 GRAVES' DISEASE: ICD-10-CM

## 2021-05-28 DIAGNOSIS — Z79.818 LONG TERM (CURRENT) USE OF OTHER AGENTS AFFECTING ESTROGEN RECEPTORS AND ESTROGEN LEVELS: ICD-10-CM

## 2021-05-28 DIAGNOSIS — E05.00 GRAVES' DISEASE: Primary | ICD-10-CM

## 2021-05-28 PROCEDURE — 77080 DEXA BONE DENSITY SPINE HIP: ICD-10-PCS | Mod: 26,,, | Performed by: INTERNAL MEDICINE

## 2021-05-28 PROCEDURE — 77080 DXA BONE DENSITY AXIAL: CPT | Mod: TC

## 2021-05-28 PROCEDURE — 77080 DXA BONE DENSITY AXIAL: CPT | Mod: 26,,, | Performed by: INTERNAL MEDICINE

## 2021-05-28 RX ORDER — METHIMAZOLE 5 MG/1
5 TABLET ORAL DAILY
Qty: 30 TABLET | Refills: 2 | Status: SHIPPED | OUTPATIENT
Start: 2021-05-28 | End: 2021-08-13

## 2021-05-31 ENCOUNTER — PATIENT MESSAGE (OUTPATIENT)
Dept: ENDOCRINOLOGY | Facility: CLINIC | Age: 36
End: 2021-05-31

## 2021-07-08 ENCOUNTER — PATIENT MESSAGE (OUTPATIENT)
Dept: ENDOCRINOLOGY | Facility: CLINIC | Age: 36
End: 2021-07-08

## 2021-08-04 ENCOUNTER — PATIENT MESSAGE (OUTPATIENT)
Dept: ENDOCRINOLOGY | Facility: CLINIC | Age: 36
End: 2021-08-04

## 2021-08-06 ENCOUNTER — LAB VISIT (OUTPATIENT)
Dept: LAB | Facility: HOSPITAL | Age: 36
End: 2021-08-06
Attending: INTERNAL MEDICINE
Payer: MEDICARE

## 2021-08-06 DIAGNOSIS — E05.00 GRAVES' DISEASE: ICD-10-CM

## 2021-08-06 LAB
T3 SERPL-MCNC: 128 NG/DL (ref 60–180)
T4 FREE SERPL-MCNC: 0.64 NG/DL (ref 0.71–1.51)
TSH SERPL DL<=0.005 MIU/L-ACNC: 0.32 UIU/ML (ref 0.4–4)

## 2021-08-06 PROCEDURE — 84443 ASSAY THYROID STIM HORMONE: CPT | Performed by: INTERNAL MEDICINE

## 2021-08-06 PROCEDURE — 84480 ASSAY TRIIODOTHYRONINE (T3): CPT | Performed by: INTERNAL MEDICINE

## 2021-08-06 PROCEDURE — 36415 COLL VENOUS BLD VENIPUNCTURE: CPT | Mod: PN | Performed by: INTERNAL MEDICINE

## 2021-08-06 PROCEDURE — 84439 ASSAY OF FREE THYROXINE: CPT | Performed by: INTERNAL MEDICINE

## 2021-08-13 ENCOUNTER — OFFICE VISIT (OUTPATIENT)
Dept: ENDOCRINOLOGY | Facility: CLINIC | Age: 36
End: 2021-08-13
Payer: MEDICARE

## 2021-08-13 DIAGNOSIS — R79.89 ABNORMAL THYROID BLOOD TEST: Primary | ICD-10-CM

## 2021-08-13 DIAGNOSIS — R94.6 ABNORMAL RESULTS OF THYROID FUNCTION STUDIES: ICD-10-CM

## 2021-08-13 DIAGNOSIS — E05.00 GRAVES' DISEASE: ICD-10-CM

## 2021-08-13 PROCEDURE — 99214 PR OFFICE/OUTPT VISIT, EST, LEVL IV, 30-39 MIN: ICD-10-PCS | Mod: 95,,, | Performed by: INTERNAL MEDICINE

## 2021-08-13 PROCEDURE — 99214 OFFICE O/P EST MOD 30 MIN: CPT | Mod: 95,,, | Performed by: INTERNAL MEDICINE

## 2021-08-13 PROCEDURE — 1159F MED LIST DOCD IN RCRD: CPT | Mod: CPTII,95,, | Performed by: INTERNAL MEDICINE

## 2021-08-13 PROCEDURE — 1159F PR MEDICATION LIST DOCUMENTED IN MEDICAL RECORD: ICD-10-PCS | Mod: CPTII,95,, | Performed by: INTERNAL MEDICINE

## 2021-08-13 PROCEDURE — 1160F RVW MEDS BY RX/DR IN RCRD: CPT | Mod: CPTII,95,, | Performed by: INTERNAL MEDICINE

## 2021-08-13 PROCEDURE — 1160F PR REVIEW ALL MEDS BY PRESCRIBER/CLIN PHARMACIST DOCUMENTED: ICD-10-PCS | Mod: CPTII,95,, | Performed by: INTERNAL MEDICINE

## 2021-09-27 ENCOUNTER — PATIENT MESSAGE (OUTPATIENT)
Dept: OBSTETRICS AND GYNECOLOGY | Facility: CLINIC | Age: 36
End: 2021-09-27

## 2021-09-27 ENCOUNTER — TELEPHONE (OUTPATIENT)
Dept: OBSTETRICS AND GYNECOLOGY | Facility: CLINIC | Age: 36
End: 2021-09-27

## 2021-09-29 ENCOUNTER — TELEPHONE (OUTPATIENT)
Dept: OBSTETRICS AND GYNECOLOGY | Facility: CLINIC | Age: 36
End: 2021-09-29

## 2021-10-03 ENCOUNTER — OFFICE VISIT (OUTPATIENT)
Dept: URGENT CARE | Facility: CLINIC | Age: 36
End: 2021-10-03
Payer: MEDICARE

## 2021-10-03 VITALS
HEART RATE: 80 BPM | DIASTOLIC BLOOD PRESSURE: 79 MMHG | SYSTOLIC BLOOD PRESSURE: 132 MMHG | WEIGHT: 245 LBS | OXYGEN SATURATION: 98 % | BODY MASS INDEX: 38.45 KG/M2 | RESPIRATION RATE: 16 BRPM | TEMPERATURE: 98 F | HEIGHT: 67 IN

## 2021-10-03 DIAGNOSIS — J02.9 SORE THROAT: ICD-10-CM

## 2021-10-03 DIAGNOSIS — Z20.822 ENCOUNTER FOR LABORATORY TESTING FOR COVID-19 VIRUS: ICD-10-CM

## 2021-10-03 DIAGNOSIS — J01.00 ACUTE NON-RECURRENT MAXILLARY SINUSITIS: Primary | ICD-10-CM

## 2021-10-03 LAB
CTP QC/QA: YES
CTP QC/QA: YES
MOLECULAR STREP A: NEGATIVE
SARS-COV-2 RDRP RESP QL NAA+PROBE: NEGATIVE

## 2021-10-03 PROCEDURE — 3078F DIAST BP <80 MM HG: CPT | Mod: CPTII,S$GLB,, | Performed by: STUDENT IN AN ORGANIZED HEALTH CARE EDUCATION/TRAINING PROGRAM

## 2021-10-03 PROCEDURE — 1160F PR REVIEW ALL MEDS BY PRESCRIBER/CLIN PHARMACIST DOCUMENTED: ICD-10-PCS | Mod: CPTII,S$GLB,, | Performed by: STUDENT IN AN ORGANIZED HEALTH CARE EDUCATION/TRAINING PROGRAM

## 2021-10-03 PROCEDURE — U0002: ICD-10-PCS | Mod: QW,S$GLB,, | Performed by: STUDENT IN AN ORGANIZED HEALTH CARE EDUCATION/TRAINING PROGRAM

## 2021-10-03 PROCEDURE — 99213 OFFICE O/P EST LOW 20 MIN: CPT | Mod: S$GLB,,, | Performed by: STUDENT IN AN ORGANIZED HEALTH CARE EDUCATION/TRAINING PROGRAM

## 2021-10-03 PROCEDURE — 1160F RVW MEDS BY RX/DR IN RCRD: CPT | Mod: CPTII,S$GLB,, | Performed by: STUDENT IN AN ORGANIZED HEALTH CARE EDUCATION/TRAINING PROGRAM

## 2021-10-03 PROCEDURE — 87651 POCT STREP A MOLECULAR: ICD-10-PCS | Mod: QW,S$GLB,, | Performed by: STUDENT IN AN ORGANIZED HEALTH CARE EDUCATION/TRAINING PROGRAM

## 2021-10-03 PROCEDURE — U0002 COVID-19 LAB TEST NON-CDC: HCPCS | Mod: QW,S$GLB,, | Performed by: STUDENT IN AN ORGANIZED HEALTH CARE EDUCATION/TRAINING PROGRAM

## 2021-10-03 PROCEDURE — 3075F PR MOST RECENT SYSTOLIC BLOOD PRESS GE 130-139MM HG: ICD-10-PCS | Mod: CPTII,S$GLB,, | Performed by: STUDENT IN AN ORGANIZED HEALTH CARE EDUCATION/TRAINING PROGRAM

## 2021-10-03 PROCEDURE — 99213 PR OFFICE/OUTPT VISIT, EST, LEVL III, 20-29 MIN: ICD-10-PCS | Mod: S$GLB,,, | Performed by: STUDENT IN AN ORGANIZED HEALTH CARE EDUCATION/TRAINING PROGRAM

## 2021-10-03 PROCEDURE — 3008F PR BODY MASS INDEX (BMI) DOCUMENTED: ICD-10-PCS | Mod: CPTII,S$GLB,, | Performed by: STUDENT IN AN ORGANIZED HEALTH CARE EDUCATION/TRAINING PROGRAM

## 2021-10-03 PROCEDURE — 3075F SYST BP GE 130 - 139MM HG: CPT | Mod: CPTII,S$GLB,, | Performed by: STUDENT IN AN ORGANIZED HEALTH CARE EDUCATION/TRAINING PROGRAM

## 2021-10-03 PROCEDURE — 3078F PR MOST RECENT DIASTOLIC BLOOD PRESSURE < 80 MM HG: ICD-10-PCS | Mod: CPTII,S$GLB,, | Performed by: STUDENT IN AN ORGANIZED HEALTH CARE EDUCATION/TRAINING PROGRAM

## 2021-10-03 PROCEDURE — 1159F MED LIST DOCD IN RCRD: CPT | Mod: CPTII,S$GLB,, | Performed by: STUDENT IN AN ORGANIZED HEALTH CARE EDUCATION/TRAINING PROGRAM

## 2021-10-03 PROCEDURE — 1159F PR MEDICATION LIST DOCUMENTED IN MEDICAL RECORD: ICD-10-PCS | Mod: CPTII,S$GLB,, | Performed by: STUDENT IN AN ORGANIZED HEALTH CARE EDUCATION/TRAINING PROGRAM

## 2021-10-03 PROCEDURE — 3008F BODY MASS INDEX DOCD: CPT | Mod: CPTII,S$GLB,, | Performed by: STUDENT IN AN ORGANIZED HEALTH CARE EDUCATION/TRAINING PROGRAM

## 2021-10-03 PROCEDURE — 87651 STREP A DNA AMP PROBE: CPT | Mod: QW,S$GLB,, | Performed by: STUDENT IN AN ORGANIZED HEALTH CARE EDUCATION/TRAINING PROGRAM

## 2021-10-03 RX ORDER — AMOXICILLIN AND CLAVULANATE POTASSIUM 875; 125 MG/1; MG/1
1 TABLET, FILM COATED ORAL EVERY 12 HOURS
Qty: 14 TABLET | Refills: 0 | Status: SHIPPED | OUTPATIENT
Start: 2021-10-03 | End: 2021-10-10

## 2021-10-08 ENCOUNTER — LAB VISIT (OUTPATIENT)
Dept: LAB | Facility: HOSPITAL | Age: 36
End: 2021-10-08
Attending: INTERNAL MEDICINE
Payer: MEDICARE

## 2021-10-08 DIAGNOSIS — R94.6 ABNORMAL RESULTS OF THYROID FUNCTION STUDIES: ICD-10-CM

## 2021-10-08 DIAGNOSIS — R79.89 ABNORMAL THYROID BLOOD TEST: ICD-10-CM

## 2021-10-08 LAB
CORTIS SERPL-MCNC: 8.9 UG/DL (ref 4.3–22.4)
FSH SERPL-ACNC: 5.28 MIU/ML
PROLACTIN SERPL IA-MCNC: 16.9 NG/ML (ref 5.2–26.5)
T4 FREE SERPL-MCNC: 0.91 NG/DL (ref 0.71–1.51)
TSH SERPL DL<=0.005 MIU/L-ACNC: <0.01 UIU/ML (ref 0.4–4)

## 2021-10-08 PROCEDURE — 84443 ASSAY THYROID STIM HORMONE: CPT | Performed by: INTERNAL MEDICINE

## 2021-10-08 PROCEDURE — 82024 ASSAY OF ACTH: CPT | Performed by: INTERNAL MEDICINE

## 2021-10-08 PROCEDURE — 82533 TOTAL CORTISOL: CPT | Performed by: INTERNAL MEDICINE

## 2021-10-08 PROCEDURE — 84305 ASSAY OF SOMATOMEDIN: CPT | Performed by: INTERNAL MEDICINE

## 2021-10-08 PROCEDURE — 83001 ASSAY OF GONADOTROPIN (FSH): CPT | Performed by: INTERNAL MEDICINE

## 2021-10-08 PROCEDURE — 84439 ASSAY OF FREE THYROXINE: CPT | Performed by: INTERNAL MEDICINE

## 2021-10-08 PROCEDURE — 84146 ASSAY OF PROLACTIN: CPT | Performed by: INTERNAL MEDICINE

## 2021-10-12 DIAGNOSIS — E05.00 GRAVES' DISEASE: Primary | ICD-10-CM

## 2021-10-12 LAB
ACTH PLAS-MCNC: 31 PG/ML (ref 0–46)
IGF-I SERPL-MCNC: 141 NG/ML (ref 54–258)
IGF-I Z-SCORE SERPL: 0.25 SD

## 2021-10-12 RX ORDER — METHIMAZOLE 5 MG/1
TABLET ORAL
Qty: 45 TABLET | Refills: 0 | Status: SHIPPED | OUTPATIENT
Start: 2021-10-12 | End: 2021-12-03

## 2021-11-24 ENCOUNTER — OFFICE VISIT (OUTPATIENT)
Dept: URGENT CARE | Facility: CLINIC | Age: 36
End: 2021-11-24
Payer: MEDICARE

## 2021-11-24 VITALS
WEIGHT: 257 LBS | BODY MASS INDEX: 40.34 KG/M2 | TEMPERATURE: 98 F | HEIGHT: 67 IN | HEART RATE: 79 BPM | RESPIRATION RATE: 18 BRPM | DIASTOLIC BLOOD PRESSURE: 50 MMHG | SYSTOLIC BLOOD PRESSURE: 138 MMHG | OXYGEN SATURATION: 98 %

## 2021-11-24 DIAGNOSIS — R11.2 NAUSEA AND VOMITING, INTRACTABILITY OF VOMITING NOT SPECIFIED, UNSPECIFIED VOMITING TYPE: Primary | ICD-10-CM

## 2021-11-24 PROCEDURE — 99213 PR OFFICE/OUTPT VISIT, EST, LEVL III, 20-29 MIN: ICD-10-PCS | Mod: S$GLB,,,

## 2021-11-24 PROCEDURE — 99213 OFFICE O/P EST LOW 20 MIN: CPT | Mod: S$GLB,,,

## 2021-11-24 RX ORDER — ONDANSETRON 4 MG/1
4 TABLET, ORALLY DISINTEGRATING ORAL 2 TIMES DAILY
Qty: 30 TABLET | Refills: 0 | Status: SHIPPED | OUTPATIENT
Start: 2021-11-24 | End: 2021-12-09

## 2021-12-02 ENCOUNTER — PATIENT MESSAGE (OUTPATIENT)
Dept: PULMONOLOGY | Facility: CLINIC | Age: 36
End: 2021-12-02
Payer: MEDICARE

## 2021-12-02 DIAGNOSIS — J45.40 MODERATE PERSISTENT ASTHMA WITHOUT COMPLICATION: Primary | ICD-10-CM

## 2021-12-03 ENCOUNTER — OFFICE VISIT (OUTPATIENT)
Dept: ENDOCRINOLOGY | Facility: CLINIC | Age: 36
End: 2021-12-03
Payer: MEDICARE

## 2021-12-03 ENCOUNTER — PATIENT MESSAGE (OUTPATIENT)
Dept: ENDOCRINOLOGY | Facility: CLINIC | Age: 36
End: 2021-12-03

## 2021-12-03 DIAGNOSIS — E05.00 GRAVES' DISEASE: ICD-10-CM

## 2021-12-03 PROCEDURE — 99213 OFFICE O/P EST LOW 20 MIN: CPT | Mod: 95,,, | Performed by: INTERNAL MEDICINE

## 2021-12-03 PROCEDURE — 99213 PR OFFICE/OUTPT VISIT, EST, LEVL III, 20-29 MIN: ICD-10-PCS | Mod: 95,,, | Performed by: INTERNAL MEDICINE

## 2021-12-03 RX ORDER — ALBUTEROL SULFATE 5 MG/ML
2.5 SOLUTION RESPIRATORY (INHALATION) 4 TIMES DAILY PRN
Qty: 1 EACH | Refills: 4 | Status: SHIPPED | OUTPATIENT
Start: 2021-12-03 | End: 2023-03-02 | Stop reason: SDUPTHER

## 2022-01-05 ENCOUNTER — PATIENT MESSAGE (OUTPATIENT)
Dept: GASTROENTEROLOGY | Facility: CLINIC | Age: 37
End: 2022-01-05
Payer: MEDICARE

## 2022-01-06 ENCOUNTER — OFFICE VISIT (OUTPATIENT)
Dept: GASTROENTEROLOGY | Facility: CLINIC | Age: 37
End: 2022-01-06
Payer: MEDICARE

## 2022-01-06 DIAGNOSIS — K21.9 GASTROESOPHAGEAL REFLUX DISEASE, UNSPECIFIED WHETHER ESOPHAGITIS PRESENT: Primary | ICD-10-CM

## 2022-01-06 DIAGNOSIS — R11.2 NAUSEA AND VOMITING, INTRACTABILITY OF VOMITING NOT SPECIFIED, UNSPECIFIED VOMITING TYPE: ICD-10-CM

## 2022-01-06 PROCEDURE — 99499 UNLISTED E&M SERVICE: CPT | Mod: 95,,, | Performed by: STUDENT IN AN ORGANIZED HEALTH CARE EDUCATION/TRAINING PROGRAM

## 2022-01-06 PROCEDURE — 99499 NO LOS: ICD-10-PCS | Mod: 95,,, | Performed by: STUDENT IN AN ORGANIZED HEALTH CARE EDUCATION/TRAINING PROGRAM

## 2022-01-06 RX ORDER — OMEPRAZOLE 40 MG/1
40 CAPSULE, DELAYED RELEASE ORAL DAILY
Qty: 30 CAPSULE | Refills: 5 | Status: SHIPPED | OUTPATIENT
Start: 2022-01-06 | End: 2022-06-08 | Stop reason: SDUPTHER

## 2022-01-06 NOTE — PROGRESS NOTES
[facility] Telemedicine Consult Note    The patient location is:   The chief complaint leading to consultation is: Nausea and vomiting    Visit type: audiovisual    Face to Face time with patient: 20  25 minutes of total time spent on the encounter, which includes face to face time and non-face to face time preparing to see the patient (eg, review of tests), Obtaining and/or reviewing separately obtained history, Documenting clinical information in the electronic or other health record, Independently interpreting results (not separately reported) and communicating results to the patient/family/caregiver, or Care coordination (not separately reported).         Each patient to whom he or she provides medical services by telemedicine is:  (1) informed of the relationship between the physician and patient and the respective role of any other health care provider with respect to management of the patient; and (2) notified that he or she may decline to receive medical services by telemedicine and may withdraw from such care at any time.    Notes:   Reason for Consult:  The primary encounter diagnosis was Gastroesophageal reflux disease, unspecified whether esophagitis present. A diagnosis of Nausea and vomiting, intractability of vomiting not specified, unspecified vomiting type was also pertinent to this visit.    PCP:   Primary Doctor No   [id number] / [place] [id number]    Referring MD:  Cee Leonardo Pa-c  [id number] [id number]    HPI:  This is a 36 y.o. female here for evaluation of nausea and vomiting.    She was referred from urgent care in the setting of nausea vomiting. She reports her symptoms began initially in November 2021.  She experiences recurrent nausea and vomiting over the course of a day and then symptoms resolve.  She finds it is largely related to her diet and symptoms are worse with greasy and spicy foods.  If she is able to avoid these foods, she  does not have any nausea or vomiting.  Symptoms really only occur when she eats the trigger foods.  Emesis is non-bloody.    She was given zofran in the urgent care which helped with symptoms.      She does have occasional abdominal pain in her RLQ.  The pain does not seem to be related to her nausea and/or vomiting.  She does have occasional constipation, but has been drinking activia probiotic drinks.  This has helped with her bowel movements.  She is moving her bowels 1-2 times daily.    Denies any tobacco or marijuana use.  Occasional alcohol use.      No prior EGD, but has had a colonoscopy years ago.    ROS:  Constitutional: No fevers, chills, No weight loss  ENT: No allergies  CV: No chest pain  Pulm: No cough, No shortness of breath  Ophtho: No vision changes  GI: see HPI  Derm: No rash  Heme: No lymphadenopathy, No bruising  MSK: No arthritis  : No dysuria, No hematuria  Endo: No hot or cold intolerance  Neuro: No syncope, No seizure  Psych: No anxiety, No depression    Medical History:  has a past medical history of Asthma, Encounter for blood transfusion, HIV infection (), and Hyperthyroidism in pregnancy, antepartum.    Surgical History:  has a past surgical history that includes Cervical cerclage;  section, classic; Dilation and curettage of uterus; Cold knife conization of cervix (N/A, 2018); Conization of cervix using loop electrosurgical excision procedure (LEEP) (N/A, 2018); Cervical cerclage (N/A, 2019);  section with tubal ligation (N/A, 6/15/2019); Bim tooth extraction; Robot-assisted laparoscopic abdominal hysterectomy using da Chas Xi (N/A, 10/22/2019); Cystoscopy (N/A, 10/22/2019); Robot-assisted surgical removal of fallopian tube using da Chas Xi (Bilateral, 10/22/2019); and Thyroidectomy (Right, 2021).    Family History: family history includes Diabetes in her maternal grandmother; Hypertension in her maternal grandmother; Sleep apnea in her  son..     Social History:  reports that she has never smoked. She has never used smokeless tobacco. She reports current alcohol use. She reports that she does not use drugs.    Review of patient's allergies indicates:   Allergen Reactions    Azithromycin Swelling and Edema       Current Outpatient Rx   Medication Sig Dispense Refill    albuterol (PROVENTIL) 5 mg/mL nebulizer solution Take 0.5 mLs (2.5 mg total) by nebulization 4 (four) times daily as needed for Wheezing or Shortness of Breath. 1 each 4    ascorbic acid, vitamin C, (VITAMIN C) 250 MG tablet Take 250 mg by mouth once daily.      DESCOVY 200-25 mg Tab once daily.   3    ELDERBERRY FRUIT ORAL Take by mouth.      fluticasone propion-salmeterol 115-21 mcg/dose (ADVAIR HFA) 115-21 mcg/actuation HFAA inhaler Inhale 2 puffs into the lungs every 12 (twelve) hours. Controller 3 Inhaler 4    fluticasone propionate (FLONASE) 50 mcg/actuation nasal spray 2 sprays (100 mcg total) by Each Nostril route once daily. 15.8 mL 0    hydrOXYzine HCL (ATARAX) 25 MG tablet 1 tablet every evening.      inhaler,assist device,lg mask (OPTICHAMBER HERBERT LG MASK MISC) U UTD BID      omeprazole (PRILOSEC) 40 MG capsule Take 1 capsule (40 mg total) by mouth once daily. 30 capsule 5    ondansetron (ZOFRAN-ODT) 4 MG TbDL Take 1 tablet (4 mg total) by mouth every 6 (six) hours as needed (nausea). 10 tablet 0    OPTICHAMBER HERBERT LG MASK Spcr U UTD BID  3    oxyCODONE-acetaminophen (PERCOCET) 5-325 mg per tablet Take 1 tablet by mouth every 6 (six) hours as needed for Pain. 15 tablet 0    PREZISTA 600 mg Tab 600 mg 2 (two) times daily with meals.   5    PROCHAMBER       promethazine (PHENERGAN) 25 MG tablet Take 25 mg by mouth every 6 (six) hours as needed.       ritonavir (NORVIR) 100 mg Tab tablet 100 mg 2 (two) times daily with meals.          Objective Findings:    Labs:  Lab Results   Component Value Date    WBC 4.53 01/25/2021    HGB 13.2 01/25/2021    HCT  38.8 01/25/2021     01/25/2021    ALT 12 01/25/2021    AST 18 01/25/2021     01/25/2021    K 3.5 01/25/2021     01/25/2021    CREATININE 0.7 01/25/2021    BUN 14 01/25/2021    CO2 23 01/25/2021    TSH <0.010 (L) 10/08/2021    HGBA1C 6.0 12/17/2013           Assessment:  1. Gastroesophageal reflux disease, unspecified whether esophagitis present    2. Nausea and vomiting, intractability of vomiting not specified, unspecified vomiting type      In summary, the patient has intermittent nausea and vomiting when eating certain foods.  I have a low suspicion for an obstructive process or motility abnormality.  Symptoms seem to be largely driven by diet and I have recommended she avoid trigger foods.  Additionally, she is noted to have intermittent GERD and symptoms may be reflux related.  In addition to dietary modifications, she will begin taking omeprazole 40mg once daily.    Other etiologies considered include symptomatic cholelithiasis and H. Pylori gastritis.    I will have her return to clinic 3 months to reassess symptoms.  If persistent or worsened nausea/vomiting, we will proceed with an endoscopic evaluation, labs, and GB imaging.      Follow up in about 3 months (around 4/6/2022).      Order summary:  Orders Placed This Encounter    omeprazole (PRILOSEC) 40 MG capsule         Thank you so much for allowing me to participate in the care of Jm Valencia MD

## 2022-02-11 ENCOUNTER — TELEPHONE (OUTPATIENT)
Dept: OBSTETRICS AND GYNECOLOGY | Facility: CLINIC | Age: 37
End: 2022-02-11
Payer: MEDICARE

## 2022-02-11 RX ORDER — TRIAMCINOLONE ACETONIDE 1 MG/G
OINTMENT TOPICAL 2 TIMES DAILY
Qty: 80 G | Refills: 3 | Status: SHIPPED | OUTPATIENT
Start: 2022-02-11 | End: 2022-02-21

## 2022-02-11 RX ORDER — FLUCONAZOLE 150 MG/1
150 TABLET ORAL DAILY
Qty: 1 TABLET | Refills: 1 | Status: SHIPPED | OUTPATIENT
Start: 2022-02-11 | End: 2022-02-12

## 2022-02-11 NOTE — TELEPHONE ENCOUNTER
----- Message from Yessenia Harley LPN sent at 2/11/2022 11:02 AM CST -----  Regarding: FW: Patient call back  PATIENT ONLY WANTS TO SPEAK TO YOU REGARDING PROBLEMS AFTER HYSTERECTOMY IN 2019.     JERSEY HARLEY LPN  ----- Message -----  From: Glendy Urbano  Sent: 2/11/2022  10:54 AM CST  To: [facility] Staff  Subject: Patient call back                                Who called:ZAY MERAZ [[id number]]    What is the request in detail: Patient is requesting a call back. Patient states she is having some issues and would only like to see Dr. Worrell. She states she would like to discuss her issues with the staff.   Please advise.    Can the clinic reply by [facility]? No    Best call back number: [phone]    Additional Information: N/A

## 2022-02-23 DIAGNOSIS — D84.9 IMMUNOSUPPRESSED STATUS: ICD-10-CM

## 2022-06-08 DIAGNOSIS — R11.2 NAUSEA AND VOMITING, INTRACTABILITY OF VOMITING NOT SPECIFIED, UNSPECIFIED VOMITING TYPE: ICD-10-CM

## 2022-06-09 RX ORDER — OMEPRAZOLE 40 MG/1
40 CAPSULE, DELAYED RELEASE ORAL DAILY
Qty: 30 CAPSULE | Refills: 5 | Status: SHIPPED | OUTPATIENT
Start: 2022-06-09 | End: 2023-10-26

## 2022-06-10 ENCOUNTER — TELEPHONE (OUTPATIENT)
Dept: OBSTETRICS AND GYNECOLOGY | Facility: CLINIC | Age: 37
End: 2022-06-10
Payer: MEDICARE

## 2022-06-10 ENCOUNTER — PATIENT MESSAGE (OUTPATIENT)
Dept: OBSTETRICS AND GYNECOLOGY | Facility: CLINIC | Age: 37
End: 2022-06-10
Payer: MEDICARE

## 2022-06-10 NOTE — TELEPHONE ENCOUNTER
----- Message from Bambi Ascencio sent at 6/10/2022 11:46 AM CDT -----  Patient is calling she is having pains in her left breast and under her armpit.    Patient is requesting to speak to Dr. Alicea.      Patient can be reached at [phone]

## 2022-06-10 NOTE — TELEPHONE ENCOUNTER
Pt sent portal message regarding breast pain  Offered Same day appointment   Pt declined requesting to s/w Dr. Worrell  Informed I will send message to Dr. Worrell, she is out of the clinic today and next week  Allow time for a response    Thanks

## 2022-06-23 ENCOUNTER — OFFICE VISIT (OUTPATIENT)
Dept: OBSTETRICS AND GYNECOLOGY | Facility: CLINIC | Age: 37
End: 2022-06-23
Attending: OBSTETRICS & GYNECOLOGY
Payer: MEDICARE

## 2022-06-23 VITALS
DIASTOLIC BLOOD PRESSURE: 86 MMHG | WEIGHT: 240.31 LBS | HEIGHT: 66 IN | BODY MASS INDEX: 38.62 KG/M2 | SYSTOLIC BLOOD PRESSURE: 130 MMHG

## 2022-06-23 DIAGNOSIS — Z01.419 WELL WOMAN EXAM WITH ROUTINE GYNECOLOGICAL EXAM: Primary | ICD-10-CM

## 2022-06-23 DIAGNOSIS — R63.5 WEIGHT GAIN: ICD-10-CM

## 2022-06-23 DIAGNOSIS — N64.4 MASTODYNIA: ICD-10-CM

## 2022-06-23 DIAGNOSIS — Z21 ASYMPTOMATIC HIV INFECTION, WITH NO HISTORY OF HIV-RELATED ILLNESS: ICD-10-CM

## 2022-06-23 DIAGNOSIS — N87.1 CIN II (CERVICAL INTRAEPITHELIAL NEOPLASIA II): ICD-10-CM

## 2022-06-23 PROCEDURE — 1159F PR MEDICATION LIST DOCUMENTED IN MEDICAL RECORD: ICD-10-PCS | Mod: CPTII,S$GLB,, | Performed by: OBSTETRICS & GYNECOLOGY

## 2022-06-23 PROCEDURE — 3075F SYST BP GE 130 - 139MM HG: CPT | Mod: CPTII,S$GLB,, | Performed by: OBSTETRICS & GYNECOLOGY

## 2022-06-23 PROCEDURE — G0101 PR CA SCREEN;PELVIC/BREAST EXAM: ICD-10-PCS | Mod: S$GLB,,, | Performed by: OBSTETRICS & GYNECOLOGY

## 2022-06-23 PROCEDURE — 88175 CYTOPATH C/V AUTO FLUID REDO: CPT | Performed by: OBSTETRICS & GYNECOLOGY

## 2022-06-23 PROCEDURE — 3008F BODY MASS INDEX DOCD: CPT | Mod: CPTII,S$GLB,, | Performed by: OBSTETRICS & GYNECOLOGY

## 2022-06-23 PROCEDURE — 99999 PR PBB SHADOW E&M-EST. PATIENT-LVL III: CPT | Mod: PBBFAC,,, | Performed by: OBSTETRICS & GYNECOLOGY

## 2022-06-23 PROCEDURE — 3079F PR MOST RECENT DIASTOLIC BLOOD PRESSURE 80-89 MM HG: ICD-10-PCS | Mod: CPTII,S$GLB,, | Performed by: OBSTETRICS & GYNECOLOGY

## 2022-06-23 PROCEDURE — G0101 CA SCREEN;PELVIC/BREAST EXAM: HCPCS | Mod: S$GLB,,, | Performed by: OBSTETRICS & GYNECOLOGY

## 2022-06-23 PROCEDURE — 3079F DIAST BP 80-89 MM HG: CPT | Mod: CPTII,S$GLB,, | Performed by: OBSTETRICS & GYNECOLOGY

## 2022-06-23 PROCEDURE — 99999 PR PBB SHADOW E&M-EST. PATIENT-LVL III: ICD-10-PCS | Mod: PBBFAC,,, | Performed by: OBSTETRICS & GYNECOLOGY

## 2022-06-23 PROCEDURE — 3008F PR BODY MASS INDEX (BMI) DOCUMENTED: ICD-10-PCS | Mod: CPTII,S$GLB,, | Performed by: OBSTETRICS & GYNECOLOGY

## 2022-06-23 PROCEDURE — 1159F MED LIST DOCD IN RCRD: CPT | Mod: CPTII,S$GLB,, | Performed by: OBSTETRICS & GYNECOLOGY

## 2022-06-23 PROCEDURE — 99213 OFFICE O/P EST LOW 20 MIN: CPT | Mod: PBBFAC,PN | Performed by: OBSTETRICS & GYNECOLOGY

## 2022-06-23 PROCEDURE — 3075F PR MOST RECENT SYSTOLIC BLOOD PRESS GE 130-139MM HG: ICD-10-PCS | Mod: CPTII,S$GLB,, | Performed by: OBSTETRICS & GYNECOLOGY

## 2022-06-23 PROCEDURE — 1160F RVW MEDS BY RX/DR IN RCRD: CPT | Mod: CPTII,S$GLB,, | Performed by: OBSTETRICS & GYNECOLOGY

## 2022-06-23 PROCEDURE — 1160F PR REVIEW ALL MEDS BY PRESCRIBER/CLIN PHARMACIST DOCUMENTED: ICD-10-PCS | Mod: CPTII,S$GLB,, | Performed by: OBSTETRICS & GYNECOLOGY

## 2022-06-23 RX ORDER — EMTRICITABINE AND TENOFOVIR DISOPROXIL FUMARATE 200; 300 MG/1; MG/1
TABLET, FILM COATED ORAL
COMMUNITY
End: 2022-12-02

## 2022-06-23 RX ORDER — PROMETHAZINE HYDROCHLORIDE AND DEXTROMETHORPHAN HYDROBROMIDE 6.25; 15 MG/5ML; MG/5ML
SYRUP ORAL
COMMUNITY
End: 2023-06-27

## 2022-06-23 RX ORDER — DEXAMETHASONE 4 MG/1
TABLET ORAL
COMMUNITY
End: 2022-09-28

## 2022-06-23 RX ORDER — SULFAMETHOXAZOLE AND TRIMETHOPRIM 400; 80 MG/1; MG/1
1 TABLET ORAL DAILY
COMMUNITY
End: 2023-10-26

## 2022-06-23 RX ORDER — TRIAMCINOLONE ACETONIDE 1 MG/G
CREAM TOPICAL 2 TIMES DAILY
Qty: 15 G | Refills: 1 | Status: SHIPPED | OUTPATIENT
Start: 2022-06-23 | End: 2023-10-18 | Stop reason: SDUPTHER

## 2022-06-23 NOTE — PROGRESS NOTES
SUBJECTIVE:   36 y.o. female   for annual routine Pap and checkup. Patient's last menstrual period was 10/02/2019..  She complains of left breast pain for a month.        Past Medical History:   Diagnosis Date    Asthma     Encounter for blood transfusion     HIV infection     dx     Hyperthyroidism in pregnancy, antepartum      Past Surgical History:   Procedure Laterality Date    CERVICAL CERCLAGE      emergent with twins    CERVICAL CERCLAGE N/A 2019    Procedure: CERCLAGE, CERVIX;  Surgeon: Obey Henry MD;  Location: [facility] L&D;  Service: OB/GYN;  Laterality: N/A;     SECTION WITH TUBAL LIGATION N/A 6/15/2019    Procedure:  SECTION, WITH TUBAL LIGATION;  Surgeon: Madeline Walden MD;  Location: [facility] L&D;  Service: OB/GYN;  Laterality: N/A;     SECTION, CLASSIC      Last section with classical extention, [facility]    COLD KNIFE CONIZATION OF CERVIX N/A 2018    Procedure: CONE BIOPSY, CERVIX, USING COLD KNIFE;  Surgeon: Chantal Worrell MD;  Location: [facility];  Service: OB/GYN;  Laterality: N/A;    CONIZATION OF CERVIX USING LOOP ELECTROSURGICAL EXCISION PROCEDURE (LEEP) N/A 2018    Procedure: LEEP CONIZATION, CERVIX;  Surgeon: Chantal Worrell MD;  Location: [facility];  Service: OB/GYN;  Laterality: N/A;    CYSTOSCOPY N/A 10/22/2019    Procedure: CYSTOSCOPY;  Surgeon: Georgia Howard MD;  Location: [facility];  Service: OB/GYN;  Laterality: N/A;    DILATION AND CURETTAGE OF UTERUS      ROBOT-ASSISTED LAPAROSCOPIC ABDOMINAL HYSTERECTOMY USING DA GALINDO XI N/A 10/22/2019    Procedure: XI ROBOTIC HYSTERECTOMY;  Surgeon: Georgia Howard MD;  Location: [facility];  Service: OB/GYN;  Laterality: N/A;    ROBOT-ASSISTED SURGICAL REMOVAL OF FALLOPIAN TUBE USING DA GALINDO XI Bilateral 10/22/2019    Procedure: XI ROBOTIC SALPINGECTOMY;  Surgeon: Georgia Howard MD;  Location: [facility];  Service: OB/GYN;  Laterality: Bilateral;    THYROIDECTOMY Right  2021    Procedure: THYROIDECTOMY;  Surgeon: Maksim Sifuentes MD;  Location: [facility] OR [address] [place] [address];  Service: ENT;  Laterality: Right;  Right possible  total    WISDOM TOOTH EXTRACTION       Social History     Socioeconomic History    Marital status: Single   Tobacco Use    Smoking status: Never Smoker    Smokeless tobacco: Never Used   Substance and Sexual Activity    Alcohol use: Yes     Alcohol/week: 0.0 standard drinks     Comment: socially    Drug use: No    Sexual activity: Not Currently     Partners: Male     Birth control/protection: None     Family History   Problem Relation Age of Onset    Hypertension Maternal Grandmother     Diabetes Maternal Grandmother     Sleep apnea Son     Colon cancer Neg Hx     Ovarian cancer Neg Hx      OB History    Para Term  AB Living   7 7 1 6   5   SAB IAB Ectopic Multiple Live Births         2 7      # Outcome Date GA Lbr Adrian/2nd Weight Sex Delivery Anes PTL Lv   7  06/15/19 32w5d   M CS-LTranv EPI Y ANAHY   6 Term 14 37w5d  3.26 kg (7 lb 3 oz) F CS-LTranv EPI N ANAHY   5A   24w0d  0.51 kg (1 lb 2 oz) M CS-LTranv  Y DEC      Birth Comments: lived for 2 months in NICU      Complications: Premature rupture of membranes   5B   24w0d  0.992 kg (2 lb 3 oz) M CS-LTranv  Y DEC      Birth Comments: lived only 2 hours      Complications: Premature rupture of membranes   4  09 35w0d  2.608 kg (5 lb 12 oz) M CS-LTranv  Y ANAHY      Birth Comments: HIV, CS d/t viral load      Complications: Premature rupture of membranes   3  08 32w0d  2.722 kg (6 lb) M Vag-Spont  Y ANAHY      Birth Comments:  labor      Complications: Premature rupture of membranes   2  10/05/06 24w0d   M Vag-Spont  Y FD      Birth Comments: abruption and stillbirth      Complications: Abruptio Placenta   1  03 35w0d  2.722 kg (6 lb) M Vag-Spont  Y ANAHY      Birth Comments:  labor         Current Outpatient  Medications   Medication Sig Dispense Refill    DESCOVY 200-25 mg Tab once daily.   3    emtricitabine-tenofovir 200-300 mg (TRUVADA) 200-300 mg Tab 1 tablet      fluticasone propionate (FLOVENT HFA) 110 mcg/actuation inhaler INL 2 PUFFS INTO THE LUNGS BID      hydrOXYzine HCL (ATARAX) 25 MG tablet 1 tablet every evening.      inhaler,assist device,lg mask (OPTICHAMBER HERBERT LG MASK MISC) U UTD BID      omeprazole (PRILOSEC) 40 MG capsule Take 1 capsule (40 mg total) by mouth once daily. 30 capsule 5    ondansetron (ZOFRAN-ODT) 4 MG TbDL Take 1 tablet (4 mg total) by mouth every 6 (six) hours as needed (nausea). 10 tablet 0    OPTICHAMBER HERBERT LG MASK Spcr U UTD BID  3    PREZISTA 600 mg Tab 600 mg 2 (two) times daily with meals.   5    PROCHAMBER       promethazine-dextromethorphan (PROMETHAZINE-DM) 6.25-15 mg/5 mL Syrp TK 5 ML PO Q NIGHT PRN      ritonavir (NORVIR) 100 mg Tab tablet 100 mg 2 (two) times daily with meals.       sulfamethoxazole-trimethoprim 400-80mg (BACTRIM,SEPTRA) 400-80 mg per tablet Take 1 tablet by mouth once daily.      albuterol (PROVENTIL) 5 mg/mL nebulizer solution Take 0.5 mLs (2.5 mg total) by nebulization 4 (four) times daily as needed for Wheezing or Shortness of Breath. 1 each 4    fluticasone propion-salmeterol 115-21 mcg/dose (ADVAIR HFA) 115-21 mcg/actuation HFAA inhaler Inhale 2 puffs into the lungs every 12 (twelve) hours. Controller 3 Inhaler 4    triamcinolone acetonide 0.1% (KENALOG) 0.1 % cream Apply topically 2 (two) times daily. 15 g 1     No current facility-administered medications for this visit.     Allergies: Azithromycin     ROS:  Constitutional: no weight loss, weight gain, fever, fatigue  Eyes:  No vision changes, glasses/contacts  ENT/Mouth: No ulcers, sinus problems, ears ringing, headache  Cardiovascular: No inability to lie flat, chest pain, exercise intolerance, swelling, heart palpitations  Respiratory: No wheezing, coughing blood,  "shortness of breath, or cough  Gastrointestinal: No diarrhea, bloody stool, nausea/vomiting, constipation, gas, hemorrhoids  Genitourinary: No blood in urine, painful urination, urgency of urination, frequency of urination, incomplete emptying, incontinence, abnormal bleeding, painful periods, heavy periods, vaginal discharge, vaginal odor, painful intercourse, sexual problems, bleeding after intercourse.  Musculoskeletal: No muscle weakness  Skin/Breast: No painful breasts, nipple discharge, masses, rash, ulcers  Neurological: No passing out, seizures, numbness, headache  Endocrine: No diabetes, hypothyroid, hyperthyroid, hot flashes, hair loss, abnormal hair growth, ance  Psychiatric: No depression, crying  Hematologic: No bruises, bleeding, swollen lymph nodes, anemia.      OBJECTIVE:   The patient appears well, alert, oriented x 3, in no distress.  /86   Ht 5' 6" (1.676 m)   Wt 109 kg (240 lb 4.8 oz)   LMP 10/02/2019   BMI 38.79 kg/m²   NECK: no thyromegaly, trachea midline  SKIN: no acne, striae, hirsutism  BREAST EXAM: breasts appear normal, no suspicious masses, no skin or nipple changes or axillary nodes  ABDOMEN: no hernias, masses, or hepatosplenomegaly  GENITALIA: normal external genitalia, no erythema, no discharge  URETHRA: normal urethra, normal urethral meatus  VAGINA: Normal  CERVIX: absent  UTERUS: uterus absent  ADNEXA: no mass, fullness, tenderness    \  ASSESSMENT:   Chip was seen today for well woman.    Diagnoses and all orders for this visit:    Well woman exam with routine gynecological exam  -     Liquid-Based Pap Smear, Screening    EMEKA II (cervical intraepithelial neoplasia II)  -     Liquid-Based Pap Smear, Screening    Asymptomatic HIV infection, with no history of HIV-related illness  -     Liquid-Based Pap Smear, Screening    Weight gain  -     TSH; Future  -     Hemoglobin A1C; Future    Mastodynia  -     US Breast Left Complete; Future    Other orders  -     " triamcinolone acetonide 0.1% (KENALOG) 0.1 % cream; Apply topically 2 (two) times daily.

## 2022-06-24 ENCOUNTER — PROCEDURE VISIT (OUTPATIENT)
Dept: OBSTETRICS AND GYNECOLOGY | Facility: CLINIC | Age: 37
End: 2022-06-24
Attending: OBSTETRICS & GYNECOLOGY
Payer: MEDICARE

## 2022-06-24 DIAGNOSIS — R63.5 WEIGHT GAIN: ICD-10-CM

## 2022-06-24 LAB
ESTIMATED AVG GLUCOSE: 108 MG/DL (ref 68–131)
HBA1C MFR BLD: 5.4 % (ref 4–5.6)
T4 FREE SERPL-MCNC: 1.13 NG/DL (ref 0.71–1.51)
TSH SERPL DL<=0.005 MIU/L-ACNC: <0.01 UIU/ML (ref 0.4–4)

## 2022-06-24 PROCEDURE — 84439 ASSAY OF FREE THYROXINE: CPT | Performed by: OBSTETRICS & GYNECOLOGY

## 2022-06-24 PROCEDURE — 83036 HEMOGLOBIN GLYCOSYLATED A1C: CPT | Performed by: OBSTETRICS & GYNECOLOGY

## 2022-06-24 PROCEDURE — 84443 ASSAY THYROID STIM HORMONE: CPT | Performed by: OBSTETRICS & GYNECOLOGY

## 2022-06-24 NOTE — PROGRESS NOTES
Lab Documentation: TSH, HGA1C    Order Type: Written Order placed in epic    Patient in for lab visit only per provider treatment plan.

## 2022-06-27 ENCOUNTER — OFFICE VISIT (OUTPATIENT)
Dept: GASTROENTEROLOGY | Facility: CLINIC | Age: 37
End: 2022-06-27
Payer: MEDICAID

## 2022-06-27 DIAGNOSIS — R11.2 NAUSEA AND VOMITING, INTRACTABILITY OF VOMITING NOT SPECIFIED, UNSPECIFIED VOMITING TYPE: Primary | ICD-10-CM

## 2022-06-27 DIAGNOSIS — K21.9 GASTROESOPHAGEAL REFLUX DISEASE, UNSPECIFIED WHETHER ESOPHAGITIS PRESENT: ICD-10-CM

## 2022-06-27 PROCEDURE — 99214 PR OFFICE/OUTPT VISIT, EST, LEVL IV, 30-39 MIN: ICD-10-PCS | Mod: 95,,, | Performed by: STUDENT IN AN ORGANIZED HEALTH CARE EDUCATION/TRAINING PROGRAM

## 2022-06-27 PROCEDURE — 3044F HG A1C LEVEL LT 7.0%: CPT | Mod: CPTII,95,, | Performed by: STUDENT IN AN ORGANIZED HEALTH CARE EDUCATION/TRAINING PROGRAM

## 2022-06-27 PROCEDURE — 99214 OFFICE O/P EST MOD 30 MIN: CPT | Mod: 95,,, | Performed by: STUDENT IN AN ORGANIZED HEALTH CARE EDUCATION/TRAINING PROGRAM

## 2022-06-27 PROCEDURE — 3044F PR MOST RECENT HEMOGLOBIN A1C LEVEL <7.0%: ICD-10-PCS | Mod: CPTII,95,, | Performed by: STUDENT IN AN ORGANIZED HEALTH CARE EDUCATION/TRAINING PROGRAM

## 2022-06-27 RX ORDER — ONDANSETRON 4 MG/1
4 TABLET, ORALLY DISINTEGRATING ORAL EVERY 6 HOURS PRN
Qty: 50 TABLET | Refills: 1 | Status: SHIPPED | OUTPATIENT
Start: 2022-06-27 | End: 2023-06-27

## 2022-06-27 NOTE — PROGRESS NOTES
[facility] Telemedicine Follow-Up Note    The patient location is:  home  The chief complaint leading to consultation is:  Nausea vomiting    Visit type: audiovisual    Face to Face time with patient:  15 minute  30 minutes of total time spent on the encounter, which includes face to face time and non-face to face time preparing to see the patient (eg, review of tests), Obtaining and/or reviewing separately obtained history, Documenting clinical information in the electronic or other health record, Independently interpreting results (not separately reported) and communicating results to the patient/family/caregiver, or Care coordination (not separately reported).         Each patient to whom he or she provides medical services by telemedicine is:  (1) informed of the relationship between the physician and patient and the respective role of any other health care provider with respect to management of the patient; and (2) notified that he or she may decline to receive medical services by telemedicine and may withdraw from such care at any time.    Notes:       Reason for Follow-Up:  The primary encounter diagnosis was Nausea and vomiting, intractability of vomiting not specified, unspecified vomiting type. A diagnosis of Gastroesophageal reflux disease, unspecified whether esophagitis present was also pertinent to this visit.    PCP:   Primary Doctor No           HPI:  This is a 36 y.o. female last seen in GI clinic on January 1/6/22 for nausea and vomiting.  She was recommended to start PPI therapy and follow-up.      Interval History:  Today, she comments that her symptoms are unchanged.  She gets intermittent nausea and vomiting that occurs 3-4 times a month.  Symptoms resolve on their own.  She did take her PPI and notes that this helped with heartburn, but no improvement with nausea or vomiting.      Denies any tobacco, but occasionally uses alcohol or THC (once a  month).      ROS:  Constitutional: No fevers, chills, No weight loss  ENT: No allergies  CV: No chest pain  Pulm: No cough, No shortness of breath  Ophtho: No vision changes  GI: see HPI  Derm: No rash  Heme: No lymphadenopathy, No bruising  MSK: No arthritis  : No dysuria, No hematuria  Endo: No hot or cold intolerance  Neuro: No syncope, No seizure  Psych: No anxiety, No depression      Current Outpatient Rx   Medication Sig Dispense Refill    albuterol (PROVENTIL) 5 mg/mL nebulizer solution Take 0.5 mLs (2.5 mg total) by nebulization 4 (four) times daily as needed for Wheezing or Shortness of Breath. 1 each 4    DESCOVY 200-25 mg Tab once daily.   3    emtricitabine-tenofovir 200-300 mg (TRUVADA) 200-300 mg Tab 1 tablet      fluticasone propion-salmeterol 115-21 mcg/dose (ADVAIR HFA) 115-21 mcg/actuation HFAA inhaler Inhale 2 puffs into the lungs every 12 (twelve) hours. Controller 3 Inhaler 4    fluticasone propionate (FLOVENT HFA) 110 mcg/actuation inhaler INL 2 PUFFS INTO THE LUNGS BID      hydrOXYzine HCL (ATARAX) 25 MG tablet 1 tablet every evening.      inhaler,assist device,lg mask (OPTICHAMBER HERBERT LG MASK MISC) U UTD BID      omeprazole (PRILOSEC) 40 MG capsule Take 1 capsule (40 mg total) by mouth once daily. 30 capsule 5    ondansetron (ZOFRAN-ODT) 4 MG TbDL Take 1 tablet (4 mg total) by mouth every 6 (six) hours as needed (nausea). 50 tablet 1    OPTICHAMBER HERBERT LG MASK Spcr U UTD BID  3    PREZISTA 600 mg Tab 600 mg 2 (two) times daily with meals.   5    PROCHAMBER       promethazine-dextromethorphan (PROMETHAZINE-DM) 6.25-15 mg/5 mL Syrp TK 5 ML PO Q NIGHT PRN      ritonavir (NORVIR) 100 mg Tab tablet 100 mg 2 (two) times daily with meals.       sulfamethoxazole-trimethoprim 400-80mg (BACTRIM,SEPTRA) 400-80 mg per tablet Take 1 tablet by mouth once daily.      triamcinolone acetonide 0.1% (KENALOG) 0.1 % cream Apply topically 2 (two) times daily. 15 g 1       Objective  Findings:      Labs:  Lab Results   Component Value Date    WBC 4.53 01/25/2021    HGB 13.2 01/25/2021    HCT 38.8 01/25/2021     01/25/2021    ALT 12 01/25/2021    AST 18 01/25/2021     01/25/2021    K 3.5 01/25/2021     01/25/2021    CREATININE 0.7 01/25/2021    BUN 14 01/25/2021    CO2 23 01/25/2021    TSH <0.010 (L) 06/24/2022    HGBA1C 5.4 06/24/2022           Assessment:  1. Nausea and vomiting, intractability of vomiting not specified, unspecified vomiting type    2. Gastroesophageal reflux disease, unspecified whether esophagitis present         In summary, the patient is a 36-year-old female who presents to GI clinic for follow-up appointment in the setting of nausea vomiting.    The patient has ongoing intermittent nausea vomiting without any resolution with PPI therapy.  I have recommended we schedule her for an EGD which point we could obtain gastric and duodenal biopsies.  I will also order an ultrasound to rule out gallbladder pathology.  I have given her prescription for as needed Zofran and she will continue PPI therapy given improvement in acid reflux.    No follow-ups on file.      Order summary:  Orders Placed This Encounter    US Abdomen Complete    ondansetron (ZOFRAN-ODT) 4 MG TbDL    Case Request Endoscopy: ESOPHAGOGASTRODUODENOSCOPY (EGD)         Thank you so much for allowing me to participate in the care of Jm Valencia MD

## 2022-07-18 ENCOUNTER — HOSPITAL ENCOUNTER (OUTPATIENT)
Dept: RADIOLOGY | Facility: OTHER | Age: 37
Discharge: HOME | End: 2022-07-18
Attending: OBSTETRICS & GYNECOLOGY
Payer: MEDICARE

## 2022-07-18 ENCOUNTER — HOSPITAL ENCOUNTER (OUTPATIENT)
Dept: RADIOLOGY | Facility: OTHER | Age: 37
Discharge: HOME | End: 2022-07-18
Attending: STUDENT IN AN ORGANIZED HEALTH CARE EDUCATION/TRAINING PROGRAM
Payer: MEDICARE

## 2022-07-18 DIAGNOSIS — N64.4 MASTODYNIA: ICD-10-CM

## 2022-07-18 DIAGNOSIS — R11.2 NAUSEA AND VOMITING, INTRACTABILITY OF VOMITING NOT SPECIFIED, UNSPECIFIED VOMITING TYPE: ICD-10-CM

## 2022-07-18 DIAGNOSIS — Z12.31 OTHER SCREENING MAMMOGRAM: ICD-10-CM

## 2022-07-18 PROCEDURE — 76700 US EXAM ABDOM COMPLETE: CPT | Mod: TC

## 2022-07-18 PROCEDURE — 77063 MAMMO DIGITAL SCREENING BILAT WITH TOMO: ICD-10-PCS | Mod: 26,,, | Performed by: RADIOLOGY

## 2022-07-18 PROCEDURE — 77067 MAMMO DIGITAL SCREENING BILAT WITH TOMO: ICD-10-PCS | Mod: 26,,, | Performed by: RADIOLOGY

## 2022-07-18 PROCEDURE — 77067 SCR MAMMO BI INCL CAD: CPT | Mod: TC

## 2022-07-18 PROCEDURE — 77067 SCR MAMMO BI INCL CAD: CPT | Mod: 26,,, | Performed by: RADIOLOGY

## 2022-07-18 PROCEDURE — 76700 US EXAM ABDOM COMPLETE: CPT | Mod: 26,,, | Performed by: RADIOLOGY

## 2022-07-18 PROCEDURE — 76700 US ABDOMEN COMPLETE: ICD-10-PCS | Mod: 26,,, | Performed by: RADIOLOGY

## 2022-07-18 PROCEDURE — 77063 BREAST TOMOSYNTHESIS BI: CPT | Mod: 26,,, | Performed by: RADIOLOGY

## 2022-07-18 PROCEDURE — 77063 BREAST TOMOSYNTHESIS BI: CPT | Mod: TC

## 2022-08-24 ENCOUNTER — OFFICE VISIT (OUTPATIENT)
Dept: ENDOCRINOLOGY | Facility: CLINIC | Age: 37
End: 2022-08-24
Payer: MEDICARE

## 2022-08-24 DIAGNOSIS — R06.83 SNORING: ICD-10-CM

## 2022-08-24 DIAGNOSIS — E05.00 GRAVES' DISEASE: Primary | ICD-10-CM

## 2022-08-24 PROCEDURE — 3044F HG A1C LEVEL LT 7.0%: CPT | Mod: CPTII,95,, | Performed by: INTERNAL MEDICINE

## 2022-08-24 PROCEDURE — 3044F PR MOST RECENT HEMOGLOBIN A1C LEVEL <7.0%: ICD-10-PCS | Mod: CPTII,95,, | Performed by: INTERNAL MEDICINE

## 2022-08-24 PROCEDURE — 1159F MED LIST DOCD IN RCRD: CPT | Mod: CPTII,95,, | Performed by: INTERNAL MEDICINE

## 2022-08-24 PROCEDURE — 99214 OFFICE O/P EST MOD 30 MIN: CPT | Mod: 95,,, | Performed by: INTERNAL MEDICINE

## 2022-08-24 PROCEDURE — 1159F PR MEDICATION LIST DOCUMENTED IN MEDICAL RECORD: ICD-10-PCS | Mod: CPTII,95,, | Performed by: INTERNAL MEDICINE

## 2022-08-24 PROCEDURE — 1160F PR REVIEW ALL MEDS BY PRESCRIBER/CLIN PHARMACIST DOCUMENTED: ICD-10-PCS | Mod: CPTII,95,, | Performed by: INTERNAL MEDICINE

## 2022-08-24 PROCEDURE — 1160F RVW MEDS BY RX/DR IN RCRD: CPT | Mod: CPTII,95,, | Performed by: INTERNAL MEDICINE

## 2022-08-24 PROCEDURE — 99214 PR OFFICE/OUTPT VISIT, EST, LEVL IV, 30-39 MIN: ICD-10-PCS | Mod: 95,,, | Performed by: INTERNAL MEDICINE

## 2022-08-24 RX ORDER — METHIMAZOLE 5 MG/1
TABLET ORAL
Qty: 45 TABLET | Refills: 2 | Status: SHIPPED | OUTPATIENT
Start: 2022-08-24 | End: 2024-03-07

## 2022-08-24 NOTE — PATIENT INSTRUCTIONS
Let me know what kind of imaging the eye doctor wants.  I think it is an MRI of the orbits with and without contrast.     Restart methimazole 2.5 mg daily (1/2 tablet)    Will repeat labs in 6 weeks at any [facility].  Make sure they draw just the TSH and free T4.  You can also message or call us to get  this scheduled if you want.     Remember to notify us immediately if having right sided abdominal pain, jaundice (yellowing of skin/eyes), or sore throat w/ fever. These may be symptoms of a reaction to methimazole so we would need to get blood work right away and stop the medication if you have these symptoms.

## 2022-08-24 NOTE — PROGRESS NOTES
ENDOCRINOLOGY FOLLOW UP  08/24/2022     The patient location is: work    Visit type: audiovisual    Face to Face time with patient: 20  30 minutes of total time spent on the encounter, which includes face to face time and non-face to face time preparing to see the patient (eg, review of tests), Obtaining and/or reviewing separately obtained history, Documenting clinical information in the electronic or other health record, Independently interpreting results (not separately reported) and communicating results to the patient/family/caregiver, or Care coordination (not separately reported).     Each patient to whom he or she provides medical services by telemedicine is:  (1) informed of the relationship between the physician and patient and the respective role of any other health care provider with respect to management of the patient; and (2) notified that he or she may decline to receive medical services by telemedicine and may withdraw from such care at any time.    The patient's last visit with me was on 12/3/2021.           CC:  F/u  hyperthyroidism 2/2 to Graves disease    HPI:  Ms. Jm Newton is a 37 y.o. female with history of hyperthyroidism and HIV.  For HIV she is treated w/ Descovy, ritonavir, prezista    Patient was found to have hyperthyroidism around age 23 presenting with symptoms of thyromegaly during pregnancy.  TSH has been supressed since 2013 with normal to elevated fT4..  Has been on and off methimazole since then, stopped in 2013.  Has 5 children, hysterectomy in 2019 for EMEKA so ovaries not removed.    She underwent a right thyroid lobectomy and isthmusectomy with Dr. Sifuentes on 04/13/2021 (for thyroid ultrasound showing a suspicious area concerning for infectious process and she also had some lymphadenopathy)  with pathology showing benign nodules and 1 area of ectopic thyroid.  She had several paratracheal lymph nodes resected on the right which were all noted to be benign.  In  preparation for surgery she was started methimazole 5 mg daily as she had suppressed TSH with normal T3 and free T4.  Initially off of methimazole after her surgery but due to symptoms of hyperthyroidism and undetectable TSH restarted methimazole 5 mg daily on 5/28/21. Methimazole stopped in 8/2021 for low fT4 and symptoms of hypothyroidism.    Path 4/13/21:  Right Lobectomy and isthmusectomy:  Number of specimens: 2   Name of specimens: 1. Right Paratracheal lymph node - frozen   - sent   2. Right thyroid lobe, isthmus and paratracheal node, stitch   on right superior lobe - permanent   Release to patient->Immediate   Specimen total (fresh, frozen, permanent):->2    Interval hx:  Off methimazole had undetectible TSH with normal fT4 on last labs in 6/2022 off methimazole  Having pain in right eye with proptosis, saw outside ophtho and was recommended imaging of the orbits    Lab Results   Component Value Date    TSH <0.010 (L) 06/24/2022    TSH <0.010 (L) 10/08/2021    TSH 0.323 (L) 08/06/2021    FREET4 1.13 06/24/2022    FREET4 0.91 10/08/2021    FREET4 0.64 (L) 08/06/2021       Thyroid Symptoms   + fatigue  Poor sleep, snoring, not waking rested  Has never been evaluated for OMAYRA    Weight change:  []  Gain []  Loss  [x]  Denies   Currently 257- wt stable, no loss     Temperature intolerance:  []  Cold [x]  Hot   [x]  Denies     GI:  []  Diarrhea []  Constipation [x]  Denies  + nausea, had RUQ ultrasound   Seeing GI     Integument:  []  Hair loss [x]  Dry skin  []  Denies    Other:  [x]  Palpitation (occasional) []  Tremor resolved    [x]  Increased anxiety    []  Denies    Menses:   Hysterectomy in 10/2019, ovaries in tact     Thyroid Antibodies:  No results found for: TPOAB, TPOS  Lab Results   Component Value Date    THYROTROPINR 2.46 (H) 01/25/2021       Previous Treatment:  I131 therapy no  Has been on methimazole on and off for some years  Restarted methimazole 5 mg daily 3/18/2021, stopped 4/13/21 after  thyroid lobectomy, restarted 5/26/21, stopped 8/2021     Current hyperthyroid medication:  Off Methimazole    Last BMD: dexa 5/28/21 - normal  No fractures         Current Outpatient Medications:     albuterol (PROVENTIL) 5 mg/mL nebulizer solution, Take 0.5 mLs (2.5 mg total) by nebulization 4 (four) times daily as needed for Wheezing or Shortness of Breath., Disp: 1 each, Rfl: 4    DESCOVY 200-25 mg Tab, once daily. , Disp: , Rfl: 3    emtricitabine-tenofovir 200-300 mg (TRUVADA) 200-300 mg Tab, 1 tablet, Disp: , Rfl:     fluticasone propion-salmeterol 115-21 mcg/dose (ADVAIR HFA) 115-21 mcg/actuation HFAA inhaler, Inhale 2 puffs into the lungs every 12 (twelve) hours. Controller, Disp: 3 Inhaler, Rfl: 4    fluticasone propionate (FLOVENT HFA) 110 mcg/actuation inhaler, INL 2 PUFFS INTO THE LUNGS BID, Disp: , Rfl:     hydrOXYzine HCL (ATARAX) 25 MG tablet, 1 tablet every evening., Disp: , Rfl:     inhaler,assist device,lg mask (OPTICHAMBER HERBERT LG MASK MISC), U UTD BID, Disp: , Rfl:     omeprazole (PRILOSEC) 40 MG capsule, Take 1 capsule (40 mg total) by mouth once daily., Disp: 30 capsule, Rfl: 5    ondansetron (ZOFRAN-ODT) 4 MG TbDL, Take 1 tablet (4 mg total) by mouth every 6 (six) hours as needed (nausea)., Disp: 50 tablet, Rfl: 1    OPTICHAMBER HERBERT LG MASK Spcr, U UTD BID, Disp: , Rfl: 3    PREZISTA 600 mg Tab, 600 mg 2 (two) times daily with meals. , Disp: , Rfl: 5    PROCHAMBER, , Disp: , Rfl:     promethazine-dextromethorphan (PROMETHAZINE-DM) 6.25-15 mg/5 mL Syrp, TK 5 ML PO Q NIGHT PRN, Disp: , Rfl:     ritonavir (NORVIR) 100 mg Tab tablet, 100 mg 2 (two) times daily with meals. , Disp: , Rfl:     sulfamethoxazole-trimethoprim 400-80mg (BACTRIM,SEPTRA) 400-80 mg per tablet, Take 1 tablet by mouth once daily., Disp: , Rfl:     triamcinolone acetonide 0.1% (KENALOG) 0.1 % cream, Apply topically 2 (two) times daily., Disp: 15 g, Rfl: 1    REVIEW OF SYSTEMS  See HPI      PHYSICAL  EXAM  LMP 10/02/2019   Wt Readings from Last 3 Encounters:   06/23/22 109 kg (240 lb 4.8 oz)   11/24/21 116.6 kg (257 lb)   10/03/21 111.1 kg (245 lb)   ]    Constitutional:  Pleasant,  in no acute distress.   HENT:   Eyes:     No scleral icterus.   Respiratory:   Effort normal   Neurological:  normal speech  Psych:  Normal mood and affect.      LABORATORY REVIEW:    IMAGING STUDIES    ASSESSMENT/PLAN    Problem List Items Addressed This Visit        1 - High    Graves' disease - Primary     Still with undetectable TSH and normal fT4 so will start low dose methimazole 2.5 mg daily with repeat labs in 6 wks.      For proptosis will order imaging for further evaluation.  Patient has outside ophthalmologist and not interested in seeing someone at [facility] right now.             Relevant Orders    TSH    T4, Free       Unprioritized    Snoring     Suspect OMAYRA, referred to sleep medicine           Relevant Orders    Ambulatory referral/consult to Sleep Disorders        Patient Instructions   Let me know what kind of imaging the eye doctor wants.  I think it is an MRI of the orbits with and without contrast.     Restart methimazole 2.5 mg daily (1/2 tablet)    Will repeat labs in 6 weeks at any [facility].  Make sure they draw just the TSH and free T4.  You can also message or call us to get  this scheduled if you want.     Remember to notify us immediately if having right sided abdominal pain, jaundice (yellowing of skin/eyes), or sore throat w/ fever. These may be symptoms of a reaction to methimazole so we would need to get blood work right away and stop the medication if you have these symptoms.         RTC 3-4 months, virtual  She will call to schedule labs in 6 weeks    James Zabala MD

## 2022-08-25 ENCOUNTER — PATIENT MESSAGE (OUTPATIENT)
Dept: ENDOCRINOLOGY | Facility: CLINIC | Age: 37
End: 2022-08-25
Payer: MEDICARE

## 2022-08-25 DIAGNOSIS — H05.20 UNSPECIFIED EXOPHTHALMOS: ICD-10-CM

## 2022-08-26 PROBLEM — R06.83 SNORING: Status: ACTIVE | Noted: 2022-08-26

## 2022-08-26 NOTE — ASSESSMENT & PLAN NOTE
Still with undetectable TSH and normal fT4 so will start low dose methimazole 2.5 mg daily with repeat labs in 6 wks.      For proptosis will order imaging for further evaluation.  Patient has outside ophthalmologist and not interested in seeing someone at [facility] right now.

## 2022-08-27 ENCOUNTER — HOSPITAL ENCOUNTER (OUTPATIENT)
Dept: RADIOLOGY | Facility: OTHER | Age: 37
Discharge: HOME | End: 2022-08-27
Attending: INTERNAL MEDICINE
Payer: MEDICARE

## 2022-08-27 DIAGNOSIS — H05.20 UNSPECIFIED EXOPHTHALMOS: ICD-10-CM

## 2022-08-27 PROCEDURE — 70480 CT ORBIT/EAR/FOSSA W/O DYE: CPT | Mod: 26,,, | Performed by: RADIOLOGY

## 2022-08-27 PROCEDURE — 70480 CT ORBITS WITHOUT CONTRAST: ICD-10-PCS | Mod: 26,,, | Performed by: RADIOLOGY

## 2022-08-27 PROCEDURE — 70480 CT ORBIT/EAR/FOSSA W/O DYE: CPT | Mod: TC

## 2022-09-28 ENCOUNTER — OFFICE VISIT (OUTPATIENT)
Dept: URGENT CARE | Facility: CLINIC | Age: 37
End: 2022-09-28
Payer: COMMERCIAL

## 2022-09-28 VITALS
BODY MASS INDEX: 38.57 KG/M2 | OXYGEN SATURATION: 98 % | RESPIRATION RATE: 20 BRPM | HEART RATE: 65 BPM | TEMPERATURE: 96 F | SYSTOLIC BLOOD PRESSURE: 138 MMHG | WEIGHT: 240 LBS | DIASTOLIC BLOOD PRESSURE: 75 MMHG | HEIGHT: 66 IN

## 2022-09-28 DIAGNOSIS — M25.572 LEFT ANKLE PAIN, UNSPECIFIED CHRONICITY: ICD-10-CM

## 2022-09-28 DIAGNOSIS — S90.02XA CONTUSION OF LEFT ANKLE, INITIAL ENCOUNTER: Primary | ICD-10-CM

## 2022-09-28 DIAGNOSIS — S90.512A ABRASION OF LEFT ANKLE, INITIAL ENCOUNTER: ICD-10-CM

## 2022-09-28 PROCEDURE — 73610 XR ANKLE COMPLETE 3 VIEW LEFT: ICD-10-PCS | Mod: LT,S$GLB,, | Performed by: RADIOLOGY

## 2022-09-28 PROCEDURE — 99214 OFFICE O/P EST MOD 30 MIN: CPT | Mod: S$GLB,,, | Performed by: NURSE PRACTITIONER

## 2022-09-28 PROCEDURE — 73610 X-RAY EXAM OF ANKLE: CPT | Mod: LT,S$GLB,, | Performed by: RADIOLOGY

## 2022-09-28 PROCEDURE — 99214 PR OFFICE/OUTPT VISIT, EST, LEVL IV, 30-39 MIN: ICD-10-PCS | Mod: S$GLB,,, | Performed by: NURSE PRACTITIONER

## 2022-09-28 RX ORDER — NAPROXEN 500 MG/1
500 TABLET ORAL 2 TIMES DAILY WITH MEALS
Qty: 20 TABLET | Refills: 0 | Status: SHIPPED | OUTPATIENT
Start: 2022-09-28 | End: 2022-10-08

## 2022-09-28 RX ORDER — MUPIROCIN 20 MG/G
OINTMENT TOPICAL 2 TIMES DAILY
Qty: 22 G | Refills: 0 | Status: SHIPPED | OUTPATIENT
Start: 2022-09-28 | End: 2022-10-03

## 2022-09-28 NOTE — PATIENT INSTRUCTIONS
ICE AFFECTED AREA SEVERAL TIMES PER DAY  NAPROXEN FOR PAIN  USE ACE WRAP TO HELP WITH COMPRESSION  ELEVATE LOWER EXT AND LIMIT ACTIVITY OVER THE NEXT COUPLE OF DAYS      You must understand that you've received an Urgent Care treatment only and that you may be released before all your medical problems are known or treated. You, the patient, will arrange for follow up care as instructed.  If your condition worsens we recommend that you receive another evaluation at the emergency room immediately or contact your primary medical clinics after hours call service to discuss your concerns.  Please return here or go to the Emergency Department for any concerns or worsening of condition.

## 2022-09-28 NOTE — LETTER
September 28, 2022      Urgent Care [address] [address]  Phone: [phone]  Fax: [phone]       Patient: Jm Newton   YOB: 1985  Date of Visit: 09/28/2022    To Whom It May Concern:    Julia Newton  was at [facility] on 09/28/2022. The patient may return to work/school on 10/03/2022 with no restrictions. If you have any questions or concerns, or if I can be of further assistance, please do not hesitate to contact me.    Sincerely,    Neha Salazar NP

## 2022-09-28 NOTE — PROGRESS NOTES
"Subjective:       Patient ID: Jm Newton is a 37 y.o. female.    Vitals:  height is 5' 6" (1.676 m) and weight is 108.9 kg (240 lb). Her temperature is 96 °F (35.6 °C). Her blood pressure is 138/75 and her pulse is 65. Her respiration is 20 and oxygen saturation is 98%.     Chief Complaint: Foot Pain (Left Foot)    Tetanus is up to date      Foot Injury   The incident occurred less than 1 hour ago. The incident occurred in the street. The injury mechanism was a twisting injury. The pain is present in the left foot and left ankle. The quality of the pain is described as aching. The pain is at a severity of 7/10. The pain is moderate. The pain has been Constant since onset. Associated symptoms include an inability to bear weight. Pertinent negatives include no loss of motion, loss of sensation, muscle weakness, numbness or tingling. She reports no foreign bodies present. The symptoms are aggravated by weight bearing. She has tried nothing for the symptoms. The treatment provided no relief.     Skin:  Negative for erythema.   Neurological:  Negative for numbness.     Objective:      Physical Exam   Constitutional: She is oriented to person, place, and time. She appears well-developed.   HENT:   Head: Normocephalic and atraumatic. Head is without abrasion, without contusion and without laceration.   Ears:   Right Ear: External ear normal.   Left Ear: External ear normal.   Nose: Nose normal.   Mouth/Throat: Oropharynx is clear and moist and mucous membranes are normal.   Eyes: Conjunctivae, EOM and lids are normal. Pupils are equal, round, and reactive to light.   Neck: Trachea normal and phonation normal. Neck supple.   Cardiovascular: Normal rate, regular rhythm and normal heart sounds.   Pulmonary/Chest: Effort normal and breath sounds normal. No stridor. No respiratory distress.   Musculoskeletal: Normal range of motion.         General: Normal range of motion.      Left ankle: She exhibits swelling. " She exhibits normal range of motion. Tenderness. Achilles tendon exhibits no defect and normal Walton's test results.        Feet:    Neurological: She is alert and oriented to person, place, and time.   Skin: Skin is warm, dry, intact and no rash. Capillary refill takes less than 2 seconds. No abrasion, No burn, No bruising, No erythema and No ecchymosis   Psychiatric: Her speech is normal and behavior is normal. Judgment and thought content normal.   Nursing note and vitals reviewed.      WOUND CLEANSED WITH NS AND MUPIROCIN OINTMENT  X-RAY: No acute fracture or dislocation.  There is mild soft tissue edema about the ankle.  No unexpected radiopaque foreign body.     Assessment:       1. Contusion of left ankle, initial encounter    2. Left ankle pain, unspecified chronicity    3. Abrasion of left ankle, initial encounter          Plan:         Contusion of left ankle, initial encounter  -     naproxen (NAPROSYN) 500 MG tablet; Take 1 tablet (500 mg total) by mouth 2 (two) times daily with meals. for 10 days  Dispense: 20 tablet; Refill: 0  -     CRUTCHES FOR HOME USE    Left ankle pain, unspecified chronicity  -     XR ANKLE COMPLETE 3 VIEW LEFT; Future; Expected date: 09/28/2022  -     CRUTCHES FOR HOME USE    Abrasion of left ankle, initial encounter  -     mupirocin (BACTROBAN) 2 % ointment; Apply topically 2 (two) times daily. Apply to the affected area for 5 days  Dispense: 22 g; Refill: 0               Patient Instructions   ICE AFFECTED AREA SEVERAL TIMES PER DAY  NAPROXEN FOR PAIN  USE ACE WRAP TO HELP WITH COMPRESSION  ELEVATE LOWER EXT AND LIMIT ACTIVITY OVER THE NEXT COUPLE OF DAYS      You must understand that you've received an Urgent Care treatment only and that you may be released before all your medical problems are known or treated. You, the patient, will arrange for follow up care as instructed.  If your condition worsens we recommend that you receive another evaluation at the emergency room  immediately or contact your primary medical clinics after hours call service to discuss your concerns.  Please return here or go to the Emergency Department for any concerns or worsening of condition.

## 2022-10-03 ENCOUNTER — OFFICE VISIT (OUTPATIENT)
Dept: ORTHOPEDICS | Facility: CLINIC | Age: 37
End: 2022-10-03
Payer: MEDICARE

## 2022-10-03 ENCOUNTER — HOSPITAL ENCOUNTER (OUTPATIENT)
Dept: RADIOLOGY | Facility: HOSPITAL | Age: 37
Discharge: HOME | End: 2022-10-03
Attending: NURSE PRACTITIONER
Payer: COMMERCIAL

## 2022-10-03 VITALS — HEIGHT: 66 IN | WEIGHT: 240.06 LBS | BODY MASS INDEX: 38.58 KG/M2

## 2022-10-03 DIAGNOSIS — M25.572 ACUTE LEFT ANKLE PAIN: ICD-10-CM

## 2022-10-03 DIAGNOSIS — M25.572 ACUTE LEFT ANKLE PAIN: Primary | ICD-10-CM

## 2022-10-03 PROCEDURE — 3044F HG A1C LEVEL LT 7.0%: CPT | Mod: CPTII,S$GLB,, | Performed by: NURSE PRACTITIONER

## 2022-10-03 PROCEDURE — 1160F RVW MEDS BY RX/DR IN RCRD: CPT | Mod: CPTII,S$GLB,, | Performed by: NURSE PRACTITIONER

## 2022-10-03 PROCEDURE — 73610 X-RAY EXAM OF ANKLE: CPT | Mod: 26,LT,, | Performed by: RADIOLOGY

## 2022-10-03 PROCEDURE — 99999 PR PBB SHADOW E&M-EST. PATIENT-LVL III: CPT | Mod: PBBFAC,,, | Performed by: NURSE PRACTITIONER

## 2022-10-03 PROCEDURE — 73610 XR ANKLE COMPLETE 3 VIEW LEFT: ICD-10-PCS | Mod: 26,LT,, | Performed by: RADIOLOGY

## 2022-10-03 PROCEDURE — 99214 PR OFFICE/OUTPT VISIT, EST, LEVL IV, 30-39 MIN: ICD-10-PCS | Mod: S$GLB,,, | Performed by: NURSE PRACTITIONER

## 2022-10-03 PROCEDURE — 1159F MED LIST DOCD IN RCRD: CPT | Mod: CPTII,S$GLB,, | Performed by: NURSE PRACTITIONER

## 2022-10-03 PROCEDURE — 73610 X-RAY EXAM OF ANKLE: CPT | Mod: TC,LT

## 2022-10-03 PROCEDURE — 1159F PR MEDICATION LIST DOCUMENTED IN MEDICAL RECORD: ICD-10-PCS | Mod: CPTII,S$GLB,, | Performed by: NURSE PRACTITIONER

## 2022-10-03 PROCEDURE — 3008F PR BODY MASS INDEX (BMI) DOCUMENTED: ICD-10-PCS | Mod: CPTII,S$GLB,, | Performed by: NURSE PRACTITIONER

## 2022-10-03 PROCEDURE — 99214 OFFICE O/P EST MOD 30 MIN: CPT | Mod: S$GLB,,, | Performed by: NURSE PRACTITIONER

## 2022-10-03 PROCEDURE — 99999 PR PBB SHADOW E&M-EST. PATIENT-LVL III: ICD-10-PCS | Mod: PBBFAC,,, | Performed by: NURSE PRACTITIONER

## 2022-10-03 PROCEDURE — 1160F PR REVIEW ALL MEDS BY PRESCRIBER/CLIN PHARMACIST DOCUMENTED: ICD-10-PCS | Mod: CPTII,S$GLB,, | Performed by: NURSE PRACTITIONER

## 2022-10-03 PROCEDURE — 3044F PR MOST RECENT HEMOGLOBIN A1C LEVEL <7.0%: ICD-10-PCS | Mod: CPTII,S$GLB,, | Performed by: NURSE PRACTITIONER

## 2022-10-03 PROCEDURE — 3008F BODY MASS INDEX DOCD: CPT | Mod: CPTII,S$GLB,, | Performed by: NURSE PRACTITIONER

## 2022-10-03 NOTE — PROGRESS NOTES
SUBJECTIVE:     Chief Complaint & History of Present Illness:  Jm Newton is a New 37 y.o. year old female patient here for constant left foot/ankle pain which has been present since 9/28/22.  There is a history of injury.  She reports a door hit her left ankle which caused a twisting injury when walking into a building.  Since the incident, she has been having pain with weight bearing activities.  She went to urgent care, x-ray was negative for fracture.  She was prescribed Naproxen and referred to Ortho.    The pain is located in the lateral aspect of the foot/ankle.  The pain is described as achy, 8/10.  It is aggravated by walking.  There is radiation up the back of her calf.  Denies numbness or tingling into the toes.  Associated symptoms include discomfort. Previous treatments include Ace wrap, crutches and naproxen which have provided minimal relief.  There is not a history of previous injury or surgery to the foot.   The patient does not use an assistive device.    Review of patient's allergies indicates:   Allergen Reactions    Azithromycin Swelling and Edema         Current Outpatient Medications   Medication Sig Dispense Refill    DESCOVY 200-25 mg Tab once daily.   3    emtricitabine-tenofovir 200-300 mg (TRUVADA) 200-300 mg Tab 1 tablet      hydrOXYzine HCL (ATARAX) 25 MG tablet 1 tablet every evening.      inhaler,assist device,lg mask (OPTICHAMBER HERBERT LG MASK MISC) U UTD BID      methIMAzole (TAPAZOLE) 5 MG Tab Take half a tablet (2.5 mg) by mouth daily 45 tablet 2    mupirocin (BACTROBAN) 2 % ointment Apply topically 2 (two) times daily. Apply to the affected area for 5 days 22 g 0    naproxen (NAPROSYN) 500 MG tablet Take 1 tablet (500 mg total) by mouth 2 (two) times daily with meals. for 10 days 20 tablet 0    omeprazole (PRILOSEC) 40 MG capsule Take 1 capsule (40 mg total) by mouth once daily. 30 capsule 5    ondansetron (ZOFRAN-ODT) 4 MG TbDL Take 1 tablet (4 mg total) by  mouth every 6 (six) hours as needed (nausea). 50 tablet 1    OPTICHAMBER HERBERT LG MASK Spcr U UTD BID  3    PREZISTA 600 mg Tab 600 mg 2 (two) times daily with meals.   5    PROCHAMBER       promethazine-dextromethorphan (PROMETHAZINE-DM) 6.25-15 mg/5 mL Syrp TK 5 ML PO Q NIGHT PRN      ritonavir (NORVIR) 100 mg Tab tablet 100 mg 2 (two) times daily with meals.       sulfamethoxazole-trimethoprim 400-80mg (BACTRIM,SEPTRA) 400-80 mg per tablet Take 1 tablet by mouth once daily.      triamcinolone acetonide 0.1% (KENALOG) 0.1 % cream Apply topically 2 (two) times daily. 15 g 1    albuterol (PROVENTIL) 5 mg/mL nebulizer solution Take 0.5 mLs (2.5 mg total) by nebulization 4 (four) times daily as needed for Wheezing or Shortness of Breath. 1 each 4    fluticasone propion-salmeterol 115-21 mcg/dose (ADVAIR HFA) 115-21 mcg/actuation HFAA inhaler Inhale 2 puffs into the lungs every 12 (twelve) hours. Controller 3 Inhaler 4     No current facility-administered medications for this visit.       Past Medical History:   Diagnosis Date    Asthma     Encounter for blood transfusion     HIV infection     dx     Hyperthyroidism in pregnancy, antepartum        Past Surgical History:   Procedure Laterality Date    CERVICAL CERCLAGE      emergent with twins    CERVICAL CERCLAGE N/A 2019    Procedure: CERCLAGE, CERVIX;  Surgeon: Obey Henry MD;  Location: [facility] L&D;  Service: OB/GYN;  Laterality: N/A;     SECTION WITH TUBAL LIGATION N/A 6/15/2019    Procedure:  SECTION, WITH TUBAL LIGATION;  Surgeon: Madeline Walden MD;  Location: [facility] L&D;  Service: OB/GYN;  Laterality: N/A;     SECTION, CLASSIC      Last section with classical extention, from [facility]    COLD KNIFE CONIZATION OF CERVIX N/A 2018    Procedure: CONE BIOPSY, CERVIX, USING COLD KNIFE;  Surgeon: Chantal Worrell MD;  Location: [facility];  Service: OB/GYN;  Laterality: N/A;    CONIZATION OF CERVIX USING LOOP ELECTROSURGICAL  "EXCISION PROCEDURE (LEEP) N/A 6/25/2018    Procedure: LEEP CONIZATION, CERVIX;  Surgeon: Chantal Worrell MD;  Location: [facility] OR;  Service: OB/GYN;  Laterality: N/A;    CYSTOSCOPY N/A 10/22/2019    Procedure: CYSTOSCOPY;  Surgeon: Georgia Howard MD;  Location: [facility] OR;  Service: OB/GYN;  Laterality: N/A;    DILATION AND CURETTAGE OF UTERUS      ROBOT-ASSISTED LAPAROSCOPIC ABDOMINAL HYSTERECTOMY USING DA GALINDO XI N/A 10/22/2019    Procedure: XI ROBOTIC HYSTERECTOMY;  Surgeon: Georgia Howard MD;  Location: [facility] OR;  Service: OB/GYN;  Laterality: N/A;    ROBOT-ASSISTED SURGICAL REMOVAL OF FALLOPIAN TUBE USING DA GALINDO XI Bilateral 10/22/2019    Procedure: XI ROBOTIC SALPINGECTOMY;  Surgeon: Georgia Howard MD;  Location: [facility];  Service: OB/GYN;  Laterality: Bilateral;    THYROIDECTOMY Right 4/13/2021    Procedure: THYROIDECTOMY;  Surgeon: Maksim Sifuentes MD;  Location: [address];  Service: ENT;  Laterality: Right;  Right possible  total    WISDOM TOOTH EXTRACTION         Family History   Problem Relation Age of Onset    Hypertension Maternal Grandmother     Diabetes Maternal Grandmother     Sleep apnea Son     Colon cancer Neg Hx     Ovarian cancer Neg Hx          Review of Systems:  ROS:  Constitutional: no fever or chills  Eyes: no visual changes  ENT: no nasal congestion or sore throat  Respiratory: no cough or shortness of breath  Cardiovascular: no chest pain or palpitations  Gastrointestinal: no nausea or vomiting, tolerating diet  Genitourinary: no hematuria or dysuria  Integument/Breast: no rash or pruritis  Hematologic/Lymphatic: no easy bruising or lymphadenopathy  Musculoskeletal:  left ankle injury  Neurological: no seizures or tremors  Behavioral/Psych: no auditory or visual hallucinations  Endocrine: no heat or cold intolerance      OBJECTIVE:     PHYSICAL EXAM:  Vital Signs (Most Recent)  There were no vitals filed for this visit.  Height: 5' 6" (167.6 cm) Weight: 108.9 kg (240 lb 1.3 oz), " "  Estimated body mass index is 38.75 kg/m² as calculated from the following:    Height as of this encounter: 5' 6" (1.676 m).    Weight as of this encounter: 108.9 kg (240 lb 1.3 oz).   General Appearance: Well nourished, well developed, in no acute distress.  HENT: Normal cephalic, oropharynx pink and moist  Eyes: PERRLA bilaterally and EOM x 4  Respiratory: Even and unlabored  Skin: Warm and Dry.   Psychiatric: AAO x 4, Mood & affect are normal.    left  Foot/Ankle    General appearance: no acute distress, alert/oriented x3, appropriate mood and affect, looks stated age, and well nourished  The examination was performed out of splint/cast  Skin: normal  Swelling: minimal  Warmth: no warmth  Tenderness: diffuse  ROM: 25 degrees dorsiflexion, 25 degrees plantarflexion, 15 degrees inversion, and 15 degrees eversion  Strength: 4 on 5 tibialis anterior strength, 4 of 5 peroneus longus, 4 of 5 peroneus brevis, and 4 of 5 tibialis posterior  Gait: antalgic  Stability: stable to testing and Cotton test: negative  Crepitus: no  Neurological Exam: normal  Vascular Exam: normal and pulse present  Walton Test: ankle plantarflexes    soft tissue swelling noted over the lateral ankle and tenderness at Achilles tendon insertion      RADIOGRAPHS:  X-ray of the left ankle was obtained, no acute fracture.  Mortise is symmetrical.  All radiographs were personally reviewed by me.    ASSESSMENT/PLAN:       ICD-10-CM ICD-9-CM   1. Acute left ankle pain  M25.572 719.47       Plan:  -Jm Irma Newton presents to clinic today with c/c left foot/ankle pain for the past 5 days, date of injury 9/28/22.  -X-ray as above.  -Recommend RICE therapy.  -I suspect achilles tendonitis vs ankle sprain.  -I explained pain from strains and sprains can last 6-8 weeks.  -I recommend she continue Naproxen as prescribed and can alternated with Tylenol PRN.  -I will place her into a ankle lace up support brace with heel gel inserts.  Advised to " avoid high impact activities.  -Follow up in 2 weeks with repeat left ankle x-ray standing.

## 2022-10-11 ENCOUNTER — TELEPHONE (OUTPATIENT)
Dept: ORTHOPEDICS | Facility: CLINIC | Age: 37
End: 2022-10-11
Payer: MEDICARE

## 2022-10-11 NOTE — TELEPHONE ENCOUNTER
Called and spoke with pt in regards to her fall and numbness in her foot. Informed pt to come in sooner and pt decided to wait to her appt on Monday with Kong. Informed pt to ice and elevate her foot until appt. Pt verbally understood and was satisfied.

## 2022-10-17 ENCOUNTER — HOSPITAL ENCOUNTER (OUTPATIENT)
Dept: RADIOLOGY | Facility: HOSPITAL | Age: 37
Discharge: HOME | End: 2022-10-17
Attending: NURSE PRACTITIONER
Payer: COMMERCIAL

## 2022-10-17 ENCOUNTER — OFFICE VISIT (OUTPATIENT)
Dept: ORTHOPEDICS | Facility: CLINIC | Age: 37
End: 2022-10-17
Attending: NURSE PRACTITIONER
Payer: MEDICARE

## 2022-10-17 ENCOUNTER — HOSPITAL ENCOUNTER (OUTPATIENT)
Dept: RADIOLOGY | Facility: HOSPITAL | Age: 37
Discharge: HOME | End: 2022-10-17
Attending: NURSE PRACTITIONER
Payer: MEDICARE

## 2022-10-17 VITALS — WEIGHT: 240.06 LBS | HEIGHT: 66 IN | BODY MASS INDEX: 38.58 KG/M2

## 2022-10-17 DIAGNOSIS — M79.672 LEFT FOOT PAIN: ICD-10-CM

## 2022-10-17 DIAGNOSIS — S93.492D SPRAIN OF OTHER LIGAMENT OF LEFT ANKLE, SUBSEQUENT ENCOUNTER: Primary | ICD-10-CM

## 2022-10-17 DIAGNOSIS — M25.572 ACUTE LEFT ANKLE PAIN: Primary | ICD-10-CM

## 2022-10-17 DIAGNOSIS — M25.572 ACUTE LEFT ANKLE PAIN: ICD-10-CM

## 2022-10-17 PROCEDURE — 73610 XR ANKLE COMPLETE 3 VIEW LEFT: ICD-10-PCS | Mod: 26,LT,, | Performed by: RADIOLOGY

## 2022-10-17 PROCEDURE — 99214 PR OFFICE/OUTPT VISIT, EST, LEVL IV, 30-39 MIN: ICD-10-PCS | Mod: S$GLB,,, | Performed by: NURSE PRACTITIONER

## 2022-10-17 PROCEDURE — 99214 OFFICE O/P EST MOD 30 MIN: CPT | Mod: S$GLB,,, | Performed by: NURSE PRACTITIONER

## 2022-10-17 PROCEDURE — 73610 X-RAY EXAM OF ANKLE: CPT | Mod: TC,LT

## 2022-10-17 PROCEDURE — 3044F HG A1C LEVEL LT 7.0%: CPT | Mod: CPTII,S$GLB,, | Performed by: NURSE PRACTITIONER

## 2022-10-17 PROCEDURE — 73630 XR FOOT COMPLETE 3 VIEW LEFT: ICD-10-PCS | Mod: 26,LT,, | Performed by: INTERNAL MEDICINE

## 2022-10-17 PROCEDURE — 1159F MED LIST DOCD IN RCRD: CPT | Mod: CPTII,S$GLB,, | Performed by: NURSE PRACTITIONER

## 2022-10-17 PROCEDURE — 73630 X-RAY EXAM OF FOOT: CPT | Mod: 26,LT,, | Performed by: INTERNAL MEDICINE

## 2022-10-17 PROCEDURE — 99999 PR PBB SHADOW E&M-EST. PATIENT-LVL III: CPT | Mod: PBBFAC,,, | Performed by: NURSE PRACTITIONER

## 2022-10-17 PROCEDURE — 73630 X-RAY EXAM OF FOOT: CPT | Mod: TC,LT

## 2022-10-17 PROCEDURE — 3044F PR MOST RECENT HEMOGLOBIN A1C LEVEL <7.0%: ICD-10-PCS | Mod: CPTII,S$GLB,, | Performed by: NURSE PRACTITIONER

## 2022-10-17 PROCEDURE — 1160F RVW MEDS BY RX/DR IN RCRD: CPT | Mod: CPTII,S$GLB,, | Performed by: NURSE PRACTITIONER

## 2022-10-17 PROCEDURE — 99999 PR PBB SHADOW E&M-EST. PATIENT-LVL III: ICD-10-PCS | Mod: PBBFAC,,, | Performed by: NURSE PRACTITIONER

## 2022-10-17 PROCEDURE — 1160F PR REVIEW ALL MEDS BY PRESCRIBER/CLIN PHARMACIST DOCUMENTED: ICD-10-PCS | Mod: CPTII,S$GLB,, | Performed by: NURSE PRACTITIONER

## 2022-10-17 PROCEDURE — 73610 X-RAY EXAM OF ANKLE: CPT | Mod: 26,LT,, | Performed by: RADIOLOGY

## 2022-10-17 PROCEDURE — 1159F PR MEDICATION LIST DOCUMENTED IN MEDICAL RECORD: ICD-10-PCS | Mod: CPTII,S$GLB,, | Performed by: NURSE PRACTITIONER

## 2022-10-17 RX ORDER — GABAPENTIN 100 MG/1
100 CAPSULE ORAL NIGHTLY
Qty: 30 CAPSULE | Refills: 0 | Status: SHIPPED | OUTPATIENT
Start: 2022-10-17 | End: 2022-12-02

## 2022-10-17 NOTE — PROGRESS NOTES
"  SUBJECTIVE:     Chief Complaint & History of Present Illness:  Jm Newton is a Established 37 y.o. year old female patient here for follow up for her left ankle injury.  She was last seen by me 2 weeks ago at which time she reported she had twisted her left ankle on 9/28/22.  X-ray of her ankle was negative for fracture and she was placed into a lace up ankle brace and told she could weight bear as tolerated, avoid high impact activities and follow up in 2 weeks.    She returns today for follow up.  She reports she continues to have pain in her left ankle which she describes as a sharp tingling like pain.  She reports her foot "went numb" while showering and caused her to fall.  She is currently using a crutch as an aid and finds the ankle lace up brace can be tight at time.  She has not done formal PT.      Review of patient's allergies indicates:   Allergen Reactions    Azithromycin Swelling and Edema         Current Outpatient Medications   Medication Sig Dispense Refill    DESCOVY 200-25 mg Tab once daily.   3    emtricitabine-tenofovir 200-300 mg (TRUVADA) 200-300 mg Tab 1 tablet      hydrOXYzine HCL (ATARAX) 25 MG tablet 1 tablet every evening.      inhaler,assist device,lg mask (OPTICHAMBER HERBERT LG MASK MISC) U UTD BID      methIMAzole (TAPAZOLE) 5 MG Tab Take half a tablet (2.5 mg) by mouth daily 45 tablet 2    omeprazole (PRILOSEC) 40 MG capsule Take 1 capsule (40 mg total) by mouth once daily. 30 capsule 5    ondansetron (ZOFRAN-ODT) 4 MG TbDL Take 1 tablet (4 mg total) by mouth every 6 (six) hours as needed (nausea). 50 tablet 1    OPTICHAMBER HERBERT LG MASK Spcr U UTD BID  3    PREZISTA 600 mg Tab 600 mg 2 (two) times daily with meals.   5    PROCHAMBER       promethazine-dextromethorphan (PROMETHAZINE-DM) 6.25-15 mg/5 mL Syrp TK 5 ML PO Q NIGHT PRN      ritonavir (NORVIR) 100 mg Tab tablet 100 mg 2 (two) times daily with meals.       sulfamethoxazole-trimethoprim 400-80mg " (BACTRIM,SEPTRA) 400-80 mg per tablet Take 1 tablet by mouth once daily.      triamcinolone acetonide 0.1% (KENALOG) 0.1 % cream Apply topically 2 (two) times daily. 15 g 1    albuterol (PROVENTIL) 5 mg/mL nebulizer solution Take 0.5 mLs (2.5 mg total) by nebulization 4 (four) times daily as needed for Wheezing or Shortness of Breath. 1 each 4    fluticasone propion-salmeterol 115-21 mcg/dose (ADVAIR HFA) 115-21 mcg/actuation HFAA inhaler Inhale 2 puffs into the lungs every 12 (twelve) hours. Controller 3 Inhaler 4     No current facility-administered medications for this visit.       Past Medical History:   Diagnosis Date    Asthma     Encounter for blood transfusion     HIV infection     dx     Hyperthyroidism in pregnancy, antepartum        Past Surgical History:   Procedure Laterality Date    CERVICAL CERCLAGE      emergent with twins    CERVICAL CERCLAGE N/A 2019    Procedure: CERCLAGE, CERVIX;  Surgeon: Obey Henry MD;  [facility];  Service: OB/GYN;  Laterality: N/A;     SECTION WITH TUBAL LIGATION N/A 6/15/2019    Procedure:  SECTION, WITH TUBAL LIGATION;  Surgeon: Madeline Walden MD;  [facility];  Service: OB/GYN;  Laterality: N/A;     SECTION, CLASSIC      Last section with classical extention, from [facility]    COLD KNIFE CONIZATION OF CERVIX N/A 2018    Procedure: CONE BIOPSY, CERVIX, USING COLD KNIFE;  Surgeon: Chantal Worrell MD;  Location: [facility];  Service: OB/GYN;  Laterality: N/A;    CONIZATION OF CERVIX USING LOOP ELECTROSURGICAL EXCISION PROCEDURE (LEEP) N/A 2018    Procedure: LEEP CONIZATION, CERVIX;  Surgeon: Chantal Worrell MD;  Location: [facility];  Service: OB/GYN;  Laterality: N/A;    CYSTOSCOPY N/A 10/22/2019    Procedure: CYSTOSCOPY;  Surgeon: Georgia Howard MD;  Location: [facility];  Service: OB/GYN;  Laterality: N/A;    DILATION AND CURETTAGE OF UTERUS      ROBOT-ASSISTED LAPAROSCOPIC ABDOMINAL HYSTERECTOMY USING DA GALINDO  "XI N/A 10/22/2019    Procedure: XI ROBOTIC HYSTERECTOMY;  Surgeon: Georgia Howard MD;  Location: [facility];  Service: OB/GYN;  Laterality: N/A;    ROBOT-ASSISTED SURGICAL REMOVAL OF FALLOPIAN TUBE USING DA GALINDO XI Bilateral 10/22/2019    Procedure: XI ROBOTIC SALPINGECTOMY;  Surgeon: Georgia Howard MD;  Location: [facility] OR;  Service: OB/GYN;  Laterality: Bilateral;    THYROIDECTOMY Right 4/13/2021    Procedure: THYROIDECTOMY;  Surgeon: Maksim Sifuentes MD;  Location: [facility] OR [address] [place] [address];  Service: ENT;  Laterality: Right;  Right possible  total    WISDOM TOOTH EXTRACTION         Family History   Problem Relation Age of Onset    Hypertension Maternal Grandmother     Diabetes Maternal Grandmother     Sleep apnea Son     Colon cancer Neg Hx     Ovarian cancer Neg Hx          Review of Systems:  ROS:  Constitutional: no fever or chills  Eyes: no visual changes  ENT: no nasal congestion or sore throat  Respiratory: no cough or shortness of breath  Cardiovascular: no chest pain or palpitations  Gastrointestinal: no nausea or vomiting, tolerating diet  Genitourinary: no hematuria or dysuria  Integument/Breast: no rash or pruritis  Hematologic/Lymphatic: no easy bruising or lymphadenopathy  Musculoskeletal:  left ankle injury  Neurological: no seizures or tremors  Behavioral/Psych: no auditory or visual hallucinations  Endocrine: no heat or cold intolerance      OBJECTIVE:     PHYSICAL EXAM:  Vital Signs (Most Recent)  There were no vitals filed for this visit.  Height: 5' 6" (167.6 cm) Weight: 108.9 kg (240 lb 1.3 oz),   Estimated body mass index is 38.75 kg/m² as calculated from the following:    Height as of this encounter: 5' 6" (1.676 m).    Weight as of this encounter: 108.9 kg (240 lb 1.3 oz).   General Appearance: Well nourished, well developed, in no acute distress.  HENT: Normal cephalic, oropharynx pink and moist  Eyes: PERRLA bilaterally and EOM x 4  Respiratory: Even and unlabored  Skin: Warm and Dry. "   Psychiatric: AAO x 4, Mood & affect are normal.    left  Foot/Ankle    General appearance: no acute distress, alert/oriented x3, appropriate mood and affect, looks stated age, and well nourished  The examination was performed out of splint/cast  Skin: normal  Swelling: minimal  Warmth: no warmth  Tenderness: diffuse  ROM: 25 degrees dorsiflexion, 25 degrees plantarflexion, 15 degrees inversion, and 15 degrees eversion  Strength: 4 on 5 tibialis anterior strength, 4 of 5 peroneus longus, 4 of 5 peroneus brevis, and 4 of 5 tibialis posterior  Gait: antalgic  Stability: stable to testing and Cotton test: negative  Crepitus: no  Neurological Exam: normal  Vascular Exam: normal and pulse present  Walton Test: ankle plantarflexes    soft tissue swelling noted over the lateral ankle and tenderness at Achilles tendon insertion      RADIOGRAPHS:  X-ray of the left ankle and left foot were obtained, no acute fractures seen.  Mortise is symmetrical.  All radiographs were personally reviewed by me.    ASSESSMENT/PLAN:       ICD-10-CM ICD-9-CM   1. Sprain of other ligament of left ankle, subsequent encounter  S93.492D V58.89     845.09         Plan:  -Jm Newton presents to clinic today with c/c left foot/ankle pain since twisting her ankle on 9/28/22.  -X-ray as above.  -Recommend RICE therapy.  -I will place her into a boot and refer her to PT with [facility].    -I explained pain from strains and sprains can last 6-8 weeks.  -I will prescribe Neurontin 100 mg qhs.  -I recommend she continue OTC analgesics PRN  -Ok to try and transition back to ankle lace up brace in 3 weeks.    -Advised to avoid high impact activities.  -Follow up in 6 weeks with repeat left ankle x-ray standing.

## 2022-10-19 ENCOUNTER — CLINICAL SUPPORT (OUTPATIENT)
Dept: REHABILITATION | Facility: HOSPITAL | Age: 37
End: 2022-10-19
Attending: NURSE PRACTITIONER
Payer: COMMERCIAL

## 2022-10-19 DIAGNOSIS — S93.492D SPRAIN OF OTHER LIGAMENT OF LEFT ANKLE, SUBSEQUENT ENCOUNTER: ICD-10-CM

## 2022-10-19 DIAGNOSIS — M25.572 ACUTE LEFT ANKLE PAIN: ICD-10-CM

## 2022-10-19 PROCEDURE — 97161 PT EVAL LOW COMPLEX 20 MIN: CPT | Mod: PN

## 2022-10-19 PROCEDURE — 97140 MANUAL THERAPY 1/> REGIONS: CPT | Mod: PN

## 2022-10-19 PROCEDURE — 97110 THERAPEUTIC EXERCISES: CPT | Mod: PN

## 2022-10-19 NOTE — PATIENT INSTRUCTIONS
Access Code: [email or web address]  URL: [email or web address]  Date: 10/19/2022  Prepared by: Jonh Johns

## 2022-10-19 NOTE — PLAN OF CARE
[facility]   Physical Therapy Initial Evaluation     Date: 10/19/2022   Name: Jm Newton  Clinic Number: [id number]    Therapy Diagnosis:   Encounter Diagnoses   Name Primary?    Sprain of other ligament of left ankle, subsequent encounter     Acute left ankle pain      Physician: Kong Salmeron NP    Physician Orders: PT Eval and Treat   Medical Diagnosis from Referral: S93.492D (ICD-10-CM) - Sprain of other ligament of left ankle, subsequent encounter  Evaluation Date: 10/19/2022  Authorization Period Expiration: 10/17/2023  Plan of Care Expiration: 11/30/2022   Progress Note Due: 11/19/2022  Visit # / Visits authorized: 1/ 1     Eval Visit FOTO - 22 (10/19/2022)   5th Visit FOTO  - (Date/Score/Turn Green)   10th Visit FOTO - (Date/Score/Turn Green)   D/C FOTO -  (Date/Score/Turn Green)      Precautions: Standard     Time In: 11:00 am   Time Out: 11:45  Total Appointment Time (timed & untimed codes): 45 minutes    SUBJECTIVE     Date of onset: 9/28/2022     Current Condition - Jm reports having left ankle pain from being hit by an iron gate at work as a . The gate hit her in the posterior ankle and has not been able to put weight on in for the last three weeks. She came into the building with a boot donned. Ms. Newton states that it hurts to put pressure on the foot and has a burning sensation in the posterior aspect of the lateral malleolus on the Left leg. Recently, she felt numb in the foot, which caused her foot to give out, and she fell while showering.    Type of Pain: Pain is located to the lateral malleolus on the Left leg and described as Burning  24-Hour Pattern: Worst during the day and with activity.   Sleep: Patient is able to sleep throughout the night without waking up due to pain.     Aggravating Factors: Standing, Touching, Walking, and Lifting  Easing Factors: pain medication and heating pad    Imaging: X-ray: Per imaging report,  ""there is mild degenerative change at the 1st metatarsal-phalangeal joint space.  There is no evidence of fracture or osseous destructive process." - Foot; "No acute or healing fractures..  Preserved tibiotalar articulation and talar dome.  Persistent bimalleolar soft tissue swelling.  Tiny spur developing at Achilles tendon less so plantar fascia attachments.  Left ankle findings do not appear significantly change when compared to earlier exam." - Ankle    Prior Therapy: None.   Work/School:  requires walking and sitting at a computer.   Hobbies/Exercise: Walking, Cleaning, Festivals   Physical Environment: Pt lives in a 1-story home with 1 steps to enter the home and lives with their family    Prior Level of Function: Independent.   Current Level of Function: Modified Independent.     Pain:  Current 8/10,   Worst 10/10,   Best 5/10     Non-Mechanical Origin:  Hx of Cancer?  None.   Sudden bowel/bladder changes?  None.   Bone Density compromise?  None.     Falls: 1 fall in the last year. Incident described in the subjective with no injuries reported.   Red Flags: (+) Numbness/Tingling to the left foot.      Patient Goals: Patient would like to decrease pain and increase gym activity.      Medical History:   Past Medical History:   Diagnosis Date    Asthma     Encounter for blood transfusion     HIV infection     dx     Hyperthyroidism in pregnancy, antepartum      Surgical History:   Jm Irma Newton  has a past surgical history that includes Cervical cerclage;  section, classic; Dilation and curettage of uterus; Cold knife conization of cervix (N/A, 2018); Conization of cervix using loop electrosurgical excision procedure (LEEP) (N/A, 2018); Cervical cerclage (N/A, 2019);  section with tubal ligation (N/A, 6/15/2019); [place] tooth extraction; Robot-assisted laparoscopic abdominal hysterectomy using da Chas Xi (N/A, 10/22/2019); Cystoscopy (N/A, 10/22/2019); " Robot-assisted surgical removal of fallopian tube using da Chas Xi (Bilateral, 10/22/2019); and Thyroidectomy (Right, 4/13/2021).    Medications:   Jm has a current medication list which includes the following prescription(s): albuterol, descovy, emtricitabine-tenofovir 200-300 mg, advair hfa, gabapentin, hydroxyzine hcl, inhaler,assist device,lg mask, methimazole, omeprazole, ondansetron, optichamber kt lg mask, prezista, prochamber, promethazine-dextromethorphan, ritonavir, sulfamethoxazole-trimethoprim 400-80mg, and triamcinolone acetonide 0.1%.    Allergies:   Review of patient's allergies indicates:   Allergen Reactions    Azithromycin Swelling and Edema        OBJECTIVE     BALANCE - Single Leg Balance    Left Right    Eyes Open  Unable to perform 30 sec   Eyes Closed  Unable to perform Unable to perform     ACTIVE RANGE OF MOTION    Left Right   Dorsiflexion (gastroc) 5° 20°   Plantarflexion 40° 55°   Inversion 20° 40°   Eversion 25° 35°   PASSIVE RANGE OF MOTION   Dorsiflexion (gastroc) 5° 20°   Plantarflexion 40° 65°   Inversion 20° 50°   Eversion 25° 40°     LOWER EXTREMITY STRENGTH    Left Right   Knee Extension 5/5 5/5   Knee Flexion 5/5 4/5     Ankle Dorsiflexion 4-/5 5/5   Ankle Plantarflexion (Single Heel Raise) Unable to Perform 17/30   Ankle Inversion 4-/5 4+/5   Ankle Eversion 4-/5 4+/5     DTR's:   Left Right   Achilles Tendon Unable to elicit Unable to elicit     SPECIAL TESTS    Left  Right    Figure 8 60 cm 55 cm   Anterior Drawer (ATFL) Unable to assess due to pain Negative    Talar Tilt/Inversion Stress Tests (CFL) Unable to assess due to pain with palpation Negative    Posterior Drawer (PTFL) Negative  Negative    Squeeze Test (syndesmosis) Negative  Negative      PALPATION:  Patient reports primary pain region along lateral aspect of the posterior portion of the lateral malleolus     GAIT: Jm ambulates with no assistive device with independently.     GAIT DEVIATIONS:  Jm displays antalgic gait    Squat Mechanics: Bilateral knee valgus, anterior tibial translation, decreased depth of squat    Limitation/Restriction for FOTO LEFS Survey    Therapist reviewed FOTO scores for Jm Newton on 10/19/2022.   FOTO documents entered into [employer] - see Media section.    Limitation Score: 8/80       TREATMENT     Total Treatment time (time-based codes) separate from Evaluation: 25 minutes      Jm received the treatments listed below:      therapeutic exercises to develop strength, endurance, ROM, and flexibility for 15 minutes including:  ABCs 2x  Ankle Pumps 2x10  Inversion/Eversion 2x10 with a 3' sec hold  Weight Shifts 2x10 with a 3' sec hold    manual therapy techniques: Joint mobilizations, Manual traction, and Soft tissue Mobilization were applied to the: left ankle for 10 minutes, including:  Soft-Tissue Mobilization to the left ankle and surrounding structures  Mobilization of the ankle, Grade 2, A-->P and Distraction       PATIENT EDUCATION AND HOME EXERCISES     Education provided:   - Pt was educated on the prognosis and pathology of tarsal tunnel syndrome. The patient verbalized understanding and compliance with HEP.   - Pt/family was provided educational information, including: role of PT, goals for PT, scheduling - pt verbalized understanding. Discussed insurance limitations with pt.     Written Home Exercises Provided: yes. Exercises were reviewed and Jm was able to demonstrate them prior to the end of the session.  Jm demonstrated good  understanding of the education provided. See EMR under Patient Instructions for exercises provided during therapy sessions.    ASSESSMENT     Jm is a 37 y.o. female referred to outpatient Physical Therapy with a medical diagnosis of Sprain of other ligament of left ankle. Findings are consistent with a strain to the everter's of the left foot. Limitations are noted with walking/weight-bearing,  driving, and occupational tasks. Primary impairment is pain, secondary to decreased ROM, endurance, strength, coordination, stability/balance, muscular activation, and function to the left foot. Pt. would benefit from PT to address impairments listed above to return to functional independence.    Intervention strategies designed to address these impairments will be instrumental in achieving the stated functional goals, including her ability to safely perform mobility tasks. Based on the examination, the patient's rehabilitation potential to achieve functional goals is good due to age and motivational factors.       Patient prognosis is Good.   Patient will benefit from skilled outpatient Physical Therapy to address the deficits stated above and in the chart below, provide patient /family education, and to maximize patientt's level of independence.     Plan of care discussed with patient: Yes  Patient's spiritual, cultural and educational needs considered and patient is agreeable to the plan of care and goals as stated below:     Anticipated Barriers for therapy: None.     Medical Necessity is demonstrated by the following  History  Co-morbidities and personal factors that may impact the plan of care Co-morbidities:   high BMI    Personal Factors:   no deficits     moderate   Examination  Body Structures and Functions, activity limitations and participation restrictions that may impact the plan of care Body Regions:   upper extremities    Body Systems:    gross symmetry  ROM  strength  gross coordinated movement  balance  gait  transfers  transitions  motor control    Participation Restrictions:   None.     Activity limitations:   Learning and applying knowledge  no deficits    General Tasks and Commands  no deficits    Communication  no deficits    Mobility  no deficits    Self care  no deficits    Domestic Life  no deficits    Interactions/Relationships  no deficits    Life Areas  no deficits    Community and  Social Life  no deficits         moderate   Clinical Presentation evolving clinical presentation with changing clinical characteristics moderate   Decision Making/ Complexity Score: moderate     Goals:  Short Term Goals: 3 weeks   Patient will be independent with HEP at home to increase PT compliance.     Patient will be able to walk for 100 feet with < 2/10 pain to increase ambulation capacity.     Patient will be able to perform 10 sit to stands with < 2/10 pain to increase transitional movements          Long Term Goals: 6 weeks   Patient will decrease LEFS score to > 70/80 to increase functional capacity.     Patient will be able to ambulate 500 yards with no pain to increase community capacity.     Patient will be able to go to the gym for 1 weeks with no pain to increase recreational activities             PLAN   Plan of care Certification: 10/19/2022 to 11/30/2022 .    Outpatient Physical Therapy 2 times weekly for 6 weeks to include the following interventions: Electrical Stimulation (IFC), Gait Training, Manual Therapy, Moist Heat/ Ice, Neuromuscular Re-ed, Patient Education, Self Care, Therapeutic Activities, Therapeutic Exercise, and Functional Dry Needling.     Jonh Johns, PT, DPT    Pt may be seen by PTA as part of the rehabilitation team.     Therapist: Jonh Johns, PT, DPT 10/19/2022    I CERTIFY THE NEED FOR THESE SERVICES FURNISHED UNDER THIS PLAN OF TREATMENT AND WHILE UNDER MY CARE   Physician's comments:     Physician's Signature: ___________________________________________________

## 2022-10-19 NOTE — PROGRESS NOTES
[facility]   Physical Therapy Initial Evaluation     Date: 10/19/2022   Name: Jm Newton  Clinic Number: [id number]    Therapy Diagnosis:   Encounter Diagnoses   Name Primary?    Sprain of other ligament of left ankle, subsequent encounter     Acute left ankle pain      Physician: Kong Salmeron, NP    Physician Orders: PT Eval and Treat   Medical Diagnosis from Referral: S93.492D (ICD-10-CM) - Sprain of other ligament of left ankle, subsequent encounter  Evaluation Date: 10/19/2022  Authorization Period Expiration: 10/17/2023  Plan of Care Expiration: 11/30/2022   Progress Note Due: 11/19/2022  Visit # / Visits authorized: 1/ 1     Eval Visit FOTO - 22 (10/19/2022)   5th Visit FOTO  - (Date/Score/Turn Green)   10th Visit FOTO - (Date/Score/Turn Green)   D/C FOTO -  (Date/Score/Turn Green)      Precautions: Standard     Time In: 11:00 am   Time Out: 11:45  Total Appointment Time (timed & untimed codes): 45 minutes      Please see Plan of Care notation section to view Jm Newton Initial Evaluation.       Jonh Johns, PT,DPT

## 2022-10-27 ENCOUNTER — CLINICAL SUPPORT (OUTPATIENT)
Dept: REHABILITATION | Facility: HOSPITAL | Age: 37
End: 2022-10-27
Attending: NURSE PRACTITIONER
Payer: COMMERCIAL

## 2022-10-27 DIAGNOSIS — M25.572 ACUTE LEFT ANKLE PAIN: Primary | ICD-10-CM

## 2022-10-27 PROCEDURE — 97110 THERAPEUTIC EXERCISES: CPT | Mod: PN,CQ

## 2022-10-27 NOTE — PROGRESS NOTES
Physical Therapy Daily Treatment Note         Name: Jm Newton  Clinic Number: [id number]    Therapy Diagnosis:   Encounter Diagnosis   Name Primary?    Acute left ankle pain Yes     Physician: Kong Salmeron NP    Visit Date: 10/27/2022    Physician Orders: PT Eval and Treat   Medical Diagnosis from Referral: S93.492D (ICD-10-CM) - Sprain of other ligament of left ankle, subsequent encounter  Evaluation Date: 10/19/2022  Authorization Period Expiration: 10/17/2023  Plan of Care Expiration: 2022   Progress Note Due: 2022  Visit # / Visits authorized:   PTA Consecutive Visits #:    Remaining Visits scheduled:     5th Visit FOTO - (Date, Score/ turn green)  10th Visit FOTO -  (Date, Score/ turn green )  D/C FOTO - (Date, Score/ turn green )    Time In: 2:47 (pt arrived late)  Time Out: 3:15  Billable Time: 28 minutes  Non-Billable Time: 00 minutes    Precautions: Standard and Fall  Insurance: Payor: [employer] MEDICARE / Plan: [employer] (SPECIAL NEEDS PLAN) / Product Type: Medicare Advantage /     Subjective     Pt reports: No exercise today to it's fine. Only hurts if I put pressure on it.  She is compliant with home exercise program.  Response to previous treatment: 1st session    Pre-Treatment Pain Ratin/10  Post-Treatment Pain Ratin/10  Location: left ankles     Objective     Jm received therapeutic exercises to develop strength, endurance, ROM, and flexibility for 28 minutes including:    therapeutic exercises to develop strength, endurance, ROM, and flexibility for 15 minutes including:  ABCs 2x - Eversion causes pain   Ankle Pumps 2x10  Inversion/Eversion 2x10 with a 3' sec hold  Weight Shifts 2x10 with a 3' sec hold   Heel slides - 5'   Toe extension 10 x 2  DF/PF - 2' with R foot controlling   Towel crunches - 3'     manual therapy techniques: Joint mobilizations, Manual traction, and Soft tissue Mobilization were applied to the: left ankle for 00  minutes, including:  Soft-Tissue Mobilization to the left ankle and surrounding structures  Mobilization of the ankle, Grade 2, A-->P and Distraction         Home Exercises Provided and Patient Education Provided     Education provided:   - Continue with HEP    Written Home Exercises Provided: Patient instructed to cont prior HEP.  Exercises were reviewed and Jm was able to demonstrate them prior to the end of the session.  Jm demonstrated good  understanding of the education provided.     See EMR under Patient Instructions for exercises provided prior visit.    Assessment     Pt tolerated session fairly well. Ankle ABCs reproduced some pain at the lateral aspects of the ankle. Heel slides, towel crunches and DF/PF added to allow for gentle motion of the ankle. Half way through towel crunches, pt felt shooting pain at medial aspect of ankle once the pain stopped pt resumed the exercise. Toe extensions tolerated well without pain. Pt instructed to control wobble board DF/PF with uninvolved foot to allow PROM, she stated this reduced pain. L ankle is very guarded. Pt has fallen since injury due to numbness in the L foot causing her fear of weight bearing. Jm had no adverse effects with therapy and will continue to benefit from skilled therapy.     Jm Is progressing well towards Her goals.     Pt prognosis is Good.     Pt will continue to benefit from skilled outpatient physical therapy to address the deficits listed in the problem list box on initial evaluation, provide pt/family education and to maximize pt's level of independence in the home and community environment.     Pt's spiritual, cultural and educational needs considered and pt agreeable to plan of care and goals.    Anticipated barriers to physical therapy: None    Goals:  Short Term Goals: 3 weeks   Patient will be independent with HEP at home to increase PT compliance.      Patient will be able to walk for 100 feet with < 2/10  pain to increase ambulation capacity.      Patient will be able to perform 10 sit to stands with < 2/10 pain to increase transitional movements              Long Term Goals: 6 weeks   Patient will decrease LEFS score to > 70/80 to increase functional capacity.      Patient will be able to ambulate 500 yards with no pain to increase community capacity.      Patient will be able to go to the gym for 1 weeks with no pain to increase recreational activities               Plan     Continued with current POC      Tim Muñoz PTA ,  10/27/2022

## 2022-10-31 ENCOUNTER — CLINICAL SUPPORT (OUTPATIENT)
Dept: REHABILITATION | Facility: HOSPITAL | Age: 37
End: 2022-10-31
Attending: NURSE PRACTITIONER
Payer: COMMERCIAL

## 2022-10-31 DIAGNOSIS — M25.572 ACUTE LEFT ANKLE PAIN: Primary | ICD-10-CM

## 2022-10-31 PROCEDURE — 97110 THERAPEUTIC EXERCISES: CPT | Mod: PN

## 2022-10-31 NOTE — PROGRESS NOTES
Physical Therapy Daily Treatment Note         Name: Jm Newton  Clinic Number: [id number]    Therapy Diagnosis:   Encounter Diagnosis   Name Primary?    Acute left ankle pain Yes     Physician: Kong Salmeron NP    Visit Date: 10/31/2022    Physician Orders: PT Eval and Treat   Medical Diagnosis from Referral: S93.492D (ICD-10-CM) - Sprain of other ligament of left ankle, subsequent encounter  Evaluation Date: 10/19/2022  Authorization Period Expiration: 10/17/2023  Plan of Care Expiration: 2022   Progress Note Due: 2022  Visit # / Visits authorized:   PTA Consecutive Visits #: 0/6   Remaining Visits scheduled:     5th Visit FOTO - (Date, Score/ turn green)  10th Visit FOTO -  (Date, Score/ turn green )  D/C FOTO - (Date, Score/ turn green )    Time In: 2:35 (pt arrived late)  Time Out: 3:15  Billable Time: 40 minutes  Non-Billable Time: 00 minutes    Precautions: Standard and Fall  Insurance: Payor: [employer] MEDICARE / Plan: [employer] (SPECIAL NEEDS PLAN) / Product Type: Medicare Advantage /     Subjective     Pt reports: increase in pain continued   She is compliant with home exercise program.  Response to previous treatment: 1st session    Pre-Treatment Pain Ratin/10  Post-Treatment Pain Ratin/10  Location: left ankles     Objective     Jm received therapeutic exercises to develop strength, endurance, ROM, and flexibility for 35minutes including:    therapeutic exercises to develop strength, endurance, ROM, and flexibility for 15 minutes including:  ABCs 2x - Eversion causes pain   Ankle Pumps 2x10  Inversion/Eversion 2x10 with a 3' sec hold  Weight Shifts 2x10 with a 3' sec hold   Heel slides - 5'   `   Toe extension 10 x 2  DF/PF - 2' with R foot controlling   Towel crunches - 3'     manual therapy techniques: Joint mobilizations, Manual traction, and Soft tissue Mobilization were applied to the: left ankle for 05 minutes, including:  Soft-Tissue  "Mobilization to the left ankle and surrounding structures  Mobilization of the ankle, Grade 2, A-->P and Distraction  Not Performed    Patient was screened for use of kinesiotape and was cleared for use.  1. Has the patient ever had a reaction to the adhesive in bandaids? No  2. Has the patient ever uses athletic/kinesiotape in the past? No  3. Is the patient hemodynamically impaired (PE, DVT, CHF, Kidney failure)? No  4. Can the PT/PTA apply the tape to your skin? Yes    Patient was instructed on duration to wear the tape, proper drying techniques for the tape, and to remove the tape if he/she had any issues with it.    Patient verbalized understanding of these instructions.         Home Exercises Provided and Patient Education Provided     Education provided:   - Continue with HEP    Written Home Exercises Provided: Patient instructed to cont prior HEP.  Exercises were reviewed and Jm was able to demonstrate them prior to the end of the session.  Jm demonstrated good  understanding of the education provided.     See EMR under Patient Instructions for exercises provided prior visit.    Assessment       Patient reports to therapy with continued increased pain with reports of "shooting pain" with weight bearing during today's treatment session. Patient noted she was able to rest today however experienced pain. Physical Therapy performed KT taping to improve support and reduce swelling during today's treatment session. Patient tolerated treatment session fair secondary to pain. Patient will continue to benefit from skilled therapy to address current deficits.     Jm Is progressing well towards Her goals.     Pt prognosis is Good.     Pt will continue to benefit from skilled outpatient physical therapy to address the deficits listed in the problem list box on initial evaluation, provide pt/family education and to maximize pt's level of independence in the home and community environment.     Pt's " spiritual, cultural and educational needs considered and pt agreeable to plan of care and goals.    Anticipated barriers to physical therapy: None    Goals:  Short Term Goals: 3 weeks   Patient will be independent with HEP at home to increase PT compliance.  Progressing  11/1/2022      Patient will be able to walk for 100 feet with < 2/10 pain to increase ambulation capacity.  progressing   11/1/2022     Patient will be able to perform 10 sit to stands with < 2/10 pain to increase transitional movements progressing   11/1/2022               Long Term Goals: 6 weeks   Patient will decrease LEFS score to > 70/80 to increase functional capacity.      Patient will be able to ambulate 500 yards with no pain to increase community capacity.      Patient will be able to go to the gym for 1 weeks with no pain to increase recreational activities               Plan     Continued with current POC      Dragan Dickey, PT ,  11/1/2022

## 2022-11-03 ENCOUNTER — CLINICAL SUPPORT (OUTPATIENT)
Dept: REHABILITATION | Facility: HOSPITAL | Age: 37
End: 2022-11-03
Attending: NURSE PRACTITIONER
Payer: COMMERCIAL

## 2022-11-03 DIAGNOSIS — M25.572 ACUTE LEFT ANKLE PAIN: Primary | ICD-10-CM

## 2022-11-03 PROCEDURE — 97140 MANUAL THERAPY 1/> REGIONS: CPT | Mod: PN

## 2022-11-03 PROCEDURE — 97110 THERAPEUTIC EXERCISES: CPT | Mod: PN

## 2022-11-07 ENCOUNTER — CLINICAL SUPPORT (OUTPATIENT)
Dept: REHABILITATION | Facility: HOSPITAL | Age: 37
End: 2022-11-07
Attending: NURSE PRACTITIONER
Payer: MEDICARE

## 2022-11-07 ENCOUNTER — DOCUMENTATION ONLY (OUTPATIENT)
Dept: REHABILITATION | Facility: HOSPITAL | Age: 37
End: 2022-11-07

## 2022-11-07 DIAGNOSIS — M25.572 ACUTE LEFT ANKLE PAIN: Primary | ICD-10-CM

## 2022-11-07 PROCEDURE — 97140 MANUAL THERAPY 1/> REGIONS: CPT | Mod: PN

## 2022-11-07 PROCEDURE — 97110 THERAPEUTIC EXERCISES: CPT | Mod: PN

## 2022-11-07 PROCEDURE — 97112 NEUROMUSCULAR REEDUCATION: CPT | Mod: PN

## 2022-11-07 NOTE — PROGRESS NOTES
Physical Therapy Daily Treatment Note         Name: Jm Newton  Clinic Number: [id number]    Therapy Diagnosis:   Encounter Diagnosis   Name Primary?    Acute left ankle pain Yes       Physician: Kong Salmeron NP    Visit Date: 2022    Physician Orders: PT Eval and Treat   Medical Diagnosis from Referral: S93.492D (ICD-10-CM) - Sprain of other ligament of left ankle, subsequent encounter  Evaluation Date: 10/19/2022  Authorization Period Expiration: 2022  Plan of Care Expiration: 2022   Progress Note Due: 2022  Visit # / Visits authorized:   PTA Consecutive Visits #: 0/6   Remaining Visits scheduled:     5th Visit FOTO - (Date, Score/ turn green)  10th Visit FOTO -  (Date, Score/ turn green )  D/C FOTO - (Date, Score/ turn green )    Time In: 11:30  Time Out: 12:15  Billable Time: 45 minutes  Non-Billable Time: 00 minutes    Precautions: Standard and Fall  Insurance: Payor: [employer] MANAGED MEDICARE / Plan: [employer] (SPECIAL NEEDS PLAN) / Product Type: Medicare Advantage /     Subjective     Pt reports: no modifications in pain.   She is compliant with home exercise program.  Response to previous treatment: 1st session    Pre-Treatment Pain Ratin/10  Post-Treatment Pain Ratin/10  Location: left ankles     Objective     Jm received therapeutic exercises to develop strength, endurance, ROM, and flexibility for 20minutes including:    therapeutic exercises to develop strength, endurance, ROM, and flexibility for 15 minutes including:  ABCs 2x   Ankle Pumps 2x10  Inversion/Eversion 2x10 with a 3' sec hold  Weight Shifts 2x10 with a 3' sec hold   Heel slides - 5'   Toe extension 10 x 2  DF/PF - 2' with R foot controlling   Towel crunches - 3'  Ankle Theraband Set 2x10, Green Theraband (DF/PF/Eversion/Inversion)  Seated Heel Raises 2x15    Jm participated in neuromuscular re-education activities to improve: Balance, Coordination, Kinesthetic, Sense,  Proprioception, and Posture for 15 minutes. The following activities were included:  SLUMP Nerve Glides 3x8   Modified BAPS Board (Super Hero Ball and Airex) 2x15 (Counter-Clockwise/Clockwise/Up-Down/Sideways)        manual therapy techniques: Joint mobilizations, Manual traction, and Soft tissue Mobilization were applied to the: left ankle for 10 minutes, including:  Soft-Tissue Mobilization to the left ankle and surrounding structures  Mobilization of the ankle, Grade 2, A-->P and Distraction  Not Performed    Mobilization of the Tib-Fib Complex, A-->P, Grade 2     Patient was screened for use of kinesiotape and was cleared for use.  1. Has the patient ever had a reaction to the adhesive in bandaids? No  2. Has the patient ever uses athletic/kinesiotape in the past? No  3. Is the patient hemodynamically impaired (PE, DVT, CHF, Kidney failure)? No  4. Can the PT/PTA apply the tape to your skin? Yes    Patient was instructed on duration to wear the tape, proper drying techniques for the tape, and to remove the tape if he/she had any issues with it.    Patient verbalized understanding of these instructions.         Home Exercises Provided and Patient Education Provided     Education provided:   - Continue with HEP    Written Home Exercises Provided: Patient instructed to cont prior HEP.  Exercises were reviewed and Jm was able to demonstrate them prior to the end of the session.  Jm demonstrated good  understanding of the education provided.     See EMR under Patient Instructions for exercises provided prior visit.    Assessment       Patient reports moderate levels of discomfort to the left foot. Most movements caused discomfort with the exception of plantarflexion. SLUMP nerve glides were the most uncomfortable during today's session and required frequent breaks. No modifications were required. Patient would continue to benefit from skilled Physical Therapy to decrease pain and increase weight-bearing  capacity.     Jm Is progressing well towards Her goals.     Pt prognosis is Good.     Pt will continue to benefit from skilled outpatient physical therapy to address the deficits listed in the problem list box on initial evaluation, provide pt/family education and to maximize pt's level of independence in the home and community environment.     Pt's spiritual, cultural and educational needs considered and pt agreeable to plan of care and goals.    Anticipated barriers to physical therapy: None    Goals:  Short Term Goals: 3 weeks   Patient will be independent with HEP at home to increase PT compliance.  Progressing  11/7/2022      Patient will be able to walk for 100 feet with < 2/10 pain to increase ambulation capacity.  progressing   11/7/2022     Patient will be able to perform 10 sit to stands with < 2/10 pain to increase transitional movements progressing   11/7/2022               Long Term Goals: 6 weeks   Patient will decrease LEFS score to > 70/80 to increase functional capacity.      Patient will be able to ambulate 500 yards with no pain to increase community capacity.      Patient will be able to go to the gym for 1 weeks with no pain to increase recreational activities               Plan     Continued with current POC      Jonh Johns, PT, DPT  11/7/2022

## 2022-11-07 NOTE — PROGRESS NOTES
30 day PT-PTA face-face discussion with Dragan Dickey DPT re: Name: Jm Newton  Clinic Number: [id number] patient status, POC, and plan for progression done .

## 2022-11-07 NOTE — PATIENT INSTRUCTIONS
Access Code: [email or web address]  URL: [email or web address]  Date: 11/07/2022  Prepared by: Jonh Johns

## 2022-11-08 NOTE — PROGRESS NOTES
Physical Therapy Daily Treatment Note         Name: Jm Newton  Clinic Number: [id number]    Therapy Diagnosis:   Encounter Diagnosis   Name Primary?    Acute left ankle pain Yes     Physician: Kong Salmeron NP    Visit Date: 11/3/2022    Physician Orders: PT Eval and Treat   Medical Diagnosis from Referral: S93.492D (ICD-10-CM) - Sprain of other ligament of left ankle, subsequent encounter  Evaluation Date: 10/19/2022  Authorization Period Expiration: 10/17/2023  Plan of Care Expiration: 2022   Progress Note Due: 2022  Visit # / Visits authorized:   PTA Consecutive Visits #: 0/6   Remaining Visits scheduled:     5th Visit FOTO - (Date, Score/ turn green)  10th Visit FOTO -  (Date, Score/ turn green )  D/C FOTO - (Date, Score/ turn green )    Time In: 1345  Time Out: 1430  Billable Time: 45 minutes  Non-Billable Time: 00 minutes    Precautions: Standard and Fall  Insurance: Payor: [employer] MEDICARE / Plan: [employer] (SPECIAL NEEDS PLAN) / Product Type: Medicare Advantage /     Subjective     Pt reports: increase in pain continued; KT tape however reduced swelling   She is compliant with home exercise program.  Response to previous treatment: 1st session    Pre-Treatment Pain Ratin/10  Post-Treatment Pain Ratin/10  Location: left ankles     Objective     Jm received therapeutic exercises to develop strength, endurance, ROM, and flexibility for 35minutes including:    therapeutic exercises to develop strength, endurance, ROM, and flexibility for 15 minutes including:  ABCs 2x - Eversion causes pain   Ankle Pumps 2x10  Inversion/Eversion 2x10 with a 3' sec hold  Weight Shifts 2x10 with a 3' sec hold   Heel slides - 5'   `   Toe extension 10 x 2  DF/PF - 2' with R foot controlling   Towel crunches - 3'  Standing weight shifts - 3'       manual therapy techniques: Joint mobilizations, Manual traction, and Soft tissue Mobilization were applied to the: left ankle  "for 10 minutes, including:  Soft-Tissue Mobilization to the left ankle and surrounding structures  Mobilization of the ankle, Grade 2, A-->P and Distraction  Not Performed    Patient was screened for use of kinesiotape and was cleared for use.  1. Has the patient ever had a reaction to the adhesive in bandaids? No  2. Has the patient ever uses athletic/kinesiotape in the past? No  3. Is the patient hemodynamically impaired (PE, DVT, CHF, Kidney failure)? No  4. Can the PT/PTA apply the tape to your skin? Yes    Patient was instructed on duration to wear the tape, proper drying techniques for the tape, and to remove the tape if he/she had any issues with it.    Patient verbalized understanding of these instructions.         Home Exercises Provided and Patient Education Provided     Education provided:   - Continue with HEP    Written Home Exercises Provided: Patient instructed to cont prior HEP.  Exercises were reviewed and Jm was able to demonstrate them prior to the end of the session.  Jm demonstrated good  understanding of the education provided.     See EMR under Patient Instructions for exercises provided prior visit.    Assessment       Patient reports to therapy with continued increased pain with reports of "shooting pain" with weight bearing during today's treatment session. Physical Therapy noted an increase in passive range of motion tolerance during today's treatment session. Physical Therapy performed KT again taping to improve support and reduce swelling during today's treatment session. Patient tolerated treatment session fair secondary to pain. Patient will continue to benefit from skilled therapy to address current deficits.     Jm Is progressing well towards Her goals.     Pt prognosis is Good.     Pt will continue to benefit from skilled outpatient physical therapy to address the deficits listed in the problem list box on initial evaluation, provide pt/family education and to " maximize pt's level of independence in the home and community environment.     Pt's spiritual, cultural and educational needs considered and pt agreeable to plan of care and goals.    Anticipated barriers to physical therapy: None    Goals:  Short Term Goals: 3 weeks   Patient will be independent with HEP at home to increase PT compliance.  Progressing  11/8/2022      Patient will be able to walk for 100 feet with < 2/10 pain to increase ambulation capacity.  progressing   11/8/2022     Patient will be able to perform 10 sit to stands with < 2/10 pain to increase transitional movements progressing   11/8/2022               Long Term Goals: 6 weeks   Patient will decrease LEFS score to > 70/80 to increase functional capacity.      Patient will be able to ambulate 500 yards with no pain to increase community capacity.      Patient will be able to go to the gym for 1 weeks with no pain to increase recreational activities               Plan     Continued with current POC      Dragan Dickey, PT ,  11/8/2022

## 2022-11-10 ENCOUNTER — CLINICAL SUPPORT (OUTPATIENT)
Dept: REHABILITATION | Facility: HOSPITAL | Age: 37
End: 2022-11-10
Attending: NURSE PRACTITIONER
Payer: COMMERCIAL

## 2022-11-10 DIAGNOSIS — M25.572 ACUTE LEFT ANKLE PAIN: Primary | ICD-10-CM

## 2022-11-10 PROCEDURE — 97110 THERAPEUTIC EXERCISES: CPT | Mod: PN,CQ

## 2022-11-10 NOTE — PROGRESS NOTES
Physical Therapy Daily Treatment Note         Name: Jm Newton  Clinic Number: [id number]    Therapy Diagnosis:   Encounter Diagnosis   Name Primary?    Acute left ankle pain Yes       Physician: Kong Salmeron NP    Visit Date: 11/10/2022    Physician Orders: PT Eval and Treat   Medical Diagnosis from Referral: S93.492D (ICD-10-CM) - Sprain of other ligament of left ankle, subsequent encounter  Evaluation Date: 10/19/2022  Authorization Period Expiration: 2022  Plan of Care Expiration: 2022   Progress Note Due: 2022  Visit # / Visits authorized:   PTA Consecutive Visits #:    Remaining Visits scheduled: 7  5th Visit FOTO - (Date, Score/ turn green)  10th Visit FOTO -  (Date, Score/ turn green )  D/C FOTO - (Date, Score/ turn green )    Time In: 1:00 pm  Time Out: 1:45 pm  Billable Time: 45 minutes  Non-Billable Time: 00 minutes    Precautions: Standard and Fall  Insurance: Payor: [employer] MEDICARE / Plan: [employer] (SPECIAL NEEDS PLAN) / Product Type: Medicare Advantage /     Subjective     Pt reports: hasn't walked a lot today but came in without pain  She is compliant with home exercise program.  Response to previous treatment: painful but exercises were helpful    Pre-Treatment Pain Ratin/10  Post-Treatment Pain Ratin/10  Location: left ankles     Objective     Jm received therapeutic exercises to develop strength, endurance, ROM, and flexibility for 40 minutes including:    ABCs 2x   Ankle Pumps 2x10  Inversion/Eversion 2x10 with a 3' sec hold  Weight Shifts 2x10 with a 3' sec hold   Heel slides - 5'   Toe extension 10 x 3  DF/PF - 2' with R foot controlling   Towel crunches - 3'  Ankle Theraband Set 2x10, Green Theraband (DF/PF/Eversion/Inversion)  Seated Heel Raises 2x15    Jm participated in neuromuscular re-education activities to improve: Balance, Coordination, Kinesthetic, Sense, Proprioception, and Posture for 00 minutes. The  following activities were included:  SLUMP Nerve Glides 3x8   Modified BAPS Board (Super Hero Ball and Airex) 2x15 (Counter-Clockwise/Clockwise/Up-Down/Sideways)        manual therapy techniques: Joint mobilizations, Manual traction, and Soft tissue Mobilization were applied to the: left ankle for 5 minutes, including:  Soft-Tissue Mobilization to the left ankle and surrounding structures  Mobilization of the ankle, Grade 2, A-->P and Distraction  Not Performed    Mobilization of the Tib-Fib Complex, A-->P, Grade 2     Patient was screened for use of kinesiotape and was cleared for use.  1. Has the patient ever had a reaction to the adhesive in bandaids? No  2. Has the patient ever uses athletic/kinesiotape in the past? No  3. Is the patient hemodynamically impaired (PE, DVT, CHF, Kidney failure)? No  4. Can the PT/PTA apply the tape to your skin? Yes    Patient was instructed on duration to wear the tape, proper drying techniques for the tape, and to remove the tape if he/she had any issues with it.    Patient verbalized understanding of these instructions.         Home Exercises Provided and Patient Education Provided     Education provided:   - Continue with HEP    Written Home Exercises Provided: Patient instructed to cont prior HEP.  Exercises were reviewed and Mannielissett was able to demonstrate them prior to the end of the session.  Carantlissett demonstrated good  understanding of the education provided.     See EMR under Patient Instructions for exercises provided prior visit.    Assessment     Pt tolerated session well. Arch of L foot is more prominent than first session. Shooting pains in foot occurred sporadically throughout session and were relieved with rest breaks. Wobble board completed without R foot guiding motion.  Resisted plantar flexion tolerated well, resisted dorsiflexion increased burning and tingling sensation. Carantlissett demonstrated greater tolerance for exercise and improved ROM. She had no  adverse effects with session and will continue to benefit from skilled therapy.      Jm Is progressing well towards Her goals.     Pt prognosis is Good.     Pt will continue to benefit from skilled outpatient physical therapy to address the deficits listed in the problem list box on initial evaluation, provide pt/family education and to maximize pt's level of independence in the home and community environment.     Pt's spiritual, cultural and educational needs considered and pt agreeable to plan of care and goals.    Anticipated barriers to physical therapy: None    Goals:  Short Term Goals: 3 weeks   Patient will be independent with HEP at home to increase PT compliance.  Progressing  11/10/2022      Patient will be able to walk for 100 feet with < 2/10 pain to increase ambulation capacity.  progressing   11/10/2022     Patient will be able to perform 10 sit to stands with < 2/10 pain to increase transitional movements progressing   11/10/2022               Long Term Goals: 6 weeks   Patient will decrease LEFS score to > 70/80 to increase functional capacity.      Patient will be able to ambulate 500 yards with no pain to increase community capacity.      Patient will be able to go to the gym for 1 weeks with no pain to increase recreational activities               Plan     Continued with current POC      Tim Muñoz PTA,  11/10/2022

## 2022-11-14 ENCOUNTER — CLINICAL SUPPORT (OUTPATIENT)
Dept: REHABILITATION | Facility: HOSPITAL | Age: 37
End: 2022-11-14
Attending: NURSE PRACTITIONER
Payer: COMMERCIAL

## 2022-11-14 DIAGNOSIS — M25.572 ACUTE LEFT ANKLE PAIN: Primary | ICD-10-CM

## 2022-11-14 PROCEDURE — 97110 THERAPEUTIC EXERCISES: CPT | Mod: PN

## 2022-11-14 NOTE — PROGRESS NOTES
Physical Therapy Daily Treatment Note     Name: Jm Newton  Clinic Number: [id number]    Therapy Diagnosis:   Encounter Diagnosis   Name Primary?    Acute left ankle pain Yes       Physician: Kong Salmeron NP    Visit Date: 2022    Physician Orders: PT Eval and Treat   Medical Diagnosis from Referral: S93.492D (ICD-10-CM) - Sprain of other ligament of left ankle, subsequent encounter  Evaluation Date: 10/19/2022  Authorization Period Expiration: 2022  Plan of Care Expiration: 2022   Progress Note Due: 2022  Visit # / Visits authorized:   PTA Consecutive Visits #: 0/6   Remaining Visits scheduled: 7  5th Visit FOTO - (Date, Score/ turn green)  10th Visit FOTO -  (Date, Score/ turn green )  D/C FOTO - (Date, Score/ turn green )    Time In: 1430  Time Out: 1515  Billable Time: 45 minutes  Non-Billable Time: 00 minutes    Precautions: Standard and Fall  Insurance: Payor: [employer] MEDICARE / Plan: [employer] (SPECIAL NEEDS PLAN) / Product Type: Medicare Advantage /     Subjective     Pt reports: I have no pain for the first time in a very long time   She is compliant with home exercise program.  Response to previous treatment: painful but exercises were helpful    Pre-Treatment Pain Ratin/10  Post-Treatment Pain Ratin/10  Location: left ankles     Objective     Jm received therapeutic exercises to develop strength, endurance, ROM, and flexibility for 40 minutes including:    ABCs 2x   Ankle Pumps 2x10  Inversion/Eversion 2x10 with a 3' sec hold  Weight Shifts 2x10 with a 3' sec hold   Heel slides - 5'   Toe extension 10 x 3  DF/PF - 2' with R foot controlling   Supination / pronation - 2'  Towel crunches - 3'  Ankle Theraband Set 2x10, Green Theraband (DF/PF/Eversion/Inversion)  Seated Heel Raises 2x15  GASTROC STRETCH - level 1 -2'  Foam pad standing - 3'     Jm participated in neuromuscular re-education activities to improve: Balance,  Coordination, Kinesthetic, Sense, Proprioception, and Posture for 00 minutes. The following activities were included:  SLUMP Nerve Glides 3x8   Modified BAPS Board (Super Hero Ball and Airex) 2x15 (Counter-Clockwise/Clockwise/Up-Down/Sideways)        manual therapy techniques: Joint mobilizations, Manual traction, and Soft tissue Mobilization were applied to the: left ankle for 5 minutes, including:  Soft-Tissue Mobilization to the left ankle and surrounding structures  Mobilization of the ankle, Grade 2, A-->P and Distraction     Mobilization of the Tib-Fib Complex, A-->P, Grade 2     Patient was screened for use of kinesiotape and was cleared for use.  1. Has the patient ever had a reaction to the adhesive in bandaids? No  2. Has the patient ever uses athletic/kinesiotape in the past? No  3. Is the patient hemodynamically impaired (PE, DVT, CHF, Kidney failure)? No  4. Can the PT/PTA apply the tape to your skin? Yes    Patient was instructed on duration to wear the tape, proper drying techniques for the tape, and to remove the tape if he/she had any issues with it.    Patient verbalized understanding of these instructions.         Home Exercises Provided and Patient Education Provided     Education provided:   - Continue with HEP    Written Home Exercises Provided: Patient instructed to cont prior HEP.  Exercises were reviewed and Jm was able to demonstrate them prior to the end of the session.  Jm demonstrated good  understanding of the education provided.     See EMR under Patient Instructions for exercises provided prior visit.    Assessment     Patient currently presents to therapy with reports of decreased left ankle pain and a slight improved tolerance for weight bearing on the left lower extremity . Patient continues to put approximately 60% of the weight on her lower extremity prior to increased nerve pain increase. Patient however tolerated an increase in therapeutic exercise.  She had no  adverse effects with session and will continue to benefit from skilled therapy.      Jm Is progressing well towards Her goals.     Pt prognosis is Good.     Pt will continue to benefit from skilled outpatient physical therapy to address the deficits listed in the problem list box on initial evaluation, provide pt/family education and to maximize pt's level of independence in the home and community environment.     Pt's spiritual, cultural and educational needs considered and pt agreeable to plan of care and goals.    Anticipated barriers to physical therapy: None    Goals:  Short Term Goals: 3 weeks   Patient will be independent with HEP at home to increase PT compliance.  Progressing  11/15/2022      Patient will be able to walk for 100 feet with < 2/10 pain to increase ambulation capacity.  progressing   11/15/2022     Patient will be able to perform 10 sit to stands with < 2/10 pain to increase transitional movements progressing   11/15/2022               Long Term Goals: 6 weeks   Patient will decrease LEFS score to > 70/80 to increase functional capacity.      Patient will be able to ambulate 500 yards with no pain to increase community capacity.      Patient will be able to go to the gym for 1 weeks with no pain to increase recreational activities               Plan     Continued with current POC      Dragan Dickey, PT,  11/15/2022

## 2022-11-17 ENCOUNTER — CLINICAL SUPPORT (OUTPATIENT)
Dept: REHABILITATION | Facility: HOSPITAL | Age: 37
End: 2022-11-17
Attending: NURSE PRACTITIONER
Payer: COMMERCIAL

## 2022-11-17 DIAGNOSIS — M25.572 ACUTE LEFT ANKLE PAIN: Primary | ICD-10-CM

## 2022-11-17 PROCEDURE — 97140 MANUAL THERAPY 1/> REGIONS: CPT | Mod: PN

## 2022-11-17 PROCEDURE — 97110 THERAPEUTIC EXERCISES: CPT | Mod: PN

## 2022-11-17 NOTE — PROGRESS NOTES
"Physical Therapy Daily Treatment Note     Name: Jm Newton  Clinic Number: [id number]    Therapy Diagnosis:   Encounter Diagnosis   Name Primary?    Acute left ankle pain Yes     Physician: Kong Salmeron NP    Visit Date: 2022    Physician Orders: PT Eval and Treat   Medical Diagnosis from Referral: S93.492D (ICD-10-CM) - Sprain of other ligament of left ankle, subsequent encounter  Evaluation Date: 10/19/2022  Authorization Period Expiration: 2022  Plan of Care Expiration: 2022   Progress Note Due: 2022  Visit # / Visits authorized:   PTA Consecutive Visits #: 0/6   Remaining Visits scheduled:   5th Visit FOTO - (Date, Score/ turn green)  10th Visit FOTO -  (Date, Score/ turn green )  D/C FOTO - (Date, Score/ turn green )    Time In: 12:00  Time Out: 12:30  Billable Time: 30 minutes  Non-Billable Time: 00 minutes    Precautions: Standard and Fall  Insurance: Payor: [employer] MANAGED MEDICARE / Plan: [employer] (SPECIAL NEEDS PLAN) / Product Type: Medicare Advantage /     Subjective     Pt reports: came into the clinic without the boot and in shoes. "I have pain with or without the boot so I might as well wear shoes." Patient came in 15 min late to appointment.   She is compliant with home exercise program.  Response to previous treatment: painful but exercises were helpful    Pre-Treatment Pain Ratin/10  Post-Treatment Pain Ratin/10  Location: left ankles     Objective     Capillary Refill Test: < 5 sec, (Negative)     Jm received therapeutic exercises to develop strength, endurance, ROM, and flexibility for 20 minutes including:    ABCs 2x   Ankle Pumps 2x10  Inversion/Eversion 2x10 with a 3' sec hold  Weight Shifts 2x10 with a 3' sec hold   Heel slides - 5'   Toe extension 10 x 3  DF/PF - 2' with R foot controlling   Supination / pronation - 2'  Towel crunches - 3'  Ankle Theraband Set 2x10, Green Theraband (DF/PF/Eversion/Inversion)  Seated Heel Raises " 2x15, #6  GASTROC STRETCH - level 1 -2'  Foam pad standing - 3'     Jm participated in neuromuscular re-education activities to improve: Balance, Coordination, Kinesthetic, Sense, Proprioception, and Posture for 00 minutes. The following activities were included:  SLUMP Nerve Glides 3x8   Modified BAPS Board (Super Hero Ball and Airex) 2x15 (Counter-Clockwise/Clockwise/Up-Down/Sideways)        manual therapy techniques: Joint mobilizations, Manual traction, and Soft tissue Mobilization were applied to the: left ankle for 10 minutes, including:  Soft-Tissue Mobilization to the left ankle and surrounding structures  Mobilization of the ankle, Grade 2, A-->P and Distraction     Mobilization of the Tib-Fib Complex, A-->P, Grade 2     Patient was screened for use of kinesiotape and was cleared for use.  1. Has the patient ever had a reaction to the adhesive in bandaids? No  2. Has the patient ever uses athletic/kinesiotape in the past? No  3. Is the patient hemodynamically impaired (PE, DVT, CHF, Kidney failure)? No  4. Can the PT/PTA apply the tape to your skin? Yes    Patient was instructed on duration to wear the tape, proper drying techniques for the tape, and to remove the tape if he/she had any issues with it.    Patient verbalized understanding of these instructions.       Home Exercises Provided and Patient Education Provided     Education provided:   - Continue with HEP    Written Home Exercises Provided: Patient instructed to cont prior HEP.  Exercises were reviewed and Jm was able to demonstrate them prior to the end of the session.  Jm demonstrated good  understanding of the education provided.     See EMR under Patient Instructions for exercises provided prior visit.    Assessment     Patient completed the exercise program with mild levels of pain to the left foot. Burning, aching, and soreness is present to the lateral aspect of the achilles and at midline. Tolerance to weight bearing  is still limited and required breaks during today's treatment. Patient would continue to benefit from skilled Physical Therapy to decrease discomfort and increase weight-bearing capacity.     Jm Is progressing well towards Her goals.     Pt prognosis is Good.     Pt will continue to benefit from skilled outpatient physical therapy to address the deficits listed in the problem list box on initial evaluation, provide pt/family education and to maximize pt's level of independence in the home and community environment.     Pt's spiritual, cultural and educational needs considered and pt agreeable to plan of care and goals.    Anticipated barriers to physical therapy: None    Goals:  Short Term Goals: 3 weeks   Patient will be independent with HEP at home to increase PT compliance.  Progressing  11/17/2022      Patient will be able to walk for 100 feet with < 2/10 pain to increase ambulation capacity.  progressing   11/17/2022     Patient will be able to perform 10 sit to stands with < 2/10 pain to increase transitional movements progressing   11/17/2022               Long Term Goals: 6 weeks   Patient will decrease LEFS score to > 70/80 to increase functional capacity.  Progressing 11/17/2022     Patient will be able to ambulate 500 yards with no pain to increase community capacity.  Progressing 11/17/2022    Patient will be able to go to the gym for 1 weeks with no pain to increase recreational activities  Progressing 11/17/2022             Plan     Continued with current POC      Jonh Johns, PT, DPT  11/17/2022

## 2022-11-21 ENCOUNTER — CLINICAL SUPPORT (OUTPATIENT)
Dept: REHABILITATION | Facility: HOSPITAL | Age: 37
End: 2022-11-21
Attending: NURSE PRACTITIONER
Payer: COMMERCIAL

## 2022-11-21 DIAGNOSIS — M25.572 ACUTE LEFT ANKLE PAIN: Primary | ICD-10-CM

## 2022-11-21 PROCEDURE — 97110 THERAPEUTIC EXERCISES: CPT | Mod: PN

## 2022-11-21 PROCEDURE — 97530 THERAPEUTIC ACTIVITIES: CPT | Mod: PN

## 2022-11-21 PROCEDURE — 97112 NEUROMUSCULAR REEDUCATION: CPT | Mod: PN

## 2022-11-21 NOTE — PROGRESS NOTES
Physical Therapy Daily Treatment Note     Name: Jm Newton  Clinic Number: [id number]    Therapy Diagnosis:   Encounter Diagnosis   Name Primary?    Acute left ankle pain Yes     Physician: Kong Salmeron NP    Visit Date: 2022    Physician Orders: PT Eval and Treat   Medical Diagnosis from Referral: S93.492D (ICD-10-CM) - Sprain of other ligament of left ankle, subsequent encounter  Evaluation Date: 10/19/2022  Authorization Period Expiration: 2022  Plan of Care Expiration: 2022   Progress Note Due: 2022  Visit # / Visits authorized:   PTA Consecutive Visits #: 0/6   Remaining Visits scheduled: 7  5th Visit FOTO - (Date, Score/ turn green)  10th Visit FOTO -  (Date, Score/ turn green )  D/C FOTO - (Date, Score/ turn green )    Time In: 11:45  Time Out: 12:30  Billable Time: 45 minutes  Non-Billable Time: 00 minutes    Precautions: Standard and Fall  Insurance: Payor: [employer] MANAGED MEDICARE / Plan: [employer] (SPECIAL NEEDS PLAN) / Product Type: Medicare Advantage /     Subjective     Pt reports: came into the clinic with no pain while sitting. Boot is still not being used and she came in with a ace bandage.   She is compliant with home exercise program.  Response to previous treatment: painful but exercises were helpful    Pre-Treatment Pain Ratin/10  Post-Treatment Pain Ratin/10  Location: left ankles     Objective     Capillary Refill Test: < 5 sec, (Negative)     Jm received therapeutic exercises to develop strength, endurance, ROM, and flexibility for 15 minutes including:    ABCs 2x   Ankle Pumps 2x10  Inversion/Eversion 2x10 with a 3' sec hold  Weight Shifts 2x10 with a 3' sec hold   Heel slides - 5'   Toe extension 10 x 3  DF/PF - 2' with R foot controlling   Supination / pronation - 2'  Towel crunches - 3'  Ankle Theraband Set 2x10, Green Theraband (DF/PF/Eversion/Inversion)  Seated Heel Raises 2x15, #6  GASTROC STRETCH - level 1 -2'  Foam pad  "standing - 3'   Bridges 3x10  Theraband Ankle Set 2x8, Red Theraband (Dorsiflexion/Plantarflexion/Eversion/Inversion)    Jm participated in neuromuscular re-education activities to improve: Balance, Coordination, Kinesthetic, Sense, Proprioception, and Posture for 15 minutes. The following activities were included:  SLUMP Nerve Glides 3x8   Modified BAPS Board (Super Hero Ball and Airex) 2x15 (Counter-Clockwise/Clockwise/Up-Down/Sideways)   Tandem Balance on Airex 3x15 sec, Eyes Closed   Walking 3 laps without Book or Ace Bandage   Heel Digs 3x8 with a 2' sec hold     Jm participated in dynamic functional therapeutic activities to improve functional performance for 15  minutes, including:    Visit Number:  8 out of 12   Activities performed   (duration/resistance)     Stair Negotiation  3 laps   1/2 Step Ups on 4" box  2x8, bilaterally                        manual therapy techniques: Joint mobilizations, Manual traction, and Soft tissue Mobilization were applied to the: left ankle for 00 minutes, including:  Soft-Tissue Mobilization to the left ankle and surrounding structures  Mobilization of the ankle, Grade 2, A-->P and Distraction     Mobilization of the Tib-Fib Complex, A-->P, Grade 2     Patient was screened for use of kinesiotape and was cleared for use.  1. Has the patient ever had a reaction to the adhesive in bandaids? No  2. Has the patient ever uses athletic/kinesiotape in the past? No  3. Is the patient hemodynamically impaired (PE, DVT, CHF, Kidney failure)? No  4. Can the PT/PTA apply the tape to your skin? Yes    Patient was instructed on duration to wear the tape, proper drying techniques for the tape, and to remove the tape if he/she had any issues with it.    Patient verbalized understanding of these instructions.       Home Exercises Provided and Patient Education Provided     Education provided:   - Continue with HEP    Written Home Exercises Provided: Patient instructed to cont " prior HEP.  Exercises were reviewed and Jm was able to demonstrate them prior to the end of the session.  Jm demonstrated good  understanding of the education provided.     See EMR under Patient Instructions for exercises provided prior visit.    Assessment     Patient completed the exercise program with moderate levels of discomfort to the right foot. Burning sensation is still present to the ankle. Ms. Newton is able to push through the discomfort and complete the exercises with some breaks needed. New exercises were well tolerated with no modifications needed. Due to fear of weight-bearing, patient displays antalgic gait with a decrease in stride length while walking and during stair negotiation. At this time, patient would continue to benefit from skill Physical Therapy to decrease discomfort and increase weight-bearing tolerance.     Jm Is progressing well towards Her goals.     Pt prognosis is Good.     Pt will continue to benefit from skilled outpatient physical therapy to address the deficits listed in the problem list box on initial evaluation, provide pt/family education and to maximize pt's level of independence in the home and community environment.     Pt's spiritual, cultural and educational needs considered and pt agreeable to plan of care and goals.    Anticipated barriers to physical therapy: None    Goals:  Short Term Goals: 3 weeks   Patient will be independent with HEP at home to increase PT compliance.  Progressing  11/21/2022      Patient will be able to walk for 100 feet with < 2/10 pain to increase ambulation capacity.  progressing   11/21/2022     Patient will be able to perform 10 sit to stands with < 2/10 pain to increase transitional movements progressing   11/21/2022               Long Term Goals: 6 weeks   Patient will decrease LEFS score to > 70/80 to increase functional capacity.  Progressing 11/21/2022     Patient will be able to ambulate 500 yards with no pain  to increase community capacity.  Progressing 11/21/2022    Patient will be able to go to the gym for 1 weeks with no pain to increase recreational activities  Progressing 11/21/2022             Plan     Progress duration, workload, and resistance levels of the Therapeutic Activities and Exercises if the patient does not exhibit any pain, swelling, or other symptoms. Progress instruction in-home exercise progression and modification, including symptom management.    Jonh Johns, PT, DPT  11/21/2022

## 2022-11-23 ENCOUNTER — CLINICAL SUPPORT (OUTPATIENT)
Dept: REHABILITATION | Facility: HOSPITAL | Age: 37
End: 2022-11-23
Attending: NURSE PRACTITIONER
Payer: COMMERCIAL

## 2022-11-23 DIAGNOSIS — M25.572 ACUTE LEFT ANKLE PAIN: Primary | ICD-10-CM

## 2022-11-23 PROCEDURE — 97530 THERAPEUTIC ACTIVITIES: CPT | Mod: PN,CQ

## 2022-11-23 PROCEDURE — 97110 THERAPEUTIC EXERCISES: CPT | Mod: PN,CQ

## 2022-11-23 NOTE — PROGRESS NOTES
"Physical Therapy Daily Treatment Note     Name: Jm Newton  Clinic Number: [id number]    Therapy Diagnosis:   Encounter Diagnosis   Name Primary?    Acute left ankle pain Yes     Physician: Kong Salmeron NP    Visit Date: 2022    Physician Orders: PT Eval and Treat   Medical Diagnosis from Referral: S93.492D (ICD-10-CM) - Sprain of other ligament of left ankle, subsequent encounter  Evaluation Date: 10/19/2022  Authorization Period Expiration: 2022  Plan of Care Expiration: 2022   Progress Note Due: 2022  Visit # / Visits authorized:   PTA Consecutive Visits #:    Remaining Visits scheduled: 6   Visit FOTO - (Date, Score/ turn green)  10th Visit FOTO -  (Date, Score/ turn green )  D/C FOTO - (Date, Score/ turn green )    Time In: 11:50  Time Out: 12:30  Billable Time: 40 minutes  Non-Billable Time: 00 minutes    Precautions: Standard and Fall  Insurance: Payor: [employer] MANAGED MEDICARE / Plan: [employer] (SPECIAL NEEDS PLAN) / Product Type: Medicare Advantage /     Subjective     Pt reports: Pt ambulated into clinic without boot or ace bandage. She ambulated without antalgic gait.   She is compliant with home exercise program.  Response to previous treatment: "It wasn't bad. Pain was tolerable after"    Pre-Treatment Pain Ratin/10  Post-Treatment Pain Ratin/10  Location: left ankles     Objective     Capillary Refill Test: < 5 sec, (Negative)     Jm received therapeutic exercises to develop strength, endurance, ROM, and flexibility for 25 minutes including:    ABCs 2x   Ankle Pumps 2x10  Inversion/Eversion 2x10 with a 3' sec hold  Weight Shifts 2x10 with a 3' sec hold   Heel slides - 5'   Toe extension 10 x 3  DF/PF - 2' with R foot controlling   Supination / pronation - 2'  Towel crunches - 3'  Ankle Theraband Set 2x10, Green Theraband (DF/PF/Eversion/Inversion)  Seated Heel Raises 2x15, #6  GASTROC STRETCH - level 1 -2'  Foam pad standing - 3' " "  Bridges 3x10  Theraband Ankle Set 2x8, Green Theraband (Dorsiflexion/Plantarflexion/Eversion/Inversion)    Jm participated in neuromuscular re-education activities to improve: Balance, Coordination, Kinesthetic, Sense, Proprioception, and Posture for 5 minutes. The following activities were included:  SLUMP Nerve Glides 3x8   Modified BAPS Board (Super Hero Ball and Airex) 2x15 (Counter-Clockwise/Clockwise/Up-Down/Sideways)   Tandem Balance on Airex 3x15 sec, Eyes Closed   Walking 3 laps without Boot or Ace Bandage   Heel Digs 3x8 with a 2' sec hold     Jm participated in dynamic functional therapeutic activities to improve functional performance for 10  minutes, including:    Visit Number:  8 out of 12   Activities performed   (duration/resistance)     Stair Negotiation  3 laps   1/2 Step Ups on 4" box  2x8, bilaterally                        manual therapy techniques: Joint mobilizations, Manual traction, and Soft tissue Mobilization were applied to the: left ankle for 00 minutes, including:  Soft-Tissue Mobilization to the left ankle and surrounding structures  Mobilization of the ankle, Grade 2, A-->P and Distraction     Mobilization of the Tib-Fib Complex, A-->P, Grade 2     Patient was screened for use of kinesiotape and was cleared for use.  1. Has the patient ever had a reaction to the adhesive in bandaids? No  2. Has the patient ever uses athletic/kinesiotape in the past? No  3. Is the patient hemodynamically impaired (PE, DVT, CHF, Kidney failure)? No  4. Can the PT/PTA apply the tape to your skin? Yes    Patient was instructed on duration to wear the tape, proper drying techniques for the tape, and to remove the tape if he/she had any issues with it.    Patient verbalized understanding of these instructions.       Home Exercises Provided and Patient Education Provided     Education provided:   - Continue with HEP    Written Home Exercises Provided: Patient instructed to cont prior " HEP.  Exercises were reviewed and Jm was able to demonstrate them prior to the end of the session.  Jm demonstrated good  understanding of the education provided.     See EMR under Patient Instructions for exercises provided prior visit.    Assessment     Pt tolerated session well. Burning sensation still present sporadically during exercises, all reps still completed. Plantarflexion exercises most challenging due to pain.Tolerance for exercise and and ambulation increased. Balance shows improvement with tandem stance on airex. Pt seems pleased with progress despite occasional instances of pain. She had no adverse effects with ther ex today and will continue to benefit from skilled therapy.     Jm Is progressing well towards Her goals.     Pt prognosis is Good.     Pt will continue to benefit from skilled outpatient physical therapy to address the deficits listed in the problem list box on initial evaluation, provide pt/family education and to maximize pt's level of independence in the home and community environment.     Pt's spiritual, cultural and educational needs considered and pt agreeable to plan of care and goals.    Anticipated barriers to physical therapy: None    Goals:  Short Term Goals: 3 weeks   Patient will be independent with HEP at home to increase PT compliance.  Progressing  11/23/2022      Patient will be able to walk for 100 feet with < 2/10 pain to increase ambulation capacity.  progressing   11/23/2022     Patient will be able to perform 10 sit to stands with < 2/10 pain to increase transitional movements progressing   11/23/2022               Long Term Goals: 6 weeks   Patient will decrease LEFS score to > 70/80 to increase functional capacity.  Progressing 11/23/2022     Patient will be able to ambulate 500 yards with no pain to increase community capacity.  Progressing 11/23/2022    Patient will be able to go to the gym for 1 weeks with no pain to increase recreational  activities  Progressing 11/23/2022             Plan     Progress duration, workload, and resistance levels of the Therapeutic Activities and Exercises if the patient does not exhibit any pain, swelling, or other symptoms. Progress instruction in-home exercise progression and modification, including symptom management.    Tim Muñoz, PTA,  11/23/2022

## 2022-11-28 ENCOUNTER — CLINICAL SUPPORT (OUTPATIENT)
Dept: REHABILITATION | Facility: HOSPITAL | Age: 37
End: 2022-11-28
Attending: NURSE PRACTITIONER
Payer: COMMERCIAL

## 2022-11-28 DIAGNOSIS — M25.572 ACUTE LEFT ANKLE PAIN: Primary | ICD-10-CM

## 2022-11-28 PROCEDURE — 97530 THERAPEUTIC ACTIVITIES: CPT | Mod: PN,CQ

## 2022-11-28 PROCEDURE — 97110 THERAPEUTIC EXERCISES: CPT | Mod: PN,CQ

## 2022-11-28 NOTE — PROGRESS NOTES
"Physical Therapy Daily Treatment Note     Name: Jm Newton  Clinic Number: [id number]    Therapy Diagnosis:   Encounter Diagnosis   Name Primary?    Acute left ankle pain Yes     Physician: Kong Salmeron NP    Visit Date: 2022    Physician Orders: PT Eval and Treat   Medical Diagnosis from Referral: S93.492D (ICD-10-CM) - Sprain of other ligament of left ankle, subsequent encounter  Evaluation Date: 10/19/2022  Authorization Period Expiration: 2022  Plan of Care Expiration: 2022   Progress Note Due: 2022  Visit # / Visits authorized: 10/ 12  PTA Consecutive Visits #: 2/6   Remaining Visits scheduled: 5  5th Visit FOTO - (Date, Score/ turn green)  10th Visit FOTO -  (Date, Score/ turn green )  D/C FOTO - (Date, Score/ turn green )    Time In: 11:50  Time Out: 12:30  Billable Time: 40 minutes  Non-Billable Time: 00 minutes    Precautions: Standard and Fall  Insurance: Payor: [employer]ICARE / Plan: [employer] (SPECIAL NEEDS PLAN) / Product Type: Medicare Advantage /     Subjective     Pt reports: Pain since last Friday. [place]  She is compliant with home exercise program.  Response to previous treatment: "It wasn't bad. Pain was tolerable after"    Pre-Treatment Pain Ratin/10  Post-Treatment Pain Ratin/10  Location: left ankles     Objective     Capillary Refill Test: < 5 sec, (Negative)     Jm received therapeutic exercises to develop strength, endurance, ROM, and flexibility for 25 minutes including:    ABCs 2x   Ankle Pumps 2x10  Inversion/Eversion 2x10 with a 3' sec hold  Weight Shifts 2x10 with a 3' sec hold   Heel slides - 5'   Toe extension 10 x 3  DF/PF - 2' with R foot controlling   Supination / pronation - 2'  Towel crunches - 3'  Ankle Theraband Set 2x10, Green Theraband (DF/PF/Eversion/Inversion)  Seated Heel Raises 2x15, #6  GASTROC STRETCH - level 1 -2'  Foam pad standing - 3'   Bridges " "3x10  Theraband Ankle Set 2x8, Green Theraband (Dorsiflexion/Plantarflexion/Eversion/Inversion)    Jm participated in neuromuscular re-education activities to improve: Balance, Coordination, Kinesthetic, Sense, Proprioception, and Posture for 05 minutes. The following activities were included:  SLUMP Nerve Glides 3x8   Modified BAPS Board (Super Hero Ball and Airex) 2x15 (Counter-Clockwise/Clockwise/Up-Down/Sideways)   Tandem Balance on Airex 3x15 sec, Eyes Closed   Walking 3 laps without Boot or Ace Bandage   Heel Digs 3x10 with a 2' sec hold     Jm participated in dynamic functional therapeutic activities to improve functional performance for 10  minutes, including:    Visit Number:  8 out of 12   Activities performed   (duration/resistance)     Stair Negotiation  3 laps   1/2 Step Ups on 4" box  2x8, bilaterally                        manual therapy techniques: Joint mobilizations, Manual traction, and Soft tissue Mobilization were applied to the: left ankle for 00 minutes, including:  Soft-Tissue Mobilization to the left ankle and surrounding structures  Mobilization of the ankle, Grade 2, A-->P and Distraction     Mobilization of the Tib-Fib Complex, A-->P, Grade 2     Patient was screened for use of kinesiotape and was cleared for use.  1. Has the patient ever had a reaction to the adhesive in bandaids? No  2. Has the patient ever uses athletic/kinesiotape in the past? No  3. Is the patient hemodynamically impaired (PE, DVT, CHF, Kidney failure)? No  4. Can the PT/PTA apply the tape to your skin? Yes    Patient was instructed on duration to wear the tape, proper drying techniques for the tape, and to remove the tape if he/she had any issues with it.    Patient verbalized understanding of these instructions.       Home Exercises Provided and Patient Education Provided     Education provided:   - Continue with HEP    Written Home Exercises Provided: Patient instructed to cont prior HEP.  Exercises " were reviewed and Jm was able to demonstrate them prior to the end of the session.  Jm demonstrated good  understanding of the education provided.     See EMR under Patient Instructions for exercises provided prior visit.    Assessment     Pt tolerated session well. She presented with increased pain in ankle. Upon reflection she realized it's because [place]'s [place]. PTA advised her to take a break [place] every other day to allow ankle to rest, she verbalized understanding. Pt tolerated all exercises well today. Gentle exercises performed since ankle is sore from walking at incline. Jm had no adverse effects with ther ex today and will continue to benefit from skilled therapy.     Jm Is progressing well towards Her goals.     Pt prognosis is Good.     Pt will continue to benefit from skilled outpatient physical therapy to address the deficits listed in the problem list box on initial evaluation, provide pt/family education and to maximize pt's level of independence in the home and community environment.     Pt's spiritual, cultural and educational needs considered and pt agreeable to plan of care and goals.    Anticipated barriers to physical therapy: None    Goals:  Short Term Goals: 3 weeks   Patient will be independent with HEP at home to increase PT compliance.  Progressing  11/28/2022      Patient will be able to walk for 100 feet with < 2/10 pain to increase ambulation capacity.  progressing   11/28/2022     Patient will be able to perform 10 sit to stands with < 2/10 pain to increase transitional movements progressing   11/28/2022               Long Term Goals: 6 weeks   Patient will decrease LEFS score to > 70/80 to increase functional capacity.  Progressing 11/28/2022     Patient will be able to ambulate 500 yards with no pain to increase community capacity.  Progressing 11/28/2022    Patient will be able to go to the gym for 1 weeks with no pain  to increase recreational activities  Progressing 11/28/2022             Plan     Progress duration, workload, and resistance levels of the Therapeutic Activities and Exercises if the patient does not exhibit any pain, swelling, or other symptoms. Progress instruction in-home exercise progression and modification, including symptom management.    Tim Muñoz, TODD,  11/28/2022

## 2022-12-02 ENCOUNTER — OFFICE VISIT (OUTPATIENT)
Dept: ORTHOPEDICS | Facility: CLINIC | Age: 37
End: 2022-12-02
Payer: MEDICARE

## 2022-12-02 ENCOUNTER — HOSPITAL ENCOUNTER (OUTPATIENT)
Dept: RADIOLOGY | Facility: HOSPITAL | Age: 37
Discharge: HOME | End: 2022-12-02
Attending: NURSE PRACTITIONER
Payer: MEDICARE

## 2022-12-02 VITALS — WEIGHT: 240.06 LBS | BODY MASS INDEX: 38.58 KG/M2 | HEIGHT: 66 IN

## 2022-12-02 DIAGNOSIS — M25.572 ACUTE LEFT ANKLE PAIN: ICD-10-CM

## 2022-12-02 DIAGNOSIS — M25.572 ACUTE LEFT ANKLE PAIN: Primary | ICD-10-CM

## 2022-12-02 DIAGNOSIS — S93.492D SPRAIN OF OTHER LIGAMENT OF LEFT ANKLE, SUBSEQUENT ENCOUNTER: Primary | ICD-10-CM

## 2022-12-02 PROCEDURE — 1159F MED LIST DOCD IN RCRD: CPT | Mod: CPTII,S$GLB,, | Performed by: NURSE PRACTITIONER

## 2022-12-02 PROCEDURE — 3044F HG A1C LEVEL LT 7.0%: CPT | Mod: CPTII,S$GLB,, | Performed by: NURSE PRACTITIONER

## 2022-12-02 PROCEDURE — 3008F BODY MASS INDEX DOCD: CPT | Mod: CPTII,S$GLB,, | Performed by: NURSE PRACTITIONER

## 2022-12-02 PROCEDURE — 99214 OFFICE O/P EST MOD 30 MIN: CPT | Mod: S$GLB,,, | Performed by: NURSE PRACTITIONER

## 2022-12-02 PROCEDURE — 3008F PR BODY MASS INDEX (BMI) DOCUMENTED: ICD-10-PCS | Mod: CPTII,S$GLB,, | Performed by: NURSE PRACTITIONER

## 2022-12-02 PROCEDURE — 99214 PR OFFICE/OUTPT VISIT, EST, LEVL IV, 30-39 MIN: ICD-10-PCS | Mod: S$GLB,,, | Performed by: NURSE PRACTITIONER

## 2022-12-02 PROCEDURE — 1160F RVW MEDS BY RX/DR IN RCRD: CPT | Mod: CPTII,S$GLB,, | Performed by: NURSE PRACTITIONER

## 2022-12-02 PROCEDURE — 1160F PR REVIEW ALL MEDS BY PRESCRIBER/CLIN PHARMACIST DOCUMENTED: ICD-10-PCS | Mod: CPTII,S$GLB,, | Performed by: NURSE PRACTITIONER

## 2022-12-02 PROCEDURE — 73610 XR ANKLE COMPLETE 3 VIEW LEFT: ICD-10-PCS | Mod: 26,LT,, | Performed by: RADIOLOGY

## 2022-12-02 PROCEDURE — 1159F PR MEDICATION LIST DOCUMENTED IN MEDICAL RECORD: ICD-10-PCS | Mod: CPTII,S$GLB,, | Performed by: NURSE PRACTITIONER

## 2022-12-02 PROCEDURE — 73610 X-RAY EXAM OF ANKLE: CPT | Mod: TC,LT

## 2022-12-02 PROCEDURE — 99999 PR PBB SHADOW E&M-EST. PATIENT-LVL III: ICD-10-PCS | Mod: PBBFAC,,, | Performed by: NURSE PRACTITIONER

## 2022-12-02 PROCEDURE — 3044F PR MOST RECENT HEMOGLOBIN A1C LEVEL <7.0%: ICD-10-PCS | Mod: CPTII,S$GLB,, | Performed by: NURSE PRACTITIONER

## 2022-12-02 PROCEDURE — 99999 PR PBB SHADOW E&M-EST. PATIENT-LVL III: CPT | Mod: PBBFAC,,, | Performed by: NURSE PRACTITIONER

## 2022-12-02 PROCEDURE — 73610 X-RAY EXAM OF ANKLE: CPT | Mod: 26,LT,, | Performed by: RADIOLOGY

## 2022-12-02 RX ORDER — IBUPROFEN 600 MG/1
600 TABLET ORAL EVERY 8 HOURS PRN
Qty: 30 TABLET | Refills: 1 | OUTPATIENT
Start: 2022-12-02 | End: 2023-03-03

## 2022-12-02 RX ORDER — GABAPENTIN 300 MG/1
300 CAPSULE ORAL NIGHTLY
Qty: 30 CAPSULE | Refills: 0 | Status: SHIPPED | OUTPATIENT
Start: 2022-12-02 | End: 2023-11-14

## 2022-12-02 NOTE — PROGRESS NOTES
SUBJECTIVE:     Chief Complaint & History of Present Illness:  Jm Newton is a Established 37 y.o. year old female patient here for follow up for her left ankle injury.  She was last seen by me 4 weeks ago.  She was referred to physical therapy and was advised to continue wearing a boot for an additional 3 weeks and then try to transition into a lace-up ankle brace.  She was given gabapentin 100 mg q.h.s. for her nerve pain.  Was advised to avoid high impact activities.    She returns to clinic today, she reports she continues to have shooting pain along the lateral aspect of her ankle that radiates up to her mid lower leg.  She reports intermittent numbness and tingling sensation to her foot.  She denies any falls or injury.  She says therapy has been placing kinesthetic taping which has helped.  She states her pain was less when she was in the boot.  She has not worn the lace-up ankle brace as she lost the strap that wrapped around the ankle.  Denies any fever chills.  She reports she has difficulty going up and down stairs secondary to her pain.       Review of patient's allergies indicates:   Allergen Reactions    Azithromycin Swelling and Edema         Current Outpatient Medications   Medication Sig Dispense Refill    albuterol (PROVENTIL) 5 mg/mL nebulizer solution Take 0.5 mLs (2.5 mg total) by nebulization 4 (four) times daily as needed for Wheezing or Shortness of Breath. 1 each 4    DESCOVY 200-25 mg Tab once daily.   3    emtricitabine-tenofovir 200-300 mg (TRUVADA) 200-300 mg Tab 1 tablet      fluticasone propion-salmeterol 115-21 mcg/dose (ADVAIR HFA) 115-21 mcg/actuation HFAA inhaler Inhale 2 puffs into the lungs every 12 (twelve) hours. Controller 3 Inhaler 4    gabapentin (NEURONTIN) 100 MG capsule Take 1 capsule (100 mg total) by mouth every evening. 30 capsule 0    hydrOXYzine HCL (ATARAX) 25 MG tablet 1 tablet every evening.      inhaler,assist device,lg mask ([facility] HERBERT  LG MASK MISC) U UTD BID      methIMAzole (TAPAZOLE) 5 MG Tab Take half a tablet (2.5 mg) by mouth daily 45 tablet 2    omeprazole (PRILOSEC) 40 MG capsule Take 1 capsule (40 mg total) by mouth once daily. 30 capsule 5    ondansetron (ZOFRAN-ODT) 4 MG TbDL Take 1 tablet (4 mg total) by mouth every 6 (six) hours as needed (nausea). 50 tablet 1    OPTICHAMBER HERBERT LG MASK Spcr U UTD BID  3    PREZISTA 600 mg Tab 600 mg 2 (two) times daily with meals.   5    PROCHAMBER       promethazine-dextromethorphan (PROMETHAZINE-DM) 6.25-15 mg/5 mL Syrp TK 5 ML PO Q NIGHT PRN      ritonavir (NORVIR) 100 mg Tab tablet 100 mg 2 (two) times daily with meals.       sulfamethoxazole-trimethoprim 400-80mg (BACTRIM,SEPTRA) 400-80 mg per tablet Take 1 tablet by mouth once daily.      triamcinolone acetonide 0.1% (KENALOG) 0.1 % cream Apply topically 2 (two) times daily. 15 g 1     No current facility-administered medications for this visit.       Past Medical History:   Diagnosis Date    Asthma     Encounter for blood transfusion     HIV infection     dx     Hyperthyroidism in pregnancy, antepartum        Past Surgical History:   Procedure Laterality Date    CERVICAL CERCLAGE      emergent with twins    CERVICAL CERCLAGE N/A 2019    Procedure: CERCLAGE, CERVIX;  Surgeon: Obey Henry MD;  Location: [facility];  Service: OB/GYN;  Laterality: N/A;     SECTION WITH TUBAL LIGATION N/A 6/15/2019    Procedure:  SECTION, WITH TUBAL LIGATION;  Surgeon: Madeline Walden MD;  Location: [facility];  Service: OB/GYN;  Laterality: N/A;     SECTION, CLASSIC      Last section with classical extention, from [facility]    COLD KNIFE CONIZATION OF CERVIX N/A 2018    Procedure: CONE BIOPSY, CERVIX, USING COLD KNIFE;  Surgeon: Chantal Worrell MD;  Location: [facility];  Service: OB/GYN;  Laterality: N/A;    CONIZATION OF CERVIX USING LOOP ELECTROSURGICAL EXCISION PROCEDURE (LEEP) N/A 2018    Procedure: LEEP  "CONIZATION, CERVIX;  Surgeon: Chantal Worrell MD;  Location: [facility];  Service: OB/GYN;  Laterality: N/A;    CYSTOSCOPY N/A 10/22/2019    Procedure: CYSTOSCOPY;  Surgeon: Georgia Howard MD;  Location: [facility];  Service: OB/GYN;  Laterality: N/A;    DILATION AND CURETTAGE OF UTERUS      ROBOT-ASSISTED LAPAROSCOPIC ABDOMINAL HYSTERECTOMY USING DA GALINDO XI N/A 10/22/2019    Procedure: XI ROBOTIC HYSTERECTOMY;  Surgeon: Georgia Howard MD;  Location: [facility];  Service: OB/GYN;  Laterality: N/A;    ROBOT-ASSISTED SURGICAL REMOVAL OF FALLOPIAN TUBE USING DA GALINDO XI Bilateral 10/22/2019    Procedure: XI ROBOTIC SALPINGECTOMY;  Surgeon: Georgia Howard MD;  Location: [facility];  Service: OB/GYN;  Laterality: Bilateral;    THYROIDECTOMY Right 4/13/2021    Procedure: THYROIDECTOMY;  Surgeon: Maksim Sifuentes MD;  Location: [address];  Service: ENT;  Laterality: Right;  Right possible  total    WISDOM TOOTH EXTRACTION         Family History   Problem Relation Age of Onset    Hypertension Maternal Grandmother     Diabetes Maternal Grandmother     Sleep apnea Son     Colon cancer Neg Hx     Ovarian cancer Neg Hx          Review of Systems:  ROS:  Constitutional: no fever or chills  Eyes: no visual changes  ENT: no nasal congestion or sore throat  Respiratory: no cough or shortness of breath  Cardiovascular: no chest pain or palpitations  Gastrointestinal: no nausea or vomiting, tolerating diet  Genitourinary: no hematuria or dysuria  Integument/Breast: no rash or pruritis  Hematologic/Lymphatic: no easy bruising or lymphadenopathy  Musculoskeletal:  left ankle injury  Neurological: no seizures or tremors  Behavioral/Psych: no auditory or visual hallucinations  Endocrine: no heat or cold intolerance      OBJECTIVE:     PHYSICAL EXAM:  Vital Signs (Most Recent)  There were no vitals filed for this visit.     ,   Estimated body mass index is 38.75 kg/m² as calculated from the following:    Height as of 10/17/22: 5' 6" " (1.676 m).    Weight as of 10/17/22: 108.9 kg (240 lb 1.3 oz).   General Appearance: Well nourished, well developed, in no acute distress.  HENT: Normal cephalic, oropharynx pink and moist  Eyes: PERRLA bilaterally and EOM x 4  Respiratory: Even and unlabored  Skin: Warm and Dry.   Psychiatric: AAO x 4, Mood & affect are normal.    left  Foot/Ankle    General appearance: no acute distress, alert/oriented x3, appropriate mood and affect, looks stated age, and well nourished  The examination was performed out of splint/cast  Skin: normal  Swelling: minimal  Warmth: no warmth  Tenderness: diffuse  ROM: 20 degrees dorsiflexion, 20 degrees plantarflexion, 15 degrees inversion, and 15 degrees eversion  Strength: 4 on 5 tibialis anterior strength, 4 of 5 peroneus longus, 4 of 5 peroneus brevis, and 4 of 5 tibialis posterior  Gait: antalgic  Stability: stable to testing and Cotton test: negative  Crepitus: no  Neurological Exam: normal  Vascular Exam: normal and pulse present  Walton Test: ankle plantarflexes    soft tissue swelling noted over the lateral and medial ankle and tenderness along the peroneals.      RADIOGRAPHS:  X-ray of the left ankle and left foot were obtained, no acute fractures seen.  Mortise is symmetrical.  All radiographs were personally reviewed by me.    ASSESSMENT/PLAN:       ICD-10-CM ICD-9-CM   1. Sprain of other ligament of left ankle, subsequent encounter  S93.492D V58.89     845.09     Plan:  -Jm Irma Newton presents to clinic today with c/c left foot/ankle pain since twisting her ankle on 9/28/22.  -X-ray as above.  -Recommend RICE therapy.  -I and she go back in her tall cam boot to allow her tendons to rest.  -I will restart her on Neurontin 300 mg q.h.s. and give her a prescription for Motrin 600 mg 3 times a day p.r.n..  -I explained to her pains from sprains and strains can last 6-8 weeks.  Although she is currently at 6 weeks she is been out of the boot and brace for several  weeks now.  She reports her pain was better controlled when she wore her boot.  -I advised her if pain fails to improve over the next 6 weeks or gets worse then we will order a MRI of her left ankle.  -Advised to avoid high impact activities.  -Follow up in 6 weeks with repeat left ankle x-ray standing.

## 2022-12-05 ENCOUNTER — CLINICAL SUPPORT (OUTPATIENT)
Dept: REHABILITATION | Facility: HOSPITAL | Age: 37
End: 2022-12-05
Attending: NURSE PRACTITIONER
Payer: COMMERCIAL

## 2022-12-05 DIAGNOSIS — M79.604 PAIN OF RIGHT LOWER EXTREMITY: Primary | ICD-10-CM

## 2022-12-05 PROCEDURE — 97140 MANUAL THERAPY 1/> REGIONS: CPT | Mod: PN

## 2022-12-05 PROCEDURE — 97110 THERAPEUTIC EXERCISES: CPT | Mod: PN

## 2022-12-05 NOTE — PROGRESS NOTES
"Physical Therapy Daily Treatment Note     Name: Jm Newton  Clinic Number: [id number]    Therapy Diagnosis:   No diagnosis found.    Physician: Kong Salmeron NP    Visit Date: 2022    Physician Orders: PT Eval and Treat   Medical Diagnosis from Referral: S93.492D (ICD-10-CM) - Sprain of other ligament of left ankle, subsequent encounter  Evaluation Date: 10/19/2022  Authorization Period Expiration: 2022  Plan of Care Expiration: 2022   Progress Note Due: 2022  Visit # / Visits authorized: 10/ 12  PTA Consecutive Visits #: 2/6   Remaining Visits scheduled: 5  5th Visit FOTO - (Date, Score/ turn green)  10th Visit FOTO -  (Date, Score/ turn green )  D/C FOTO - (Date, Score/ turn green )    Time In: 11:50  Time Out: 12:30  Billable Time: 40 minutes  Non-Billable Time: 00 minutes    Precautions: Standard and Fall  Insurance: Payor: [email or web address] MEDICARE / Plan: [employer] (SPECIAL NEEDS PLAN) / Product Type: Medicare Advantage /     Subjective     Pt reports: Pain since last Friday. [place]  She is compliant with home exercise program.  Response to previous treatment: "It wasn't bad. Pain was tolerable after"    Pre-Treatment Pain Ratin/10  Post-Treatment Pain Ratin/10  Location: left ankles     Objective     Capillary Refill Test: < 5 sec, (Negative)     Jm received therapeutic exercises to develop strength, endurance, ROM, and flexibility for 25 minutes including:    ABCs 2x   Ankle Pumps 2x10  Inversion/Eversion 2x10 with a 3' sec hold  Weight Shifts 2x10 with a 3' sec hold   Heel slides - 5'   Toe extension 10 x 3  DF/PF - 2' with R foot controlling   Supination / pronation - 2'  Towel crunches - 3'  Ankle Theraband Set 2x10, Green Theraband (DF/PF/Eversion/Inversion)  Seated Heel Raises 2x15, #6  GASTROC STRETCH - level 1 -2'  Foam pad standing - 3'   Bridges 3x10  Theraband Ankle Set 2x8, Green Theraband " "(Dorsiflexion/Plantarflexion/Eversion/Inversion)    Jm participated in neuromuscular re-education activities to improve: Balance, Coordination, Kinesthetic, Sense, Proprioception, and Posture for 05 minutes. The following activities were included:  SLUMP Nerve Glides 3x8   Modified BAPS Board (Super Hero Ball and Airex) 2x15 (Counter-Clockwise/Clockwise/Up-Down/Sideways)   Tandem Balance on Airex 3x15 sec, Eyes Closed   Walking 3 laps without Boot or Ace Bandage   Heel Digs 3x10 with a 2' sec hold     Jm participated in dynamic functional therapeutic activities to improve functional performance for 10  minutes, including:    Visit Number:  8 out of 12   Activities performed   (duration/resistance)     Stair Negotiation  3 laps   1/2 Step Ups on 4" box  2x8, bilaterally                        manual therapy techniques: Joint mobilizations, Manual traction, and Soft tissue Mobilization were applied to the: left ankle for 00 minutes, including:  Soft-Tissue Mobilization to the left ankle and surrounding structures  Mobilization of the ankle, Grade 2, A-->P and Distraction     Mobilization of the Tib-Fib Complex, A-->P, Grade 2     Patient was screened for use of kinesiotape and was cleared for use.  1. Has the patient ever had a reaction to the adhesive in bandaids? No  2. Has the patient ever uses athletic/kinesiotape in the past? No  3. Is the patient hemodynamically impaired (PE, DVT, CHF, Kidney failure)? No  4. Can the PT/PTA apply the tape to your skin? Yes    Patient was instructed on duration to wear the tape, proper drying techniques for the tape, and to remove the tape if he/she had any issues with it.    Patient verbalized understanding of these instructions.       Home Exercises Provided and Patient Education Provided     Education provided:   - Continue with HEP    Written Home Exercises Provided: Patient instructed to cont prior HEP.  Exercises were reviewed and Jm was able to " demonstrate them prior to the end of the session.  Jm demonstrated good  understanding of the education provided.     See EMR under Patient Instructions for exercises provided prior visit.    Assessment     Pt tolerated session well. She presented with increased pain in ankle. Upon reflection she realized it's because [place]. PTA advised her to take a break from the [place] every other day to allow ankle to rest, she verbalized understanding. Pt tolerated all exercises well today. Gentle exercises performed since ankle is sore from walking at incline. Jm had no adverse effects with ther ex today and will continue to benefit from skilled therapy.     Jm Is progressing well towards Her goals.     Pt prognosis is Good.     Pt will continue to benefit from skilled outpatient physical therapy to address the deficits listed in the problem list box on initial evaluation, provide pt/family education and to maximize pt's level of independence in the home and community environment.     Pt's spiritual, cultural and educational needs considered and pt agreeable to plan of care and goals.    Anticipated barriers to physical therapy: None    Goals:  Short Term Goals: 3 weeks   Patient will be independent with HEP at home to increase PT compliance.  Progressing  12/5/2022      Patient will be able to walk for 100 feet with < 2/10 pain to increase ambulation capacity.  progressing   12/5/2022     Patient will be able to perform 10 sit to stands with < 2/10 pain to increase transitional movements progressing   12/5/2022               Long Term Goals: 6 weeks   Patient will decrease LEFS score to > 70/80 to increase functional capacity.  Progressing 12/5/2022     Patient will be able to ambulate 500 yards with no pain to increase community capacity.  Progressing 12/5/2022    Patient will be able to go to the gym for 1 weeks with no pain to increase recreational activities   Progressing 12/5/2022             Plan     Progress duration, workload, and resistance levels of the Therapeutic Activities and Exercises if the patient does not exhibit any pain, swelling, or other symptoms. Progress instruction in-home exercise progression and modification, including symptom management.    Jose Armando Smith, PT,  12/5/2022

## 2022-12-08 ENCOUNTER — CLINICAL SUPPORT (OUTPATIENT)
Dept: REHABILITATION | Facility: HOSPITAL | Age: 37
End: 2022-12-08
Attending: NURSE PRACTITIONER
Payer: COMMERCIAL

## 2022-12-08 DIAGNOSIS — M25.572 ACUTE LEFT ANKLE PAIN: Primary | ICD-10-CM

## 2022-12-08 PROCEDURE — 97110 THERAPEUTIC EXERCISES: CPT | Mod: PN,CQ

## 2022-12-08 NOTE — PROGRESS NOTES
"Physical Therapy Daily Treatment Note     Name: Jm Newton  Clinic Number: [id number]    Therapy Diagnosis:   Encounter Diagnosis   Name Primary?    Acute left ankle pain Yes       Physician: Kong Salmeron NP    Visit Date: 2022    Physician Orders: PT Eval and Treat   Medical Diagnosis from Referral: S93.492D (ICD-10-CM) - Sprain of other ligament of left ankle, subsequent encounter  Evaluation Date: 10/19/2022  Authorization Period Expiration: 2022  Plan of Care Expiration: 2022   Progress Note Due: 2022  Visit # / Visits authorized:  (auth pending, more visits scheduled)  PTA Consecutive Visits #:    Remaining Visits scheduled: 2  5th Visit FOTO - (Date, Score/ turn green)  10th Visit FOTO -  (Date, Score/ turn green )  D/C FOTO - (Date, Score/ turn green )    Time In: 10:25  Time Out: 11:03  Billable Time: 38 minutes  Non-Billable Time: 00 minutes    Precautions: Standard and Fall  Insurance: Payor: [employer] MEDICARE / Plan: Float: [place] (SPECIAL NEEDS PLAN) / Product Type: Medicare Advantage /     Subjective     Pt reports: She's doing better, doctor advised her to go back in the boot, she tried over the weekend and shooting pain increased.  She is compliant with home exercise program.  Response to previous treatment: "It wasn't bad. Pain was tolerable after"    Pre-Treatment Pain Ratin/10  Post-Treatment Pain Ratin/10  Location: left ankles     Objective     Capillary Refill Test: < 5 sec, (Negative)     Jm received therapeutic exercises to develop strength, endurance, ROM, and flexibility for 38 minutes including:    ABCs 2x   Ankle Pumps 2x10  Inversion/Eversion 2x10 with a 3' sec hold  Weight Shifts 2x10 with a 3' sec hold   Heel slides - 5'   Toe extension 10 x 3  DF/PF - 2' with R foot controlling   Supination / pronation - 2'  Towel crunches - 3'  Ankle Theraband Set 2x10, Green Theraband (DF/PF/Eversion/Inversion)  Seated Heel Raises " "2x15, #6  GASTROC STRETCH - level 1 -2'  Foam pad standing - 3'   Bridges 3x10  Theraband Ankle Set 2x8, Green Theraband (Dorsiflexion/Plantarflexion/Eversion/Inversion)    Jm participated in neuromuscular re-education activities to improve: Balance, Coordination, Kinesthetic, Sense, Proprioception, and Posture for 00 minutes. The following activities were included:  SLUMP Nerve Glides 3x8   Modified BAPS Board (Super Hero Ball and Airex) 2x15 (Counter-Clockwise/Clockwise/Up-Down/Sideways)   Tandem Balance on Airex 3x15 sec, Eyes Closed   Walking 3 laps without Boot or Ace Bandage   Heel Digs 3x10 with a 2' sec hold     Jm participated in dynamic functional therapeutic activities to improve functional performance for 00  minutes, including:    Visit Number:  8 out of 12   Activities performed   (duration/resistance)     Stair Negotiation  3 laps   1/2 Step Ups on 4" box  2x8, bilaterally (attempted but too painful to perform today)                        manual therapy techniques: Joint mobilizations, Manual traction, and Soft tissue Mobilization were applied to the: left ankle for 00 minutes, including:  Soft-Tissue Mobilization to the left ankle and surrounding structures  Mobilization of the ankle, Grade 2, A-->P and Distraction     Mobilization of the Tib-Fib Complex, A-->P, Grade 2     Patient was screened for use of kinesiotape and was cleared for use.  1. Has the patient ever had a reaction to the adhesive in bandaids? No  2. Has the patient ever uses athletic/kinesiotape in the past? No  3. Is the patient hemodynamically impaired (PE, DVT, CHF, Kidney failure)? No  4. Can the PT/PTA apply the tape to your skin? Yes    Patient was instructed on duration to wear the tape, proper drying techniques for the tape, and to remove the tape if he/she had any issues with it.    Patient verbalized understanding of these instructions.       Home Exercises Provided and Patient Education Provided "     Education provided:   - Continue with HEP    Written Home Exercises Provided: Patient instructed to cont prior HEP.  Exercises were reviewed and Jm was able to demonstrate them prior to the end of the session.  Jm demonstrated good  understanding of the education provided.     See EMR under Patient Instructions for exercises provided prior visit.    Assessment     Pt did not tolerate session well. Pt advised to wear a boot or compression sock by doctor, pt presented in compression sock.  Weighted heel raises more challenging today than previously. Pt felt pain in her calf and achilles with exercise but was able to complete all reps. Sharp shooting pain increased as session went on mainly with plantar flexion. Pt complained of tightness in achilles and calf, supine gastroc stretch and manual gastroc stretch performed with some relief but pain returned when ankle was in resting position. Compression sleeve removed to see if this was increasing pain, pt had no relief. Ankle-4-way completed with reduced reps. Pt will continue to benefit from skilled therapy    Jm Is progressing well towards Her goals.     Pt prognosis is Good.     Pt will continue to benefit from skilled outpatient physical therapy to address the deficits listed in the problem list box on initial evaluation, provide pt/family education and to maximize pt's level of independence in the home and community environment.     Pt's spiritual, cultural and educational needs considered and pt agreeable to plan of care and goals.    Anticipated barriers to physical therapy: None    Goals:  Short Term Goals: 3 weeks   Patient will be independent with HEP at home to increase PT compliance.  Progressing  12/8/2022      Patient will be able to walk for 100 feet with < 2/10 pain to increase ambulation capacity.  progressing   12/8/2022     Patient will be able to perform 10 sit to stands with < 2/10 pain to increase transitional movements  progressing   12/8/2022               Long Term Goals: 6 weeks   Patient will decrease LEFS score to > 70/80 to increase functional capacity.  Progressing 12/8/2022     Patient will be able to ambulate 500 yards with no pain to increase community capacity.  Progressing 12/8/2022    Patient will be able to go to the gym for 1 weeks with no pain to increase recreational activities  Progressing 12/8/2022             Plan     Progress duration, workload, and resistance levels of the Therapeutic Activities and Exercises if the patient does not exhibit any pain, swelling, or other symptoms. Progress instruction in-home exercise progression and modification, including symptom management.    Tim Muñoz, TODD,  12/8/2022

## 2022-12-12 ENCOUNTER — TELEPHONE (OUTPATIENT)
Dept: ORTHOPEDICS | Facility: CLINIC | Age: 37
End: 2022-12-12
Payer: MEDICARE

## 2022-12-12 NOTE — TELEPHONE ENCOUNTER
Called and scheduled pt with Kong for an new issue pt is having with her right foot. Pt says her foot went numb and she feel and broke her right toe. Scheduled pt with Kong on 12/16 @ 11:30 am. Pt was satisfied with new appt date/time.

## 2022-12-15 ENCOUNTER — TELEPHONE (OUTPATIENT)
Dept: ORTHOPEDICS | Facility: CLINIC | Age: 37
End: 2022-12-15
Payer: MEDICARE

## 2022-12-15 ENCOUNTER — PATIENT MESSAGE (OUTPATIENT)
Dept: ORTHOPEDICS | Facility: CLINIC | Age: 37
End: 2022-12-15
Payer: MEDICARE

## 2022-12-15 ENCOUNTER — TELEPHONE (OUTPATIENT)
Dept: REHABILITATION | Facility: HOSPITAL | Age: 37
End: 2022-12-15

## 2022-12-15 ENCOUNTER — DOCUMENTATION ONLY (OUTPATIENT)
Dept: REHABILITATION | Facility: HOSPITAL | Age: 37
End: 2022-12-15

## 2022-12-15 NOTE — PROGRESS NOTES
30 day PT-PTA face-face discussion with Jonh Johns DPT re:Name: Jm Newton Clinic Number: [id number] patient status, POC, and plan for progression done

## 2022-12-15 NOTE — TELEPHONE ENCOUNTER
Patient was called regarding today's appointment.     Ms. Newton recently hurt her Right foot and believes it to be broken. No images have been taken to confirm the findings, but she has an appointment tomorrow with Ortho to assess her right foot.     Ms. Newton was called to cancel today's appointment until further assessment of the right foot is done.     Patient already limits weight-bearing capacity of the Left foot due to pain and compensates by putting more weight on the Right lower extremity. Therapy today would not be beneficial due to pain levels and the inability to put weight through either foot comfortably.     She is to call back once she knows more and sees her provider.

## 2022-12-15 NOTE — TELEPHONE ENCOUNTER
Called and spoke with pt in regards to pt working after injuring her right foot. Pt states she was told by the ED she can return to work. When she went to PT the therapist says she is to be immobilized for six to eight weeks and not suppose to be at work. The therapist asked pt to check with Kong. Informed pt since she has an appt with Kong on tomorrow we can clarify if pt can work or not. Pt verbally understood and was satisfied.

## 2022-12-15 NOTE — TELEPHONE ENCOUNTER
----- Message from Liss Corrales sent at 12/15/2022 10:13 AM CST -----  Contact: pt  Pt calling in regards to her PT , pt was told she she be immobilized , however ER told her she should return to work today       Confirmed patient's contact info below:  Contact Name: Jm Newton  Phone Number: [phone]

## 2022-12-16 ENCOUNTER — OFFICE VISIT (OUTPATIENT)
Dept: ORTHOPEDICS | Facility: CLINIC | Age: 37
End: 2022-12-16
Payer: COMMERCIAL

## 2022-12-16 ENCOUNTER — HOSPITAL ENCOUNTER (OUTPATIENT)
Dept: RADIOLOGY | Facility: HOSPITAL | Age: 37
Discharge: HOME | End: 2022-12-16
Attending: NURSE PRACTITIONER
Payer: MEDICARE

## 2022-12-16 VITALS — WEIGHT: 240.06 LBS | BODY MASS INDEX: 38.58 KG/M2 | HEIGHT: 66 IN

## 2022-12-16 DIAGNOSIS — G62.9 NEUROPATHY: ICD-10-CM

## 2022-12-16 DIAGNOSIS — G89.29 CHRONIC TOE PAIN, RIGHT FOOT: Primary | ICD-10-CM

## 2022-12-16 DIAGNOSIS — G89.29 CHRONIC TOE PAIN, RIGHT FOOT: ICD-10-CM

## 2022-12-16 DIAGNOSIS — M63.872: ICD-10-CM

## 2022-12-16 DIAGNOSIS — S92.524A CLOSED NONDISPLACED FRACTURE OF MIDDLE PHALANX OF LESSER TOE OF RIGHT FOOT, INITIAL ENCOUNTER: Primary | ICD-10-CM

## 2022-12-16 DIAGNOSIS — M79.674 CHRONIC TOE PAIN, RIGHT FOOT: Primary | ICD-10-CM

## 2022-12-16 DIAGNOSIS — M79.674 CHRONIC TOE PAIN, RIGHT FOOT: ICD-10-CM

## 2022-12-16 PROCEDURE — 99214 PR OFFICE/OUTPT VISIT, EST, LEVL IV, 30-39 MIN: ICD-10-PCS | Mod: S$GLB,,, | Performed by: NURSE PRACTITIONER

## 2022-12-16 PROCEDURE — 73660 XR TOE 2 OR MORE VIEWS RIGHT: ICD-10-PCS | Mod: 26,RT,, | Performed by: RADIOLOGY

## 2022-12-16 PROCEDURE — 3008F BODY MASS INDEX DOCD: CPT | Mod: CPTII,S$GLB,, | Performed by: NURSE PRACTITIONER

## 2022-12-16 PROCEDURE — 3044F PR MOST RECENT HEMOGLOBIN A1C LEVEL <7.0%: ICD-10-PCS | Mod: CPTII,S$GLB,, | Performed by: NURSE PRACTITIONER

## 2022-12-16 PROCEDURE — 73660 X-RAY EXAM OF TOE(S): CPT | Mod: 26,RT,, | Performed by: RADIOLOGY

## 2022-12-16 PROCEDURE — 99999 PR PBB SHADOW E&M-EST. PATIENT-LVL III: ICD-10-PCS | Mod: PBBFAC,,, | Performed by: NURSE PRACTITIONER

## 2022-12-16 PROCEDURE — 1160F PR REVIEW ALL MEDS BY PRESCRIBER/CLIN PHARMACIST DOCUMENTED: ICD-10-PCS | Mod: CPTII,S$GLB,, | Performed by: NURSE PRACTITIONER

## 2022-12-16 PROCEDURE — 73660 X-RAY EXAM OF TOE(S): CPT | Mod: TC,RT

## 2022-12-16 PROCEDURE — 1159F PR MEDICATION LIST DOCUMENTED IN MEDICAL RECORD: ICD-10-PCS | Mod: CPTII,S$GLB,, | Performed by: NURSE PRACTITIONER

## 2022-12-16 PROCEDURE — 99999 PR PBB SHADOW E&M-EST. PATIENT-LVL III: CPT | Mod: PBBFAC,,, | Performed by: NURSE PRACTITIONER

## 2022-12-16 PROCEDURE — 3008F PR BODY MASS INDEX (BMI) DOCUMENTED: ICD-10-PCS | Mod: CPTII,S$GLB,, | Performed by: NURSE PRACTITIONER

## 2022-12-16 PROCEDURE — 1159F MED LIST DOCD IN RCRD: CPT | Mod: CPTII,S$GLB,, | Performed by: NURSE PRACTITIONER

## 2022-12-16 PROCEDURE — 99214 OFFICE O/P EST MOD 30 MIN: CPT | Mod: S$GLB,,, | Performed by: NURSE PRACTITIONER

## 2022-12-16 PROCEDURE — 3044F HG A1C LEVEL LT 7.0%: CPT | Mod: CPTII,S$GLB,, | Performed by: NURSE PRACTITIONER

## 2022-12-16 PROCEDURE — 1160F RVW MEDS BY RX/DR IN RCRD: CPT | Mod: CPTII,S$GLB,, | Performed by: NURSE PRACTITIONER

## 2022-12-16 RX ORDER — TRAMADOL HYDROCHLORIDE 50 MG/1
50 TABLET ORAL EVERY 8 HOURS PRN
Qty: 21 EACH | Refills: 0 | Status: SHIPPED | OUTPATIENT
Start: 2022-12-16 | End: 2023-01-31 | Stop reason: SDUPTHER

## 2022-12-16 NOTE — PROGRESS NOTES
"  SUBJECTIVE:     Chief Complaint & History of Present Illness:  Jm Newton is a New 37 y.o. year old female patient here for constant right toe pain which has been present for 5 days.  She states she was walking and her left foot/ankle went numb and she went to catch herself before she fell and jammed her right 5th toe into the corner of the wall.  She reports she heard a "pop" and knew she must have broken it.  She went to urgent care on Sunday and was told she fractured her toe.  She was placed into a post op shoe and referred to Orthopedics.    Currently she reports pain around the 5th toe on the right.  She also reports having intermittent numbness to her left heel/ankle which has caused her weakness there.  She is known to our clinic left ankle sprain that was treated with a boot and PT.  She is currently on Neurontin 300 mg qhs and Tylenol and Motrin during the day which takes the edge off.      Review of patient's allergies indicates:   Allergen Reactions    Azithromycin Swelling and Edema         Current Outpatient Medications   Medication Sig Dispense Refill    DESCOVY 200-25 mg Tab once daily.   3    gabapentin (NEURONTIN) 300 MG capsule Take 1 capsule (300 mg total) by mouth every evening. 30 capsule 0    hydrOXYzine HCL (ATARAX) 25 MG tablet 1 tablet every evening.      ibuprofen (ADVIL,MOTRIN) 600 MG tablet Take 1 tablet (600 mg total) by mouth every 8 (eight) hours as needed. 30 tablet 1    inhaler,assist device,lg mask (OPTICHAMBER HERBERT LG MASK MISC) U UTD BID      methIMAzole (TAPAZOLE) 5 MG Tab Take half a tablet (2.5 mg) by mouth daily 45 tablet 2    omeprazole (PRILOSEC) 40 MG capsule Take 1 capsule (40 mg total) by mouth once daily. 30 capsule 5    ondansetron (ZOFRAN-ODT) 4 MG TbDL Take 1 tablet (4 mg total) by mouth every 6 (six) hours as needed (nausea). 50 tablet 1    OPTICHAMBER HERBERT LG MASK Spcr U UTD BID  3    PROCHAMBER       promethazine-dextromethorphan " (PROMETHAZINE-DM) 6.25-15 mg/5 mL Syrp TK 5 ML PO Q NIGHT PRN      sulfamethoxazole-trimethoprim 400-80mg (BACTRIM,SEPTRA) 400-80 mg per tablet Take 1 tablet by mouth once daily.      triamcinolone acetonide 0.1% (KENALOG) 0.1 % cream Apply topically 2 (two) times daily. 15 g 1    albuterol (PROVENTIL) 5 mg/mL nebulizer solution Take 0.5 mLs (2.5 mg total) by nebulization 4 (four) times daily as needed for Wheezing or Shortness of Breath. 1 each 4    fluticasone propion-salmeterol 115-21 mcg/dose (ADVAIR HFA) 115-21 mcg/actuation HFAA inhaler Inhale 2 puffs into the lungs every 12 (twelve) hours. Controller 3 Inhaler 4    ritonavir (NORVIR) 100 mg Tab tablet 100 mg 2 (two) times daily with meals.        No current facility-administered medications for this visit.       Past Medical History:   Diagnosis Date    Asthma     Encounter for blood transfusion     HIV infection     dx     Hyperthyroidism in pregnancy, antepartum        Past Surgical History:   Procedure Laterality Date    CERVICAL CERCLAGE      emergent with twins    CERVICAL CERCLAGE N/A 2019    Procedure: CERCLAGE, CERVIX;  Surgeon: Obey Henry MD;  Location: [facility];  Service: OB/GYN;  Laterality: N/A;     SECTION WITH TUBAL LIGATION N/A 6/15/2019    Procedure:  SECTION, WITH TUBAL LIGATION;  Surgeon: Madeline Walden MD;  Location: [facility];  Service: OB/GYN;  Laterality: N/A;     SECTION, CLASSIC      Last section with classical extention, from [facility]    COLD KNIFE CONIZATION OF CERVIX N/A 2018    Procedure: CONE BIOPSY, CERVIX, USING COLD KNIFE;  Surgeon: Chantal Worrell MD;  Location: [facility];  Service: OB/GYN;  Laterality: N/A;    CONIZATION OF CERVIX USING LOOP ELECTROSURGICAL EXCISION PROCEDURE (LEEP) N/A 2018    Procedure: LEEP CONIZATION, CERVIX;  Surgeon: Chantal Worrell MD;  Location: [facility];  Service: OB/GYN;  Laterality: N/A;    CYSTOSCOPY N/A 10/22/2019    Procedure: CYSTOSCOPY;   "Surgeon: Georgia Howard MD;  Location: [facility];  Service: OB/GYN;  Laterality: N/A;    DILATION AND CURETTAGE OF UTERUS      ROBOT-ASSISTED LAPAROSCOPIC ABDOMINAL HYSTERECTOMY USING DA GALINDO XI N/A 10/22/2019    Procedure: XI ROBOTIC HYSTERECTOMY;  Surgeon: Georgia Howard MD;  Location: [facility];  Service: OB/GYN;  Laterality: N/A;    ROBOT-ASSISTED SURGICAL REMOVAL OF FALLOPIAN TUBE USING DA GALINDO XI Bilateral 10/22/2019    Procedure: XI ROBOTIC SALPINGECTOMY;  Surgeon: Georgia Howard MD;  Location: [facility];  Service: OB/GYN;  Laterality: Bilateral;    THYROIDECTOMY Right 4/13/2021    Procedure: THYROIDECTOMY;  Surgeon: Maksim Sifuentes MD;  Location: [address];  Service: ENT;  Laterality: Right;  Right possible  total    WISDOM TOOTH EXTRACTION         Family History   Problem Relation Age of Onset    Hypertension Maternal Grandmother     Diabetes Maternal Grandmother     Sleep apnea Son     Colon cancer Neg Hx     Ovarian cancer Neg Hx          Review of Systems:  ROS:  Constitutional: no fever or chills  Eyes: no visual changes  ENT: no nasal congestion or sore throat  Respiratory: no cough or shortness of breath  Cardiovascular: no chest pain or palpitations  Gastrointestinal: no nausea or vomiting, tolerating diet  Genitourinary: no hematuria or dysuria  Integument/Breast: no rash or pruritis  Hematologic/Lymphatic: no easy bruising or lymphadenopathy  Musculoskeletal:  right toe pain  Neurological: no seizures or tremors  Behavioral/Psych: no auditory or visual hallucinations  Endocrine: no heat or cold intolerance      OBJECTIVE:     PHYSICAL EXAM:  Vital Signs (Most Recent)  There were no vitals filed for this visit.  Height: 5' 6" (167.6 cm) Weight: 108.9 kg (240 lb 1.3 oz),   Estimated body mass index is 38.75 kg/m² as calculated from the following:    Height as of this encounter: 5' 6" (1.676 m).    Weight as of this encounter: 108.9 kg (240 lb 1.3 oz).   General Appearance: Well nourished, " well developed, in no acute distress.  HENT: Normal cephalic, oropharynx pink and moist  Eyes: PERRLA bilaterally and EOM x 4  Respiratory: Even and unlabored  Skin: Warm and Dry.   Psychiatric: AAO x 4, Mood & affect are normal.    right  Foot/Ankle    General appearance: no acute distress, alert/oriented x3, appropriate mood and affect, looks stated age, and well nourished  The examination was performed out of splint/cast  Skin: normal  Swelling: minimal  Warmth: no warmth  Tenderness: diffuse  ROM: normal  Strength: normal  Gait: normal  Stability: stable to testing and Cotton test: negative  Crepitus: no  Neurological Exam: normal  Vascular Exam: normal and pulse present    Tenderness at base of 5th phalanx.      RADIOGRAPHS:  Right toe x-ray was obtained, findings show fracture of the 5th phalanx that appears stable.  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  All radiographs were personally reviewed by me.    ASSESSMENT/PLAN:       ICD-10-CM ICD-9-CM   1. Closed nondisplaced fracture of middle phalanx of lesser toe of right foot, initial encounter  S92.524A 826.0   2. Neuropathy  G62.9 355.9   3. Disorders of muscle in diseases classified elsewhere, left ankle and foot  M63.872 728.9       Plan:  -Jm Newton presents to clinic today for a fracture of her 5th toe on the right and intermittent numbness and weakness of the left ankle.    -X-ray as above.  -Recommend she continue to wear the post op shoe and avoid high impact activities for 6 weeks.  -I will get an EMG on her left lower leg given her persistent numbness and weakness.  -Recommend RICE therapy.  -I will prescribe Tramadol 50 mg tid PRN for pain, continue Tylenol and Motrin for mild to moderate pain and neurontin for neuropathy.  -Follow up in 1 month with repeat x-ray of her right foot and left ankle.    Future Appointments   Date Time Provider  Department Center   1/13/2023  9:30 AM [facility]

## 2022-12-21 ENCOUNTER — PATIENT MESSAGE (OUTPATIENT)
Dept: ORTHOPEDICS | Facility: CLINIC | Age: 37
End: 2022-12-21
Payer: MEDICARE

## 2022-12-23 ENCOUNTER — PATIENT MESSAGE (OUTPATIENT)
Dept: ORTHOPEDICS | Facility: CLINIC | Age: 37
End: 2022-12-23
Payer: MEDICARE

## 2022-12-28 ENCOUNTER — TELEPHONE (OUTPATIENT)
Dept: ORTHOPEDICS | Facility: CLINIC | Age: 37
End: 2022-12-28
Payer: MEDICARE

## 2022-12-28 NOTE — TELEPHONE ENCOUNTER
Called and spoke with Ms Kiser from [facility] in regards to pt's EMG. Ms Kiser states pt was upset and says she do not want to move forward with getting her EMG done due to its not being covered under her workers comp. Informed Ms Kiser I have spoken with pt earlier regarding this matter and has message our workers comp department to assist. Ms Kiser verbally understood.

## 2023-01-03 ENCOUNTER — DOCUMENTATION ONLY (OUTPATIENT)
Dept: REHABILITATION | Facility: HOSPITAL | Age: 38
End: 2023-01-03
Payer: MEDICARE

## 2023-01-03 NOTE — PROGRESS NOTES
30 day PT-PTA face-face discussion with Dragan Dickey DPT re: Name: Jm Newton  Clinic Number: [id number] patient status, POC, and plan for progression done .

## 2023-01-13 ENCOUNTER — OFFICE VISIT (OUTPATIENT)
Dept: ORTHOPEDICS | Facility: CLINIC | Age: 38
End: 2023-01-13
Payer: COMMERCIAL

## 2023-01-13 ENCOUNTER — HOSPITAL ENCOUNTER (OUTPATIENT)
Dept: RADIOLOGY | Facility: HOSPITAL | Age: 38
Discharge: HOME | End: 2023-01-13
Attending: NURSE PRACTITIONER
Payer: COMMERCIAL

## 2023-01-13 ENCOUNTER — HOSPITAL ENCOUNTER (OUTPATIENT)
Dept: RADIOLOGY | Facility: HOSPITAL | Age: 38
Discharge: HOME | End: 2023-01-13
Attending: NURSE PRACTITIONER
Payer: MEDICARE

## 2023-01-13 DIAGNOSIS — M25.572 ACUTE LEFT ANKLE PAIN: ICD-10-CM

## 2023-01-13 DIAGNOSIS — S92.524D CLOSED NONDISPLACED FRACTURE OF MIDDLE PHALANX OF LESSER TOE OF RIGHT FOOT WITH ROUTINE HEALING, SUBSEQUENT ENCOUNTER: ICD-10-CM

## 2023-01-13 DIAGNOSIS — G62.9 NEUROPATHY: ICD-10-CM

## 2023-01-13 DIAGNOSIS — S92.524D CLOSED NONDISPLACED FRACTURE OF MIDDLE PHALANX OF LESSER TOE OF RIGHT FOOT WITH ROUTINE HEALING, SUBSEQUENT ENCOUNTER: Primary | ICD-10-CM

## 2023-01-13 DIAGNOSIS — S93.492D SPRAIN OF OTHER LIGAMENT OF LEFT ANKLE, SUBSEQUENT ENCOUNTER: ICD-10-CM

## 2023-01-13 DIAGNOSIS — S86.112D STRAIN OF GASTROCNEMIUS MUSCLE OF LEFT LOWER EXTREMITY, SUBSEQUENT ENCOUNTER: ICD-10-CM

## 2023-01-13 PROCEDURE — 1159F PR MEDICATION LIST DOCUMENTED IN MEDICAL RECORD: ICD-10-PCS | Mod: CPTII,S$GLB,, | Performed by: NURSE PRACTITIONER

## 2023-01-13 PROCEDURE — 99214 OFFICE O/P EST MOD 30 MIN: CPT | Mod: S$GLB,,, | Performed by: NURSE PRACTITIONER

## 2023-01-13 PROCEDURE — 1160F RVW MEDS BY RX/DR IN RCRD: CPT | Mod: CPTII,S$GLB,, | Performed by: NURSE PRACTITIONER

## 2023-01-13 PROCEDURE — 73630 XR FOOT COMPLETE 3 VIEW RIGHT: ICD-10-PCS | Mod: 26,RT,, | Performed by: RADIOLOGY

## 2023-01-13 PROCEDURE — 73630 X-RAY EXAM OF FOOT: CPT | Mod: TC,RT

## 2023-01-13 PROCEDURE — 73610 XR ANKLE COMPLETE 3 VIEW LEFT: ICD-10-PCS | Mod: 26,LT,, | Performed by: RADIOLOGY

## 2023-01-13 PROCEDURE — 99214 PR OFFICE/OUTPT VISIT, EST, LEVL IV, 30-39 MIN: ICD-10-PCS | Mod: S$GLB,,, | Performed by: NURSE PRACTITIONER

## 2023-01-13 PROCEDURE — 73610 X-RAY EXAM OF ANKLE: CPT | Mod: TC,LT

## 2023-01-13 PROCEDURE — 1159F MED LIST DOCD IN RCRD: CPT | Mod: CPTII,S$GLB,, | Performed by: NURSE PRACTITIONER

## 2023-01-13 PROCEDURE — 99999 PR PBB SHADOW E&M-EST. PATIENT-LVL III: CPT | Mod: PBBFAC,,, | Performed by: NURSE PRACTITIONER

## 2023-01-13 PROCEDURE — 73630 X-RAY EXAM OF FOOT: CPT | Mod: 26,RT,, | Performed by: RADIOLOGY

## 2023-01-13 PROCEDURE — 1160F PR REVIEW ALL MEDS BY PRESCRIBER/CLIN PHARMACIST DOCUMENTED: ICD-10-PCS | Mod: CPTII,S$GLB,, | Performed by: NURSE PRACTITIONER

## 2023-01-13 PROCEDURE — 73630 X-RAY EXAM OF FOOT: CPT | Mod: 26,LT,, | Performed by: RADIOLOGY

## 2023-01-13 PROCEDURE — 73630 X-RAY EXAM OF FOOT: CPT | Mod: TC,LT

## 2023-01-13 PROCEDURE — 99999 PR PBB SHADOW E&M-EST. PATIENT-LVL III: ICD-10-PCS | Mod: PBBFAC,,, | Performed by: NURSE PRACTITIONER

## 2023-01-13 PROCEDURE — 73630 XR FOOT COMPLETE 3 VIEW LEFT: ICD-10-PCS | Mod: 26,LT,, | Performed by: RADIOLOGY

## 2023-01-13 PROCEDURE — 73610 X-RAY EXAM OF ANKLE: CPT | Mod: 26,LT,, | Performed by: RADIOLOGY

## 2023-01-13 NOTE — PROGRESS NOTES
SUBJECTIVE:     Chief Complaint & History of Present Illness:  Jm Newton is a Established 37 y.o. year old female patient here for follow up for work related injuries.  On September 28, 2022 she states a door hit the back of her ankle which caused her to twist her ankle when walking into a building.  Since that time she is had left ankle pain with pain shooting up the back of her leg and then developed numbness to her left foot.  She states due to the numbness she lost her balance again and jammed her right foot into the wall resulting in a fracture in her right 5th toe.  She states this is a work-related injury.  She was last seen in clinic on December 16th at which time in the EMG was ordered but has not been done.  She has not seen Occupational Medicine.  X-ray of her left foot and ankle reveal no acute fractures and was treated for a sprain.  Patient has been using gabapentin for her neuropathy pain which helps but makes her sleepy.    Review of patient's allergies indicates:   Allergen Reactions    Azithromycin Swelling and Edema         Current Outpatient Medications   Medication Sig Dispense Refill    albuterol (PROVENTIL) 5 mg/mL nebulizer solution Take 0.5 mLs (2.5 mg total) by nebulization 4 (four) times daily as needed for Wheezing or Shortness of Breath. 1 each 4    DESCOVY 200-25 mg Tab once daily.   3    fluticasone propion-salmeterol 115-21 mcg/dose (ADVAIR HFA) 115-21 mcg/actuation HFAA inhaler Inhale 2 puffs into the lungs every 12 (twelve) hours. Controller 3 Inhaler 4    gabapentin (NEURONTIN) 300 MG capsule Take 1 capsule (300 mg total) by mouth every evening. 30 capsule 0    hydrOXYzine HCL (ATARAX) 25 MG tablet 1 tablet every evening.      ibuprofen (ADVIL,MOTRIN) 600 MG tablet Take 1 tablet (600 mg total) by mouth every 8 (eight) hours as needed. 30 tablet 1    inhaler,assist device,lg mask (OPTICHAMBER HERBERT LG MASK MISC) U UTD BID      methIMAzole (TAPAZOLE) 5 MG Tab Take  half a tablet (2.5 mg) by mouth daily 45 tablet 2    omeprazole (PRILOSEC) 40 MG capsule Take 1 capsule (40 mg total) by mouth once daily. 30 capsule 5    ondansetron (ZOFRAN-ODT) 4 MG TbDL Take 1 tablet (4 mg total) by mouth every 6 (six) hours as needed (nausea). 50 tablet 1    OPTICHAMBER HERBERT LG MASK Spcr U UTD BID  3    PROCHAMBER       promethazine-dextromethorphan (PROMETHAZINE-DM) 6.25-15 mg/5 mL Syrp TK 5 ML PO Q NIGHT PRN      ritonavir (NORVIR) 100 mg Tab tablet 100 mg 2 (two) times daily with meals.       sulfamethoxazole-trimethoprim 400-80mg (BACTRIM,SEPTRA) 400-80 mg per tablet Take 1 tablet by mouth once daily.      traMADoL (ULTRAM) 50 mg tablet Take 1 tablet (50 mg total) by mouth every 8 (eight) hours as needed for Pain. 21 each 0    triamcinolone acetonide 0.1% (KENALOG) 0.1 % cream Apply topically 2 (two) times daily. 15 g 1     No current facility-administered medications for this visit.       Past Medical History:   Diagnosis Date    Asthma     Encounter for blood transfusion     HIV infection     dx     Hyperthyroidism in pregnancy, antepartum        Past Surgical History:   Procedure Laterality Date    CERVICAL CERCLAGE      emergent with twins    CERVICAL CERCLAGE N/A 2019    Procedure: CERCLAGE, CERVIX;  Surgeon: Obey Henry MD;  Location: [facility];  Service: OB/GYN;  Laterality: N/A;     SECTION WITH TUBAL LIGATION N/A 6/15/2019    Procedure:  SECTION, WITH TUBAL LIGATION;  Surgeon: Madeline Walden MD;  [facility]: [facility];  Service: OB/GYN;  Laterality: N/A;     SECTION, CLASSIC      Last section with classical extention, from [facility]    COLD KNIFE CONIZATION OF CERVIX N/A 2018    Procedure: CONE BIOPSY, CERVIX, USING COLD KNIFE;  Surgeon: Chantal Worrell MD;  Location: [facility];  Service: OB/GYN;  Laterality: N/A;    CONIZATION OF CERVIX USING LOOP ELECTROSURGICAL EXCISION PROCEDURE (LEEP) N/A 2018    Procedure: LEEP CONIZATION,  "CERVIX;  Surgeon: Chantal Worrell MD;  Location: [facility];  Service: OB/GYN;  Laterality: N/A;    CYSTOSCOPY N/A 10/22/2019    Procedure: CYSTOSCOPY;  Surgeon: Georgia Howard MD;  Location: [facility];  Service: OB/GYN;  Laterality: N/A;    DILATION AND CURETTAGE OF UTERUS      ROBOT-ASSISTED LAPAROSCOPIC ABDOMINAL HYSTERECTOMY USING DA GALINDO XI N/A 10/22/2019    Procedure: XI ROBOTIC HYSTERECTOMY;  Surgeon: Georgia Howard MD;  Location: [facility];  Service: OB/GYN;  Laterality: N/A;    ROBOT-ASSISTED SURGICAL REMOVAL OF FALLOPIAN TUBE USING DA GALINDO XI Bilateral 10/22/2019    Procedure: XI ROBOTIC SALPINGECTOMY;  Surgeon: Georgia Howard MD;  Location: [facility];  Service: OB/GYN;  Laterality: Bilateral;    THYROIDECTOMY Right 4/13/2021    Procedure: THYROIDECTOMY;  Surgeon: Maksim Sifuentes MD;  [facility];  Service: ENT;  Laterality: Right;  Right possible  total    WISDOM TOOTH EXTRACTION         Family History   Problem Relation Age of Onset    Hypertension Maternal Grandmother     Diabetes Maternal Grandmother     Sleep apnea Son     Colon cancer Neg Hx     Ovarian cancer Neg Hx          Review of Systems:  ROS:  Constitutional: no fever or chills  Eyes: no visual changes  ENT: no nasal congestion or sore throat  Respiratory: no cough or shortness of breath  Cardiovascular: no chest pain or palpitations  Gastrointestinal: no nausea or vomiting, tolerating diet  Genitourinary: no hematuria or dysuria  Integument/Breast: no rash or pruritis  Hematologic/Lymphatic: no easy bruising or lymphadenopathy  Musculoskeletal:  right toe pain  Neurological: no seizures or tremors  Behavioral/Psych: no auditory or visual hallucinations  Endocrine: no heat or cold intolerance      OBJECTIVE:     PHYSICAL EXAM:  Vital Signs (Most Recent)  There were no vitals filed for this visit.     ,   Estimated body mass index is 38.75 kg/m² as calculated from the following:    Height as of 12/16/22: 5' 6" (1.676 m).    " Weight as of 12/16/22: 108.9 kg (240 lb 1.3 oz).   General Appearance: Well nourished, well developed, in no acute distress.  HENT: Normal cephalic, oropharynx pink and moist  Eyes: PERRLA bilaterally and EOM x 4  Respiratory: Even and unlabored  Skin: Warm and Dry.   Psychiatric: AAO x 4, Mood & affect are normal.      left  Foot/Ankle    The examination was performed out of splint/cast  Skin: normal  Swelling: none  Warmth: no warmth  Tenderness: diffuse  ROM: normal  Strength: normal  Gait: normal  Stability: stable to testing and Cotton test: negative  Crepitus: no  Neurological Exam: normal  Vascular Exam: normal and pulse present  Walton Test: ankle plantarflexes and there is tenderness over gastrocnemius muscle    Tenderness to ATFL    right  Foot/Ankle    General appearance: no acute distress, alert/oriented x3, appropriate mood and affect, looks stated age, and well nourished  The examination was performed out of splint/cast  Skin: normal  Swelling: none  Warmth: no warmth  Tenderness: diffuse  ROM: normal  Strength: normal  Gait: normal  Stability: stable to testing and Cotton test: negative  Crepitus: no  Neurological Exam: normal  Vascular Exam: normal and pulse present    normal exam, no swelling, tenderness, instability; ligaments intact, full range of motion of all ankle/foot joints      RADIOGRAPHS:  Right foot, Left foot, and Left ankle x-rays were obtained, findings show fracture of the 5th phalanx that appears stable with progressive callus formation.  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  Left foot and ankle xrays show no acute fractures.  Ankle mortise is symmetrical.  All radiographs were personally reviewed by me.    ASSESSMENT/PLAN:       ICD-10-CM ICD-9-CM   1. Closed nondisplaced fracture of middle phalanx of lesser toe of right foot with routine healing, subsequent encounter  S92.524D V54.19    2. Neuropathy  G62.9 355.9   3. Acute left ankle pain  M25.572 719.47   4. Sprain of other ligament of left ankle, subsequent encounter  S93.492D V58.89     845.09   5. Strain of gastrocnemius muscle of left lower extremity, subsequent encounter  S86.112D V58.89     844.8       Plan:  -Jm Newton presents to clinic today for follow for her right 5th toe on the right and intermittent numbness and weakness of the left ankle.    -X-ray as above.  -Recommend she continue her gabapentin as prescribed.  She is currently in regular shoes with she may continue to use avoid high impact activities for the next 4-6 weeks.  -Okay to resume physical therapy.  -I will refer her to Occupational Medicine moving forward for her chronic left ankle pain in associated foot numbness.  An EMG has been ordered but was never performed.  She may also require an MRI or ultrasound to rule out a gastrocnemius strain on the left.  Will defer decision to occupational medicine physician.  - Continue Tylenol and Motrin for mild to moderate pain and neurontin for neuropathy.

## 2023-01-31 ENCOUNTER — PATIENT MESSAGE (OUTPATIENT)
Dept: ORTHOPEDICS | Facility: CLINIC | Age: 38
End: 2023-01-31
Payer: MEDICARE

## 2023-01-31 DIAGNOSIS — S92.524A CLOSED NONDISPLACED FRACTURE OF MIDDLE PHALANX OF LESSER TOE OF RIGHT FOOT, INITIAL ENCOUNTER: ICD-10-CM

## 2023-01-31 DIAGNOSIS — S93.492D SPRAIN OF OTHER LIGAMENT OF LEFT ANKLE, SUBSEQUENT ENCOUNTER: Primary | ICD-10-CM

## 2023-01-31 RX ORDER — TRAMADOL HYDROCHLORIDE 50 MG/1
50 TABLET ORAL EVERY 8 HOURS PRN
Qty: 21 EACH | Refills: 0 | Status: SHIPPED | OUTPATIENT
Start: 2023-01-31 | End: 2023-10-26

## 2023-01-31 NOTE — TELEPHONE ENCOUNTER
She has both PT and OT in.  I refilled her Tramadol but this will be the last time as she has to follow up with worker's comp.

## 2023-02-24 ENCOUNTER — CLINICAL SUPPORT (OUTPATIENT)
Dept: REHABILITATION | Facility: HOSPITAL | Age: 38
End: 2023-02-24
Payer: COMMERCIAL

## 2023-02-24 DIAGNOSIS — M25.572 CHRONIC PAIN OF LEFT ANKLE: Primary | ICD-10-CM

## 2023-02-24 DIAGNOSIS — S93.492D SPRAIN OF OTHER LIGAMENT OF LEFT ANKLE, SUBSEQUENT ENCOUNTER: ICD-10-CM

## 2023-02-24 DIAGNOSIS — G89.29 CHRONIC PAIN OF LEFT ANKLE: Primary | ICD-10-CM

## 2023-02-24 PROCEDURE — 97140 MANUAL THERAPY 1/> REGIONS: CPT | Mod: PN

## 2023-02-24 PROCEDURE — 97162 PT EVAL MOD COMPLEX 30 MIN: CPT | Mod: PN

## 2023-02-24 PROCEDURE — 97530 THERAPEUTIC ACTIVITIES: CPT | Mod: PN

## 2023-02-24 NOTE — PROGRESS NOTES
[facility]   Physical Therapy Initial Evaluation     Date: 2/24/2023   Name: Jm Newton  Clinic Number: [id number]    Therapy Diagnosis:   Encounter Diagnoses   Name Primary?    Sprain of other ligament of left ankle, subsequent encounter     Chronic pain of left ankle Yes     Physician: Kong Salmeron, NP    Physician Orders: PT Eval and Treat   Medical Diagnosis from Referral: S93.492D (ICD-10-CM) - Sprain of other ligament of left ankle, subsequent encounter  Evaluation Date: 2/24/2023  Authorization Period Expiration: 1/31/2024  Plan of Care Expiration: 4/7/2023   Progress Note Due: 3/24/2023  Visit # / Visits authorized: 1/ 1   Remaining Visits Scheduled - 00  PTA Consecutive Visits - 0/6    Eval Visit FOTO - 52 (2/24/2023)   5th Visit FOTO  - (Date/Score/[place])   10th Visit FOTO - (Date/Score/[place])   D/C FOTO -  (Date/Score/[place])      Precautions: Standard     Time In: 8:00  Time Out: 8:45  Total Appointment Time (timed & untimed codes): 45 minutes      Please see Plan of Care notation section to view Jm Newton Initial Evaluation.       Jonh Johns, PT,DPT

## 2023-02-24 NOTE — PLAN OF CARE
"[facility]   Physical Therapy Initial Evaluation     Date: 2/24/2023   Name: Jm Newton  Clinic Number: [id number]    Therapy Diagnosis:   Encounter Diagnoses   Name Primary?    Sprain of other ligament of left ankle, subsequent encounter     Chronic pain of left ankle Yes     Physician: Kong Salmeron, NP    Physician Orders: PT Eval and Treat   Medical Diagnosis from Referral: S93.492D (ICD-10-CM) - Sprain of other ligament of left ankle, subsequent encounter  Evaluation Date: 2/24/2023  Authorization Period Expiration: 1/31/2024  Plan of Care Expiration: 4/7/2023   Progress Note Due: 3/24/2023  Visit # / Visits authorized: 1/ 1   Remaining Visits Scheduled - 00  PTA Consecutive Visits - 0/6    Eval Visit FOTO - 52 (2/24/2023)   5th Visit FOTO  - (Date/Score/Turn Green)   10th Visit FOTO - (Date/Score/Turn Green)   D/C FOTO -  (Date/Score/Turn Green)      Precautions: Standard     Time In: 8:00  Time Out: 8:45  Total Appointment Time (timed & untimed codes): 45 minutes    SUBJECTIVE     Date of onset: 9/28/2022    Current Condition - Jm reports of a Right foot fracture of the 5th metatarsal in 12/11/2022. With continued pain on the Left ankle after an iron fence hit the posterior aspect of the ankle. The Right foot fracture has healed since the initial onset of injury but the Left ankle is still painful. She presents to therapy with no brace or boot, but does state that the compression socks she wears helps with the pain.     Type of Pain: Pain is located to the posterior aspect of the Left ankle towards the lateral side of the malleolus and described as Aching and Tight  Sleep: Pt is able to sleep for 4 hours before pain wakes them up.    Aggravating Factors: Standing, Walking, Lifting, Getting out of bed/chair, and Jogging   Easing Factors: pain medication, heating pad, and Compression socks    Imaging: X-rays: Per imaging report, "Subacute healing oblique " "fracture of the proximal phalanx of the 5th toe, callus developing about the fracture site and less well-defined fracture line.   Stable remote fracture involving the tibial side of the base of the distal phalanx of the great toe.  No new fractures.  Stable osteoarthritic changes at the 1st MTP articulation.  Soft tissue swelling dorsally at the level of the metatarsals less so midfoot and MTP articulations."    "Skeletal structures are intact.  No fracture or dislocation is identified.  Ankle joint space looks normal.  Minimal soft tissue swelling is suggested on the medial side."    Prior Therapy: Yes, no major changes of the Left foot.   Work/School: [employer]   Hobbies/Exercise: Jogging, Walking, Driving, Stair Negotiation, Going to the Gym   Physical Environment: Pt lives in a 1-story home with 1 steps to enter the home and lives with their family    Prior Level of Function: Independent   Current Level of Function: Independent.     Pain:  Current 6/10,   Worst 9/10,   Best 3/10     Non-Mechanical Origin:  Night Pain?  Sometimes   Hx of Cancer?  No.   Trauma?  Yes.     Falls: No falls in the last year but did slip and fractured her 5th metatarsal on the Right foot.  Red Flags: (+) Tingling/Burning on the Left lower extremity      Patient Goals: Patient would like to decrease pain and increase mobility.      Medical History:   Past Medical History:   Diagnosis Date    Asthma     Encounter for blood transfusion     HIV infection     dx     Hyperthyroidism in pregnancy, antepartum        Surgical History:   Jm Newton  has a past surgical history that includes Cervical cerclage;  section, classic; Dilation and curettage of uterus; Cold knife conization of cervix (N/A, 2018); Conization of cervix using loop electrosurgical excision procedure (LEEP) (N/A, 2018); Cervical cerclage (N/A, 2019);  section with tubal ligation (N/A, 6/15/2019); Wolsey tooth " extraction; Robot-assisted laparoscopic abdominal hysterectomy using da Chas Xi (N/A, 10/22/2019); Cystoscopy (N/A, 10/22/2019); Robot-assisted surgical removal of fallopian tube using da Chas Xi (Bilateral, 10/22/2019); and Thyroidectomy (Right, 4/13/2021).    Medications:   Jm has a current medication list which includes the following prescription(s): albuterol, descovy, advair hfa, gabapentin, hydroxyzine hcl, ibuprofen, inhaler,assist device,lg mask, methimazole, omeprazole, ondansetron, optichamber kt lg mask, prochamber, promethazine-dextromethorphan, ritonavir, sulfamethoxazole-trimethoprim 400-80mg, tramadol, and triamcinolone acetonide 0.1%.    Allergies:   Review of patient's allergies indicates:   Allergen Reactions    Azithromycin Swelling and Edema        OBJECTIVE     BALANCE - Single Leg Balance     Left Right    Eyes Open  Unable to perform due to pain 30 sec     ACTIVE RANGE OF MOTION    Left Right   Dorsiflexion (gastroc) -5° 5°   Dorsiflexion (soleus) 0° 20°   Plantarflexion 50° 60°   Inversion 40° 40°   Eversion 10° 20°   PASSIVE RANGE OF MOTION   Dorsiflexion (gastroc) 5° 10°   Dorsiflexion (soleus) 5°, burning Within Normal Limits   Plantarflexion 60° Within Normal Limits    Inversion Within Functional Limits  Within Normal Limits    Eversion Within Functional Limits  Within Normal Limits      LOWER EXTREMITY STRENGTH     Left Right   Knee Extension 5/5 5/5   Knee Flexion 5/5 4/5      Ankle Dorsiflexion 4-/5 5/5   Ankle Plantarflexion (Single Heel Raise) Unable to Perform 17/30   Ankle Inversion 4-/5 4+/5   Ankle Eversion 4-/5 4+/5     SPECIAL TESTS    Left  Right    Anterior Drawer (ATFL) Negative  Negative    Talar Tilt/Inversion Stress Tests (CFL) Positive  Negative    Posterior Drawer (PTFL) Positive  Negative    Squeeze Test (syndesmosis) Negative  Negative      PALPATION:  Patient reports primary pain region along the achilles tendon, inferior aspect of the lateral  malleolus, and tibialis anterior tendons.     GAIT: Jm ambulates with no assistive device with independently.     GAIT DEVIATIONS: Jm displays hip hiking with antalgic gait due to pain.     Posture Assessment: Ms. Newton displayed a valgus deformity while standing in neutral with a decrease in arch of the Left foot when compared to the Right. Additionally, the Right hip was higher than the left iliac crest.     Length Leg Dis    Squat Mechanics: Bilateral knee valgus, anterior tibial translation, decreased depth of squat    Limitation/Restriction for FOTO LEFS Survey    Therapist reviewed FOTO scores for Jm Newton on 2/24/2023.   FOTO documents entered into [employer] - see Media section.    Limitation Score: 37/80       TREATMENT     Total Treatment time (time-based codes) separate from Evaluation: 25 minutes      Jm received the treatments listed below:      therapeutic exercises to develop strength, endurance, ROM, flexibility, and posture for 00 minutes including:  Supine Bridge 3 sets - 10 reps  Clamshell 3 sets - 10 reps  Sidelying Reverse Clamshell 3 sets - 10 reps  Seated Anterior Weight Shifting 3 sets - 10 reps - 5 hold  Arch Lifting 3 sets - 10 reps - 3 hold    manual therapy techniques: MET were applied to the: Left Hip for 15 minutes, including:  Left Posterior Hip MET to correct Hip Imbalance   Left Ankle Distraction, Grade III    therapeutic activities to improve functional performance for 10  minutes, including:  Pt was educated on the prognosis and pathology of achilles tendinosis. The patient verbalized understanding and compliance with HEP.     PATIENT EDUCATION AND HOME EXERCISES     Education provided:   - Pt/family was provided educational information, including: role of PT, goals for PT, scheduling - pt verbalized understanding. Discussed insurance limitations with pt.     Written Home Exercises Provided: yes. Exercises were reviewed and Jm was able to  demonstrate them prior to the end of the session.  Jm demonstrated fair  understanding of the education provided. See EMR under Patient Instructions for exercises provided during therapy sessions.    ASSESSMENT     Jm is a 37 y.o. female referred to outpatient Physical Therapy with a medical diagnosis of Sprain of other ligament of left ankle, subsequent encounter. 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 Limitations are noted with walking, standing, going to the gym/exercising, stair negotiation, and pain levels. Primary impairment is pain, secondary to decreased ROM, endurance, strength, coordination, stability/balance, muscular activation, and function to the Left ankle. Pt. would benefit from PT to address impairments listed above to return to functional independence.    Intervention strategies designed to address these impairments will be instrumental in achieving the stated functional goals, including her ability to safely perform mobility tasks. Based on the examination, the patient's rehabilitation potential to achieve functional goals is fair due to previous attempt at Physical Therapy with no major improvement.       Patient prognosis is Fair.   Patient will benefit from skilled outpatient Physical Therapy to address the deficits stated above and in the chart below, provide patient /family education, and to maximize patientt's level of independence.     Plan of care discussed with patient: Yes  Patient's spiritual, cultural and educational needs considered and patient is agreeable to the plan of care and goals as stated below:     Anticipated Barriers for therapy: Chronicity of pain.     Medical Necessity is demonstrated by the following  History  Co-morbidities and personal factors that may impact the plan of care Co-morbidities:   COPD/asthma, high BMI, HIV/AIDS, and Hyperthyroidism    Personal Factors:   coping style     moderate   Examination  Body Structures  and Functions, activity limitations and participation restrictions that may impact the plan of care Body Regions:   lower extremities    Body Systems:    gross symmetry  ROM  strength  gross coordinated movement  balance  gait  transfers  transitions    Participation Restrictions:   None.     Activity limitations:   Learning and applying knowledge  no deficits    General Tasks and Commands  no deficits    Communication  no deficits    Mobility  walking  driving (bike, car, motorcycle)    Self care  no deficits    Domestic Life  shopping  cooking  doing house work (cleaning house, washing dishes, laundry)  assisting others    Interactions/Relationships  no deficits    Life Areas  no deficits    Community and Social Life  no deficits         moderate   Clinical Presentation evolving clinical presentation with changing clinical characteristics moderate   Decision Making/ Complexity Score: moderate     Goals:  Short Term Goals: 4 weeks   Patient will be independent with HEP at home to increase PT compliance.     Patient will be independent with Single Leg balance for 20 sec with Eyes Open, bilaterally, to increase balance     Patient will be independent with 10 Sit to Stand with < 5/10 pain to increase functional capacity          Long Term Goals: 8 weeks   Patient will decrease LEFS score to > 65/80 to increase functional capacity.     Patient will be independent with walking for 30 min with < 4/10 pain to increase recreational capacity     Patient will be independent with going to the gym 3 days with < 4/10 pain to increase recreational capacity             PLAN   Plan of care Certification: 2/24/2023 to 4/21/2023 .    Outpatient Physical Therapy 2 times weekly for 8 weeks to include the following interventions: Cervical/Lumbar Traction, Electrical Stimulation (IFC), Gait Training, Iontophoresis (with Dexamethasone), Manual Therapy, Moist Heat/ Ice, Neuromuscular Re-ed, Patient Education, Self Care, Therapeutic  Activities, Therapeutic Exercise, Ultrasound, and Trigger Point Dry Needling.     Jonh Johns, PT, DPT    Pt may be seen by PTA as part of the rehabilitation team.     Therapist: Jonh Johns, PT, DPT 2/24/2023    I CERTIFY THE NEED FOR THESE SERVICES FURNISHED UNDER THIS PLAN OF TREATMENT AND WHILE UNDER MY CARE   Physician's comments:     Physician's Signature: ___________________________________________________

## 2023-02-24 NOTE — PATIENT INSTRUCTIONS
Access Code: [email or web address]  URL: [email or web address]  Date: 02/24/2023  Prepared by: Jonh Johns

## 2023-03-01 ENCOUNTER — CLINICAL SUPPORT (OUTPATIENT)
Dept: REHABILITATION | Facility: HOSPITAL | Age: 38
End: 2023-03-01
Payer: COMMERCIAL

## 2023-03-01 DIAGNOSIS — G89.29 CHRONIC PAIN OF LEFT ANKLE: Primary | ICD-10-CM

## 2023-03-01 DIAGNOSIS — M25.572 CHRONIC PAIN OF LEFT ANKLE: Primary | ICD-10-CM

## 2023-03-01 PROCEDURE — 97110 THERAPEUTIC EXERCISES: CPT | Mod: PN,CQ

## 2023-03-01 NOTE — PROGRESS NOTES
Physical Therapy Daily Treatment Note         Name: Jm Newton  Clinic Number: [id number]    Therapy Diagnosis:   Encounter Diagnosis   Name Primary?    Chronic pain of left ankle Yes     Physician: Kong Salmeron NP    Visit Date: 3/1/2023    Physician Orders: PT Eval and Treat   Medical Diagnosis from Referral: S93.492D (ICD-10-CM) - Sprain of other ligament of left ankle, subsequent encounter  Evaluation Date: 2023  Authorization Period Expiration: 2024  Plan of Care Expiration: 2023   Progress Note Due: 3/24/2023  Visit # / Visits authorized:    Remaining Visits Scheduled - 10  PTA Consecutive Visits -     5th Visit FOTO - (Date, Score/ turn green)  10th Visit FOTO -  (Date, Score/ turn green )  D/C FOTO - (Date, Score/ turn green )    Time In: 1:55  Time Out: 2:33  Billable Time: 38 minutes  Non-Billable Time: 00 minutes    Precautions: Standard  Insurance: Payor: [employer] MEDICARE / Plan: [employer] (SPECIAL NEEDS PLAN) / Product Type: Medicare Advantage /     Subjective     Pt reports: No pain until coming up the steps, now a burning sensation in her ankle, achilles area, a shooting pain.  She is compliant with home exercise program.  Response to previous treatment: 1st session    Pre-Treatment Pain Ratin/10 (normally a 3/10)  Post-Treatment Pain Ratin/10  Location: Left foot      Objective     Jm received therapeutic exercises to develop strength, endurance, ROM, and flexibility for 38 minutes including:    Supine Bridge 3 sets - 10 reps  Clamshell 3 sets - 10 reps  Sidelying Reverse Clamshell 3 sets - 10 reps  Seated Anterior Weight Shifting 3 sets - 10 reps - 5 hold  Arch Lifting 3 sets - 10 reps - 3 hold   Towel crunches - 3'     manual therapy techniques: MET were applied to the: Left Hip for 00 minutes, including:  Left Posterior Hip MET to correct Hip Imbalance   Left Ankle Distraction, Grade III      Home Exercises Provided and Patient  Education Provided     Education provided:   - Continue with HEP    Written Home Exercises Provided: Patient instructed to cont prior HEP.  Exercises were reviewed and Jm was able to demonstrate them prior to the end of the session.  Jm demonstrated fair  understanding of the education provided.     See EMR under Patient Instructions for exercises provided prior visit.    Assessment     Pt tolerated session well. Tactile and verbal cues used to improve ther ex technique. Tolerance for exercise limited due to nerve pain in achilles resulting from pt using steps to enter clinic. Pt had no adverse effects effects with exercise and will continue to benefit from skilled therapy.     Jm Is progressing well towards Her goals.     Pt prognosis is Fair.     Pt will continue to benefit from skilled outpatient physical therapy to address the deficits listed in the problem list box on initial evaluation, provide pt/family education and to maximize pt's level of independence in the home and community environment.     Pt's spiritual, cultural and educational needs considered and pt agreeable to plan of care and goals.    Anticipated barriers to physical therapy: Chronicity of pain    Goals:  Short Term Goals: 4 weeks   Patient will be independent with HEP at home to increase PT compliance.  Progressing 3/1/2023      Patient will be independent with Single Leg balance for 20 sec with Eyes Open, bilaterally, to increase balance  Progressing 3/1/2023      Patient will be independent with 10 Sit to Stand with < 5/10 pain to increase functional capacity Progressing 3/1/2023               Long Term Goals: 8 weeks   Patient will decrease LEFS score to > 65/80 to increase functional capacity.      Patient will be independent with walking for 30 min with < 4/10 pain to increase recreational capacity      Patient will be independent with going to the gym 3 days with < 4/10 pain to increase recreational capacity                Plan     Continued with current POC      Tim Muñoz PTA ,  3/1/2023

## 2023-03-01 NOTE — PROGRESS NOTES
"  Physical Therapy Daily Treatment Note     Name: Jm Newton  Clinic Number: [id number]    Therapy Diagnosis:   Encounter Diagnosis   Name Primary?    Chronic pain of left ankle Yes     Physician: Kong Salmeron NP    Visit Date: 3/1/2023    Physician Orders: PT Eval and Treat   Medical Diagnosis from Referral: S93.492D (ICD-10-CM) - Sprain of other ligament of left ankle, subsequent encounter  Evaluation Date: 2/24/2023  Authorization Period Expiration: 1/31/2024  Plan of Care Expiration: 4/7/2023   Progress Note Due: 3/24/2023  Visit # / Visits authorized: 1/40   Remaining Visits Scheduled - 10  PTA Consecutive Visits - 0/6     Eval Visit FOTO - 52 (2/24/2023)   5th Visit FOTO  - (Date/Score/Turn Green)   10th Visit FOTO - (Date/Score/Turn Green)   D/C FOTO -  (Date/Score/Turn Green)      Time In: ***  Time Out: ***  Billable Time: *** minutes  Non-Billable Time: 00 minutes    Precautions: {IP WOUND PRECAUTIONS OHS:90714}  Insurance: Payor: [employer] MANAGED MEDICARE / Plan: [employer] (SPECIAL NEEDS PLAN) / Product Type: Medicare Advantage /     Subjective     Pt reports: ***.  {HE SHE CAPITAL LETTER:99365} {Is/is not:9024} compliant with home exercise program.  Response to previous treatment: ***    Pre-Treatment Pain Rating: {0-10:53678::"0"}/10  Post-Treatment Pain Rating: {0-10:83857::"0"}/10  Location: {RIGHT/LEFT/BILATERAL:88413} {LOCATION ON BODY:72335}     Objective     Jm received therapeutic exercises to develop {AMB PT PROGRESS OBJECTIVE:37603} for *** minutes including:    Warm-up      Supine    Supine Bridge 3 sets - 10 reps  Clamshell 3 sets - 10 reps  Sidelying Reverse Clamshell 3 sets - 10 reps  Seated Anterior Weight Shifting 3 sets - 10 reps - 5 hold  Arch Lifting 3 sets - 10 reps - 3 hold    Seated        Standing          *Bold exercise performed     ***  Jm received hot pack for *** minutes to ***.    Jm received cold pack for *** minutes to " ***.    Jm received the following manual therapy techniques: {AMB PT PROGRESS MANUAL THERAPY:11538} were applied to the: *** for *** minutes, including:  Left Posterior Hip MET to correct Hip Imbalance   Left Ankle Distraction, Grade III    Jm participated in neuromuscular re-education activities to improve: {AMB PT PROGRESS NEURO RE-ED:11057} for *** minutes. The following activities were included:  ***    Jm participated in dynamic functional therapeutic activities to improve functional performance for 00  minutes, including:        Home Exercises Provided and Patient Education Provided     Education provided:   - Continue with HEP    Written Home Exercises Provided: Patient instructed to cont prior HEP.  Exercises were reviewed and Jm was able to demonstrate them prior to the end of the session.  Jm demonstrated fair  understanding of the education provided.     See EMR under Patient Instructions for exercises provided prior visit.    Assessment     ***    Jm Is progressing well towards Her goals.     Pt prognosis is Fair.     Pt will continue to benefit from skilled outpatient physical therapy to address the deficits listed in the problem list box on initial evaluation, provide pt/family education and to maximize pt's level of independence in the home and community environment.     Pt's spiritual, cultural and educational needs considered and pt agreeable to plan of care and goals.    Anticipated barriers to physical therapy: Chronicity of pain    Goals:  Short Term Goals: 4 weeks   Patient will be independent with HEP at home to increase PT compliance.  Progressing 3/1/2023     Patient will be independent with Single Leg balance for 20 sec with Eyes Open, bilaterally, to increase balance  Progressing 3/1/2023     Patient will be independent with 10 Sit to Stand with < 5/10 pain to increase functional capacity Progressing 3/1/2023             Long Term Goals: 8 weeks    Patient will decrease LEFS score to > 65/80 to increase functional capacity.  Progressing 3/1/2023     Patient will be independent with walking for 30 min with < 4/10 pain to increase recreational capacity  Progressing 3/1/2023    Patient will be independent with going to the gym 3 days with < 4/10 pain to increase recreational capacity  Progressing 3/1/2023              Plan     Progress duration, workload, and resistance levels of the Therapeutic Activities and Exercises if the patient does not exhibit any pain, swelling, or other symptoms. Progress instruction in-home exercise progression and modification, including symptom management.    Jonh Johns, PT ,DPT  3/1/2023

## 2023-03-02 ENCOUNTER — OFFICE VISIT (OUTPATIENT)
Dept: PULMONOLOGY | Facility: CLINIC | Age: 38
End: 2023-03-02
Payer: MEDICARE

## 2023-03-02 VITALS
DIASTOLIC BLOOD PRESSURE: 82 MMHG | HEIGHT: 66 IN | BODY MASS INDEX: 35.26 KG/M2 | WEIGHT: 219.38 LBS | OXYGEN SATURATION: 99 % | SYSTOLIC BLOOD PRESSURE: 124 MMHG | HEART RATE: 60 BPM

## 2023-03-02 DIAGNOSIS — J45.40 MODERATE PERSISTENT ASTHMA, UNSPECIFIED WHETHER COMPLICATED: Primary | ICD-10-CM

## 2023-03-02 DIAGNOSIS — J45.40 MODERATE PERSISTENT ASTHMA WITHOUT COMPLICATION: ICD-10-CM

## 2023-03-02 PROCEDURE — 3074F PR MOST RECENT SYSTOLIC BLOOD PRESSURE < 130 MM HG: ICD-10-PCS | Mod: CPTII,S$GLB,, | Performed by: INTERNAL MEDICINE

## 2023-03-02 PROCEDURE — 99999 PR PBB SHADOW E&M-EST. PATIENT-LVL IV: ICD-10-PCS | Mod: PBBFAC,,, | Performed by: INTERNAL MEDICINE

## 2023-03-02 PROCEDURE — 99213 OFFICE O/P EST LOW 20 MIN: CPT | Mod: S$GLB,,, | Performed by: INTERNAL MEDICINE

## 2023-03-02 PROCEDURE — 3008F PR BODY MASS INDEX (BMI) DOCUMENTED: ICD-10-PCS | Mod: CPTII,S$GLB,, | Performed by: INTERNAL MEDICINE

## 2023-03-02 PROCEDURE — 99213 PR OFFICE/OUTPT VISIT, EST, LEVL III, 20-29 MIN: ICD-10-PCS | Mod: S$GLB,,, | Performed by: INTERNAL MEDICINE

## 2023-03-02 PROCEDURE — 3079F PR MOST RECENT DIASTOLIC BLOOD PRESSURE 80-89 MM HG: ICD-10-PCS | Mod: CPTII,S$GLB,, | Performed by: INTERNAL MEDICINE

## 2023-03-02 PROCEDURE — 1159F MED LIST DOCD IN RCRD: CPT | Mod: CPTII,S$GLB,, | Performed by: INTERNAL MEDICINE

## 2023-03-02 PROCEDURE — 3008F BODY MASS INDEX DOCD: CPT | Mod: CPTII,S$GLB,, | Performed by: INTERNAL MEDICINE

## 2023-03-02 PROCEDURE — 1159F PR MEDICATION LIST DOCUMENTED IN MEDICAL RECORD: ICD-10-PCS | Mod: CPTII,S$GLB,, | Performed by: INTERNAL MEDICINE

## 2023-03-02 PROCEDURE — 99999 PR PBB SHADOW E&M-EST. PATIENT-LVL IV: CPT | Mod: PBBFAC,,, | Performed by: INTERNAL MEDICINE

## 2023-03-02 PROCEDURE — 3079F DIAST BP 80-89 MM HG: CPT | Mod: CPTII,S$GLB,, | Performed by: INTERNAL MEDICINE

## 2023-03-02 PROCEDURE — 3074F SYST BP LT 130 MM HG: CPT | Mod: CPTII,S$GLB,, | Performed by: INTERNAL MEDICINE

## 2023-03-02 PROCEDURE — 1160F RVW MEDS BY RX/DR IN RCRD: CPT | Mod: CPTII,S$GLB,, | Performed by: INTERNAL MEDICINE

## 2023-03-02 PROCEDURE — 1160F PR REVIEW ALL MEDS BY PRESCRIBER/CLIN PHARMACIST DOCUMENTED: ICD-10-PCS | Mod: CPTII,S$GLB,, | Performed by: INTERNAL MEDICINE

## 2023-03-02 RX ORDER — CITALOPRAM 20 MG/1
20 TABLET, FILM COATED ORAL
COMMUNITY
Start: 2023-02-14 | End: 2023-10-26

## 2023-03-02 RX ORDER — BUTALBITAL, ACETAMINOPHEN AND CAFFEINE 50; 325; 40 MG/1; MG/1; MG/1
1 TABLET ORAL
COMMUNITY
Start: 2022-10-07

## 2023-03-02 RX ORDER — ALBUTEROL SULFATE 5 MG/ML
2.5 SOLUTION RESPIRATORY (INHALATION) 4 TIMES DAILY PRN
Qty: 1 EACH | Refills: 4 | Status: SHIPPED | OUTPATIENT
Start: 2023-03-02 | End: 2023-05-31

## 2023-03-02 RX ORDER — HYDROXYZINE PAMOATE 25 MG/1
25 CAPSULE ORAL NIGHTLY PRN
COMMUNITY
Start: 2023-02-14 | End: 2023-06-27

## 2023-03-02 RX ORDER — BECLOMETHASONE DIPROPIONATE HFA 80 UG/1
1 AEROSOL, METERED RESPIRATORY (INHALATION) 2 TIMES DAILY
COMMUNITY
Start: 2023-02-14 | End: 2023-03-02

## 2023-03-02 RX ORDER — DARUNAVIR ETHANOLATE AND COBICISTAT 800; 150 MG/1; MG/1
TABLET, FILM COATED ORAL
COMMUNITY
Start: 2023-02-14 | End: 2023-03-02

## 2023-03-02 RX ORDER — PREDNISONE 20 MG/1
TABLET ORAL
COMMUNITY
Start: 2023-02-10 | End: 2023-03-02

## 2023-03-02 RX ORDER — KETOROLAC TROMETHAMINE 5 MG/ML
SOLUTION OPHTHALMIC
COMMUNITY
Start: 2022-10-07

## 2023-03-02 RX ORDER — OXYCODONE AND ACETAMINOPHEN 5; 325 MG/1; MG/1
TABLET ORAL
COMMUNITY
Start: 2022-12-12 | End: 2023-06-27

## 2023-03-02 RX ORDER — BUDESONIDE AND FORMOTEROL FUMARATE DIHYDRATE 160; 4.5 UG/1; UG/1
2 AEROSOL RESPIRATORY (INHALATION) EVERY 12 HOURS
Qty: 10.2 G | Refills: 11 | Status: SHIPPED | OUTPATIENT
Start: 2023-03-02 | End: 2024-03-01
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
## 2023-03-02 NOTE — ASSESSMENT & PLAN NOTE
Suspect her recent symptoms are due to monotherapy with ICS compounded by her recent URI.     Plan:   Stop Qvar.  Start Symbicort 2 puffs bid.  Patient instructed to rinse/gargle after each use.  Continue albuterol hfa/nebs prn    Patient instructed to reach out via patient portal if symptoms do not improve.

## 2023-03-03 ENCOUNTER — HOSPITAL ENCOUNTER (EMERGENCY)
Facility: OTHER | Age: 38
Discharge: HOME | End: 2023-03-03
Attending: EMERGENCY MEDICINE
Payer: MEDICARE

## 2023-03-03 VITALS
RESPIRATION RATE: 18 BRPM | SYSTOLIC BLOOD PRESSURE: 133 MMHG | OXYGEN SATURATION: 99 % | HEART RATE: 64 BPM | TEMPERATURE: 99 F | DIASTOLIC BLOOD PRESSURE: 80 MMHG
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
## 2023-03-03 DIAGNOSIS — S00.83XA TRAUMATIC HEMATOMA OF FOREHEAD, INITIAL ENCOUNTER: ICD-10-CM

## 2023-03-03 PROCEDURE — 25000003 PHARM REV CODE 250: Performed by: EMERGENCY MEDICINE

## 2023-03-03 PROCEDURE — 63600175 PHARM REV CODE 636 W HCPCS: Performed by: EMERGENCY MEDICINE

## 2023-03-03 PROCEDURE — 99285 EMERGENCY DEPT VISIT HI MDM: CPT | Mod: 25

## 2023-03-03 PROCEDURE — 96374 THER/PROPH/DIAG INJ IV PUSH: CPT

## 2023-03-03 RX ORDER — IBUPROFEN 800 MG/1
800 TABLET ORAL EVERY 6 HOURS PRN
Qty: 30 TABLET | Refills: 0 | Status: SHIPPED | OUTPATIENT
Start: 2023-03-03 | End: 2023-11-14 | Stop reason: CLARIF

## 2023-03-03 RX ORDER — ONDANSETRON 8 MG/1
8 TABLET, ORALLY DISINTEGRATING ORAL
Status: COMPLETED | OUTPATIENT
Start: 2023-03-03 | End: 2023-03-03

## 2023-03-03 RX ORDER — ACETAMINOPHEN 500 MG
1000 TABLET ORAL
Status: COMPLETED | OUTPATIENT
Start: 2023-03-03 | End: 2023-03-03

## 2023-03-03 RX ORDER — CYCLOBENZAPRINE HCL 5 MG
5 TABLET ORAL 3 TIMES DAILY PRN
Qty: 20 TABLET | Refills: 0 | Status: SHIPPED | OUTPATIENT
Start: 2023-03-03 | End: 2023-03-08

## 2023-03-03 RX ORDER — KETOROLAC TROMETHAMINE 30 MG/ML
10 INJECTION, SOLUTION INTRAMUSCULAR; INTRAVENOUS
Status: COMPLETED | OUTPATIENT
Start: 2023-03-03 | End: 2023-03-03

## 2023-03-03 RX ADMIN — ACETAMINOPHEN 1000 MG: 500 TABLET, FILM COATED ORAL at 05:03

## 2023-03-03 RX ADMIN — KETOROLAC TROMETHAMINE 10 MG: 30 INJECTION, SOLUTION INTRAMUSCULAR; INTRAVENOUS at 06:03

## 2023-03-03 RX ADMIN — ONDANSETRON 8 MG: 8 TABLET, ORALLY DISINTEGRATING ORAL at 04:03
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
## 2023-03-14 ENCOUNTER — TELEPHONE (OUTPATIENT)
Dept: REHABILITATION | Facility: HOSPITAL | Age: 38
End: 2023-03-14
Payer: MEDICARE
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
## 2023-03-16 ENCOUNTER — TELEPHONE (OUTPATIENT)
Dept: REHABILITATION | Facility: HOSPITAL | Age: 38
End: 2023-03-16
Payer: MEDICARE
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
## 2023-03-27 ENCOUNTER — CLINICAL SUPPORT (OUTPATIENT)
Dept: REHABILITATION | Facility: HOSPITAL | Age: 38
End: 2023-03-27
Payer: COMMERCIAL

## 2023-03-27 DIAGNOSIS — G89.29 CHRONIC PAIN OF LEFT ANKLE: Primary | ICD-10-CM

## 2023-03-27 DIAGNOSIS — M25.572 CHRONIC PAIN OF LEFT ANKLE: Primary | ICD-10-CM

## 2023-03-27 PROCEDURE — 97112 NEUROMUSCULAR REEDUCATION: CPT | Mod: PN

## 2023-03-27 PROCEDURE — 97140 MANUAL THERAPY 1/> REGIONS: CPT | Mod: PN

## 2023-03-27 PROCEDURE — 97110 THERAPEUTIC EXERCISES: CPT | Mod: PN
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
## 2023-04-03 ENCOUNTER — DOCUMENTATION ONLY (OUTPATIENT)
Dept: REHABILITATION | Facility: HOSPITAL | Age: 38
End: 2023-04-03

## 2023-04-03 NOTE — PROGRESS NOTES
30 day PT-PTA face-face discussion with Dragan Dickey DPT re: Name: Jm Newton  Clinic Number: [id number] patient status, POC, and plan for progression done .

## 2023-04-18 ENCOUNTER — PATIENT MESSAGE (OUTPATIENT)
Dept: REHABILITATION | Facility: HOSPITAL | Age: 38
End: 2023-04-18

## 2023-04-21 ENCOUNTER — TELEPHONE (OUTPATIENT)
Dept: URGENT CARE | Facility: CLINIC | Age: 38
End: 2023-04-21

## 2023-04-21 ENCOUNTER — OFFICE VISIT (OUTPATIENT)
Dept: URGENT CARE | Facility: CLINIC | Age: 38
End: 2023-04-21
Payer: COMMERCIAL

## 2023-04-21 VITALS
BODY MASS INDEX: 33.9 KG/M2 | HEART RATE: 58 BPM | OXYGEN SATURATION: 100 % | HEIGHT: 67 IN | TEMPERATURE: 99 F | SYSTOLIC BLOOD PRESSURE: 126 MMHG | DIASTOLIC BLOOD PRESSURE: 75 MMHG | RESPIRATION RATE: 18 BRPM | WEIGHT: 216 LBS

## 2023-04-21 DIAGNOSIS — M76.62 TENDONITIS, ACHILLES, LEFT: ICD-10-CM

## 2023-04-21 DIAGNOSIS — S96.912A LEFT ANKLE STRAIN, INITIAL ENCOUNTER: ICD-10-CM

## 2023-04-21 DIAGNOSIS — Z02.6 ENCOUNTER RELATED TO WORKER'S COMPENSATION CLAIM: Primary | ICD-10-CM

## 2023-04-21 DIAGNOSIS — M25.572 PAIN OF JOINT OF LEFT ANKLE AND FOOT: ICD-10-CM

## 2023-04-21 DIAGNOSIS — S90.02XA CONTUSION OF LEFT ANKLE, INITIAL ENCOUNTER: ICD-10-CM

## 2023-04-21 PROCEDURE — 99213 PR OFFICE/OUTPT VISIT, EST, LEVL III, 20-29 MIN: ICD-10-PCS | Mod: S$GLB,,,

## 2023-04-21 PROCEDURE — 99213 OFFICE O/P EST LOW 20 MIN: CPT | Mod: S$GLB,,,

## 2023-04-21 RX ORDER — MELOXICAM 15 MG/1
15 TABLET ORAL DAILY
Qty: 30 TABLET | Refills: 0 | Status: SHIPPED | OUTPATIENT
Start: 2023-04-21 | End: 2023-05-21

## 2023-04-21 NOTE — LETTER
[facility]  [address] [address]  Phone: [phone]  Fax: [phone]  [facility] [employer] [facility]: [phone]    Pt Name: Jm Newton  Injury Date: 09/28/2022   Employee ID: [id number] Date of First Treatment: 04/21/2023   Company: [employer]      Appointment Time: 11:00  AM Arrived: 11:05 AM    Provider: Kavon Fu, DO Time Out: 12:39 PM     Office Treatment:   1. Encounter related to worker's compensation claim    2. Contusion of left ankle, initial encounter    3. Tendonitis, Achilles, left    4. Left ankle strain, initial encounter    5. Pain of joint of left ankle and foot      Medications Ordered This Encounter   Medications    meloxicam (MOBIC) 15 MG tablet      Patient Instructions: Continue Physical Therapy      Restrictions: Regular Duty     Return Appointment: 5/5/2023 at 9:00 AM KAYKAY
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
## 2023-04-25 ENCOUNTER — TELEPHONE (OUTPATIENT)
Dept: URGENT CARE | Facility: CLINIC | Age: 38
End: 2023-04-25
Payer: MEDICARE

## 2023-04-25 ENCOUNTER — CLINICAL SUPPORT (OUTPATIENT)
Dept: REHABILITATION | Facility: HOSPITAL | Age: 38
End: 2023-04-25
Payer: COMMERCIAL

## 2023-04-25 DIAGNOSIS — M25.572 CHRONIC PAIN OF LEFT ANKLE: Primary | ICD-10-CM

## 2023-04-25 DIAGNOSIS — G89.29 CHRONIC PAIN OF LEFT ANKLE: Primary | ICD-10-CM

## 2023-04-25 PROCEDURE — 97110 THERAPEUTIC EXERCISES: CPT | Mod: PN

## 2023-04-25 PROCEDURE — 97112 NEUROMUSCULAR REEDUCATION: CPT | Mod: PN

## 2023-04-25 NOTE — TELEPHONE ENCOUNTER
Unable to leave message ANGIE full. Sent patient an Email to obtain the bills that she received  from her works comp case.

## 2023-04-25 NOTE — PROGRESS NOTES
"Physical Therapy Daily Treatment Note     Name: Jm Newton  Clinic Number: [id number]    Therapy Diagnosis:   Encounter Diagnosis   Name Primary?    Chronic pain of left ankle Yes       Physician: Kong Salmeron NP    Visit Date: 2023    Physician Orders: PT Eval and Treat   Medical Diagnosis from Referral: S93.492D (ICD-10-CM) - Sprain of other ligament of left ankle, subsequent encounter  Evaluation Date: 2023  Authorization Period Expiration: 2023  Plan of Care Expiration: , 2023   Progress Note Due: 2023  Visit # / Visits authorized: 3/ 40   Remaining Visits Scheduled - 9  PTA Consecutive Visits - 0/6    5th Visit FOTO - (Date, Score/ turn green)  10th Visit FOTO -  (Date, Score/ turn green )  D/C FOTO - (Date, Score/ turn green )    Time In: 4:05 PM  Time Out: 4:45 PM  Billable Time: 40 minutes (1TE, 2NR)  Non-Billable Time: 00 minutes    Precautions: Standard  Insurance: Payor: [employer] / Plan: HARMONY / Product Type: Worker's Comp /     Subjective     Pt reports: increased swelling and pain at end of day after "taking compression sock off". Patient reports   She is compliant with home exercise program.  Response to previous treatment: 1st session    Pre-Treatment Pain Ratin/10   Post-Treatment Pain Ratin/10  Location: Left foot      Objective     Jm received therapeutic exercises to develop strength, endurance, ROM, and flexibility for 12 minutes including:  Supine Bridge 3 sets - 10 reps  Clamshell 3 sets - 10 reps  Sidelying Reverse Clamshell 3 sets - 10 reps  Seated Anterior Weight Shifting 3 sets - 10 reps - 5 hold  Arch Lifting 3 sets - 10 reps - 3 hold   Towel crunches - 3'     manual therapy techniques: MET were applied to the: Left Hip for 00 minutes, including:  Left Posterior Hip MET to correct Hip Imbalance   Left Ankle Distraction, Grade III  Soft-Tissue Mobilization to the Gastroc/Soleus Complex and Tibialis Anterior     Jm participated " "in neuromuscular re-education activities to improve: Balance, Coordination, Proprioception, Posture, and Neuromuscular Recruitment for 28 minutes. The following activities were included:  Seated Tibialis Anterior raises 3x10 with a 3' sec hold   Seated Heel Raises 3x10 with a 3' sec hold   Sit to stand with emphasis on glute and TA activation 3x10  Side steps along treatment table with emphasis on DF 5 minutes  Marching with Ankle Weight at Distal Toes to increase Dorsiflexion strength 2x10, #5        Home Exercises Provided and Patient Education Provided     Education provided:   - Continue with HEP    Written Home Exercises Provided: Patient instructed to cont prior HEP.  Exercises were reviewed and Jm was able to demonstrate them prior to the end of the session.  Jm demonstrated fair  understanding of the education provided.     See EMR under Patient Instructions for exercises provided prior visit.    Assessment     Pt. tolerated treatment session well. Minimal improvement has been made since last visit, patient has had difficulty coming in to therapy at previous sessions due to issues with insurance. Patient reports continued "burning" sensation in L ankle that increases with weight bearing activities. Patient was educated on pain relief strategies, functional strengthening, swelling management techniques, and functional mobility techniques to address limitations. Continue with current treatment plan.    Jm Is progressing well towards Her goals.     Pt prognosis is Fair.     Pt will continue to benefit from skilled outpatient physical therapy to address the deficits listed in the problem list box on initial evaluation, provide pt/family education and to maximize pt's level of independence in the home and community environment.     Pt's spiritual, cultural and educational needs considered and pt agreeable to plan of care and goals.    Anticipated barriers to physical therapy: Chronicity of " pain    Goals:  Short Term Goals: 4 weeks   Patient will be independent with HEP at home to increase PT compliance.  Progressing 4/25/2023      Patient will be independent with Single Leg balance for 20 sec with Eyes Open, bilaterally, to increase balance  Progressing 4/25/2023      Patient will be independent with 10 Sit to Stand with < 5/10 pain to increase functional capacity Progressing 4/25/2023               Long Term Goals: 8 weeks   Patient will decrease LEFS score to > 65/80 to increase functional capacity.  Progressing 4/25/2023     Patient will be independent with walking for 30 min with < 4/10 pain to increase recreational capacity  Progressing 4/25/2023    Patient will be independent with going to the gym 3 days with < 4/10 pain to increase recreational capacity  Progressing 4/25/2023             Plan     Updated Plan of Care: 2x a week for 4 weeks.     Progress duration, workload, and resistance levels of the Therapeutic Activities and Exercises if the patient does not exhibit any pain, swelling, or other symptoms. Progress instruction in-home exercise progression and modification, including symptom management.     Rodrick Kingsley, PT, DPT  4/25/2023

## 2023-04-27 ENCOUNTER — TELEPHONE (OUTPATIENT)
Dept: URGENT CARE | Facility: CLINIC | Age: 38
End: 2023-04-27
Payer: MEDICARE

## 2023-05-08 ENCOUNTER — CLINICAL SUPPORT (OUTPATIENT)
Dept: REHABILITATION | Facility: HOSPITAL | Age: 38
End: 2023-05-08
Payer: MEDICARE

## 2023-05-08 DIAGNOSIS — G89.29 CHRONIC PAIN OF LEFT ANKLE: Primary | ICD-10-CM

## 2023-05-08 DIAGNOSIS — M25.572 CHRONIC PAIN OF LEFT ANKLE: Primary | ICD-10-CM

## 2023-05-08 PROCEDURE — 97112 NEUROMUSCULAR REEDUCATION: CPT | Mod: PN

## 2023-05-08 PROCEDURE — 97110 THERAPEUTIC EXERCISES: CPT | Mod: PN

## 2023-05-08 NOTE — PROGRESS NOTES
"Physical Therapy Daily Treatment Note     Name: Jm Newton  Clinic Number: [id number]    Therapy Diagnosis:   Encounter Diagnosis   Name Primary?    Chronic pain of left ankle Yes       Physician: Kong Salmeron NP    Visit Date: 2023    Physician Orders: PT Eval and Treat   Medical Diagnosis from Referral: S93.492D (ICD-10-CM) - Sprain of other ligament of left ankle, subsequent encounter  Evaluation Date: 2023  Authorization Period Expiration: 2023  Plan of Care Expiration: , 2023   Progress Note Due: 2023  Visit # / Visits authorized:    Remaining Visits Scheduled - 9  PTA Consecutive Visits - 0/6    5th Visit FOTO - (Date, Score/ turn green)  10th Visit FOTO -  (Date, Score/ turn green )  D/C FOTO - (Date, Score/ turn green )    Time In: 4:05 PM  Time Out: 4:45 PM  Billable Time: 40 minutes (1TE, 1NR, 1DN)  Non-Billable Time: 00 minutes    Precautions: Standard  Insurance: Payor: [employer] MEDICARE / Plan: [employer] HMO PPO SPECIAL NEEDS / Product Type: Medicare Advantage /     Subjective     Pt reports: pain that begins at lateral "ankle bone" and travels up the lower leg. Patient notes a consistent "achey pain" in her L ankle  She is compliant with home exercise program.  Response to previous treatment: 1st session    Pre-Treatment Pain Ratin/10   Post-Treatment Pain Ratin/10  Location: Left foot      Objective     Jm received therapeutic exercises to develop strength, endurance, ROM, and flexibility for 18 minutes including:  Supine Bridge 3 sets - 10 reps  Clamshell 3 sets - 10 reps  Sidelying Reverse Clamshell 3 sets - 10 reps  Seated Anterior Weight Shifting 3 sets - 10 reps - 5 hold  Arch Lifting 3 sets - 10 reps - 3 hold   Towel crunches - 3'  L ankle alphabet AROM     manual therapy techniques: MET were applied to the: Left Hip for 00 minutes, including:  Left Posterior Hip MET to correct Hip Imbalance   Left Ankle Distraction, Grade " III  Soft-Tissue Mobilization to the Gastroc/Soleus Complex and Tibialis Anterior     Jm participated in neuromuscular re-education activities to improve: Balance, Coordination, Proprioception, Posture, and Neuromuscular Recruitment for 12 minutes. The following activities were included:  Seated Tibialis Anterior raises 3x10 with a 3' sec hold   Seated Heel Raises 3x10 with a 3' sec hold   Sit to stand with emphasis on glute and TA activation 3x10  Side steps along treatment table with emphasis on DF 5 minutes  Marching with Ankle Weight at Distal Toes to increase Dorsiflexion strength 2x10, #5        Home Exercises Provided and Patient Education Provided     Education provided:   - Continue with HEP    Written Home Exercises Provided: Patient instructed to cont prior HEP.  Exercises were reviewed and Jm was able to demonstrate them prior to the end of the session.  Jm demonstrated fair  understanding of the education provided.     See EMR under Patient Instructions for exercises provided prior visit.    Assessment     Pt. tolerated treatment session well. Increased palpable tenderness was noted at left sided peroneal musculotendinous structures. Patient was able to manage painful symptoms well throughout requested therapeutic exercises and activities. Patient was educated on pain relief strategies, functional mechanics, pain relief strategies, and functional mobility techniques to address limitations. Continue with current treatment plan.    Jm Is progressing well towards Her goals.     Pt prognosis is Fair.     Pt will continue to benefit from skilled outpatient physical therapy to address the deficits listed in the problem list box on initial evaluation, provide pt/family education and to maximize pt's level of independence in the home and community environment.     Pt's spiritual, cultural and educational needs considered and pt agreeable to plan of care and goals.    Anticipated  barriers to physical therapy: Chronicity of pain    Goals:  Short Term Goals: 4 weeks   Patient will be independent with HEP at home to increase PT compliance.  Progressing 5/8/2023      Patient will be independent with Single Leg balance for 20 sec with Eyes Open, bilaterally, to increase balance  Progressing 5/8/2023      Patient will be independent with 10 Sit to Stand with < 5/10 pain to increase functional capacity Progressing 5/8/2023               Long Term Goals: 8 weeks   Patient will decrease LEFS score to > 65/80 to increase functional capacity.  Progressing 5/8/2023     Patient will be independent with walking for 30 min with < 4/10 pain to increase recreational capacity  Progressing 5/8/2023    Patient will be independent with going to the gym 3 days with < 4/10 pain to increase recreational capacity  Progressing 5/8/2023             Plan     Updated Plan of Care: 2x a week for 4 weeks.     Progress duration, workload, and resistance levels of the Therapeutic Activities and Exercises if the patient does not exhibit any pain, swelling, or other symptoms. Progress instruction in-home exercise progression and modification, including symptom management.     Rodrick Kingsley, PT, DPT  5/8/2023

## 2023-05-12 ENCOUNTER — HOSPITAL ENCOUNTER (OUTPATIENT)
Dept: RADIOLOGY | Facility: HOSPITAL | Age: 38
Discharge: HOME | End: 2023-05-12
Payer: COMMERCIAL

## 2023-05-12 DIAGNOSIS — M25.572 PAIN OF JOINT OF LEFT ANKLE AND FOOT: ICD-10-CM

## 2023-05-12 PROCEDURE — 73721 MRI ANKLE WITHOUT CONTRAST LEFT: ICD-10-PCS | Mod: 26,LT,, | Performed by: RADIOLOGY

## 2023-05-12 PROCEDURE — 73721 MRI JNT OF LWR EXTRE W/O DYE: CPT | Mod: 26,LT,, | Performed by: RADIOLOGY

## 2023-05-12 PROCEDURE — 73721 MRI JNT OF LWR EXTRE W/O DYE: CPT | Mod: TC,LT

## 2023-05-17 ENCOUNTER — TELEPHONE (OUTPATIENT)
Dept: URGENT CARE | Facility: CLINIC | Age: 38
End: 2023-05-17
Payer: MEDICARE

## 2023-05-17 NOTE — TELEPHONE ENCOUNTER
Called patient and she verbalized that she did call for her virtual appointment that was scheduled for her and she states that she was placed on hold for 42 minutes before Dr. Ulloa Nurse came to the phone and informed her that Dr. Fu did not want to do a Virtual appointment with her. The patient also stated that his Nurse is very rude and she has had the worsted experience with [facility] since she started with this injury for the last few months. Patient states that she has moved her care to [facility] and does not want anything to do with [facility]. I apologies to her as much as I could and wished her well with whatever decisions she make towards her care for her injury. AFG

## 2023-06-27 ENCOUNTER — PROCEDURE VISIT (OUTPATIENT)
Dept: OBSTETRICS AND GYNECOLOGY | Facility: CLINIC | Age: 38
End: 2023-06-27
Attending: OBSTETRICS & GYNECOLOGY
Payer: MEDICARE

## 2023-06-27 VITALS
BODY MASS INDEX: 34.05 KG/M2 | DIASTOLIC BLOOD PRESSURE: 72 MMHG | WEIGHT: 211.88 LBS | SYSTOLIC BLOOD PRESSURE: 116 MMHG | HEIGHT: 66 IN

## 2023-06-27 DIAGNOSIS — Z91.89 GYN EXAM FOR HIGH-RISK MEDICARE PATIENT: Primary | ICD-10-CM

## 2023-06-27 DIAGNOSIS — B20 HIV INFECTION, UNSPECIFIED SYMPTOM STATUS: ICD-10-CM

## 2023-06-27 DIAGNOSIS — E05.00 GRAVES' DISEASE: ICD-10-CM

## 2023-06-27 DIAGNOSIS — N87.1 CIN II (CERVICAL INTRAEPITHELIAL NEOPLASIA II): ICD-10-CM

## 2023-06-27 LAB
T4 FREE SERPL-MCNC: 1.01 NG/DL (ref 0.71–1.51)
TSH SERPL DL<=0.005 MIU/L-ACNC: 0.11 UIU/ML (ref 0.4–4)

## 2023-06-27 PROCEDURE — G0101 CA SCREEN;PELVIC/BREAST EXAM: HCPCS | Mod: S$GLB,,, | Performed by: OBSTETRICS & GYNECOLOGY

## 2023-06-27 PROCEDURE — 3078F PR MOST RECENT DIASTOLIC BLOOD PRESSURE < 80 MM HG: ICD-10-PCS | Mod: CPTII,S$GLB,, | Performed by: OBSTETRICS & GYNECOLOGY

## 2023-06-27 PROCEDURE — 3074F PR MOST RECENT SYSTOLIC BLOOD PRESSURE < 130 MM HG: ICD-10-PCS | Mod: CPTII,S$GLB,, | Performed by: OBSTETRICS & GYNECOLOGY

## 2023-06-27 PROCEDURE — 1159F MED LIST DOCD IN RCRD: CPT | Mod: CPTII,S$GLB,, | Performed by: OBSTETRICS & GYNECOLOGY

## 2023-06-27 PROCEDURE — G0101 PR CA SCREEN;PELVIC/BREAST EXAM: ICD-10-PCS | Mod: S$GLB,,, | Performed by: OBSTETRICS & GYNECOLOGY

## 2023-06-27 PROCEDURE — 84443 ASSAY THYROID STIM HORMONE: CPT | Performed by: OBSTETRICS & GYNECOLOGY

## 2023-06-27 PROCEDURE — 1159F PR MEDICATION LIST DOCUMENTED IN MEDICAL RECORD: ICD-10-PCS | Mod: CPTII,S$GLB,, | Performed by: OBSTETRICS & GYNECOLOGY

## 2023-06-27 PROCEDURE — 88175 CYTOPATH C/V AUTO FLUID REDO: CPT | Performed by: OBSTETRICS & GYNECOLOGY

## 2023-06-27 PROCEDURE — 3074F SYST BP LT 130 MM HG: CPT | Mod: CPTII,S$GLB,, | Performed by: OBSTETRICS & GYNECOLOGY

## 2023-06-27 PROCEDURE — 99999 PR PBB SHADOW E&M-EST. PATIENT-LVL III: CPT | Mod: PBBFAC,,, | Performed by: OBSTETRICS & GYNECOLOGY

## 2023-06-27 PROCEDURE — 1160F RVW MEDS BY RX/DR IN RCRD: CPT | Mod: CPTII,S$GLB,, | Performed by: OBSTETRICS & GYNECOLOGY

## 2023-06-27 PROCEDURE — 84439 ASSAY OF FREE THYROXINE: CPT | Performed by: OBSTETRICS & GYNECOLOGY

## 2023-06-27 PROCEDURE — 87536 HIV-1 QUANT&REVRSE TRNSCRPJ: CPT | Performed by: OBSTETRICS & GYNECOLOGY

## 2023-06-27 PROCEDURE — 3008F PR BODY MASS INDEX (BMI) DOCUMENTED: ICD-10-PCS | Mod: CPTII,S$GLB,, | Performed by: OBSTETRICS & GYNECOLOGY

## 2023-06-27 PROCEDURE — 3008F BODY MASS INDEX DOCD: CPT | Mod: CPTII,S$GLB,, | Performed by: OBSTETRICS & GYNECOLOGY

## 2023-06-27 PROCEDURE — 99999 PR PBB SHADOW E&M-EST. PATIENT-LVL III: ICD-10-PCS | Mod: PBBFAC,,, | Performed by: OBSTETRICS & GYNECOLOGY

## 2023-06-27 PROCEDURE — 3078F DIAST BP <80 MM HG: CPT | Mod: CPTII,S$GLB,, | Performed by: OBSTETRICS & GYNECOLOGY

## 2023-06-27 PROCEDURE — 1160F PR REVIEW ALL MEDS BY PRESCRIBER/CLIN PHARMACIST DOCUMENTED: ICD-10-PCS | Mod: CPTII,S$GLB,, | Performed by: OBSTETRICS & GYNECOLOGY

## 2023-06-27 PROCEDURE — 87624 HPV HI-RISK TYP POOLED RSLT: CPT | Performed by: OBSTETRICS & GYNECOLOGY

## 2023-06-27 RX ORDER — METHYLPREDNISOLONE 4 MG/1
TABLET ORAL
COMMUNITY
Start: 2023-06-08 | End: 2023-11-14 | Stop reason: CLARIF

## 2023-06-27 RX ORDER — TRIAMCINOLONE ACETONIDE 1 MG/G
OINTMENT TOPICAL 2 TIMES DAILY
Qty: 80 G | Refills: 3 | Status: SHIPPED | OUTPATIENT
Start: 2023-06-27 | End: 2023-07-07

## 2023-06-27 RX ORDER — TRAMADOL HYDROCHLORIDE AND ACETAMINOPHEN 37.5; 325 MG/1; MG/1
TABLET, FILM COATED ORAL
COMMUNITY
Start: 2023-06-08

## 2023-06-27 RX ORDER — DARUNAVIR ETHANOLATE AND COBICISTAT 800; 150 MG/1; MG/1
1 TABLET, FILM COATED ORAL
COMMUNITY
Start: 2023-05-30

## 2023-06-27 RX ORDER — ALBUTEROL SULFATE 90 UG/1
2 AEROSOL, METERED RESPIRATORY (INHALATION) EVERY 4 HOURS PRN
COMMUNITY
Start: 2023-05-30 | End: 2023-12-13 | Stop reason: SDUPTHER

## 2023-06-27 NOTE — PROGRESS NOTES
SUBJECTIVE:   37 y.o. female   for annual routine Pap and checkup. Patient's last menstrual period was 10/02/2019..  She has no unusual complaints and wants her TSH and titers checked.        Past Medical History:   Diagnosis Date    Asthma     Encounter for blood transfusion     HIV infection     dx     Hyperthyroidism in pregnancy, antepartum      Past Surgical History:   Procedure Laterality Date    CERVICAL CERCLAGE      emergent with twins    CERVICAL CERCLAGE N/A 2019    Procedure: CERCLAGE, CERVIX;  Surgeon: Obey Henry MD;  Location: [facility] L&D;  Service: OB/GYN;  Laterality: N/A;     SECTION WITH TUBAL LIGATION N/A 6/15/2019    Procedure:  SECTION, WITH TUBAL LIGATION;  Surgeon: Madeline Walden MD;  Location: [facility] L&D;  Service: OB/GYN;  Laterality: N/A;     SECTION, CLASSIC      Last section with classical extention, from [facility]    COLD KNIFE CONIZATION OF CERVIX N/A 2018    Procedure: CONE BIOPSY, CERVIX, USING COLD KNIFE;  Surgeon: Chantal Worrell MD;  Location: [facility];  Service: OB/GYN;  Laterality: N/A;    CONIZATION OF CERVIX USING LOOP ELECTROSURGICAL EXCISION PROCEDURE (LEEP) N/A 2018    Procedure: LEEP CONIZATION, CERVIX;  Surgeon: Chantal Worrell MD;  Location: [facility];  Service: OB/GYN;  Laterality: N/A;    CYSTOSCOPY N/A 10/22/2019    Procedure: CYSTOSCOPY;  Surgeon: Georgia Howard MD;  Location: [facility];  Service: OB/GYN;  Laterality: N/A;    DILATION AND CURETTAGE OF UTERUS      ROBOT-ASSISTED LAPAROSCOPIC ABDOMINAL HYSTERECTOMY USING DA GALINDO XI N/A 10/22/2019    Procedure: XI ROBOTIC HYSTERECTOMY;  Surgeon: Georgia Howard MD;  Location: [facility];  Service: OB/GYN;  Laterality: N/A;    ROBOT-ASSISTED SURGICAL REMOVAL OF FALLOPIAN TUBE USING DA GALINDO XI Bilateral 10/22/2019    Procedure: XI ROBOTIC SALPINGECTOMY;  Surgeon: Georgia Howard MD;  Location: [facility];  Service: OB/GYN;  Laterality: Bilateral;    THYROIDECTOMY Right  2021    Procedure: THYROIDECTOMY;  Surgeon: Maksim Sifuentes MD;  Location: [facility] OR [address] [place] [address];  Service: ENT;  Laterality: Right;  Right possible  total    WISDOM TOOTH EXTRACTION       Social History     Socioeconomic History    Marital status: Single   Tobacco Use    Smoking status: Never     Passive exposure: Never    Smokeless tobacco: Never   Substance and Sexual Activity    Alcohol use: Yes     Alcohol/week: 0.0 standard drinks     Comment: socially    Drug use: No    Sexual activity: Not Currently     Partners: Male     Birth control/protection: None     Family History   Problem Relation Age of Onset    Hypertension Maternal Grandmother     Diabetes Maternal Grandmother     Sleep apnea Son     Colon cancer Neg Hx     Ovarian cancer Neg Hx      OB History    Para Term  AB Living   7 7 1 6   5   SAB IAB Ectopic Multiple Live Births         2 7      # Outcome Date GA Lbr Adrian/2nd Weight Sex Delivery Anes PTL Lv   7  06/15/19 32w5d   M CS-LTranv EPI Y ANAHY   6 Term 14 37w5d  3.26 kg (7 lb 3 oz) F CS-LTranv EPI N ANAHY   5A   24w0d  0.51 kg (1 lb 2 oz) M CS-LTranv  Y DEC      Birth Comments: lived for 2 months in NICU      Complications: Premature rupture of membranes   5B   24w0d  0.992 kg (2 lb 3 oz) M CS-LTranv  Y DEC      Birth Comments: lived only 2 hours      Complications: Premature rupture of membranes   4  09 35w0d  2.608 kg (5 lb 12 oz) M CS-LTranv  Y ANAHY      Birth Comments: HIV, CS d/t viral load      Complications: Premature rupture of membranes   3  08 32w0d  2.722 kg (6 lb) M Vag-Spont  Y ANAHY      Birth Comments:  labor      Complications: Premature rupture of membranes   2  10/05/06 24w0d   M Vag-Spont  Y FD      Birth Comments: abruption and stillbirth      Complications: Abruptio Placenta   1  03 35w0d  2.722 kg (6 lb) M Vag-Spont  Y ANAHY      Birth Comments:  labor         Current Outpatient  Medications   Medication Sig Dispense Refill    albuterol (PROVENTIL/VENTOLIN HFA) 90 mcg/actuation inhaler Inhale 2 puffs into the lungs every 4 (four) hours as needed.      budesonide-formoterol 160-4.5 mcg (SYMBICORT) 160-4.5 mcg/actuation HFAA Inhale 2 puffs into the lungs every 12 (twelve) hours. Controller 10.2 g 11    butalbital-acetaminophen-caffeine -40 mg (FIORICET, ESGIC) -40 mg per tablet Take 1 tablet by mouth every 4 to 6 hours as needed.      citalopram (CELEXA) 20 MG tablet Take 20 mg by mouth.      DESCOVY 200-25 mg Tab once daily.   3    hydrOXYzine HCL (ATARAX) 25 MG tablet 1 tablet every evening.      ibuprofen (ADVIL,MOTRIN) 800 MG tablet Take 1 tablet (800 mg total) by mouth every 6 (six) hours as needed for Pain. 30 tablet 0    inhaler,assist device,lg mask (OPTICHAMBER HERBERT LG MASK MISC) U UTD BID      ketorolac 0.5% (ACULAR) 0.5 % Drop INSTILL 1 DROP INTO BOTH EYES FOUR TIMES DAILY FOR INFLAMMATION      methIMAzole (TAPAZOLE) 5 MG Tab Take half a tablet (2.5 mg) by mouth daily 45 tablet 2    methylPREDNISolone (MEDROL DOSEPACK) 4 mg tablet Take by mouth.      OPTICHAMBER HERBERT LG MASK Spcr U UTD BID  3    PREZCOBIX 800-150 mg-mg Tab tablet Take 1 tablet by mouth.      PROCHAMBER       sulfamethoxazole-trimethoprim 400-80mg (BACTRIM,SEPTRA) 400-80 mg per tablet Take 1 tablet by mouth once daily.      traMADoL (ULTRAM) 50 mg tablet Take 1 tablet (50 mg total) by mouth every 8 (eight) hours as needed for Pain. 21 each 0    tramadol-acetaminophen 37.5-325 mg (ULTRACET) 37.5-325 mg Tab SMARTSI Tablet(s) By Mouth Every 8-12 Hours PRN      triamcinolone acetonide 0.1% (KENALOG) 0.1 % cream Apply topically 2 (two) times daily. 15 g 1    gabapentin (NEURONTIN) 300 MG capsule Take 1 capsule (300 mg total) by mouth every evening. 30 capsule 0    hydrOXYzine pamoate (VISTARIL) 25 MG Cap Take 25 mg by mouth nightly as needed.      omeprazole (PRILOSEC) 40 MG capsule Take 1 capsule (40  "mg total) by mouth once daily. 30 capsule 5    ondansetron (ZOFRAN-ODT) 4 MG TbDL Take 1 tablet (4 mg total) by mouth every 6 (six) hours as needed (nausea). (Patient not taking: Reported on 6/27/2023) 50 tablet 1    oxyCODONE-acetaminophen (PERCOCET) 5-325 mg per tablet       promethazine-dextromethorphan (PROMETHAZINE-DM) 6.25-15 mg/5 mL Syrp TK 5 ML PO Q NIGHT PRN      ritonavir (NORVIR) 100 mg Tab tablet 100 mg 2 (two) times daily with meals.       triamcinolone acetonide 0.1% (KENALOG) 0.1 % ointment Apply topically 2 (two) times daily. for 10 days 80 g 3     No current facility-administered medications for this visit.     Allergies: Azithromycin     ROS:  Constitutional: no weight loss, weight gain, fever, fatigue  Eyes:  No vision changes, glasses/contacts  ENT/Mouth: No ulcers, sinus problems, ears ringing, headache  Cardiovascular: No inability to lie flat, chest pain, exercise intolerance, swelling, heart palpitations  Respiratory: No wheezing, coughing blood, shortness of breath, or cough  Gastrointestinal: No diarrhea, bloody stool, nausea/vomiting, constipation, gas, hemorrhoids  Genitourinary: No blood in urine, painful urination, urgency of urination, frequency of urination, incomplete emptying, incontinence, abnormal bleeding, painful periods, heavy periods, vaginal discharge, vaginal odor, painful intercourse, sexual problems, bleeding after intercourse.  Musculoskeletal: No muscle weakness  Skin/Breast: No painful breasts, nipple discharge, masses, rash, ulcers  Neurological: No passing out, seizures, numbness, headache  Endocrine: No diabetes, hypothyroid, hyperthyroid, hot flashes, hair loss, abnormal hair growth, ance  Psychiatric: No depression, crying  Hematologic: No bruises, bleeding, swollen lymph nodes, anemia.      OBJECTIVE:   The patient appears well, alert, oriented x 3, in no distress.  /72   Ht 5' 6" (1.676 m)   Wt 96.1 kg (211 lb 13.8 oz)   LMP 10/02/2019   BMI 34.20 kg/m² "   NECK: no thyromegaly, trachea midline  SKIN: no acne, striae, hirsutism  BREAST EXAM: breasts appear normal, no suspicious masses, no skin or nipple changes or axillary nodes  ABDOMEN: no hernias, masses, or hepatosplenomegaly  GENITALIA: normal external genitalia, no erythema, no discharge and area of hypopigmentation on right perineum  URETHRA: normal urethra, normal urethral meatus  VAGINA: Normal  CERVIX: absent  UTERUS: uterus absent  ADNEXA: no mass, fullness, tenderness    \  ASSESSMENT:   Chip was seen today for well woman.    Diagnoses and all orders for this visit:    GYN exam for high-risk Medicare patient  -     Liquid-Based Pap Smear, Screening  -     HPV High Risk Genotypes, PCR    EMEKA II (cervical intraepithelial neoplasia II)  -     Liquid-Based Pap Smear, Screening  -     HPV High Risk Genotypes, PCR    HIV infection, unspecified symptom status  -     Liquid-Based Pap Smear, Screening  -     HPV High Risk Genotypes, PCR  -     HIV RNA, QUANTITATIVE, PCR; Future    Graves' disease  -     TSH; Future    Other orders  -     triamcinolone acetonide 0.1% (KENALOG) 0.1 % ointment; Apply topically 2 (two) times daily. for 10 days      Steroid cream for a month.  If area of hypopigmentation unchanged will biopsy.  No pruritis unless she shaves.

## 2023-06-28 LAB
HIV1 RNA # SERPL NAA+PROBE: <20 COPIES/ML
HIV1 RNA SERPL NAA+PROBE-LOG#: <1.3 LOG COPIES/ML
HIV1 RNA SERPL QL NAA+PROBE: DETECTED

## 2023-07-03 LAB
FINAL PATHOLOGIC DIAGNOSIS: NORMAL
Lab: NORMAL

## 2023-07-05 LAB
HPV HR 12 DNA SPEC QL NAA+PROBE: NEGATIVE
HPV16 AG SPEC QL: NEGATIVE
HPV18 DNA SPEC QL NAA+PROBE: NEGATIVE

## 2023-08-03 ENCOUNTER — OFFICE VISIT (OUTPATIENT)
Dept: OBSTETRICS AND GYNECOLOGY | Facility: CLINIC | Age: 38
End: 2023-08-03
Attending: OBSTETRICS & GYNECOLOGY
Payer: MEDICARE

## 2023-08-03 VITALS
DIASTOLIC BLOOD PRESSURE: 80 MMHG | WEIGHT: 208.13 LBS | SYSTOLIC BLOOD PRESSURE: 118 MMHG | HEIGHT: 66 IN | BODY MASS INDEX: 33.45 KG/M2

## 2023-08-03 DIAGNOSIS — N90.89 VULVAL LESION: Primary | ICD-10-CM

## 2023-08-03 PROBLEM — R06.83 SNORING: Status: RESOLVED | Noted: 2022-08-26 | Resolved: 2023-08-03

## 2023-08-03 PROCEDURE — 3008F BODY MASS INDEX DOCD: CPT | Mod: CPTII,S$GLB,, | Performed by: OBSTETRICS & GYNECOLOGY

## 2023-08-03 PROCEDURE — 99999 PR PBB SHADOW E&M-EST. PATIENT-LVL III: ICD-10-PCS | Mod: PBBFAC,,, | Performed by: OBSTETRICS & GYNECOLOGY

## 2023-08-03 PROCEDURE — 1160F RVW MEDS BY RX/DR IN RCRD: CPT | Mod: CPTII,S$GLB,, | Performed by: OBSTETRICS & GYNECOLOGY

## 2023-08-03 PROCEDURE — 3079F PR MOST RECENT DIASTOLIC BLOOD PRESSURE 80-89 MM HG: ICD-10-PCS | Mod: CPTII,S$GLB,, | Performed by: OBSTETRICS & GYNECOLOGY

## 2023-08-03 PROCEDURE — 1159F MED LIST DOCD IN RCRD: CPT | Mod: CPTII,S$GLB,, | Performed by: OBSTETRICS & GYNECOLOGY

## 2023-08-03 PROCEDURE — 99213 OFFICE O/P EST LOW 20 MIN: CPT | Mod: S$GLB,,, | Performed by: OBSTETRICS & GYNECOLOGY

## 2023-08-03 PROCEDURE — 99999 PR PBB SHADOW E&M-EST. PATIENT-LVL III: CPT | Mod: PBBFAC,,, | Performed by: OBSTETRICS & GYNECOLOGY

## 2023-08-03 PROCEDURE — 3074F PR MOST RECENT SYSTOLIC BLOOD PRESSURE < 130 MM HG: ICD-10-PCS | Mod: CPTII,S$GLB,, | Performed by: OBSTETRICS & GYNECOLOGY

## 2023-08-03 PROCEDURE — 3079F DIAST BP 80-89 MM HG: CPT | Mod: CPTII,S$GLB,, | Performed by: OBSTETRICS & GYNECOLOGY

## 2023-08-03 PROCEDURE — 1159F PR MEDICATION LIST DOCUMENTED IN MEDICAL RECORD: ICD-10-PCS | Mod: CPTII,S$GLB,, | Performed by: OBSTETRICS & GYNECOLOGY

## 2023-08-03 PROCEDURE — 3074F SYST BP LT 130 MM HG: CPT | Mod: CPTII,S$GLB,, | Performed by: OBSTETRICS & GYNECOLOGY

## 2023-08-03 PROCEDURE — 3008F PR BODY MASS INDEX (BMI) DOCUMENTED: ICD-10-PCS | Mod: CPTII,S$GLB,, | Performed by: OBSTETRICS & GYNECOLOGY

## 2023-08-03 PROCEDURE — 1160F PR REVIEW ALL MEDS BY PRESCRIBER/CLIN PHARMACIST DOCUMENTED: ICD-10-PCS | Mod: CPTII,S$GLB,, | Performed by: OBSTETRICS & GYNECOLOGY

## 2023-08-03 PROCEDURE — 99213 PR OFFICE/OUTPT VISIT, EST, LEVL III, 20-29 MIN: ICD-10-PCS | Mod: S$GLB,,, | Performed by: OBSTETRICS & GYNECOLOGY

## 2023-08-03 RX ORDER — HYDROXYZINE PAMOATE 25 MG/1
25 CAPSULE ORAL NIGHTLY PRN
COMMUNITY
Start: 2023-07-27 | End: 2023-10-26

## 2023-08-03 NOTE — PROGRESS NOTES
SUBJECTIVE:   37 y.o. female  is here for follow up of vulvar lesion.  She has had an area of hypopigmentation for a few years. No associated itching.  At her last visit, the lesion appeared raised, so she was treated with a steroid cream.    ROS:  GENERAL: No fever, chills, fatigability or weight loss.  VULVAR: No pain, no lesions and no itching.  VAGINAL: No relaxation, no itching, no discharge, no abnormal bleeding and no lesions.  ABDOMEN: No abdominal pain. Denies nausea. Denies vomiting. No diarrhea. No constipation  BREAST: Denies pain. No lumps. No discharge.  URINARY: No incontinence, no nocturia, no frequency and no dysuria.  CARDIOVASCULAR: No chest pain. No shortness of breath. No leg cramps.  NEUROLOGICAL: No headaches. No vision changes.        Vitals:    23 0825   BP: 118/80         OBJECTIVE:   She appears well, afebrile.  Abdomen: benign, soft, nontender, no masses.  VULVA: area of hypopigmentation no longer raised and appears to be resolving, Normal external female genitalia, normal urethra, normal urethral meatus        ASSESSMENT:   Diagnoses and all orders for this visit:    Vulval lesion

## 2023-09-17 NOTE — PROGRESS NOTES
Lab Documentation:    Order Type: Written Order placed in [facility]    Patient in for lab visit only per provider treatment plan.

## 2023-10-03 ENCOUNTER — TELEPHONE (OUTPATIENT)
Dept: PULMONOLOGY | Facility: CLINIC | Age: 38
End: 2023-10-03
Payer: MEDICARE

## 2023-10-03 NOTE — TELEPHONE ENCOUNTER
Called was returned to Ms Newton. Appointment made on 12/13/23 at 2 PM with Dr Mendez. Patient confirmed and verbalized understanding.

## 2023-10-03 NOTE — TELEPHONE ENCOUNTER
----- Message from Ellen Mix sent at 10/3/2023  3:15 PM CDT -----  Regarding: Appt  Contact: pt [phone]  Pt is calling to schedule virtual appt, pt is having complications with asthma and SOB, please call pt @ [phone]

## 2023-10-18 ENCOUNTER — TELEPHONE (OUTPATIENT)
Dept: OBSTETRICS AND GYNECOLOGY | Facility: HOSPITAL | Age: 38
End: 2023-10-18
Payer: MEDICARE

## 2023-10-18 RX ORDER — TRIAMCINOLONE ACETONIDE 1 MG/G
CREAM TOPICAL 2 TIMES DAILY
Qty: 15 G | Refills: 1 | Status: SHIPPED | OUTPATIENT
Start: 2023-10-18

## 2023-10-18 NOTE — TELEPHONE ENCOUNTER
----- Message from Herman Osullivan MA sent at 10/18/2023  4:06 PM CDT -----  Pt is requesting a call back states she is experiencing anxiety and need to speak with you. Also requesting refill of medication below.        triamcinolone acetonide 0.1% (KENALOG) 0.1 % ointment 80 g 3 6/27/2023 7/7/2023 --  Sig - Route: Apply topically 2 (two) times daily. for 10 days - Topical (Top)  Sent to pharmacy as: triamcinolone acetonide 0.1% (KENALOG) 0.1 % ointment  Class: Normal  Order: [id number]  Date/Time Signed: 6/27/2023 09:34      E-Prescribing Status: Receipt confirmed by pharmacy (6/27/2023  9:34 AM CDT       Pt can be reached at [phone]

## 2023-10-18 NOTE — TELEPHONE ENCOUNTER
She complains of a vaginal growth.  Please schedule to see me next week.  Does not want an earlier appt.

## 2023-10-26 ENCOUNTER — OFFICE VISIT (OUTPATIENT)
Dept: OBSTETRICS AND GYNECOLOGY | Facility: CLINIC | Age: 38
End: 2023-10-26
Attending: OBSTETRICS & GYNECOLOGY
Payer: MEDICARE

## 2023-10-26 VITALS
WEIGHT: 216.06 LBS | BODY MASS INDEX: 34.87 KG/M2 | DIASTOLIC BLOOD PRESSURE: 70 MMHG | SYSTOLIC BLOOD PRESSURE: 116 MMHG

## 2023-10-26 DIAGNOSIS — N75.1 BARTHOLIN'S GLAND ABSCESS: Primary | ICD-10-CM

## 2023-10-26 PROCEDURE — 1159F MED LIST DOCD IN RCRD: CPT | Mod: CPTII,S$GLB,, | Performed by: OBSTETRICS & GYNECOLOGY

## 2023-10-26 PROCEDURE — 99999 PR PBB SHADOW E&M-EST. PATIENT-LVL III: ICD-10-PCS | Mod: PBBFAC,,, | Performed by: OBSTETRICS & GYNECOLOGY

## 2023-10-26 PROCEDURE — 3074F PR MOST RECENT SYSTOLIC BLOOD PRESSURE < 130 MM HG: ICD-10-PCS | Mod: CPTII,S$GLB,, | Performed by: OBSTETRICS & GYNECOLOGY

## 2023-10-26 PROCEDURE — 3008F BODY MASS INDEX DOCD: CPT | Mod: CPTII,S$GLB,, | Performed by: OBSTETRICS & GYNECOLOGY

## 2023-10-26 PROCEDURE — 3078F PR MOST RECENT DIASTOLIC BLOOD PRESSURE < 80 MM HG: ICD-10-PCS | Mod: CPTII,S$GLB,, | Performed by: OBSTETRICS & GYNECOLOGY

## 2023-10-26 PROCEDURE — 3074F SYST BP LT 130 MM HG: CPT | Mod: CPTII,S$GLB,, | Performed by: OBSTETRICS & GYNECOLOGY

## 2023-10-26 PROCEDURE — 1159F PR MEDICATION LIST DOCUMENTED IN MEDICAL RECORD: ICD-10-PCS | Mod: CPTII,S$GLB,, | Performed by: OBSTETRICS & GYNECOLOGY

## 2023-10-26 PROCEDURE — 3078F DIAST BP <80 MM HG: CPT | Mod: CPTII,S$GLB,, | Performed by: OBSTETRICS & GYNECOLOGY

## 2023-10-26 PROCEDURE — 1160F PR REVIEW ALL MEDS BY PRESCRIBER/CLIN PHARMACIST DOCUMENTED: ICD-10-PCS | Mod: CPTII,S$GLB,, | Performed by: OBSTETRICS & GYNECOLOGY

## 2023-10-26 PROCEDURE — 99213 PR OFFICE/OUTPT VISIT, EST, LEVL III, 20-29 MIN: ICD-10-PCS | Mod: S$GLB,,, | Performed by: OBSTETRICS & GYNECOLOGY

## 2023-10-26 PROCEDURE — 99999 PR PBB SHADOW E&M-EST. PATIENT-LVL III: CPT | Mod: PBBFAC,,, | Performed by: OBSTETRICS & GYNECOLOGY

## 2023-10-26 PROCEDURE — 3008F PR BODY MASS INDEX (BMI) DOCUMENTED: ICD-10-PCS | Mod: CPTII,S$GLB,, | Performed by: OBSTETRICS & GYNECOLOGY

## 2023-10-26 PROCEDURE — 1160F RVW MEDS BY RX/DR IN RCRD: CPT | Mod: CPTII,S$GLB,, | Performed by: OBSTETRICS & GYNECOLOGY

## 2023-10-26 PROCEDURE — 99213 OFFICE O/P EST LOW 20 MIN: CPT | Mod: S$GLB,,, | Performed by: OBSTETRICS & GYNECOLOGY

## 2023-10-26 RX ORDER — SULFAMETHOXAZOLE AND TRIMETHOPRIM 400; 80 MG/1; MG/1
1 TABLET ORAL 2 TIMES DAILY
Qty: 14 TABLET | Refills: 0 | Status: SHIPPED | OUTPATIENT
Start: 2023-10-26 | End: 2023-11-02

## 2023-10-26 NOTE — PROGRESS NOTES
Subjective     Patient ID: Jm Newton is a 38 y.o. female.    Chief Complaint: No chief complaint on file.    HPI She complains of a lump on her vagina.    Review of Systems  ROS:  GENERAL: No fever, chills, fatigability or weight loss.  VULVAR: No pain, no lesions and no itching.  VAGINAL: No relaxation, no itching, no discharge, no abnormal bleeding and no lesions.  ABDOMEN: No abdominal pain. Denies nausea. Denies vomiting. No diarrhea. No constipation  BREAST: Denies pain. No lumps. No discharge.  URINARY: No incontinence, no nocturia, no frequency and no dysuria.  CARDIOVASCULAR: No chest pain. No shortness of breath. No leg cramps.  NEUROLOGICAL: no headaches. No vision changes.         Objective     Physical Exam  Constitutional:       Appearance: Normal appearance.   HENT:      Head: Normocephalic and atraumatic.   Pulmonary:      Effort: Pulmonary effort is normal.   Genitourinary:         Comments: Palpable bartholin's gland  Neurological:      General: No focal deficit present.      Mental Status: She is alert and oriented to person, place, and time. Mental status is at baseline.   Psychiatric:         Mood and Affect: Mood normal.         Behavior: Behavior normal.         Thought Content: Thought content normal.         Judgment: Judgment normal.            Assessment and Plan     1. Bartholin's gland abscess  -     sulfamethoxazole-trimethoprim 400-80mg (BACTRIM) 400-80 mg per tablet; Take 1 tablet by mouth 2 (two) times daily. for 7 days  Dispense: 14 tablet; Refill: 0        Will treat with antibiotics.  If it doesn't resolve will need removal.         No follow-ups on file.

## 2023-11-08 ENCOUNTER — OFFICE VISIT (OUTPATIENT)
Dept: OBSTETRICS AND GYNECOLOGY | Facility: CLINIC | Age: 38
End: 2023-11-08
Attending: OBSTETRICS & GYNECOLOGY
Payer: MEDICARE

## 2023-11-08 VITALS
HEIGHT: 66 IN | WEIGHT: 217.13 LBS | SYSTOLIC BLOOD PRESSURE: 120 MMHG | BODY MASS INDEX: 34.9 KG/M2 | DIASTOLIC BLOOD PRESSURE: 86 MMHG

## 2023-11-08 DIAGNOSIS — N90.89 VULVAL LESION: ICD-10-CM

## 2023-11-08 DIAGNOSIS — N75.0 BARTHOLIN GLAND CYST: Primary | ICD-10-CM

## 2023-11-08 PROCEDURE — 99999 PR PBB SHADOW E&M-EST. PATIENT-LVL III: ICD-10-PCS | Mod: PBBFAC,,, | Performed by: OBSTETRICS & GYNECOLOGY

## 2023-11-08 PROCEDURE — 88342 IMHCHEM/IMCYTCHM 1ST ANTB: CPT | Performed by: PATHOLOGY

## 2023-11-08 PROCEDURE — 88342 CHG IMMUNOCYTOCHEMISTRY: ICD-10-PCS | Mod: 26,,, | Performed by: PATHOLOGY

## 2023-11-08 PROCEDURE — 3008F PR BODY MASS INDEX (BMI) DOCUMENTED: ICD-10-PCS | Mod: CPTII,S$GLB,, | Performed by: OBSTETRICS & GYNECOLOGY

## 2023-11-08 PROCEDURE — 3008F BODY MASS INDEX DOCD: CPT | Mod: CPTII,S$GLB,, | Performed by: OBSTETRICS & GYNECOLOGY

## 2023-11-08 PROCEDURE — 3074F PR MOST RECENT SYSTOLIC BLOOD PRESSURE < 130 MM HG: ICD-10-PCS | Mod: CPTII,S$GLB,, | Performed by: OBSTETRICS & GYNECOLOGY

## 2023-11-08 PROCEDURE — 3074F SYST BP LT 130 MM HG: CPT | Mod: CPTII,S$GLB,, | Performed by: OBSTETRICS & GYNECOLOGY

## 2023-11-08 PROCEDURE — 99214 OFFICE O/P EST MOD 30 MIN: CPT | Mod: 25,S$GLB,, | Performed by: OBSTETRICS & GYNECOLOGY

## 2023-11-08 PROCEDURE — 88342 IMHCHEM/IMCYTCHM 1ST ANTB: CPT | Mod: 26,,, | Performed by: PATHOLOGY

## 2023-11-08 PROCEDURE — 99999 PR PBB SHADOW E&M-EST. PATIENT-LVL III: CPT | Mod: PBBFAC,,, | Performed by: OBSTETRICS & GYNECOLOGY

## 2023-11-08 PROCEDURE — 3079F PR MOST RECENT DIASTOLIC BLOOD PRESSURE 80-89 MM HG: ICD-10-PCS | Mod: CPTII,S$GLB,, | Performed by: OBSTETRICS & GYNECOLOGY

## 2023-11-08 PROCEDURE — 88305 TISSUE EXAM BY PATHOLOGIST: CPT | Performed by: PATHOLOGY

## 2023-11-08 PROCEDURE — 88305 TISSUE EXAM BY PATHOLOGIST: ICD-10-PCS | Mod: 26,,, | Performed by: PATHOLOGY

## 2023-11-08 PROCEDURE — 3079F DIAST BP 80-89 MM HG: CPT | Mod: CPTII,S$GLB,, | Performed by: OBSTETRICS & GYNECOLOGY

## 2023-11-08 PROCEDURE — 99214 PR OFFICE/OUTPT VISIT, EST, LEVL IV, 30-39 MIN: ICD-10-PCS | Mod: 25,S$GLB,, | Performed by: OBSTETRICS & GYNECOLOGY

## 2023-11-08 PROCEDURE — 88305 TISSUE EXAM BY PATHOLOGIST: CPT | Mod: 26,,, | Performed by: PATHOLOGY

## 2023-11-08 RX ORDER — SODIUM CHLORIDE 9 MG/ML
INJECTION, SOLUTION INTRAVENOUS CONTINUOUS
Status: CANCELLED | OUTPATIENT
Start: 2023-11-08

## 2023-11-08 RX ORDER — MUPIROCIN 20 MG/G
OINTMENT TOPICAL
Status: CANCELLED | OUTPATIENT
Start: 2023-11-08

## 2023-11-08 RX ORDER — CETIRIZINE HYDROCHLORIDE 10 MG/1
10 TABLET ORAL NIGHTLY
COMMUNITY
Start: 2023-11-01

## 2023-11-08 RX ORDER — FAMOTIDINE 20 MG/1
20 TABLET, FILM COATED ORAL
Status: SHIPPED | OUTPATIENT
Start: 2023-11-08

## 2023-11-08 NOTE — PROGRESS NOTES
Subjective     Patient ID: Jm Newton is a 38 y.o. female.    Chief Complaint: No chief complaint on file.    HPI She is here for follow up after treatment for possible bartholin's abscess.  She states that it has decreased a little.  No pain noted.     Review of Systems  ROS:  GENERAL: No fever, chills, fatigability or weight loss.  VULVAR: No pain, no lesions and no itching.  VAGINAL: No relaxation, no itching, no discharge, no abnormal bleeding and + lesions.  ABDOMEN: No abdominal pain. Denies nausea. Denies vomiting. No diarrhea. No constipation  BREAST: Denies pain. No lumps. No discharge.  URINARY: No incontinence, no nocturia, no frequency and no dysuria.  CARDIOVASCULAR: No chest pain. No shortness of breath. No leg cramps.  NEUROLOGICAL: no headaches. No vision changes.        Objective     Physical Exam  Constitutional:       Appearance: Normal appearance.   HENT:      Head: Normocephalic and atraumatic.   Pulmonary:      Effort: Pulmonary effort is normal.   Abdominal:      Palpations: There is no hepatomegaly or mass.      Hernia: No hernia is present.   Genitourinary:     Labia:         Right: Lesion present. No rash, tenderness or injury.         Left: No rash, tenderness, lesion or injury.       Uterus: Absent.           Comments: Right Bartholin's gland cyst noted.  Area of hypopigmentation biopsied.  normal urethral, normal urethral meatus  Cervix absent  Musculoskeletal:      Cervical back: Normal range of motion.   Neurological:      General: No focal deficit present.      Mental Status: She is alert and oriented to person, place, and time. Mental status is at baseline.   Psychiatric:         Mood and Affect: Mood normal.         Behavior: Behavior normal.         Thought Content: Thought content normal.         Judgment: Judgment normal.            Assessment and Plan     1. Bartholin gland cyst    2. Vulval lesion  -     Specimen to Pathology, Ob/Gyn  -     Biopsy        Plan  bartholin's removal.  Will send vulva biopsy stat to see if she needs a WLE as well.  I have discussed the risks, benefits, indications, and alternatives of the procedure in detail.  The patient verbalizes her understanding.  All questions answered.  Consents signed.  The patient agrees to proceed to proceed as planned.           No follow-ups on file.

## 2023-11-08 NOTE — PROCEDURES
Biopsy    Date/Time: 11/8/2023 9:00 AM    Performed by: Chantal Worrell MD  Authorized by: Chantal Worrell MD  Preparation: Patient was prepped and draped in the usual sterile fashion.  Local anesthesia used: yes  Anesthesia: local infiltration    Anesthesia:  Local anesthesia used: yes  Local Anesthetic: lidocaine 1% with epinephrine  Anesthetic total: 1 mL    Sedation:  Patient sedated: no        5mm punch biopsy performed.  Specimen removed with forceps and scissors.  Bleeding stopped with silver nitrate

## 2023-11-10 ENCOUNTER — PATIENT MESSAGE (OUTPATIENT)
Dept: OBSTETRICS AND GYNECOLOGY | Facility: CLINIC | Age: 38
End: 2023-11-10
Payer: MEDICARE

## 2023-11-13 ENCOUNTER — TELEPHONE (OUTPATIENT)
Dept: OBSTETRICS AND GYNECOLOGY | Facility: CLINIC | Age: 38
End: 2023-11-13
Payer: MEDICARE

## 2023-11-13 LAB
FINAL PATHOLOGIC DIAGNOSIS: NORMAL
GROSS: NORMAL
Lab: NORMAL

## 2023-11-14 ENCOUNTER — PATIENT MESSAGE (OUTPATIENT)
Dept: OBSTETRICS AND GYNECOLOGY | Facility: CLINIC | Age: 38
End: 2023-11-14
Payer: MEDICARE

## 2023-11-14 ENCOUNTER — HOSPITAL ENCOUNTER (OUTPATIENT)
Dept: PREADMISSION TESTING | Facility: OTHER | Age: 38
Discharge: HOME | End: 2023-11-14
Attending: OBSTETRICS & GYNECOLOGY
Payer: MEDICARE

## 2023-11-14 ENCOUNTER — ANESTHESIA EVENT (OUTPATIENT)
Dept: SURGERY | Facility: OTHER | Age: 38
End: 2023-11-14
Payer: MEDICARE

## 2023-11-14 VITALS
HEIGHT: 66 IN | DIASTOLIC BLOOD PRESSURE: 63 MMHG | HEART RATE: 66 BPM | OXYGEN SATURATION: 100 % | SYSTOLIC BLOOD PRESSURE: 140 MMHG | WEIGHT: 216 LBS | BODY MASS INDEX: 34.72 KG/M2

## 2023-11-14 DIAGNOSIS — N90.1 VIN II (VULVAR INTRAEPITHELIAL NEOPLASIA II): Primary | ICD-10-CM

## 2023-11-14 DIAGNOSIS — Z01.818 PREOP TESTING: Primary | ICD-10-CM

## 2023-11-14 LAB
BASOPHILS # BLD AUTO: 0.03 K/UL (ref 0–0.2)
BASOPHILS NFR BLD: 0.7 % (ref 0–1.9)
DIFFERENTIAL METHOD: ABNORMAL
EOSINOPHIL # BLD AUTO: 0 K/UL (ref 0–0.5)
EOSINOPHIL NFR BLD: 0.4 % (ref 0–8)
ERYTHROCYTE [DISTWIDTH] IN BLOOD BY AUTOMATED COUNT: 11.6 % (ref 11.5–14.5)
HCT VFR BLD AUTO: 37.1 % (ref 37–48.5)
HGB BLD-MCNC: 12.6 G/DL (ref 12–16)
IMM GRANULOCYTES # BLD AUTO: 0.01 K/UL (ref 0–0.04)
IMM GRANULOCYTES NFR BLD AUTO: 0.2 % (ref 0–0.5)
LYMPHOCYTES # BLD AUTO: 2.2 K/UL (ref 1–4.8)
LYMPHOCYTES NFR BLD: 48.5 % (ref 18–48)
MCH RBC QN AUTO: 29.3 PG (ref 27–31)
MCHC RBC AUTO-ENTMCNC: 34 G/DL (ref 32–36)
MCV RBC AUTO: 86 FL (ref 82–98)
MONOCYTES # BLD AUTO: 0.4 K/UL (ref 0.3–1)
MONOCYTES NFR BLD: 9.6 % (ref 4–15)
NEUTROPHILS # BLD AUTO: 1.9 K/UL (ref 1.8–7.7)
NEUTROPHILS NFR BLD: 40.6 % (ref 38–73)
NRBC BLD-RTO: 0 /100 WBC
PLATELET # BLD AUTO: 184 K/UL (ref 150–450)
PLATELET BLD QL SMEAR: ABNORMAL
PMV BLD AUTO: 10.1 FL (ref 9.2–12.9)
RBC # BLD AUTO: 4.3 M/UL (ref 4–5.4)
WBC # BLD AUTO: 4.58 K/UL (ref 3.9–12.7)

## 2023-11-14 PROCEDURE — 36415 COLL VENOUS BLD VENIPUNCTURE: CPT | Performed by: ANESTHESIOLOGY

## 2023-11-14 PROCEDURE — 85025 COMPLETE CBC W/AUTO DIFF WBC: CPT | Performed by: ANESTHESIOLOGY

## 2023-11-14 RX ORDER — CYCLOSPORINE 0.5 MG/ML
EMULSION OPHTHALMIC
COMMUNITY
Start: 2023-11-02

## 2023-11-14 RX ORDER — LIDOCAINE HYDROCHLORIDE 10 MG/ML
1 INJECTION, SOLUTION EPIDURAL; INFILTRATION; INTRACAUDAL; PERINEURAL ONCE
Status: CANCELLED | OUTPATIENT
Start: 2023-11-14 | End: 2023-11-14

## 2023-11-14 RX ORDER — ALBUTEROL SULFATE 2.5 MG/.5ML
2.5 SOLUTION RESPIRATORY (INHALATION)
Status: CANCELLED | OUTPATIENT
Start: 2023-11-14 | End: 2023-11-14

## 2023-11-14 NOTE — TELEPHONE ENCOUNTER
Called and discussed pathology showing GREG II. Recommend WLE.  Counseled, she agrees.  Will have her sign consents tomorrow when she gets to the surgery center.

## 2023-11-14 NOTE — DISCHARGE INSTRUCTIONS
Information to Prepare you for your Surgery    PRE-ADMIT TESTING -  [phone]    [address] [facility]          Your surgery has been scheduled at [facility]. We are pleased to have the opportunity to serve you. For Further Information please call [phone].    On the day of surgery please report to the Information Desk on the 1st floor.    CONTACT YOUR PHYSICIAN'S OFFICE THE DAY PRIOR TO YOUR SURGERY TO OBTAIN YOUR ARRIVAL TIME.     The evening before surgery do not eat anything after 9 p.m. ( this includes hard candy, chewing gum and mints).  You may only have GATORADE, POWERADE AND WATER  from 9 p.m. until you leave your home.   DO NOT DRINK ANY LIQUIDS ON THE WAY TO THE HOSPITAL.      Why does your anesthesiologist allow you to drink Gatorade/Powerade before surgery?  Gatorade/Powerade helps to increase your comfort before surgery and to decrease your nausea after surgery. The carbohydrates in Gatorade/Powerade help reduce your body's stress response to surgery.  If you are a diabetic-drink only water prior to surgery.    Outpatient Surgery- May allow 2 adult (18 and older) Support Persons (1 being the designated ) for all surgical/procedural patients. A breastfeeding mother will be allowed her infant and 2 adult Support Persons. No one under the age of 18 will be allowed in the building.      SPECIAL MEDICATION INSTRUCTIONS: TAKE medications checked off by the Anesthesiologist on your Medication List.    Angiogram Patients: Take medications as instructed by your physician, including aspirin.     Surgery Patients:    If you take ASPIRIN - Your PHYSICIAN/SURGEON will need to inform you IF/OR when you need to stop taking aspirin prior to your surgery.     The week prior to surgery do not ot take any medications containing IBUPROFEN or NSAIDS ( Advil, Motrin, Goodys, BC, Aleve, Naproxen etc) If you are not sure if you should take a medicine  please call your surgeon's office.  Ok to take Tylenol    Do Not Wear any make-up (especially eye make-up) to surgery. Please remove any false eyelashes or eyelash extensions. If you arrive the day of surgery with makeup/eyelashes on you will be required to remove prior to surgery. (There is a risk of corneal abrasions if eye makeup/eyelash extensions are not removed)      Leave all valuables at home.   Do Not wear any jewelry or watches, including any metal in body piercings. Jewelry must be removed prior to coming to the hospital.  There is a possibility that rings that are unable to be removed may be cut off if they are on the surgical extremity.    Please remove all hair extensions, wigs, clips and any other metal accessories/ ornaments from your hair.  These items may pose a flammable/fire risk in Surgery and must be removed.    Do not shave your surgical area at least 5 days prior to your surgery. The surgical prep will be performed at the hospital according to Infection Control regulations.    Contact Lens must be removed before surgery. Either do not wear the contact lens or bring a case and solution for storage.  Please bring a container for eyeglasses or dentures as required.  Bring any paperwork your physician has provided, such as consent forms,  history and physicals, doctor's orders, etc.   Bring comfortable clothes that are loose fitting to wear upon discharge. Take into consideration the type of surgery being performed.  Maintain your diet as advised per your physician the day prior to surgery.      Adequate rest the night before surgery is advised.   Park in the Parking lot behind the hospital or in the [facility] across the street from the parking lot. Parking is complimentary.  If you will be discharged the same day as your procedure, please arrange for a responsible adult to drive you home or to accompany you if traveling by taxi.   YOU WILL NOT BE PERMITTED TO DRIVE OR TO LEAVE THE  HOSPITAL ALONE AFTER SURGERY.   If you are being discharged the same day, it is strongly recommended that you arrange for someone to remain with you for the first 24 hrs following your surgery.    The Surgeon will speak to your family/visitor after your surgery regarding the outcome of your surgery and post op care.  The Surgeon may speak to you after your surgery, but there is a possibility you may not remember the details.  Please check with your family members regarding the conversation with the Surgeon.    We strongly recommend whoever is bringing you home be present for discharge instructions.  This will ensure a thorough understanding for your post op home care.          Thank you for your cooperation.  The Staff of [facility].            Bathing Instructions with Hibiclens    Shower the evening before and morning of your procedure with Chlorhexidine (Hibiclens)  do not use Chlorhexidine on your face or genitals. Do not get in your eyes.  Wash your face with water and your regular face wash/soap  Use your regular shampoo  Apply Chlorhexidine (Hibiclens) directly on your skin or on a wet washcloth and wash gently. When showering: Move away from the shower stream when applying Chlorhexidine (Hibiclens) to avoid rinsing off too soon.  Rinse thoroughly with warm water  Do not dilute Chlorhexidine (Hibiclens)   Dry off as usual, do not use any deodorant, powder, body lotions, perfume, after shave or cologne.

## 2023-11-14 NOTE — ANESTHESIA PREPROCEDURE EVALUATION
11/14/2023  Jm Newton is a 38 y.o., female.      Pre-op Assessment    I have reviewed the Patient Summary Reports.     I have reviewed the Nursing Notes. I have reviewed the NPO Status.   I have reviewed the Medications.     Review of Systems  Anesthesia Hx:  No previous Anesthesia             Denies Family Hx of Anesthesia complications.    Denies Personal Hx of Anesthesia complications.                    Social:  Non-Smoker, Social Alcohol Use       Hematology/Oncology:  Hematology Normal   Oncology Normal                Hematology Comments: HIV pos                    EENT/Dental:  EENT/Dental Normal           Cardiovascular:  Cardiovascular Normal                                            Pulmonary:    Asthma asymptomatic                   Renal/:  Renal/ Normal                 Hepatic/GI:  Hepatic/GI Normal                 Musculoskeletal:  Musculoskeletal Normal                Neurological:  Neurology Normal                                      Endocrine:  Endocrine Normal            Dermatological:  Skin Normal    Psych:  Psychiatric History  depression                Physical Exam  General: Cooperative, Alert and Oriented    Airway:  Mallampati: I   Mouth Opening: Normal  Neck ROM: Normal ROM    Dental:  Caps / Implants, Intact        Anesthesia Plan  Type of Anesthesia, risks & benefits discussed:    Anesthesia Type: Gen Supraglottic Airway  Intra-op Monitoring Plan: Standard ASA Monitors  Post Op Pain Control Plan: multimodal analgesia  Induction:  IV  Informed Consent: Informed consent signed with the Patient and all parties understand the risks and agree with anesthesia plan.  All questions answered.   ASA Score: 3  Anesthesia Plan Notes: CBC today  HIV positive    Ready For Surgery From Anesthesia Perspective.     .

## 2023-11-15 ENCOUNTER — HOSPITAL ENCOUNTER (OUTPATIENT)
Facility: OTHER | Age: 38
Discharge: HOME | End: 2023-11-15
Attending: OBSTETRICS & GYNECOLOGY | Admitting: OBSTETRICS & GYNECOLOGY
Payer: MEDICARE

## 2023-11-15 ENCOUNTER — ANESTHESIA (OUTPATIENT)
Dept: SURGERY | Facility: OTHER | Age: 38
End: 2023-11-15
Payer: MEDICARE

## 2023-11-15 ENCOUNTER — TELEPHONE (OUTPATIENT)
Dept: OBSTETRICS AND GYNECOLOGY | Facility: HOSPITAL | Age: 38
End: 2023-11-15
Payer: MEDICARE

## 2023-11-15 DIAGNOSIS — N75.0 BARTHOLIN GLAND CYST: ICD-10-CM

## 2023-11-15 DIAGNOSIS — N75.0 BARTHOLIN CYST: Primary | ICD-10-CM

## 2023-11-15 PROCEDURE — D9220A PRA ANESTHESIA: Mod: CRNA,,, | Performed by: STUDENT IN AN ORGANIZED HEALTH CARE EDUCATION/TRAINING PROGRAM

## 2023-11-15 PROCEDURE — D9220A PRA ANESTHESIA: Mod: ANES,,, | Performed by: ANESTHESIOLOGY

## 2023-11-15 PROCEDURE — 88309 TISSUE EXAM BY PATHOLOGIST: CPT | Mod: 26,,, | Performed by: PATHOLOGY

## 2023-11-15 PROCEDURE — D9220A PRA ANESTHESIA: ICD-10-PCS | Mod: ANES,,, | Performed by: ANESTHESIOLOGY

## 2023-11-15 PROCEDURE — 71000033 HC RECOVERY, INTIAL HOUR: Performed by: OBSTETRICS & GYNECOLOGY

## 2023-11-15 PROCEDURE — 71000016 HC POSTOP RECOV ADDL HR: Performed by: OBSTETRICS & GYNECOLOGY

## 2023-11-15 PROCEDURE — 36000706: Performed by: OBSTETRICS & GYNECOLOGY

## 2023-11-15 PROCEDURE — 25000003 PHARM REV CODE 250: Performed by: OBSTETRICS & GYNECOLOGY

## 2023-11-15 PROCEDURE — 36000707: Performed by: OBSTETRICS & GYNECOLOGY

## 2023-11-15 PROCEDURE — 25000242 PHARM REV CODE 250 ALT 637 W/ HCPCS: Performed by: ANESTHESIOLOGY

## 2023-11-15 PROCEDURE — 25000003 PHARM REV CODE 250: Performed by: ANESTHESIOLOGY

## 2023-11-15 PROCEDURE — 56440 MRSPLZATN BRTHLNS GLND CST: CPT | Mod: 51,,, | Performed by: OBSTETRICS & GYNECOLOGY

## 2023-11-15 PROCEDURE — 63600175 PHARM REV CODE 636 W HCPCS: Performed by: ANESTHESIOLOGY

## 2023-11-15 PROCEDURE — 88309 PR  SURG PATH,LEVEL VI: ICD-10-PCS | Mod: 26,,, | Performed by: PATHOLOGY

## 2023-11-15 PROCEDURE — 71000039 HC RECOVERY, EACH ADD'L HOUR: Performed by: OBSTETRICS & GYNECOLOGY

## 2023-11-15 PROCEDURE — 56440 PR MARSUP BARTHOLIN GLAND CYST: ICD-10-PCS | Mod: 51,,, | Performed by: OBSTETRICS & GYNECOLOGY

## 2023-11-15 PROCEDURE — 37000009 HC ANESTHESIA EA ADD 15 MINS: Performed by: OBSTETRICS & GYNECOLOGY

## 2023-11-15 PROCEDURE — 25000003 PHARM REV CODE 250: Performed by: STUDENT IN AN ORGANIZED HEALTH CARE EDUCATION/TRAINING PROGRAM

## 2023-11-15 PROCEDURE — 88309 TISSUE EXAM BY PATHOLOGIST: CPT | Performed by: PATHOLOGY

## 2023-11-15 PROCEDURE — 71000015 HC POSTOP RECOV 1ST HR: Performed by: OBSTETRICS & GYNECOLOGY

## 2023-11-15 PROCEDURE — 56620 VULVECTOMY SIMPLE PARTIAL: CPT | Mod: ,,, | Performed by: OBSTETRICS & GYNECOLOGY

## 2023-11-15 PROCEDURE — D9220A PRA ANESTHESIA: ICD-10-PCS | Mod: CRNA,,, | Performed by: STUDENT IN AN ORGANIZED HEALTH CARE EDUCATION/TRAINING PROGRAM

## 2023-11-15 PROCEDURE — 56620 PR PART SIMPLE REMV VULVA: ICD-10-PCS | Mod: ,,, | Performed by: OBSTETRICS & GYNECOLOGY

## 2023-11-15 PROCEDURE — 37000008 HC ANESTHESIA 1ST 15 MINUTES: Performed by: OBSTETRICS & GYNECOLOGY

## 2023-11-15 PROCEDURE — 63600175 PHARM REV CODE 636 W HCPCS: Performed by: STUDENT IN AN ORGANIZED HEALTH CARE EDUCATION/TRAINING PROGRAM

## 2023-11-15 RX ORDER — MEPERIDINE HYDROCHLORIDE 25 MG/ML
12.5 INJECTION INTRAMUSCULAR; INTRAVENOUS; SUBCUTANEOUS ONCE AS NEEDED
Status: DISCONTINUED | OUTPATIENT
Start: 2023-11-15 | End: 2023-11-15 | Stop reason: HOSPADM

## 2023-11-15 RX ORDER — PROPOFOL 10 MG/ML
VIAL (ML) INTRAVENOUS
Status: DISCONTINUED | OUTPATIENT
Start: 2023-11-15 | End: 2023-11-15

## 2023-11-15 RX ORDER — SODIUM CHLORIDE 0.9 % (FLUSH) 0.9 %
3 SYRINGE (ML) INJECTION
Status: DISCONTINUED | OUTPATIENT
Start: 2023-11-15 | End: 2023-11-15 | Stop reason: HOSPADM

## 2023-11-15 RX ORDER — DEXTROMETHORPHAN HYDROBROMIDE, GUAIFENESIN 5; 100 MG/5ML; MG/5ML
650 LIQUID ORAL EVERY 6 HOURS PRN
Qty: 30 TABLET | Refills: 0 | Status: SHIPPED | OUTPATIENT
Start: 2023-11-15

## 2023-11-15 RX ORDER — SULFAMETHOXAZOLE AND TRIMETHOPRIM 800; 160 MG/1; MG/1
1 TABLET ORAL 2 TIMES DAILY
Qty: 14 TABLET | Refills: 0 | Status: SHIPPED | OUTPATIENT
Start: 2023-11-15 | End: 2023-11-22

## 2023-11-15 RX ORDER — IBUPROFEN 600 MG/1
600 TABLET ORAL EVERY 6 HOURS PRN
Qty: 30 TABLET | Refills: 0 | Status: SHIPPED | OUTPATIENT
Start: 2023-11-15

## 2023-11-15 RX ORDER — DEXAMETHASONE SODIUM PHOSPHATE 4 MG/ML
INJECTION, SOLUTION INTRA-ARTICULAR; INTRALESIONAL; INTRAMUSCULAR; INTRAVENOUS; SOFT TISSUE
Status: DISCONTINUED | OUTPATIENT
Start: 2023-11-15 | End: 2023-11-15

## 2023-11-15 RX ORDER — HYDROMORPHONE HYDROCHLORIDE 2 MG/ML
0.4 INJECTION, SOLUTION INTRAMUSCULAR; INTRAVENOUS; SUBCUTANEOUS EVERY 5 MIN PRN
Status: DISCONTINUED | OUTPATIENT
Start: 2023-11-15 | End: 2023-11-15 | Stop reason: HOSPADM

## 2023-11-15 RX ORDER — SODIUM CHLORIDE 9 MG/ML
INJECTION, SOLUTION INTRAVENOUS CONTINUOUS
Status: DISCONTINUED | OUTPATIENT
Start: 2023-11-15 | End: 2023-11-15 | Stop reason: HOSPADM

## 2023-11-15 RX ORDER — DIPHENHYDRAMINE HCL 25 MG
25 CAPSULE ORAL EVERY 4 HOURS PRN
Status: CANCELLED | OUTPATIENT
Start: 2023-11-15

## 2023-11-15 RX ORDER — MUPIROCIN 20 MG/G
OINTMENT TOPICAL
Status: DISCONTINUED | OUTPATIENT
Start: 2023-11-15 | End: 2023-11-15 | Stop reason: HOSPADM

## 2023-11-15 RX ORDER — LIDOCAINE HYDROCHLORIDE 20 MG/ML
INJECTION INTRAVENOUS
Status: DISCONTINUED | OUTPATIENT
Start: 2023-11-15 | End: 2023-11-15

## 2023-11-15 RX ORDER — DIPHENHYDRAMINE HYDROCHLORIDE 50 MG/ML
25 INJECTION INTRAMUSCULAR; INTRAVENOUS EVERY 4 HOURS PRN
Status: CANCELLED | OUTPATIENT
Start: 2023-11-15

## 2023-11-15 RX ORDER — CEFAZOLIN SODIUM 1 G/3ML
INJECTION, POWDER, FOR SOLUTION INTRAMUSCULAR; INTRAVENOUS
Status: DISCONTINUED | OUTPATIENT
Start: 2023-11-15 | End: 2023-11-15

## 2023-11-15 RX ORDER — ONDANSETRON 2 MG/ML
INJECTION INTRAMUSCULAR; INTRAVENOUS
Status: DISCONTINUED | OUTPATIENT
Start: 2023-11-15 | End: 2023-11-15

## 2023-11-15 RX ORDER — OXYCODONE HYDROCHLORIDE 5 MG/1
5 TABLET ORAL
Status: DISCONTINUED | OUTPATIENT
Start: 2023-11-15 | End: 2023-11-15 | Stop reason: HOSPADM

## 2023-11-15 RX ORDER — OXYCODONE HYDROCHLORIDE 5 MG/1
5 TABLET ORAL EVERY 4 HOURS PRN
Qty: 15 TABLET | Refills: 0 | Status: SHIPPED | OUTPATIENT
Start: 2023-11-15

## 2023-11-15 RX ORDER — ONDANSETRON 8 MG/1
8 TABLET, ORALLY DISINTEGRATING ORAL ONCE
Status: COMPLETED | OUTPATIENT
Start: 2023-11-15 | End: 2023-11-15

## 2023-11-15 RX ORDER — ALBUTEROL SULFATE 2.5 MG/.5ML
2.5 SOLUTION RESPIRATORY (INHALATION)
Status: COMPLETED | OUTPATIENT
Start: 2023-11-15 | End: 2023-11-15

## 2023-11-15 RX ORDER — PROCHLORPERAZINE EDISYLATE 5 MG/ML
5 INJECTION INTRAMUSCULAR; INTRAVENOUS EVERY 30 MIN PRN
Status: DISCONTINUED | OUTPATIENT
Start: 2023-11-15 | End: 2023-11-15 | Stop reason: HOSPADM

## 2023-11-15 RX ORDER — FENTANYL CITRATE 50 UG/ML
INJECTION, SOLUTION INTRAMUSCULAR; INTRAVENOUS
Status: DISCONTINUED | OUTPATIENT
Start: 2023-11-15 | End: 2023-11-15

## 2023-11-15 RX ORDER — LIDOCAINE HYDROCHLORIDE 10 MG/ML
1 INJECTION, SOLUTION EPIDURAL; INFILTRATION; INTRACAUDAL; PERINEURAL ONCE
Status: DISCONTINUED | OUTPATIENT
Start: 2023-11-15 | End: 2023-11-15 | Stop reason: HOSPADM

## 2023-11-15 RX ADMIN — FENTANYL CITRATE 50 MCG: 50 INJECTION, SOLUTION INTRAMUSCULAR; INTRAVENOUS at 11:11

## 2023-11-15 RX ADMIN — ONDANSETRON 8 MG: 8 TABLET, ORALLY DISINTEGRATING ORAL at 02:11

## 2023-11-15 RX ADMIN — HYDROMORPHONE HYDROCHLORIDE 0.4 MG: 2 INJECTION INTRAMUSCULAR; INTRAVENOUS; SUBCUTANEOUS at 01:11

## 2023-11-15 RX ADMIN — LIDOCAINE HYDROCHLORIDE 60 MG: 20 INJECTION, SOLUTION INTRAVENOUS at 11:11

## 2023-11-15 RX ADMIN — MUPIROCIN: 20 OINTMENT TOPICAL at 10:11

## 2023-11-15 RX ADMIN — CEFAZOLIN 2 G: 330 INJECTION, POWDER, FOR SOLUTION INTRAMUSCULAR; INTRAVENOUS at 11:11

## 2023-11-15 RX ADMIN — HYDROMORPHONE HYDROCHLORIDE 0.4 MG: 2 INJECTION INTRAMUSCULAR; INTRAVENOUS; SUBCUTANEOUS at 12:11

## 2023-11-15 RX ADMIN — OXYCODONE HYDROCHLORIDE 5 MG: 5 TABLET ORAL at 01:11

## 2023-11-15 RX ADMIN — DEXAMETHASONE SODIUM PHOSPHATE 4 MG: 4 INJECTION, SOLUTION INTRAMUSCULAR; INTRAVENOUS at 11:11

## 2023-11-15 RX ADMIN — Medication 200 MG: at 11:11

## 2023-11-15 RX ADMIN — SODIUM CHLORIDE, SODIUM LACTATE, POTASSIUM CHLORIDE, AND CALCIUM CHLORIDE: .6; .31; .03; .02 INJECTION, SOLUTION INTRAVENOUS at 11:11

## 2023-11-15 RX ADMIN — ALBUTEROL SULFATE 2.5 MG: 2.5 SOLUTION RESPIRATORY (INHALATION) at 10:11

## 2023-11-15 RX ADMIN — ONDANSETRON HYDROCHLORIDE 4 MG: 2 INJECTION INTRAMUSCULAR; INTRAVENOUS at 11:11

## 2023-11-15 RX ADMIN — FENTANYL CITRATE 50 MCG: 50 INJECTION, SOLUTION INTRAMUSCULAR; INTRAVENOUS at 12:11

## 2023-11-15 NOTE — ANESTHESIA PROCEDURE NOTES
Intubation    Date/Time: 11/15/2023 11:41 AM    Performed by: Jimmy Lewis CRNA  Authorized by: Ford Vyas MD    Intubation:     Induction:  Intravenous    Intubated:  Postinduction    Mask Ventilation:  Not attempted    Attempts:  1    Attempted By:  CRNA    Difficult Airway Encountered?: No      Complications:  None    Airway Device:  Supraglottic airway/LMA    Airway Device Size:  4.0    Placement Verified By:  Capnometry    Complicating Factors:  None    Findings Post-Intubation:  BS equal bilateral and atraumatic/condition of teeth unchanged

## 2023-11-15 NOTE — TRANSFER OF CARE
Anesthesia Transfer of Care Note    Patient: Jm Newton    Procedure(s) Performed: Procedure(s) (LRB):  EXCISION, CYST, BARTHOLIN'S GLAND (Right)  VULVECTOMY, PARTIAL (N/A)    Patient location: PACU    Anesthesia Type: general    Transport from OR: Transported from OR on room air with adequate spontaneous ventilation    Post pain: adequate analgesia    Post assessment: no apparent anesthetic complications and tolerated procedure well    Post vital signs: stable    Level of consciousness: awake and responds to stimulation    Nausea/Vomiting: no nausea/vomiting    Complications: none    Transfer of care protocol was followed      Last vitals: Visit Vitals  /69   Pulse 66   Temp 36.4 °C (97.5 °F)   Resp 16   LMP 10/02/2019   SpO2 99%   Breastfeeding No

## 2023-11-15 NOTE — DISCHARGE SUMMARY
[facility] - Surgery ([place])  Brief Operative Note     SUMMARY     Surgery Date: 11/15/2023     Surgeon(s) and Role:     * Martín Newberry MD - Primary     * Светлана Warren MD - Resident - Assisting    Pre-op Diagnosis:  Bartholin gland cyst [N75.0]    Post-op Diagnosis:  Post-Op Diagnosis Codes:     * Bartholin gland cyst [N75.0]    Procedure(s) (LRB):  EXCISION, CYST, BARTHOLIN'S GLAND (Right)  VULVECTOMY, PARTIAL (N/A)    Anesthesia: Choice    Findings/Key Components: See operative note    Estimated Blood Loss: 20cc    UOP: 250cc    Complications: none         Specimens:   Specimen (24h ago, onward)       Start     Ordered    11/15/23 1242  Specimen to Pathology, Surgery Gynecology and Obstetrics  Once        Comments: Pre-op Diagnosis: Bartholin gland cyst [N75.0]Procedure(s):EXCISION, CYST, BARTHOLIN'S GLANDVULVECTOMY, PARTIAL Number of specimens: 1Name of specimens: 1) right vulvar excision biopsy stitch at 1200     References:    Click here for ordering Quick Tip   Question Answer Comment   Procedure Type: Gynecology and Obstetrics    Specimen Class: Routine/Screening    Which provider would you like to cc? MARTÍN NEWBERRY    Release to patient Immediate        11/15/23 1242    Signed and Held  Specimen to Pathology, Surgery Gynecology and Obstetrics  Once        Comments: Pre-op Diagnosis: Bartholin gland cyst [N75.0]Procedure(s):EXCISION, CYST, BARTHOLIN'S GLANDVULVECTOMY, PARTIAL Number of specimens: 1Name of specimens: 1) right vulvar excision biopsy, stitch at 1200     References:    Click here for ordering Quick Tip   Question Answer Comment   Procedure Type: Gynecology and Obstetrics    Specimen Class: Routine/Screening    Which provider would you like to cc? MARTÍN NEWBERRY    Release to patient Immediate        Signed and Held                    Discharge Note    SUMMARY     Admit Date: 11/15/2023    Discharge Date:  11/15/2023 3:00 PM    Hospital Course   Patient was admitted for the above  mentioned procedure.  Procedure was performed without difficulty.  Please see operative report for further details.  She was transferred to recovery in stable condition and discharged home the same day.      Final Diagnosis: Post-Op Diagnosis Codes:     * Bartholin gland cyst [N75.0]    Disposition: Home or Self Care    Follow Up/Patient Instructions: see below    Medications:  Reconciled Home Medications:      Medication List        START taking these medications      acetaminophen 650 MG Tbsr  Commonly known as: TYLENOL  Take 1 tablet (650 mg total) by mouth every 6 (six) hours as needed. Alternate between ibuprofen and tylenol every 3 hours. For example: @0800: ibuprofen 600mg @1100: tylenol 650mg @1400: ibuprofen 600mg @1700: tylenol 650 mg @2000: ibuprofen 600mg     ibuprofen 600 MG tablet  Commonly known as: ADVIL,MOTRIN  Take 1 tablet (600 mg total) by mouth every 6 (six) hours as needed for Pain. Alternate between ibuprofen and tylenol every 3 hours. For example: @0800: ibuprofen 600mg @1100: tylenol 650mg @1400: ibuprofen 600mg @1700: tylenol 650 mg @2000: ibuprofen 600mg     oxyCODONE 5 MG immediate release tablet  Commonly known as: ROXICODONE  Take 1 tablet (5 mg total) by mouth every 4 (four) hours as needed for Pain.     sulfamethoxazole-trimethoprim 800-160mg 800-160 mg Tab  Commonly known as: BACTRIM DS  Take 1 tablet by mouth 2 (two) times daily. for 7 days            CONTINUE taking these medications      albuterol 90 mcg/actuation inhaler  Commonly known as: PROVENTIL/VENTOLIN HFA  Inhale 2 puffs into the lungs every 4 (four) hours as needed.     budesonide-formoterol 160-4.5 mcg 160-4.5 mcg/actuation Hfaa  Commonly known as: SYMBICORT  Inhale 2 puffs into the lungs every 12 (twelve) hours. Controller     butalbital-acetaminophen-caffeine -40 mg -40 mg per tablet  Commonly known as: FIORICET, ESGIC  Take 1 tablet by mouth every 4 to 6 hours as needed.     cetirizine 10 MG  tablet  Commonly known as: ZYRTEC  Take 10 mg by mouth every evening.     DESCOVY 200-25 mg Tab  Generic drug: emtricitabine-tenofovir alafen  once daily.     gabapentin 300 MG capsule  Commonly known as: NEURONTIN  Take 1 capsule (300 mg total) by mouth every evening.     hydrOXYzine HCL 25 MG tablet  Commonly known as: ATARAX  1 tablet every evening.     ketorolac 0.5% 0.5 % Drop  Commonly known as: ACULAR  INSTILL 1 DROP INTO BOTH EYES FOUR TIMES DAILY FOR INFLAMMATION     methIMAzole 5 MG Tab  Commonly known as: TAPAZOLE  Take half a tablet (2.5 mg) by mouth daily     OPTICHAMBER HERBERT LG MASK MISC  U UTD BID     PREZCOBIX 800-150 mg-mg Tab tablet  Generic drug: darunavir-cobicistat  Take 1 tablet by mouth.     RESTASIS 0.05 % ophthalmic emulsion  Generic drug: cycloSPORINE  INSTILL 1 DROP INTO BOTH EYES TWICE DAILY FOR DRY EYE     tramadol-acetaminophen 37.5-325 mg 37.5-325 mg Tab  Commonly known as: ULTRACET  SMARTSI Tablet(s) By Mouth Every 8-12 Hours PRN     triamcinolone acetonide 0.1% 0.1 % cream  Commonly known as: KENALOG  Apply topically 2 (two) times daily.            Discharge Procedure Orders   Diet general     Pelvic Rest   Order Comments: Nothing in the vagina (tampons, douching, diva cups or intercourse) until cleared by Dr. Worrell.     Call MD for:  temperature >100.4     Call MD for:  persistent nausea and vomiting     Call MD for:  severe uncontrolled pain     Call MD for:  difficulty breathing, headache or visual disturbances     Call MD for:  redness, tenderness, or signs of infection (pain, swelling, redness, odor or green/yellow discharge around incision site)     Call MD for:  hives     Call MD for:  persistent dizziness or light-headedness     Call MD for:  extreme fatigue     Call MD for:   Order Comments: Heavy vaginal bleeding saturating more than 1 maxi pad per hour     Wound care routine (specify)   Order Comments: Wound care routine: Use ice packs for 24 hours, then sitz baths  twice daily      Follow-up Information       Chantal Worrell MD Follow up.    Specialties: Obstetrics, Obstetrics and Gynecology  Why: Post-op visit  Contact information:  [address] [id number] [phone]                             Светлана Warren MD  [facility]   OBGYN PGY2

## 2023-11-15 NOTE — OP NOTE
Operative Report    Procedure:   Wide Local Excision  Marsupialization of Bartholin's Gland Cyst     Date of Surgery: 11/15/2023    Pre-Op Diagnosis:   1. Vulvar dysplasia, GREG 2  2. Bartholin's gland cyst    Post-op Diagnosis:  Same    Complications: none    EBL: 20 cc     Urine Output: 250 cc via in/out cath prior to start of procedure     Specimen: Right vulvar excisional biopsy stitch at 12 o'clock    Findings: Findings/Key Components: Approximate 2-3cm raised hypopigmented lesion on the right vulva just lateral to the perineum. Lesion excised in entirety. Right bartholin's gland cyst incised and clear fluid drained. Marsupialization performed using interrupted sutures of 2-0 Vicryl. Hemostasis noted at close of case. Specimen to pathology.    DESCRIPTION OF PROCEDURE:   The patient was taken to the operating room, where general anesthesia was obtained without difficulty. The patient was prepped and draped in the normal sterile fashion in the dorsal lithotomy position using yellowfin stirrups. Bladder drained with in and out catheter. A 2-3cm raised hypopigmented lesion was noted on the right vulva just lateral to the perineum. An elliptical incision was made around the lesion with the scalpel, taking care that the margins were free of visible disease. The area was excised in its entirety with assistance of Allis clamps and electrocautery. The base of the excised area noted to be hemostatic prior to closing the defect with 2-0 vicryl interrupted sutures.     Attention was then turned to the patient's right bartholin's gland where a 4cm bartholin's gland cyst was palpated. A 15 blade was used to made a 2cm vertical incision on the skin overlying the cyst. Once the cyst wall was identified, an incision was made on the cyst wall and a moderate amount of clear fluid was drained. Suction was used to thoroughly evacuate the fluid. The Bartholin gland cavity was then copiously irrigated with sterile saline. Once this  was performed, then marsupialization was accomplished with several interrupted stitches of  2-0 Vicryl. Bovie cautery was used to ensure adequate hemostasis.     Sponge and instrument correct was correct x 2. The patient was transferred to the stretcher and taken to the postanesthesia recovery unit in good condition. There were no complications associated with this procedure          Светлана Warren MD  [facility]   OBGYN PGY2

## 2023-11-15 NOTE — ANESTHESIA POSTPROCEDURE EVALUATION
Anesthesia Post Evaluation    Patient: Jm Newton    Procedure(s) Performed: Procedure(s) (LRB):  EXCISION, CYST, BARTHOLIN'S GLAND (Right)  VULVECTOMY, PARTIAL (N/A)    Final Anesthesia Type: general      Patient location during evaluation: PACU  Patient participation: Yes- Able to Participate  Level of consciousness: awake and alert  Post-procedure vital signs: reviewed and stable  Pain management: adequate  Airway patency: patent  OMAYRA mitigation strategies: Extubation while patient is awake  PONV status at discharge: No PONV  Anesthetic complications: no      Cardiovascular status: hemodynamically stable  Respiratory status: unassisted  Hydration status: euvolemic  Follow-up not needed.          Vitals Value Taken Time   /62 11/15/23 1330   Temp 36.2 °C (97.2 °F) 11/15/23 1237   Pulse 68 11/15/23 1330   Resp 16 11/15/23 1330   SpO2 93 % 11/15/23 1330   Vitals shown include unvalidated device data.      No case tracking events are documented in the log.      Pain/Katherine Score: Pain Rating Prior to Med Admin: 9 (11/15/2023  1:00 PM)  Pain Rating Post Med Admin: 5 (11/15/2023  1:08 PM)  Katherine Score: 9 (11/15/2023  1:07 PM)

## 2023-11-15 NOTE — OR NURSING
VSS on RA. Pain is tolerable per pt. Peripad, CDI. PIV CDI. Meets PACU discharge criteria. Ready for transfer to phase II. Family notified.

## 2023-11-15 NOTE — PLAN OF CARE
Jm Newton has met all discharge criteria from Phase II. Vital Signs are stable, ambulating  without difficulty. Discharge instructions given, patient verbalized understanding. Discharged from facility via wheelchair in stable condition.

## 2023-11-15 NOTE — BRIEF OP NOTE
BRIEF OPERATIVE NOTE       SUMMARY      Surgery Date: 11/15/2023     SURGEON: Chantal Worrell    ASSISTANT: Светлана Warren MD    PREOP DIAGNOSIS: 1) GRGE II  2) Bartholin's gland cyst    POSTOP DIAGNOSIS:  same    PROCEDURES: 1) Wide local excision   2) marsupialization of bartholin's gland cyst    ANESTHESIA: general    FINDINGS/KEY COMPONENTS: 3cm bartholin's gland cyst  3 cm area of hypopigmentation noted on perineum    ESTIMATED BLOOD LOSS: 20cc    COMPLICATIONS: none    PATHOLOGY:   Specimens (From admission, onward)      Wide local excision

## 2023-11-16 VITALS
TEMPERATURE: 99 F | OXYGEN SATURATION: 96 % | HEART RATE: 63 BPM | SYSTOLIC BLOOD PRESSURE: 115 MMHG | RESPIRATION RATE: 17 BRPM | DIASTOLIC BLOOD PRESSURE: 66 MMHG

## 2023-11-20 ENCOUNTER — TELEPHONE (OUTPATIENT)
Dept: OBSTETRICS AND GYNECOLOGY | Facility: CLINIC | Age: 38
End: 2023-11-20
Payer: MEDICARE

## 2023-11-20 LAB
FINAL PATHOLOGIC DIAGNOSIS: ABNORMAL
GROSS: ABNORMAL
Lab: ABNORMAL

## 2023-11-20 NOTE — TELEPHONE ENCOUNTER
----- Message from Chantal Worrell MD sent at 11/20/2023  2:26 PM CST -----  Yes.  ----- Message -----  From: Herman Osullivan MA  Sent: 11/20/2023  10:13 AM CST  To: Chantal Worrell MD    Pt would like to know if she can return to work tomorrow? Please advise.

## 2023-11-21 ENCOUNTER — TELEPHONE (OUTPATIENT)
Dept: OBSTETRICS AND GYNECOLOGY | Facility: CLINIC | Age: 38
End: 2023-11-21
Payer: MEDICARE

## 2023-11-21 ENCOUNTER — TELEPHONE (OUTPATIENT)
Dept: PULMONOLOGY | Facility: CLINIC | Age: 38
End: 2023-11-21
Payer: MEDICARE

## 2023-11-21 DIAGNOSIS — R06.02 SOB (SHORTNESS OF BREATH): Primary | ICD-10-CM

## 2023-11-21 NOTE — TELEPHONE ENCOUNTER
Returned call to discuss pathology showing GREG III with focally positive margins from 12-3.  Will colpo at 6 weeks to decide about re-excision.

## 2023-11-28 ENCOUNTER — OFFICE VISIT (OUTPATIENT)
Dept: OBSTETRICS AND GYNECOLOGY | Facility: CLINIC | Age: 38
End: 2023-11-28
Attending: OBSTETRICS & GYNECOLOGY
Payer: MEDICARE

## 2023-11-28 VITALS
BODY MASS INDEX: 35.89 KG/M2 | HEIGHT: 66 IN | WEIGHT: 223.31 LBS | SYSTOLIC BLOOD PRESSURE: 120 MMHG | DIASTOLIC BLOOD PRESSURE: 72 MMHG | HEART RATE: 71 BPM

## 2023-11-28 DIAGNOSIS — N75.0 BARTHOLIN CYST: Primary | ICD-10-CM

## 2023-11-28 DIAGNOSIS — T81.31XA POSTOPERATIVE WOUND DEHISCENCE, INITIAL ENCOUNTER: ICD-10-CM

## 2023-11-28 DIAGNOSIS — D07.1 VIN III (VULVAR INTRAEPITHELIAL NEOPLASIA III): ICD-10-CM

## 2023-11-28 PROCEDURE — 3008F BODY MASS INDEX DOCD: CPT | Mod: CPTII,S$GLB,, | Performed by: OBSTETRICS & GYNECOLOGY

## 2023-11-28 PROCEDURE — 1160F PR REVIEW ALL MEDS BY PRESCRIBER/CLIN PHARMACIST DOCUMENTED: ICD-10-PCS | Mod: CPTII,S$GLB,, | Performed by: OBSTETRICS & GYNECOLOGY

## 2023-11-28 PROCEDURE — 99999 PR PBB SHADOW E&M-EST. PATIENT-LVL III: ICD-10-PCS | Mod: PBBFAC,,, | Performed by: OBSTETRICS & GYNECOLOGY

## 2023-11-28 PROCEDURE — 3078F DIAST BP <80 MM HG: CPT | Mod: CPTII,S$GLB,, | Performed by: OBSTETRICS & GYNECOLOGY

## 2023-11-28 PROCEDURE — 99024 PR POST-OP FOLLOW-UP VISIT: ICD-10-PCS | Mod: S$GLB,,, | Performed by: OBSTETRICS & GYNECOLOGY

## 2023-11-28 PROCEDURE — 99024 POSTOP FOLLOW-UP VISIT: CPT | Mod: S$GLB,,, | Performed by: OBSTETRICS & GYNECOLOGY

## 2023-11-28 PROCEDURE — 3078F PR MOST RECENT DIASTOLIC BLOOD PRESSURE < 80 MM HG: ICD-10-PCS | Mod: CPTII,S$GLB,, | Performed by: OBSTETRICS & GYNECOLOGY

## 2023-11-28 PROCEDURE — 3008F PR BODY MASS INDEX (BMI) DOCUMENTED: ICD-10-PCS | Mod: CPTII,S$GLB,, | Performed by: OBSTETRICS & GYNECOLOGY

## 2023-11-28 PROCEDURE — 3074F PR MOST RECENT SYSTOLIC BLOOD PRESSURE < 130 MM HG: ICD-10-PCS | Mod: CPTII,S$GLB,, | Performed by: OBSTETRICS & GYNECOLOGY

## 2023-11-28 PROCEDURE — 1159F PR MEDICATION LIST DOCUMENTED IN MEDICAL RECORD: ICD-10-PCS | Mod: CPTII,S$GLB,, | Performed by: OBSTETRICS & GYNECOLOGY

## 2023-11-28 PROCEDURE — 3074F SYST BP LT 130 MM HG: CPT | Mod: CPTII,S$GLB,, | Performed by: OBSTETRICS & GYNECOLOGY

## 2023-11-28 PROCEDURE — 1160F RVW MEDS BY RX/DR IN RCRD: CPT | Mod: CPTII,S$GLB,, | Performed by: OBSTETRICS & GYNECOLOGY

## 2023-11-28 PROCEDURE — 99999 PR PBB SHADOW E&M-EST. PATIENT-LVL III: CPT | Mod: PBBFAC,,, | Performed by: OBSTETRICS & GYNECOLOGY

## 2023-11-28 PROCEDURE — 1159F MED LIST DOCD IN RCRD: CPT | Mod: CPTII,S$GLB,, | Performed by: OBSTETRICS & GYNECOLOGY

## 2023-11-28 RX ORDER — MUPIROCIN 20 MG/G
OINTMENT TOPICAL 3 TIMES DAILY
Qty: 15 G | Refills: 0 | Status: SHIPPED | OUTPATIENT
Start: 2023-11-28

## 2023-11-28 RX ORDER — SULFAMETHOXAZOLE AND TRIMETHOPRIM 400; 80 MG/1; MG/1
1 TABLET ORAL 2 TIMES DAILY
Qty: 14 TABLET | Refills: 0 | Status: SHIPPED | OUTPATIENT
Start: 2023-11-28 | End: 2023-12-05

## 2023-12-13 ENCOUNTER — OFFICE VISIT (OUTPATIENT)
Dept: OBSTETRICS AND GYNECOLOGY | Facility: CLINIC | Age: 38
End: 2023-12-13
Attending: OBSTETRICS & GYNECOLOGY
Payer: MEDICARE

## 2023-12-13 ENCOUNTER — HOSPITAL ENCOUNTER (OUTPATIENT)
Dept: PULMONOLOGY | Facility: CLINIC | Age: 38
Discharge: HOME | End: 2023-12-13
Payer: MEDICARE

## 2023-12-13 ENCOUNTER — OFFICE VISIT (OUTPATIENT)
Dept: PULMONOLOGY | Facility: CLINIC | Age: 38
End: 2023-12-13
Payer: MEDICARE

## 2023-12-13 VITALS
BODY MASS INDEX: 35.79 KG/M2 | WEIGHT: 222.69 LBS | HEIGHT: 66 IN | SYSTOLIC BLOOD PRESSURE: 110 MMHG | DIASTOLIC BLOOD PRESSURE: 74 MMHG

## 2023-12-13 VITALS
OXYGEN SATURATION: 99 % | DIASTOLIC BLOOD PRESSURE: 76 MMHG | WEIGHT: 224.63 LBS | SYSTOLIC BLOOD PRESSURE: 124 MMHG | HEART RATE: 70 BPM | HEIGHT: 67 IN | BODY MASS INDEX: 35.26 KG/M2

## 2023-12-13 DIAGNOSIS — R06.02 SOB (SHORTNESS OF BREATH): ICD-10-CM

## 2023-12-13 DIAGNOSIS — D07.1 VIN III (VULVAR INTRAEPITHELIAL NEOPLASIA III): ICD-10-CM

## 2023-12-13 DIAGNOSIS — J45.40 MODERATE PERSISTENT ASTHMA, UNSPECIFIED WHETHER COMPLICATED: ICD-10-CM

## 2023-12-13 DIAGNOSIS — T81.31XA POSTOPERATIVE WOUND DEHISCENCE, INITIAL ENCOUNTER: Primary | ICD-10-CM

## 2023-12-13 PROCEDURE — 3078F PR MOST RECENT DIASTOLIC BLOOD PRESSURE < 80 MM HG: ICD-10-PCS | Mod: CPTII,S$GLB,, | Performed by: INTERNAL MEDICINE

## 2023-12-13 PROCEDURE — 1160F RVW MEDS BY RX/DR IN RCRD: CPT | Mod: CPTII,S$GLB,, | Performed by: INTERNAL MEDICINE

## 2023-12-13 PROCEDURE — 94727 PR PULM FUNCTION TEST BY GAS: ICD-10-PCS | Mod: S$GLB,,, | Performed by: INTERNAL MEDICINE

## 2023-12-13 PROCEDURE — 99214 PR OFFICE/OUTPT VISIT, EST, LEVL IV, 30-39 MIN: ICD-10-PCS | Mod: 25,S$GLB,, | Performed by: INTERNAL MEDICINE

## 2023-12-13 PROCEDURE — 3078F PR MOST RECENT DIASTOLIC BLOOD PRESSURE < 80 MM HG: ICD-10-PCS | Mod: CPTII,S$GLB,, | Performed by: OBSTETRICS & GYNECOLOGY

## 2023-12-13 PROCEDURE — 3078F DIAST BP <80 MM HG: CPT | Mod: CPTII,S$GLB,, | Performed by: INTERNAL MEDICINE

## 2023-12-13 PROCEDURE — 1159F MED LIST DOCD IN RCRD: CPT | Mod: CPTII,S$GLB,, | Performed by: OBSTETRICS & GYNECOLOGY

## 2023-12-13 PROCEDURE — 3008F BODY MASS INDEX DOCD: CPT | Mod: CPTII,S$GLB,, | Performed by: OBSTETRICS & GYNECOLOGY

## 2023-12-13 PROCEDURE — 99999 PR PBB SHADOW E&M-EST. PATIENT-LVL III: ICD-10-PCS | Mod: PBBFAC,,, | Performed by: OBSTETRICS & GYNECOLOGY

## 2023-12-13 PROCEDURE — 3074F SYST BP LT 130 MM HG: CPT | Mod: CPTII,S$GLB,, | Performed by: OBSTETRICS & GYNECOLOGY

## 2023-12-13 PROCEDURE — 1159F PR MEDICATION LIST DOCUMENTED IN MEDICAL RECORD: ICD-10-PCS | Mod: CPTII,S$GLB,, | Performed by: INTERNAL MEDICINE

## 2023-12-13 PROCEDURE — 1160F PR REVIEW ALL MEDS BY PRESCRIBER/CLIN PHARMACIST DOCUMENTED: ICD-10-PCS | Mod: CPTII,S$GLB,, | Performed by: INTERNAL MEDICINE

## 2023-12-13 PROCEDURE — 99999 PR PBB SHADOW E&M-EST. PATIENT-LVL IV: CPT | Mod: PBBFAC,,, | Performed by: INTERNAL MEDICINE

## 2023-12-13 PROCEDURE — 94727 GAS DIL/WSHOT DETER LNG VOL: CPT | Mod: S$GLB,,, | Performed by: INTERNAL MEDICINE

## 2023-12-13 PROCEDURE — 94729 PR C02/MEMBANE DIFFUSE CAPACITY: ICD-10-PCS | Mod: S$GLB,,, | Performed by: INTERNAL MEDICINE

## 2023-12-13 PROCEDURE — 3008F BODY MASS INDEX DOCD: CPT | Mod: CPTII,S$GLB,, | Performed by: INTERNAL MEDICINE

## 2023-12-13 PROCEDURE — 3074F PR MOST RECENT SYSTOLIC BLOOD PRESSURE < 130 MM HG: ICD-10-PCS | Mod: CPTII,S$GLB,, | Performed by: INTERNAL MEDICINE

## 2023-12-13 PROCEDURE — 99214 OFFICE O/P EST MOD 30 MIN: CPT | Mod: 25,S$GLB,, | Performed by: INTERNAL MEDICINE

## 2023-12-13 PROCEDURE — 94010 BREATHING CAPACITY TEST: CPT | Mod: S$GLB,,, | Performed by: INTERNAL MEDICINE

## 2023-12-13 PROCEDURE — 99999 PR PBB SHADOW E&M-EST. PATIENT-LVL III: CPT | Mod: PBBFAC,,, | Performed by: OBSTETRICS & GYNECOLOGY

## 2023-12-13 PROCEDURE — 1159F PR MEDICATION LIST DOCUMENTED IN MEDICAL RECORD: ICD-10-PCS | Mod: CPTII,S$GLB,, | Performed by: OBSTETRICS & GYNECOLOGY

## 2023-12-13 PROCEDURE — 3078F DIAST BP <80 MM HG: CPT | Mod: CPTII,S$GLB,, | Performed by: OBSTETRICS & GYNECOLOGY

## 2023-12-13 PROCEDURE — 94729 DIFFUSING CAPACITY: CPT | Mod: S$GLB,,, | Performed by: INTERNAL MEDICINE

## 2023-12-13 PROCEDURE — 94010 BREATHING CAPACITY TEST: ICD-10-PCS | Mod: S$GLB,,, | Performed by: INTERNAL MEDICINE

## 2023-12-13 PROCEDURE — 1160F RVW MEDS BY RX/DR IN RCRD: CPT | Mod: CPTII,S$GLB,, | Performed by: OBSTETRICS & GYNECOLOGY

## 2023-12-13 PROCEDURE — 1159F MED LIST DOCD IN RCRD: CPT | Mod: CPTII,S$GLB,, | Performed by: INTERNAL MEDICINE

## 2023-12-13 PROCEDURE — 3074F PR MOST RECENT SYSTOLIC BLOOD PRESSURE < 130 MM HG: ICD-10-PCS | Mod: CPTII,S$GLB,, | Performed by: OBSTETRICS & GYNECOLOGY

## 2023-12-13 PROCEDURE — 99999 PR PBB SHADOW E&M-EST. PATIENT-LVL IV: ICD-10-PCS | Mod: PBBFAC,,, | Performed by: INTERNAL MEDICINE

## 2023-12-13 PROCEDURE — 3074F SYST BP LT 130 MM HG: CPT | Mod: CPTII,S$GLB,, | Performed by: INTERNAL MEDICINE

## 2023-12-13 PROCEDURE — 99024 PR POST-OP FOLLOW-UP VISIT: ICD-10-PCS | Mod: S$GLB,,, | Performed by: OBSTETRICS & GYNECOLOGY

## 2023-12-13 PROCEDURE — 1160F PR REVIEW ALL MEDS BY PRESCRIBER/CLIN PHARMACIST DOCUMENTED: ICD-10-PCS | Mod: CPTII,S$GLB,, | Performed by: OBSTETRICS & GYNECOLOGY

## 2023-12-13 PROCEDURE — 99024 POSTOP FOLLOW-UP VISIT: CPT | Mod: S$GLB,,, | Performed by: OBSTETRICS & GYNECOLOGY

## 2023-12-13 PROCEDURE — 3008F PR BODY MASS INDEX (BMI) DOCUMENTED: ICD-10-PCS | Mod: CPTII,S$GLB,, | Performed by: INTERNAL MEDICINE

## 2023-12-13 PROCEDURE — 3008F PR BODY MASS INDEX (BMI) DOCUMENTED: ICD-10-PCS | Mod: CPTII,S$GLB,, | Performed by: OBSTETRICS & GYNECOLOGY

## 2023-12-13 RX ORDER — ALBUTEROL SULFATE 90 UG/1
2 AEROSOL, METERED RESPIRATORY (INHALATION) EVERY 4 HOURS PRN
Qty: 18 G | Refills: 11 | Status: SHIPPED | OUTPATIENT
Start: 2023-12-13

## 2023-12-13 RX ORDER — FLUTICASONE PROPIONATE AND SALMETEROL 500; 50 UG/1; UG/1
1 POWDER RESPIRATORY (INHALATION) 2 TIMES DAILY
Qty: 180 EACH | Refills: 3 | Status: SHIPPED | OUTPATIENT
Start: 2023-12-13 | End: 2024-12-12

## 2023-12-13 RX ORDER — MONTELUKAST SODIUM 10 MG/1
10 TABLET ORAL NIGHTLY
Qty: 90 TABLET | Refills: 3 | Status: SHIPPED | OUTPATIENT
Start: 2023-12-13 | End: 2024-12-07

## 2023-12-13 NOTE — PROGRESS NOTES
Subjective:       Patient ID: Jm Newton is a 38 y.o. female.    Chief Complaint: Shortness of Breath, Wheezing, and Asthma    HPI:   Jm Netwon is a 38 y.o. female who presents with for follow up of asthma.  Last seen in March of 2023.  Switched to Symbicort at that visit.  Out of the inhaler for the last 8 weeks.    Needing her nebulizer 3-4 times/month  ED visit in October for asthma exacerbation.  Treated with a continuous neb and prednisone.  She reports 2-3 courses of prednisone in the last 12 months.     Generally she feels like her breathing is not as good as she would like.       March 2023_____________________________  Last prescribed Advair bid plus albuterol rescue.  Hussein's Rx'd Qvar and since starting monotherapy with ICS she's had more symptoms.    Had a URI in January - coughing, nasal congestion.  Since then she hasn't been able to get on track    2/8/23: ED visit for chest tightness and wheezing.   Received a continous neb and steroids.   Better, but still not at baseline.     History of HIV on ritonavir.     Initial History___________________________________  Seen in the [facility] ED in 12/10 with chest tightness and shortness of breath. She was albuterol and prednisone.  She completed the steroid course yesterday and states that her chest tightness is coming back.     Previously prescribed flovent, but has not taken flovent in the last year.  Using rescue inhaler 3-4 times/day.  Her asthma is triggered back: seasonal changes, cold weather, second hand smoke.      Never smoker  Telemetry monitor at   No family history of lung disease      Review of Systems   Constitutional:  Negative for fever, chills and activity change.   HENT:  Negative for postnasal drip, rhinorrhea, sinus pressure and congestion.    Respiratory:  Positive for cough, shortness of breath and wheezing. Negative for hemoptysis, asthma nighttime symptoms and dyspnea on extertion.     Cardiovascular:  Negative for chest pain and leg swelling.   Genitourinary:  Negative for difficulty urinating.   Endocrine:  Negative for cold intolerance and heat intolerance.    Musculoskeletal:  Negative for joint swelling and myalgias.   Gastrointestinal:  Negative for nausea, vomiting and abdominal pain.   Neurological:  Negative for headaches.   Hematological:  Negative for adenopathy. No excessive bruising.   Psychiatric/Behavioral:  Negative for confusion and sleep disturbance.          Social History     Tobacco Use    Smoking status: Never     Passive exposure: Never    Smokeless tobacco: Never   Substance Use Topics    Alcohol use: Yes     Alcohol/week: 0.0 standard drinks of alcohol     Comment: socially       Review of patient's allergies indicates:   Allergen Reactions    Azithromycin Swelling and Edema     Past Medical History:   Diagnosis Date    Asthma     Encounter for blood transfusion     Graves disease     HIV infection     dx     Hyperthyroidism in pregnancy, antepartum      Past Surgical History:   Procedure Laterality Date    BARTHOLIN GLAND CYST EXCISION Right 11/15/2023    Procedure: EXCISION, CYST, BARTHOLIN'S GLAND;  Surgeon: Chantal Worrell MD;  Location: [facility];  Service: OB/GYN;  Laterality: Right;    CERVICAL CERCLAGE      emergent with twins    CERVICAL CERCLAGE N/A 2019    Procedure: CERCLAGE, CERVIX;  Surgeon: Obey Henry MD;  Location: [facility];  Service: OB/GYN;  Laterality: N/A;     SECTION WITH TUBAL LIGATION N/A 6/15/2019    Procedure:  SECTION, WITH TUBAL LIGATION;  Surgeon: Madeline Walden MD;  Location: [facility];  Service: OB/GYN;  Laterality: N/A;     SECTION, CLASSIC      Last section with classical extention, from [facility]    COLD KNIFE CONIZATION OF CERVIX N/A 2018    Procedure: CONE BIOPSY, CERVIX, USING COLD KNIFE;  Surgeon: Chantal Worrell MD;  Location: [facility];  Service: OB/GYN;  Laterality: N/A;    CONIZATION OF  CERVIX USING LOOP ELECTROSURGICAL EXCISION PROCEDURE (LEEP) N/A 6/25/2018    Procedure: LEEP CONIZATION, CERVIX;  Surgeon: Chantal Worrell MD;  Location: [facility];  Service: OB/GYN;  Laterality: N/A;    CYSTOSCOPY N/A 10/22/2019    Procedure: CYSTOSCOPY;  Surgeon: Georgia Howard MD;  Location: [facility] OR;  Service: OB/GYN;  Laterality: N/A;    DILATION AND CURETTAGE OF UTERUS      PARTIAL VULVECTOMY N/A 11/15/2023    Procedure: VULVECTOMY, PARTIAL;  Surgeon: Chantal Worrell MD;  Location: [facility] OR;  Service: OB/GYN;  Laterality: N/A;    ROBOT-ASSISTED LAPAROSCOPIC ABDOMINAL HYSTERECTOMY USING DA GALINDO XI N/A 10/22/2019    Procedure: XI ROBOTIC HYSTERECTOMY;  Surgeon: Georgia Howard MD;  Location: [facility];  Service: OB/GYN;  Laterality: N/A;    ROBOT-ASSISTED SURGICAL REMOVAL OF FALLOPIAN TUBE USING DA GALINDO XI Bilateral 10/22/2019    Procedure: XI ROBOTIC SALPINGECTOMY;  Surgeon: Georgia Howard MD;  Location: [facility];  Service: OB/GYN;  Laterality: Bilateral;    THYROIDECTOMY Right 4/13/2021    Procedure: THYROIDECTOMY;  Surgeon: Maksim Sifuentes MD;  Location: [address];  Service: ENT;  Laterality: Right;  Right possible  total    WISDOM TOOTH EXTRACTION       Current Outpatient Medications on File Prior to Visit   Medication Sig    acetaminophen (TYLENOL) 650 MG TbSR Take 1 tablet (650 mg total) by mouth every 6 (six) hours as needed. Alternate between ibuprofen and tylenol every 3 hours. For example: @0800: ibuprofen 600mg @1100: tylenol 650mg @1400: ibuprofen 600mg @1700: tylenol 650 mg @2000: ibuprofen 600mg    albuterol (PROVENTIL/VENTOLIN HFA) 90 mcg/actuation inhaler Inhale 2 puffs into the lungs every 4 (four) hours as needed.    budesonide-formoterol 160-4.5 mcg (SYMBICORT) 160-4.5 mcg/actuation HFAA Inhale 2 puffs into the lungs every 12 (twelve) hours. Controller    butalbital-acetaminophen-caffeine -40 mg (FIORICET, ESGIC) -40 mg per tablet Take 1 tablet by mouth every 4 to 6 hours  "as needed.    cetirizine (ZYRTEC) 10 MG tablet Take 10 mg by mouth every evening.    DESCOVY 200-25 mg Tab once daily.     hydrOXYzine HCL (ATARAX) 25 MG tablet 1 tablet every evening.    ibuprofen (ADVIL,MOTRIN) 600 MG tablet Take 1 tablet (600 mg total) by mouth every 6 (six) hours as needed for Pain. Alternate between ibuprofen and tylenol every 3 hours. For example: @0800: ibuprofen 600mg @1100: tylenol 650mg @1400: ibuprofen 600mg @1700: tylenol 650 mg @2000: ibuprofen 600mg    inhaler,assist device,lg mask (OPTICHAMBER HERBERT LG MASK MISC) U UTD BID    ketorolac 0.5% (ACULAR) 0.5 % Drop INSTILL 1 DROP INTO BOTH EYES FOUR TIMES DAILY FOR INFLAMMATION    methIMAzole (TAPAZOLE) 5 MG Tab Take half a tablet (2.5 mg) by mouth daily    mupirocin (BACTROBAN) 2 % ointment Apply topically 3 (three) times daily.    oxyCODONE (ROXICODONE) 5 MG immediate release tablet Take 1 tablet (5 mg total) by mouth every 4 (four) hours as needed for Pain.    PREZCOBIX 800-150 mg-mg Tab tablet Take 1 tablet by mouth.    RESTASIS 0.05 % ophthalmic emulsion INSTILL 1 DROP INTO BOTH EYES TWICE DAILY FOR DRY EYE    tramadol-acetaminophen 37.5-325 mg (ULTRACET) 37.5-325 mg Tab SMARTSI Tablet(s) By Mouth Every 8-12 Hours PRN    triamcinolone acetonide 0.1% (KENALOG) 0.1 % cream Apply topically 2 (two) times daily.    gabapentin (NEURONTIN) 300 MG capsule Take 1 capsule (300 mg total) by mouth every evening.     Current Facility-Administered Medications on File Prior to Visit   Medication    famotidine tablet 20 mg       Objective:      Vitals:    23 1400   BP: 124/76   BP Location: Left arm   Patient Position: Sitting   Pulse: 70   SpO2: 99%   Weight: 101.9 kg (224 lb 10.4 oz)   Height: 5' 7" (1.702 m)         Physical Exam   Constitutional: She is oriented to person, place, and time. She appears well-developed and well-nourished.   HENT:   Head: Normocephalic.   Mouth/Throat: Mallampati Score: IV.   Neck: No tracheal deviation " present.   Cardiovascular: Normal rate and regular rhythm.   No murmur heard.  Pulmonary/Chest: Normal expansion, effort normal and breath sounds normal. She has no wheezes. She has no rales.   Abdominal: Soft. Bowel sounds are normal. She exhibits no distension. There is no abdominal tenderness.   Musculoskeletal:         General: No edema. Normal range of motion.      Cervical back: Normal range of motion and neck supple.   Neurological: She is alert and oriented to person, place, and time.   Skin: Skin is warm and dry.   Psychiatric: She has a normal mood and affect. Her behavior is normal. Judgment and thought content normal.   Vitals reviewed.    Personal Diagnostic Review  CXR: 12/10/20: [facility]  Assessment:     No orders of the defined types were placed in this encounter.    1. Moderate persistent asthma, unspecified whether complicated            Plan:         Problem List Items Addressed This Visit          Pulmonary    Moderate persistent asthma    Current Assessment & Plan     Switching to high dose Advair bid.  Continue albuterol prn  Start Singulair qpm.         Relevant Medications    fluticasone-salmeterol diskus inhaler 500-50 mcg    montelukast (SINGULAIR) 10 mg tablet    albuterol (PROVENTIL/VENTOLIN HFA) 90 mcg/actuation inhaler

## 2023-12-13 NOTE — PROGRESS NOTES
Subjective     Patient ID: Jm Newton is a 38 y.o. female.    Chief Complaint: Post-op Evaluation    HPI She is here for follow up of wound healing.  She denies complaints    Review of Systems  ROS:  GENERAL: No fever, chills, fatigability or weight loss.  VULVAR: No pain, no lesions and no itching.  VAGINAL: No relaxation, no itching, no discharge, no abnormal bleeding and no lesions.  ABDOMEN: No abdominal pain. Denies nausea. Denies vomiting. No diarrhea. No constipation  BREAST: Denies pain. No lumps. No discharge.  URINARY: No incontinence, no nocturia, no frequency and no dysuria.  CARDIOVASCULAR: No chest pain. No shortness of breath. No leg cramps.  NEUROLOGICAL: no headaches. No vision changes.        Objective     Physical Exam  Constitutional:       Appearance: Normal appearance.   HENT:      Head: Normocephalic and atraumatic.   Genitourinary:         Comments: Healing wound.  Pink granulation tissue.  No areas of purulence, necrosis, erythema  Neurological:      General: No focal deficit present.      Mental Status: She is alert and oriented to person, place, and time. Mental status is at baseline.   Psychiatric:         Mood and Affect: Mood normal.         Behavior: Behavior normal.         Thought Content: Thought content normal.         Judgment: Judgment normal.            Assessment and Plan     1. Postoperative wound dehiscence, initial encounter    2. GREG III (vulvar intraepithelial neoplasia III)        RTC 3 weeks.  Once all healed will need excision for positive margins.         No follow-ups on file.

## 2024-01-03 ENCOUNTER — TELEPHONE (OUTPATIENT)
Dept: PULMONOLOGY | Facility: CLINIC | Age: 39
End: 2024-01-03
Payer: MEDICARE

## 2024-01-03 ENCOUNTER — PATIENT MESSAGE (OUTPATIENT)
Dept: PULMONOLOGY | Facility: CLINIC | Age: 39
End: 2024-01-03
Payer: MEDICARE

## 2024-01-03 NOTE — TELEPHONE ENCOUNTER
Spoke with patient about the possible side effects she having from singular. Patient states she has been agitated and anxious since starting the singular with thoughts of harming others. Patient states she does not have a plan to hurt anyone but does not like the way it made her feel. Patient was advised to stop the singular and her message was sent to Dr. Mendez's in basket. Patient also advised to reach out to her PCP for a referral for a physiatrist or . Patient verbalized understanding.

## 2024-01-03 NOTE — TELEPHONE ENCOUNTER
I spoke with Ms Newton in regards to her RX. Patient stated that she wants her SINGULAIR change due to making her feel depress. I informed patient that I have sent Dr Mendez a message to review and advise. I advised patient to go to the nearest ER. I also advised patient if she have any questions or concerns to give the office a call back. Office number was provided. Patient verbalized understanding.

## 2024-01-04 ENCOUNTER — PATIENT MESSAGE (OUTPATIENT)
Dept: OBSTETRICS AND GYNECOLOGY | Facility: CLINIC | Age: 39
End: 2024-01-04

## 2024-01-04 ENCOUNTER — OFFICE VISIT (OUTPATIENT)
Dept: OBSTETRICS AND GYNECOLOGY | Facility: CLINIC | Age: 39
End: 2024-01-04
Attending: OBSTETRICS & GYNECOLOGY
Payer: MEDICARE

## 2024-01-04 ENCOUNTER — TELEPHONE (OUTPATIENT)
Dept: OBSTETRICS AND GYNECOLOGY | Facility: CLINIC | Age: 39
End: 2024-01-04

## 2024-01-04 VITALS — WEIGHT: 212.75 LBS | HEIGHT: 67 IN | BODY MASS INDEX: 33.39 KG/M2

## 2024-01-04 DIAGNOSIS — T81.31XA POSTOPERATIVE WOUND DEHISCENCE, INITIAL ENCOUNTER: Primary | ICD-10-CM

## 2024-01-04 DIAGNOSIS — D07.1 VIN III (VULVAR INTRAEPITHELIAL NEOPLASIA III): ICD-10-CM

## 2024-01-04 DIAGNOSIS — D07.1 VIN III (VULVAR INTRAEPITHELIAL NEOPLASIA III): Primary | ICD-10-CM

## 2024-01-04 PROCEDURE — 1160F RVW MEDS BY RX/DR IN RCRD: CPT | Mod: CPTII,S$GLB,, | Performed by: OBSTETRICS & GYNECOLOGY

## 2024-01-04 PROCEDURE — 99999 PR PBB SHADOW E&M-EST. PATIENT-LVL III: CPT | Mod: PBBFAC,,, | Performed by: OBSTETRICS & GYNECOLOGY

## 2024-01-04 PROCEDURE — 1159F MED LIST DOCD IN RCRD: CPT | Mod: CPTII,S$GLB,, | Performed by: OBSTETRICS & GYNECOLOGY

## 2024-01-04 PROCEDURE — 3008F BODY MASS INDEX DOCD: CPT | Mod: CPTII,S$GLB,, | Performed by: OBSTETRICS & GYNECOLOGY

## 2024-01-04 PROCEDURE — 99024 POSTOP FOLLOW-UP VISIT: CPT | Mod: S$GLB,,, | Performed by: OBSTETRICS & GYNECOLOGY

## 2024-01-04 RX ORDER — SODIUM CHLORIDE 9 MG/ML
INJECTION, SOLUTION INTRAVENOUS CONTINUOUS
Status: CANCELLED | OUTPATIENT
Start: 2024-01-04

## 2024-01-04 RX ORDER — FAMOTIDINE 20 MG/1
20 TABLET, FILM COATED ORAL
Status: SHIPPED | OUTPATIENT
Start: 2024-01-04

## 2024-01-04 RX ORDER — MUPIROCIN 20 MG/G
OINTMENT TOPICAL
Status: CANCELLED | OUTPATIENT
Start: 2024-01-04

## 2024-01-04 NOTE — PROGRESS NOTES
SUBJECTIVE:   38 y.o. female  is here for follow up of wound dehiscence after WLE for GREG III.  She is doing well and feels that her healing is going well. Her final pathology showed positive margins, so she will need a repeat excision.    ROS:  GENERAL: No fever, chills, fatigability or weight loss.  VULVAR: No pain, no lesions and no itching.  VAGINAL: No relaxation, no itching, no discharge, no abnormal bleeding and no lesions.  ABDOMEN: No abdominal pain. Denies nausea. Denies vomiting. No diarrhea. No constipation  BREAST: Denies pain. No lumps. No discharge.  URINARY: No incontinence, no nocturia, no frequency and no dysuria.  CARDIOVASCULAR: No chest pain. No shortness of breath. No leg cramps.  NEUROLOGICAL: No headaches. No vision changes.        There were no vitals filed for this visit.      OBJECTIVE:   She appears well, afebrile.  Abdomen: benign, soft, nontender, no masses.  VULVA: Normal external female genitalia, normal urethra, normal urethral meatus.  Healing well.      ASSESSMENT:   Jm was seen today for follow-up.    Diagnoses and all orders for this visit:    Postoperative wound dehiscence, initial encounter    GREG III (vulvar intraepithelial neoplasia III)    Plan repeat excision and biopsy of clitoral lujan in February to allow for complete healing.

## 2024-01-05 ENCOUNTER — TELEPHONE (OUTPATIENT)
Dept: PULMONOLOGY | Facility: CLINIC | Age: 39
End: 2024-01-05
Payer: MEDICARE

## 2024-01-05 NOTE — TELEPHONE ENCOUNTER
I spoke with Ms Newton in regards to her singulair. I informed patient that per Dr Hussein patient is to stop taking singulair. Dr. Mendez will readdress when she returns. Patient verbalized understanding.

## 2024-02-07 PROBLEM — R06.02 SOB (SHORTNESS OF BREATH): Status: RESOLVED | Noted: 2023-12-13 | Resolved: 2024-02-07

## 2024-02-07 PROBLEM — N75.0 BARTHOLIN CYST: Status: RESOLVED | Noted: 2023-11-15 | Resolved: 2024-02-07

## 2024-02-08 ENCOUNTER — TELEPHONE (OUTPATIENT)
Dept: OBSTETRICS AND GYNECOLOGY | Facility: CLINIC | Age: 39
End: 2024-02-08
Payer: MEDICARE

## 2024-02-08 NOTE — TELEPHONE ENCOUNTER
Spoke with pt in regards to scheduling pt, pt declined pre-op appt and demand to speak with someone else. Call was transfer to provider's nurse. Pt verbalized understanding.

## 2024-02-09 ENCOUNTER — TELEPHONE (OUTPATIENT)
Dept: OBSTETRICS AND GYNECOLOGY | Facility: CLINIC | Age: 39
End: 2024-02-09
Payer: MEDICARE

## 2024-02-14 RX ORDER — LIDOCAINE HYDROCHLORIDE 10 MG/ML
0.5 INJECTION, SOLUTION EPIDURAL; INFILTRATION; INTRACAUDAL; PERINEURAL ONCE
Status: CANCELLED | OUTPATIENT
Start: 2024-02-14 | End: 2024-02-14

## 2024-02-14 RX ORDER — ACETAMINOPHEN 500 MG
1000 TABLET ORAL
Status: CANCELLED | OUTPATIENT
Start: 2024-02-14 | End: 2024-02-14

## 2024-02-14 RX ORDER — PREGABALIN 75 MG/1
75 CAPSULE ORAL ONCE
Status: CANCELLED | OUTPATIENT
Start: 2024-02-14 | End: 2024-02-14

## 2024-02-14 RX ORDER — SODIUM CHLORIDE, SODIUM LACTATE, POTASSIUM CHLORIDE, CALCIUM CHLORIDE 600; 310; 30; 20 MG/100ML; MG/100ML; MG/100ML; MG/100ML
INJECTION, SOLUTION INTRAVENOUS CONTINUOUS
Status: CANCELLED | OUTPATIENT
Start: 2024-02-14

## 2024-02-15 ENCOUNTER — TELEPHONE (OUTPATIENT)
Dept: OBSTETRICS AND GYNECOLOGY | Facility: CLINIC | Age: 39
End: 2024-02-15
Payer: MEDICARE

## 2024-02-15 ENCOUNTER — HOSPITAL ENCOUNTER (OUTPATIENT)
Dept: PREADMISSION TESTING | Facility: OTHER | Age: 39
Discharge: HOME | End: 2024-02-15
Payer: MEDICARE

## 2024-02-19 ENCOUNTER — PATIENT MESSAGE (OUTPATIENT)
Dept: OBSTETRICS AND GYNECOLOGY | Facility: CLINIC | Age: 39
End: 2024-02-19
Payer: MEDICARE

## 2024-03-07 ENCOUNTER — OFFICE VISIT (OUTPATIENT)
Dept: ENDOCRINOLOGY | Facility: CLINIC | Age: 39
End: 2024-03-07
Payer: MEDICARE

## 2024-03-07 DIAGNOSIS — E05.00 GRAVES' DISEASE: Primary | ICD-10-CM

## 2024-03-07 PROCEDURE — G2211 COMPLEX E/M VISIT ADD ON: HCPCS | Mod: 95,,, | Performed by: INTERNAL MEDICINE

## 2024-03-07 PROCEDURE — 99214 OFFICE O/P EST MOD 30 MIN: CPT | Mod: 95,,, | Performed by: INTERNAL MEDICINE

## 2024-03-07 NOTE — PROGRESS NOTES
ENDOCRINOLOGY FOLLOW UP  03/07/2024     The patient location is: work    Visit type: audiovisual    Face to Face time with patient: 20  30 minutes of total time spent on the encounter, which includes face to face time and non-face to face time preparing to see the patient (eg, review of tests), Obtaining and/or reviewing separately obtained history, Documenting clinical information in the electronic or other health record, Independently interpreting results (not separately reported) and communicating results to the patient/family/caregiver, or Care coordination (not separately reported).     Each patient to whom he or she provides medical services by telemedicine is:  (1) informed of the relationship between the physician and patient and the respective role of any other health care provider with respect to management of the patient; and (2) notified that he or she may decline to receive medical services by telemedicine and may withdraw from such care at any time.    The patient's last visit with me was on 8/24/2022.         CC:  F/u  hyperthyroidism 2/2 to Graves disease    HPI:  Ms. Jm Newton is a 38 y.o. female with history of hyperthyroidism and HIV.  For HIV she is treated w/ Descovy, ritonavir, prezista    Patient was found to have hyperthyroidism around age 23 presenting with symptoms of thyromegaly during pregnancy.  TSH has been supressed since 2013 with normal to elevated fT4..  Has been on and off methimazole since then, stopped in 2013.  Has 5 children, hysterectomy in 2019 for EMEKA so ovaries not removed.    She underwent a right thyroid lobectomy and isthmusectomy with Dr. Sifuentes on 04/13/2021 (for thyroid ultrasound showing a suspicious area concerning for infectious process and she also had some lymphadenopathy)  with pathology showing benign nodules and 1 area of ectopic thyroid.  She had several paratracheal lymph nodes resected on the right which were all noted to be benign.  "          Path 4/13/21:  Right Lobectomy and isthmusectomy:  Number of specimens: 2   Name of specimens: 1. Right Paratracheal lymph node - frozen   - sent   2. Right thyroid lobe, isthmus and paratracheal node, stitch   on right superior lobe - permanent   Release to patient->Immediate   Specimen total (fresh, frozen, permanent):->2    Interval hx:    At last visit 8/2022 methimazole 2.5 mg weekly resumed (for undetectible TSH 2/2 graves but normal fT4).  She ran out in 11/2024.    Reports had more palpitations when on methimazole     Lab Results   Component Value Date    TSH 0.110 (L) 06/27/2023    TSH <0.010 (L) 12/07/2022    TSH <0.010 (L) 06/24/2022    FREET4 1.01 06/27/2023    FREET4 1.14 12/07/2022    FREET4 1.13 06/24/2022       Thyroid Symptoms   No fatigue    Weight change:  []  Gain []  Loss  [x]  Denies   Down 40 lbs intentionally in 6 month(s) - when on methimazole     Temperature intolerance:  []  Cold []  Hot   []  Denies     GI:  []  Diarrhea []  Constipation [x]  Denies  Some nausea intermittently     Integument:  []  Hair loss []  Dry skin  []  Denies    Other:  [x]  Palpitation (occasional) []  Tremor resolved    [x]  Increased anxiety    []  Denies    Menses:   Hysterectomy in 10/2019, ovaries in tact    Denies hot flushes/vaginal driness    Thyroid Antibodies:  No results found for: "TPOAB", "TPOS"  Lab Results   Component Value Date    THYROTROPINR 2.46 (H) 01/25/2021     Previous Treatment:  I131 therapy no  Has been on methimazole on and off for some years  Restarted methimazole 5 mg daily 3/18/2021, stopped 4/13/21 after thyroid lobectomy, restarted 5/26/21, stopped 8/2021, resumed methimazole 8/2022-11/2023     Current hyperthyroid medication:  Off Methimazole    Last BMD: dexa 5/28/21 - normal  No fractures         Current Outpatient Medications:     acetaminophen (TYLENOL) 650 MG TbSR, Take 1 tablet (650 mg total) by mouth every 6 (six) hours as needed. Alternate between ibuprofen and " tylenol every 3 hours. For example: @0800: ibuprofen 600mg @1100: tylenol 650mg @1400: ibuprofen 600mg @1700: tylenol 650 mg @2000: ibuprofen 600mg, Disp: 30 tablet, Rfl: 0    albuterol (PROVENTIL/VENTOLIN HFA) 90 mcg/actuation inhaler, Inhale 2 puffs into the lungs every 4 (four) hours as needed for Shortness of Breath or Wheezing., Disp: 18 g, Rfl: 11    budesonide-formoterol 160-4.5 mcg (SYMBICORT) 160-4.5 mcg/actuation HFAA, Inhale 2 puffs into the lungs every 12 (twelve) hours. Controller, Disp: 10.2 g, Rfl: 11    butalbital-acetaminophen-caffeine -40 mg (FIORICET, ESGIC) -40 mg per tablet, Take 1 tablet by mouth every 4 to 6 hours as needed., Disp: , Rfl:     cetirizine (ZYRTEC) 10 MG tablet, Take 10 mg by mouth every evening., Disp: , Rfl:     DESCOVY 200-25 mg Tab, once daily. , Disp: , Rfl: 3    fluticasone-salmeterol diskus inhaler 500-50 mcg, Inhale 1 puff into the lungs 2 (two) times daily. Controller, Disp: 180 each, Rfl: 3    gabapentin (NEURONTIN) 300 MG capsule, Take 1 capsule (300 mg total) by mouth every evening., Disp: 30 capsule, Rfl: 0    hydrOXYzine HCL (ATARAX) 25 MG tablet, 1 tablet every evening., Disp: , Rfl:     ibuprofen (ADVIL,MOTRIN) 600 MG tablet, Take 1 tablet (600 mg total) by mouth every 6 (six) hours as needed for Pain. Alternate between ibuprofen and tylenol every 3 hours. For example: @0800: ibuprofen 600mg @1100: tylenol 650mg @1400: ibuprofen 600mg @1700: tylenol 650 mg @2000: ibuprofen 600mg, Disp: 30 tablet, Rfl: 0    inhaler,assist device,lg mask (OPTICHAMBER HERBERT LG MASK MISC), U UTD BID, Disp: , Rfl:     ketorolac 0.5% (ACULAR) 0.5 % Drop, INSTILL 1 DROP INTO BOTH EYES FOUR TIMES DAILY FOR INFLAMMATION, Disp: , Rfl:     methIMAzole (TAPAZOLE) 5 MG Tab, Take half a tablet (2.5 mg) by mouth daily, Disp: 45 tablet, Rfl: 2    montelukast (SINGULAIR) 10 mg tablet, Take 1 tablet (10 mg total) by mouth every evening., Disp: 90 tablet, Rfl: 3    mupirocin  (BACTROBAN) 2 % ointment, Apply topically 3 (three) times daily., Disp: 15 g, Rfl: 0    oxyCODONE (ROXICODONE) 5 MG immediate release tablet, Take 1 tablet (5 mg total) by mouth every 4 (four) hours as needed for Pain., Disp: 15 tablet, Rfl: 0    PREZCOBIX 800-150 mg-mg Tab tablet, Take 1 tablet by mouth., Disp: , Rfl:     RESTASIS 0.05 % ophthalmic emulsion, INSTILL 1 DROP INTO BOTH EYES TWICE DAILY FOR DRY EYE, Disp: , Rfl:     tramadol-acetaminophen 37.5-325 mg (ULTRACET) 37.5-325 mg Tab, SMARTSI Tablet(s) By Mouth Every 8-12 Hours PRN, Disp: , Rfl:     triamcinolone acetonide 0.1% (KENALOG) 0.1 % cream, Apply topically 2 (two) times daily., Disp: 15 g, Rfl: 1    Current Facility-Administered Medications:     famotidine tablet 20 mg, 20 mg, Oral, On Call Procedure, Chantal Worrell MD    famotidine tablet 20 mg, 20 mg, Oral, On Call Procedure, Chantal Worrell MD    REVIEW OF SYSTEMS  See HPI      PHYSICAL EXAM  LMP 10/02/2019   Wt Readings from Last 3 Encounters:   24 96.5 kg (212 lb 11.9 oz)   23 101.9 kg (224 lb 10.4 oz)   23 101 kg (222 lb 10.6 oz)   ]    Constitutional:  Pleasant,  in no acute distress.   HENT:   Eyes:     No scleral icterus.   Respiratory:   Effort normal   Neurological:  normal speech  Psych:  Normal mood and affect.      LABORATORY REVIEW:    IMAGING STUDIES    ASSESSMENT/PLAN    Problem List Items Addressed This Visit       Graves' disease - Primary     Longstanding history of Graves disease on an off methimazole (has been off since around 2023).  Has not had updated labs since then.    Also with history of right lobectomy for abnormality in the right thyroid lobe and lymphadenopathy that was benign on pathology.  No significant abnormality noted on the left and patient remains asymptomatic.      She is clinically euthyroid.    Will repeat thyroid labs now and if TSH is <0.1 or if either T3 or FT4 are elevated will need to resume methimazole.         Relevant  Orders    TSH    Thyrotropin Receptor Antibody    T4, Free    T3       RTC 6 months, virtual  Labs this week at [facility] (patient works there so will go at her convenience)    James Zabala MD

## 2024-03-07 NOTE — PATIENT INSTRUCTIONS
Please have thyroid labs done at [facility] in the next week.  I message you with the results but if the TSH is less than 0.1 we will need to restart methimazole.  I will let you know the dose through the portal based on your blood work results.      If we do restart methimazole we will need to recheck thyroid labs in 2-3 months.      1. Graves' disease  Labs ordered:  - TSH; Future  - Thyrotropin Receptor Antibody; Future  - T4, Free; Future  - T3; Future

## 2024-03-07 NOTE — Clinical Note
RTC 6 months, virtual preferably with jsoe or trace Labs this week at [facility], [place] (patient works there so will go at her convenience)

## 2024-03-07 NOTE — ASSESSMENT & PLAN NOTE
Longstanding history of Graves disease on an off methimazole (has been off since around November 2023).  Has not had updated labs since then.    Also with history of right lobectomy for abnormality in the right thyroid lobe and lymphadenopathy that was benign on pathology.  No significant abnormality noted on the left and patient remains asymptomatic.      She is clinically euthyroid.    Will repeat thyroid labs now and if TSH is <0.1 or if either T3 or FT4 are elevated will need to resume methimazole.

## 2024-03-20 NOTE — TELEPHONE ENCOUNTER
----- Message from Herman Osullivan MA sent at 11/20/2020  4:26 PM CST -----  Pt states she needs a referral to see endocrinology. Pt can be reached at [phone].     
Please schedule appt with endocrine  
Pt informed  
Spine appears normal, range of motion is not limited, no muscle or joint tenderness

## 2024-06-05 ENCOUNTER — PATIENT MESSAGE (OUTPATIENT)
Dept: OBSTETRICS AND GYNECOLOGY | Facility: CLINIC | Age: 39
End: 2024-06-05

## 2024-06-05 ENCOUNTER — TELEPHONE (OUTPATIENT)
Dept: OBSTETRICS AND GYNECOLOGY | Facility: CLINIC | Age: 39
End: 2024-06-05
Payer: MEDICARE

## 2024-06-12 ENCOUNTER — OFFICE VISIT (OUTPATIENT)
Dept: OBSTETRICS AND GYNECOLOGY | Facility: CLINIC | Age: 39
End: 2024-06-12
Attending: OBSTETRICS & GYNECOLOGY
Payer: MEDICARE

## 2024-06-12 VITALS
HEIGHT: 67 IN | DIASTOLIC BLOOD PRESSURE: 82 MMHG | BODY MASS INDEX: 32.21 KG/M2 | WEIGHT: 205.25 LBS | SYSTOLIC BLOOD PRESSURE: 120 MMHG

## 2024-06-12 DIAGNOSIS — D07.1 VIN III (VULVAR INTRAEPITHELIAL NEOPLASIA III): Primary | ICD-10-CM

## 2024-06-12 DIAGNOSIS — F32.A DEPRESSION, UNSPECIFIED DEPRESSION TYPE: ICD-10-CM

## 2024-06-12 PROCEDURE — 3008F BODY MASS INDEX DOCD: CPT | Mod: CPTII,S$GLB,, | Performed by: OBSTETRICS & GYNECOLOGY

## 2024-06-12 PROCEDURE — 1160F RVW MEDS BY RX/DR IN RCRD: CPT | Mod: CPTII,S$GLB,, | Performed by: OBSTETRICS & GYNECOLOGY

## 2024-06-12 PROCEDURE — 1159F MED LIST DOCD IN RCRD: CPT | Mod: CPTII,S$GLB,, | Performed by: OBSTETRICS & GYNECOLOGY

## 2024-06-12 PROCEDURE — 3074F SYST BP LT 130 MM HG: CPT | Mod: CPTII,S$GLB,, | Performed by: OBSTETRICS & GYNECOLOGY

## 2024-06-12 PROCEDURE — 99213 OFFICE O/P EST LOW 20 MIN: CPT | Mod: S$GLB,,, | Performed by: OBSTETRICS & GYNECOLOGY

## 2024-06-12 PROCEDURE — 99999 PR PBB SHADOW E&M-EST. PATIENT-LVL III: CPT | Mod: PBBFAC,,, | Performed by: OBSTETRICS & GYNECOLOGY

## 2024-06-12 PROCEDURE — 3079F DIAST BP 80-89 MM HG: CPT | Mod: CPTII,S$GLB,, | Performed by: OBSTETRICS & GYNECOLOGY

## 2024-06-12 RX ORDER — ESCITALOPRAM OXALATE 10 MG/1
10 TABLET ORAL DAILY
Qty: 30 TABLET | Refills: 2 | Status: SHIPPED | OUTPATIENT
Start: 2024-06-12 | End: 2025-06-12

## 2024-06-12 RX ORDER — HYDROXYZINE HYDROCHLORIDE 25 MG/1
25 TABLET, FILM COATED ORAL 2 TIMES DAILY
Qty: 60 TABLET | Refills: 1 | Status: SHIPPED | OUTPATIENT
Start: 2024-06-12

## 2024-06-12 NOTE — PROGRESS NOTES
Subjective     Patient ID: Jm Newton is a 38 y.o. female.    Chief Complaint: No chief complaint on file.    HPI She is here for follow up after her vulvar WLE showed GREG III with positive margins.  I have recommended re-excision.  She is very upset about her recent job loss and the situation surrounding it.  She has an appointment with [facility] in August.  She is tearful and admits to feeling depressed.  She denies si/si or hi/hi.  She would like to do the biopsy another day.  At this time, she is declining surgery at [facility].  Review of Systems  ROS:  GENERAL: No fever, chills, fatigability or weight loss.  VULVAR: No pain, no lesions and no itching.  VAGINAL: No relaxation, no itching, no discharge, no abnormal bleeding and no lesions.  ABDOMEN: No abdominal pain. Denies nausea. Denies vomiting. No diarrhea. No constipation  BREAST: Denies pain. No lumps. No discharge.  URINARY: No incontinence, no nocturia, no frequency and no dysuria.  CARDIOVASCULAR: No chest pain. No shortness of breath. No leg cramps.  NEUROLOGICAL: no headaches. No vision changes.        Objective     Physical Exam  Constitutional:       Appearance: Normal appearance.   HENT:      Head: Normocephalic and atraumatic.   Neurological:      General: No focal deficit present.      Mental Status: She is alert and oriented to person, place, and time. Mental status is at baseline.   Psychiatric:         Attention and Perception: Attention normal.         Mood and Affect: Mood is depressed.         Speech: Speech normal.         Behavior: Behavior normal.            Assessment and Plan     1. GREG III (vulvar intraepithelial neoplasia III)    2. Depression, unspecified depression type  -     hydrOXYzine HCL (ATARAX) 25 MG tablet; Take 1 tablet (25 mg total) by mouth 2 (two) times a day.  Dispense: 60 tablet; Refill: 1  -     EScitalopram oxalate (LEXAPRO) 10 MG tablet; Take 1 tablet (10 mg total) by mouth once daily.  Dispense: 30 tablet;  Refill: 2        A list of mental health resources given.  Discussed using the crisis line as needed.  Discussed Aunt Annia.  Will have her rtc for a biopsy of the area where positive margins were noted on pathology.         No follow-ups on file.

## 2024-07-09 ENCOUNTER — PROCEDURE VISIT (OUTPATIENT)
Dept: OBSTETRICS AND GYNECOLOGY | Facility: CLINIC | Age: 39
End: 2024-07-09
Payer: MEDICARE

## 2024-07-09 ENCOUNTER — PROCEDURE VISIT (OUTPATIENT)
Dept: OBSTETRICS AND GYNECOLOGY | Facility: CLINIC | Age: 39
End: 2024-07-09
Attending: OBSTETRICS & GYNECOLOGY
Payer: MEDICARE

## 2024-07-09 VITALS
HEIGHT: 67 IN | BODY MASS INDEX: 32.87 KG/M2 | DIASTOLIC BLOOD PRESSURE: 70 MMHG | SYSTOLIC BLOOD PRESSURE: 116 MMHG | WEIGHT: 209.44 LBS

## 2024-07-09 DIAGNOSIS — D07.1 VIN III (VULVAR INTRAEPITHELIAL NEOPLASIA III): Primary | ICD-10-CM

## 2024-07-09 DIAGNOSIS — B20 HIV INFECTION, UNSPECIFIED SYMPTOM STATUS: ICD-10-CM

## 2024-07-09 DIAGNOSIS — N87.1 CIN II (CERVICAL INTRAEPITHELIAL NEOPLASIA II): ICD-10-CM

## 2024-07-09 PROCEDURE — 87624 HPV HI-RISK TYP POOLED RSLT: CPT

## 2024-07-09 PROCEDURE — 86361 T CELL ABSOLUTE COUNT: CPT | Performed by: OBSTETRICS & GYNECOLOGY

## 2024-07-09 PROCEDURE — 99499 UNLISTED E&M SERVICE: CPT | Mod: S$GLB,,, | Performed by: OBSTETRICS & GYNECOLOGY

## 2024-07-09 RX ORDER — SODIUM CHLORIDE 9 MG/ML
INJECTION, SOLUTION INTRAVENOUS CONTINUOUS
OUTPATIENT
Start: 2024-07-09

## 2024-07-09 RX ORDER — MUPIROCIN 20 MG/G
OINTMENT TOPICAL
OUTPATIENT
Start: 2024-07-09

## 2024-07-09 RX ORDER — FAMOTIDINE 20 MG/1
20 TABLET, FILM COATED ORAL
Status: SHIPPED | OUTPATIENT
Start: 2024-07-09

## 2024-07-09 NOTE — H&P (VIEW-ONLY)
[facility]  Obstetrics & Gynecology  History & Physical    Patient Name: Jm Newton  MRN: [id number]  Admission Date: (Not on file)  Primary Care Provider: Jenniffer, Primary Doctor    Subjective:     Chief Complaint/Reason for Admission: GREG 3    History of Present Illness: Jm Newton is a 37 yo with GRGE 3 s/p wide local excision on 11/15/24. The patient pathology report came back with positive margins. The patient amendable to proceeding with repeat WLE.    Additionally, the patient desires viral load and CD4 count testing. She states she has not been  taking antivirals for 1 year because she was undetectable and no longer felt it was needed. The patient is not currently following with an ID provider.    Current Outpatient Medications on File Prior to Visit   Medication Sig    acetaminophen (TYLENOL) 650 MG TbSR Take 1 tablet (650 mg total) by mouth every 6 (six) hours as needed. Alternate between ibuprofen and tylenol every 3 hours. For example: @0800: ibuprofen 600mg @1100: tylenol 650mg @1400: ibuprofen 600mg @1700: tylenol 650 mg @2000: ibuprofen 600mg (Patient not taking: Reported on 6/12/2024)    albuterol (PROVENTIL/VENTOLIN HFA) 90 mcg/actuation inhaler Inhale 2 puffs into the lungs every 4 (four) hours as needed for Shortness of Breath or Wheezing.    budesonide-formoterol 160-4.5 mcg (SYMBICORT) 160-4.5 mcg/actuation HFAA Inhale 2 puffs into the lungs every 12 (twelve) hours. Controller    butalbital-acetaminophen-caffeine -40 mg (FIORICET, ESGIC) -40 mg per tablet Take 1 tablet by mouth every 4 to 6 hours as needed.    cetirizine (ZYRTEC) 10 MG tablet Take 10 mg by mouth every evening.    DESCOVY 200-25 mg Tab once daily.     EScitalopram oxalate (LEXAPRO) 10 MG tablet Take 1 tablet (10 mg total) by mouth once daily.    fluticasone-salmeterol diskus inhaler 500-50 mcg Inhale 1 puff into the lungs 2 (two) times daily. Controller    hydrOXYzine HCL (ATARAX)  25 MG tablet Take 1 tablet (25 mg total) by mouth 2 (two) times a day.    inhaler,assist device,lg mask (OPTICHAMBER HERBERT LG MASK MISC) U UTD BID    ketorolac 0.5% (ACULAR) 0.5 % Drop INSTILL 1 DROP INTO BOTH EYES FOUR TIMES DAILY FOR INFLAMMATION    PREZCOBIX 800-150 mg-mg Tab tablet Take 1 tablet by mouth.    RESTASIS 0.05 % ophthalmic emulsion INSTILL 1 DROP INTO BOTH EYES TWICE DAILY FOR DRY EYE    tramadol-acetaminophen 37.5-325 mg (ULTRACET) 37.5-325 mg Tab SMARTSI Tablet(s) By Mouth Every 8-12 Hours PRN     No current facility-administered medications on file prior to visit.       Review of patient's allergies indicates:   Allergen Reactions    Azithromycin Swelling and Edema       Past Medical History:   Diagnosis Date    Asthma     Encounter for blood transfusion     Graves disease     HIV infection     dx     Hyperthyroidism in pregnancy, antepartum      OB History    Para Term  AB Living   7 7 1 6   5   SAB IAB Ectopic Multiple Live Births         2 7      # Outcome Date GA Lbr Adrian/2nd Weight Sex Type Anes PTL Lv   7  06/15/19 32w5d   M CS-LTranv EPI Y ANAHY   6 Term 14 37w5d  3.26 kg (7 lb 3 oz) F CS-LTranv EPI N ANAHY   5A   24w0d  0.51 kg (1 lb 2 oz) M CS-LTranv  Y DEC      Birth Comments: lived for 2 months in NICU      Complications: Premature rupture of membranes   5B   24w0d  0.992 kg (2 lb 3 oz) M CS-LTranv  Y DEC      Birth Comments: lived only 2 hours      Complications: Premature rupture of membranes   4  09 35w0d  2.608 kg (5 lb 12 oz) M CS-LTranv  Y ANAHY      Birth Comments: HIV, CS d/t viral load      Complications: Premature rupture of membranes   3  08 32w0d  2.722 kg (6 lb) M Vag-Spont  Y ANAHY      Birth Comments:  labor      Complications: Premature rupture of membranes   2  10/05/06 24w0d   M Vag-Spont  Y FD      Birth Comments: abruption and stillbirth      Complications: Abruptio Placenta   1   03 35w0d  2.722 kg (6 lb) M Vag-Spont  Y ANAHY      Birth Comments:  labor     Past Surgical History:   Procedure Laterality Date    BARTHOLIN GLAND CYST EXCISION Right 11/15/2023    Procedure: EXCISION, CYST, BARTHOLIN'S GLAND;  Surgeon: Chantal Worrell MD;  Location: [facility];  Service: OB/GYN;  Laterality: Right;    CERVICAL CERCLAGE      emergent with twins    CERVICAL CERCLAGE N/A 2019    Procedure: CERCLAGE, CERVIX;  Surgeon: Obey Henry MD;  Location: [facility] L&D;  Service: OB/GYN;  Laterality: N/A;     SECTION WITH TUBAL LIGATION N/A 6/15/2019    Procedure:  SECTION, WITH TUBAL LIGATION;  Surgeon: Madeline Walden MD;  Location: [facility] L&D;  Service: OB/GYN;  Laterality: N/A;     SECTION, CLASSIC      Last section with classical extention, from [facility]    COLD KNIFE CONIZATION OF CERVIX N/A 2018    Procedure: CONE BIOPSY, CERVIX, USING COLD KNIFE;  Surgeon: Chantal Worrell MD;  Location: [facility];  Service: OB/GYN;  Laterality: N/A;    CONIZATION OF CERVIX USING LOOP ELECTROSURGICAL EXCISION PROCEDURE (LEEP) N/A 2018    Procedure: LEEP CONIZATION, CERVIX;  Surgeon: Chantal Worerll MD;  Location: [facility];  Service: OB/GYN;  Laterality: N/A;    CYSTOSCOPY N/A 10/22/2019    Procedure: CYSTOSCOPY;  Surgeon: Georgia Howard MD;  Location: [facility];  Service: OB/GYN;  Laterality: N/A;    DILATION AND CURETTAGE OF UTERUS      PARTIAL VULVECTOMY N/A 11/15/2023    Procedure: VULVECTOMY, PARTIAL;  Surgeon: Chantal Worrell MD;  Location: [facility];  Service: OB/GYN;  Laterality: N/A;    ROBOT-ASSISTED LAPAROSCOPIC ABDOMINAL HYSTERECTOMY USING DA GALINDO XI N/A 10/22/2019    Procedure: XI ROBOTIC HYSTERECTOMY;  Surgeon: Georgia Howard MD;  Location: [facility];  Service: OB/GYN;  Laterality: N/A;    ROBOT-ASSISTED SURGICAL REMOVAL OF FALLOPIAN TUBE USING DA GALINDO XI Bilateral 10/22/2019    Procedure: XI ROBOTIC SALPINGECTOMY;  Surgeon: Georgia Howard MD;   Location: [facility] OR;  Service: OB/GYN;  Laterality: Bilateral;    THYROIDECTOMY Right 4/13/2021    Procedure: THYROIDECTOMY;  Surgeon: Maksim Sifuentes MD;  Location: [facility] OR [address] [place] [address];  Service: ENT;  Laterality: Right;  Right possible  total    WISDOM TOOTH EXTRACTION       Family History       Problem Relation (Age of Onset)    Diabetes Maternal Grandmother    Hypertension Maternal Grandmother    Sleep apnea Son          Tobacco Use    Smoking status: Never     Passive exposure: Never    Smokeless tobacco: Never   Substance and Sexual Activity    Alcohol use: Yes     Alcohol/week: 0.0 standard drinks of alcohol     Comment: socially    Drug use: No    Sexual activity: Not Currently     Partners: Male     Birth control/protection: None     Review of Systems   Constitutional:  Negative for fever.   Respiratory:  Negative for shortness of breath.    Cardiovascular:  Negative for chest pain.   Gastrointestinal:  Negative for abdominal pain, nausea and vomiting.   Endocrine: Negative for hot flashes.   Genitourinary:  Negative for menstrual problem, pelvic pain, vaginal bleeding and vaginal discharge.   Integumentary:  Negative for breast mass, nipple discharge and breast skin changes.   Neurological:  Negative for headaches.   Hematological:  Does not bruise/bleed easily.   Psychiatric/Behavioral:  Negative for depression.    Breast: Negative for mass, mastodynia, nipple discharge and skin changes    Objective:     Vital Signs (Most Recent):  BP: 116/70 (07/09/24 0924) Vital Signs (24h Range):  [unfilled]     Weight: 95 kg (209 lb 7 oz)  Body mass index is 32.8 kg/m².  Patient's last menstrual period was 10/02/2019.    Physical Exam:   Constitutional: She is oriented to person, place, and time. She appears well-developed and well-nourished. No distress.    HENT:   Head: Normocephalic and atraumatic.    Eyes: EOM are normal.      Pulmonary/Chest: Effort normal. No respiratory distress.                  Musculoskeletal:  Normal range of motion.       Neurological: She is alert and oriented to person, place, and time.    Skin: Skin is warm and dry. She is not diaphoretic.    Psychiatric: She has a normal mood and affect. Her behavior is normal. Thought content normal.       Laboratory:  None    Contains abnormal data Specimen to Pathology, Surgery Gynecology and Obstetrics  Order: [id number]  Status: Final result       Visible to patient: Yes (seen)       Next appt: 07/15/2024 at 01:30 PM in Pre-Admission Testing (PRE-ADMIT, [facility])    0 Result Notes       1 Follow-up Encounter      Component 7 mo ago   Final Pathologic Diagnosis  Abnormal   VULVA, RIGHT,  WIDE LOCAL EXCISION:  - High-grade squamous intraepithelial lesion/vulvar intraepithelial neoplasia 3 (GREG 3).  - High-grade dysplasia is focally present at 12-3:00 peripheral margin of specimen.  - No invasive carcinoma.        Assessment/Plan:     Diagnoses and all orders for this visit:    GREG III (vulvar intraepithelial neoplasia III)    HIV infection, unspecified symptom status  -     HIV RNA, QUANTITATIVE, PCR; Future  -     T-HELPER CELLS (CD4) COUNT; Future  -     Liquid-Based Pap Smear, Screening  -     HPV High Risk Genotypes, PCR    EMEKA II (cervical intraepithelial neoplasia II)  -     Liquid-Based Pap Smear, Screening  -     HPV High Risk Genotypes, PCR     Patient is to have  WLE  for  GREG 3   on (7/17/2024)    - Medical clearance required: No  - Case request placed & pre-op orders completed  - Anticoagulation : Patient is not on antiocoagulation.   - I have discussed the risks, benefits, indications, and alternatives of the procedure in detail.  The patient verbalizes her understanding.  All questions answered.  Consents signed.  The patient agrees to proceed to proceed as planned.  - To pre-op   - Will f/u with results regarding pap smear/HPV, CD 4 count and viral load.    Randall Das MD  Obstetrics & Gynecology  [facility]

## 2024-07-09 NOTE — PROGRESS NOTES
[facility]  Obstetrics & Gynecology  History & Physical    Patient Name: Jm Newton  MRN: [id number]  Admission Date: (Not on file)  Primary Care Provider: Jenniffer, Primary Doctor    Subjective:     Chief Complaint/Reason for Admission: GREG 3    History of Present Illness: Jm Newton is a 39 yo with GREG 3 s/p wide local excision on 11/15/24. The patient pathology report came back with positive margins. The patient amendable to proceeding with repeat WLE.    Additionally, the patient desires viral load and CD4 count testing. She states she has not been  taking antivirals for 1 year because she was undetectable and no longer felt it was needed. The patient is not currently following with an ID provider.    Current Outpatient Medications on File Prior to Visit   Medication Sig    acetaminophen (TYLENOL) 650 MG TbSR Take 1 tablet (650 mg total) by mouth every 6 (six) hours as needed. Alternate between ibuprofen and tylenol every 3 hours. For example: @0800: ibuprofen 600mg @1100: tylenol 650mg @1400: ibuprofen 600mg @1700: tylenol 650 mg @2000: ibuprofen 600mg (Patient not taking: Reported on 6/12/2024)    albuterol (PROVENTIL/VENTOLIN HFA) 90 mcg/actuation inhaler Inhale 2 puffs into the lungs every 4 (four) hours as needed for Shortness of Breath or Wheezing.    budesonide-formoterol 160-4.5 mcg (SYMBICORT) 160-4.5 mcg/actuation HFAA Inhale 2 puffs into the lungs every 12 (twelve) hours. Controller    butalbital-acetaminophen-caffeine -40 mg (FIORICET, ESGIC) -40 mg per tablet Take 1 tablet by mouth every 4 to 6 hours as needed.    cetirizine (ZYRTEC) 10 MG tablet Take 10 mg by mouth every evening.    DESCOVY 200-25 mg Tab once daily.     EScitalopram oxalate (LEXAPRO) 10 MG tablet Take 1 tablet (10 mg total) by mouth once daily.    fluticasone-salmeterol diskus inhaler 500-50 mcg Inhale 1 puff into the lungs 2 (two) times daily. Controller    hydrOXYzine HCL (ATARAX)  25 MG tablet Take 1 tablet (25 mg total) by mouth 2 (two) times a day.    inhaler,assist device,lg mask (OPTICHAMBER HERBERT LG MASK MISC) U UTD BID    ketorolac 0.5% (ACULAR) 0.5 % Drop INSTILL 1 DROP INTO BOTH EYES FOUR TIMES DAILY FOR INFLAMMATION    PREZCOBIX 800-150 mg-mg Tab tablet Take 1 tablet by mouth.    RESTASIS 0.05 % ophthalmic emulsion INSTILL 1 DROP INTO BOTH EYES TWICE DAILY FOR DRY EYE    tramadol-acetaminophen 37.5-325 mg (ULTRACET) 37.5-325 mg Tab SMARTSI Tablet(s) By Mouth Every 8-12 Hours PRN     No current facility-administered medications on file prior to visit.       Review of patient's allergies indicates:   Allergen Reactions    Azithromycin Swelling and Edema       Past Medical History:   Diagnosis Date    Asthma     Encounter for blood transfusion     Graves disease     HIV infection     dx     Hyperthyroidism in pregnancy, antepartum      OB History    Para Term  AB Living   7 7 1 6   5   SAB IAB Ectopic Multiple Live Births         2 7      # Outcome Date GA Lbr Adrian/2nd Weight Sex Type Anes PTL Lv   7  06/15/19 32w5d   M CS-LTranv EPI Y ANAHY   6 Term 14 37w5d  3.26 kg (7 lb 3 oz) F CS-LTranv EPI N ANAHY   5A   24w0d  0.51 kg (1 lb 2 oz) M CS-LTranv  Y DEC      Birth Comments: lived for 2 months in NICU      Complications: Premature rupture of membranes   5B   24w0d  0.992 kg (2 lb 3 oz) M CS-LTranv  Y DEC      Birth Comments: lived only 2 hours      Complications: Premature rupture of membranes   4  09 35w0d  2.608 kg (5 lb 12 oz) M CS-LTranv  Y ANAHY      Birth Comments: HIV, CS d/t viral load      Complications: Premature rupture of membranes   3  08 32w0d  2.722 kg (6 lb) M Vag-Spont  Y ANAHY      Birth Comments:  labor      Complications: Premature rupture of membranes   2  10/05/06 24w0d   M Vag-Spont  Y FD      Birth Comments: abruption and stillbirth      Complications: Abruptio Placenta   1   03 35w0d  2.722 kg (6 lb) M Vag-Spont  Y ANAHY      Birth Comments:  labor     Past Surgical History:   Procedure Laterality Date    BARTHOLIN GLAND CYST EXCISION Right 11/15/2023    Procedure: EXCISION, CYST, BARTHOLIN'S GLAND;  Surgeon: Chantal Worrell MD;  Location: [facility];  Service: OB/GYN;  Laterality: Right;    CERVICAL CERCLAGE      emergent with twins    CERVICAL CERCLAGE N/A 2019    Procedure: CERCLAGE, CERVIX;  Surgeon: Obey Henry MD;  Location: [facility] L&D;  Service: OB/GYN;  Laterality: N/A;     SECTION WITH TUBAL LIGATION N/A 6/15/2019    Procedure:  SECTION, WITH TUBAL LIGATION;  Surgeon: Madeline Walden MD;  Location: [facility] L&D;  Service: OB/GYN;  Laterality: N/A;     SECTION, CLASSIC      Last section with classical extention, from [facility]    COLD KNIFE CONIZATION OF CERVIX N/A 2018    Procedure: CONE BIOPSY, CERVIX, USING COLD KNIFE;  Surgeon: Chantal Worrell MD;  Location: [facility];  Service: OB/GYN;  Laterality: N/A;    CONIZATION OF CERVIX USING LOOP ELECTROSURGICAL EXCISION PROCEDURE (LEEP) N/A 2018    Procedure: LEEP CONIZATION, CERVIX;  Surgeon: Chantal Worrell MD;  Location: [facility];  Service: OB/GYN;  Laterality: N/A;    CYSTOSCOPY N/A 10/22/2019    Procedure: CYSTOSCOPY;  Surgeon: Georgia Howard MD;  Location: [facility];  Service: OB/GYN;  Laterality: N/A;    DILATION AND CURETTAGE OF UTERUS      PARTIAL VULVECTOMY N/A 11/15/2023    Procedure: VULVECTOMY, PARTIAL;  Surgeon: Chantal Worrell MD;  Location: [facility];  Service: OB/GYN;  Laterality: N/A;    ROBOT-ASSISTED LAPAROSCOPIC ABDOMINAL HYSTERECTOMY USING DA GALINDO XI N/A 10/22/2019    Procedure: XI ROBOTIC HYSTERECTOMY;  Surgeon: Georgia Howard MD;  Location: [facility];  Service: OB/GYN;  Laterality: N/A;    ROBOT-ASSISTED SURGICAL REMOVAL OF FALLOPIAN TUBE USING DA GALINDO XI Bilateral 10/22/2019    Procedure: XI ROBOTIC SALPINGECTOMY;  Surgeon: Georgia Howard MD;   Location: [facility] OR;  Service: OB/GYN;  Laterality: Bilateral;    THYROIDECTOMY Right 4/13/2021    Procedure: THYROIDECTOMY;  Surgeon: Maksim Sifuentes MD;  Location: [facility] OR [address] [place] [address];  Service: ENT;  Laterality: Right;  Right possible  total    WISDOM TOOTH EXTRACTION       Family History       Problem Relation (Age of Onset)    Diabetes Maternal Grandmother    Hypertension Maternal Grandmother    Sleep apnea Son          Tobacco Use    Smoking status: Never     Passive exposure: Never    Smokeless tobacco: Never   Substance and Sexual Activity    Alcohol use: Yes     Alcohol/week: 0.0 standard drinks of alcohol     Comment: socially    Drug use: No    Sexual activity: Not Currently     Partners: Male     Birth control/protection: None     Review of Systems   Constitutional:  Negative for fever.   Respiratory:  Negative for shortness of breath.    Cardiovascular:  Negative for chest pain.   Gastrointestinal:  Negative for abdominal pain, nausea and vomiting.   Endocrine: Negative for hot flashes.   Genitourinary:  Negative for menstrual problem, pelvic pain, vaginal bleeding and vaginal discharge.   Integumentary:  Negative for breast mass, nipple discharge and breast skin changes.   Neurological:  Negative for headaches.   Hematological:  Does not bruise/bleed easily.   Psychiatric/Behavioral:  Negative for depression.    Breast: Negative for mass, mastodynia, nipple discharge and skin changes    Objective:     Vital Signs (Most Recent):  BP: 116/70 (07/09/24 0924) Vital Signs (24h Range):  [unfilled]     Weight: 95 kg (209 lb 7 oz)  Body mass index is 32.8 kg/m².  Patient's last menstrual period was 10/02/2019.    Physical Exam:   Constitutional: She is oriented to person, place, and time. She appears well-developed and well-nourished. No distress.    HENT:   Head: Normocephalic and atraumatic.    Eyes: EOM are normal.      Pulmonary/Chest: Effort normal. No respiratory distress.                  Musculoskeletal:  Normal range of motion.       Neurological: She is alert and oriented to person, place, and time.    Skin: Skin is warm and dry. She is not diaphoretic.    Psychiatric: She has a normal mood and affect. Her behavior is normal. Thought content normal.       Laboratory:  None    Contains abnormal data Specimen to Pathology, Surgery Gynecology and Obstetrics  Order: [id number]  Status: Final result       Visible to patient: Yes (seen)       Next appt: 07/15/2024 at 01:30 PM in Pre-Admission Testing (PRE-ADMIT, [facility])    0 Result Notes       1 Follow-up Encounter      Component 7 mo ago   Final Pathologic Diagnosis  Abnormal   VULVA, RIGHT,  WIDE LOCAL EXCISION:  - High-grade squamous intraepithelial lesion/vulvar intraepithelial neoplasia 3 (GREG 3).  - High-grade dysplasia is focally present at 12-3:00 peripheral margin of specimen.  - No invasive carcinoma.        Assessment/Plan:     Diagnoses and all orders for this visit:    GREG III (vulvar intraepithelial neoplasia III)    HIV infection, unspecified symptom status  -     HIV RNA, QUANTITATIVE, PCR; Future  -     T-HELPER CELLS (CD4) COUNT; Future  -     Liquid-Based Pap Smear, Screening  -     HPV High Risk Genotypes, PCR    EMEKA II (cervical intraepithelial neoplasia II)  -     Liquid-Based Pap Smear, Screening  -     HPV High Risk Genotypes, PCR     Patient is to have  WLE  for  GREG 3   on (7/17/2024)    - Medical clearance required: No  - Case request placed & pre-op orders completed  - Anticoagulation : Patient is not on antiocoagulation.   - I have discussed the risks, benefits, indications, and alternatives of the procedure in detail.  The patient verbalizes her understanding.  All questions answered.  Consents signed.  The patient agrees to proceed to proceed as planned.  - To pre-op   - Will f/u with results regarding pap smear/HPV, CD 4 count and viral load.    Randall Das MD  Obstetrics & Gynecology  [facility]

## 2024-07-10 LAB
CD3+CD4+ CELLS # BLD: 16 CELLS/UL (ref 300–1400)
CD3+CD4+ CELLS NFR BLD: 2.4 % (ref 28–57)

## 2024-07-11 ENCOUNTER — TELEPHONE (OUTPATIENT)
Dept: OBSTETRICS AND GYNECOLOGY | Facility: CLINIC | Age: 39
End: 2024-07-11
Payer: MEDICARE

## 2024-07-11 NOTE — TELEPHONE ENCOUNTER
----- Message from Virginia Fleming LPN sent at 7/11/2024  1:55 PM CDT -----    ----- Message -----  From: Shelley Hernandez  Sent: 7/11/2024   1:38 PM CDT  To: [facility] Staff    Pt called said she need to speak with the Dr Worrell about a message she sent she can be reached at [phone]

## 2024-07-15 ENCOUNTER — HOSPITAL ENCOUNTER (OUTPATIENT)
Dept: PREADMISSION TESTING | Facility: OTHER | Age: 39
Discharge: HOME | End: 2024-07-15
Attending: OBSTETRICS & GYNECOLOGY
Payer: MEDICARE

## 2024-07-15 ENCOUNTER — ANESTHESIA EVENT (OUTPATIENT)
Dept: SURGERY | Facility: OTHER | Age: 39
End: 2024-07-15
Payer: MEDICARE

## 2024-07-15 VITALS
BODY MASS INDEX: 32.8 KG/M2 | TEMPERATURE: 98 F | RESPIRATION RATE: 16 BRPM | OXYGEN SATURATION: 100 % | HEIGHT: 67 IN | WEIGHT: 209 LBS | HEART RATE: 78 BPM | SYSTOLIC BLOOD PRESSURE: 147 MMHG | DIASTOLIC BLOOD PRESSURE: 83 MMHG

## 2024-07-15 DIAGNOSIS — Z01.818 PREOP TESTING: Primary | ICD-10-CM

## 2024-07-15 LAB
ANION GAP SERPL CALC-SCNC: 8 MMOL/L (ref 8–16)
BASOPHILS # BLD AUTO: 0.01 K/UL (ref 0–0.2)
BASOPHILS NFR BLD: 0.3 % (ref 0–1.9)
BUN SERPL-MCNC: 15 MG/DL (ref 6–20)
CALCIUM SERPL-MCNC: 9.6 MG/DL (ref 8.7–10.5)
CHLORIDE SERPL-SCNC: 109 MMOL/L (ref 95–110)
CO2 SERPL-SCNC: 21 MMOL/L (ref 23–29)
CREAT SERPL-MCNC: 0.9 MG/DL (ref 0.5–1.4)
DIFFERENTIAL METHOD BLD: ABNORMAL
EOSINOPHIL # BLD AUTO: 0 K/UL (ref 0–0.5)
EOSINOPHIL NFR BLD: 0.3 % (ref 0–8)
ERYTHROCYTE [DISTWIDTH] IN BLOOD BY AUTOMATED COUNT: 12.4 % (ref 11.5–14.5)
EST. GFR  (NO RACE VARIABLE): >60 ML/MIN/1.73 M^2
GLUCOSE SERPL-MCNC: 118 MG/DL (ref 70–110)
HCT VFR BLD AUTO: 32.9 % (ref 37–48.5)
HGB BLD-MCNC: 11.6 G/DL (ref 12–16)
IMM GRANULOCYTES # BLD AUTO: 0.01 K/UL (ref 0–0.04)
IMM GRANULOCYTES NFR BLD AUTO: 0.3 % (ref 0–0.5)
LYMPHOCYTES # BLD AUTO: 0.9 K/UL (ref 1–4.8)
LYMPHOCYTES NFR BLD: 24 % (ref 18–48)
MCH RBC QN AUTO: 28.6 PG (ref 27–31)
MCHC RBC AUTO-ENTMCNC: 35.3 G/DL (ref 32–36)
MCV RBC AUTO: 81 FL (ref 82–98)
MONOCYTES # BLD AUTO: 0.2 K/UL (ref 0.3–1)
MONOCYTES NFR BLD: 6.2 % (ref 4–15)
NEUTROPHILS # BLD AUTO: 2.7 K/UL (ref 1.8–7.7)
NEUTROPHILS NFR BLD: 68.9 % (ref 38–73)
NRBC BLD-RTO: 0 /100 WBC
PLATELET # BLD AUTO: 182 K/UL (ref 150–450)
PMV BLD AUTO: 10.2 FL (ref 9.2–12.9)
POTASSIUM SERPL-SCNC: 3.7 MMOL/L (ref 3.5–5.1)
RBC # BLD AUTO: 4.06 M/UL (ref 4–5.4)
SODIUM SERPL-SCNC: 138 MMOL/L (ref 136–145)
T3FREE SERPL-MCNC: 2.8 PG/ML (ref 2.3–4.2)
T4 FREE SERPL-MCNC: 0.82 NG/DL (ref 0.71–1.51)
TSH SERPL DL<=0.005 MIU/L-ACNC: 0.51 UIU/ML (ref 0.4–4)
WBC # BLD AUTO: 3.88 K/UL (ref 3.9–12.7)

## 2024-07-15 PROCEDURE — 85025 COMPLETE CBC W/AUTO DIFF WBC: CPT | Performed by: ANESTHESIOLOGY

## 2024-07-15 PROCEDURE — 80048 BASIC METABOLIC PNL TOTAL CA: CPT | Performed by: ANESTHESIOLOGY

## 2024-07-15 PROCEDURE — 36415 COLL VENOUS BLD VENIPUNCTURE: CPT | Performed by: ANESTHESIOLOGY

## 2024-07-15 PROCEDURE — 84481 FREE ASSAY (FT-3): CPT | Performed by: ANESTHESIOLOGY

## 2024-07-15 PROCEDURE — 84443 ASSAY THYROID STIM HORMONE: CPT | Performed by: ANESTHESIOLOGY

## 2024-07-15 PROCEDURE — 84439 ASSAY OF FREE THYROXINE: CPT | Performed by: ANESTHESIOLOGY

## 2024-07-15 RX ORDER — ACETAMINOPHEN 500 MG
1000 TABLET ORAL
Status: CANCELLED | OUTPATIENT
Start: 2024-07-15 | End: 2024-07-15

## 2024-07-15 RX ORDER — LIDOCAINE HYDROCHLORIDE 10 MG/ML
0.5 INJECTION, SOLUTION EPIDURAL; INFILTRATION; INTRACAUDAL; PERINEURAL ONCE
Status: CANCELLED | OUTPATIENT
Start: 2024-07-15 | End: 2024-07-15

## 2024-07-15 RX ORDER — SODIUM CHLORIDE, SODIUM LACTATE, POTASSIUM CHLORIDE, CALCIUM CHLORIDE 600; 310; 30; 20 MG/100ML; MG/100ML; MG/100ML; MG/100ML
INJECTION, SOLUTION INTRAVENOUS CONTINUOUS
Status: CANCELLED | OUTPATIENT
Start: 2024-07-15

## 2024-07-15 NOTE — ANESTHESIA PREPROCEDURE EVALUATION
07/15/2024  Jm Newton is a 38 y.o., female.      Pre-op Assessment    I have reviewed the Patient Summary Reports.     I have reviewed the Nursing Notes. I have reviewed the NPO Status.   I have reviewed the Medications.     Review of Systems  Anesthesia Hx:             Denies Family Hx of Anesthesia complications.    Denies Personal Hx of Anesthesia complications.                    Social:  Non-Smoker       Hematology/Oncology:                   Hematology Comments: HIV+                    Cardiovascular:  Cardiovascular Normal                                            Pulmonary:    Asthma moderate                   Renal/:  Renal/ Normal                 Hepatic/GI:  Hepatic/GI Normal                 Musculoskeletal:  Musculoskeletal Normal                Neurological:  Neurology Normal                                      Endocrine:    Hyperthyroidism (Graves)       Obesity / BMI > 30  Psych:  Psychiatric History                Physical Exam  General: Well nourished, Cooperative, Alert and Oriented    Airway:  Mallampati: II   Mouth Opening: Normal  TM Distance: Normal  Tongue: Normal  Neck ROM: Normal ROM    Dental:  Intact, Caps / Implants      Anesthesia Plan  Type of Anesthesia, risks & benefits discussed:    Anesthesia Type: Gen ETT  Intra-op Monitoring Plan: Standard ASA Monitors  Post Op Pain Control Plan: multimodal analgesia  Induction:  IV  Airway Plan: Video, Post-Induction  Informed Consent: Informed consent signed with the Patient and all parties understand the risks and agree with anesthesia plan.  All questions answered.   ASA Score: 3  Anesthesia Plan Notes: CBC, BMP, TSH, Free T4    Ready For Surgery From Anesthesia Perspective.     .

## 2024-07-15 NOTE — DISCHARGE INSTRUCTIONS
Information to Prepare you for your Surgery    PRE-ADMIT TESTING -  [phone]    [address]  [facility]          Your surgery has been scheduled at [facility]. We are pleased to have the opportunity to serve you. For Further Information please call [phone].    On the day of surgery please report to the Information Desk on the 1st floor.    CONTACT YOUR PHYSICIAN'S OFFICE THE DAY PRIOR TO YOUR SURGERY TO OBTAIN YOUR ARRIVAL TIME.     The evening before surgery do not eat anything after 9 p.m. ( this includes hard candy, chewing gum and mints).  You may only have GATORADE, POWERADE AND WATER  from 9 p.m. until you leave your home.   DO NOT DRINK ANY LIQUIDS ON THE WAY TO THE HOSPITAL.      Why does your anesthesiologist allow you to drink Gatorade/Powerade before surgery?  Gatorade/Powerade helps to increase your comfort before surgery and to decrease your nausea after surgery. The carbohydrates in Gatorade/Powerade help reduce your body's stress response to surgery.  If you are a diabetic-drink only water prior to surgery.    Outpatient Surgery- May allow 2 adult (18 and older) Support Persons (1 being the designated ) for all surgical/procedural patients. A breastfeeding mother will be allowed her infant and 2 adult Support Persons. No one under the age of 18 will be allowed in the building. No swapping out of visitors in the [facility].      SPECIAL MEDICATION INSTRUCTIONS: TAKE medications checked off by the Anesthesiologist on your Medication List.    Angiogram Patients: Take medications as instructed by your physician, including aspirin.     Surgery Patients:    If you take ASPIRIN - Your PHYSICIAN/SURGEON will need to inform you IF/OR when you need to stop taking aspirin prior to your surgery.     The week prior to surgery do not ot take any medications containing IBUPROFEN or NSAIDS ( Advil, Motrin, Goodys, BC, Aleve, Naproxen etc)  If you are not sure if you should take a medicine please call your surgeon's office.  Ok to take Tylenol    Do Not Wear any make-up (especially eye make-up) to surgery. Please remove any false eyelashes or eyelash extensions. If you arrive the day of surgery with makeup/eyelashes on you will be required to remove prior to surgery. (There is a risk of corneal abrasions if eye makeup/eyelash extensions are not removed)      Leave all valuables at home.   Do Not wear any jewelry or watches, including any metal in body piercings. Jewelry must be removed prior to coming to the hospital.  There is a possibility that rings that are unable to be removed may be cut off if they are on the surgical extremity.    Please remove all hair extensions, wigs, clips and any other metal accessories/ ornaments from your hair.  These items may pose a flammable/fire risk in Surgery and must be removed.    Do not shave your surgical area at least 5 days prior to your surgery. The surgical prep will be performed at the hospital according to Infection Control regulations.    Contact Lens must be removed before surgery. Either do not wear the contact lens or bring a case and solution for storage.  Please bring a container for eyeglasses or dentures as required.  Bring any paperwork your physician has provided, such as consent forms,  history and physicals, doctor's orders, etc.   Bring comfortable clothes that are loose fitting to wear upon discharge. Take into consideration the type of surgery being performed.  Maintain your diet as advised per your physician the day prior to surgery.      Adequate rest the night before surgery is advised.   Park in the Parking lot behind the hospital or in the [facility] across the street from the parking lot. Parking is complimentary.  If you will be discharged the same day as your procedure, please arrange for a responsible adult to drive you home or to accompany you if traveling by taxi.    YOU WILL NOT BE PERMITTED TO DRIVE OR TO LEAVE THE HOSPITAL ALONE AFTER SURGERY.   If you are being discharged the same day, it is strongly recommended that you arrange for someone to remain with you for the first 24 hrs following your surgery.    The Surgeon will speak to your family/visitor after your surgery regarding the outcome of your surgery and post op care.  The Surgeon may speak to you after your surgery, but there is a possibility you may not remember the details.  Please check with your family members regarding the conversation with the Surgeon.    We strongly recommend whoever is bringing you home be present for discharge instructions.  This will ensure a thorough understanding for your post op home care.          Thank you for your cooperation.  The Staff of [facility].            Bathing Instructions with Hibiclens    Shower the evening before and morning of your procedure with Chlorhexidine (Hibiclens)  do not use Chlorhexidine on your face or genitals. Do not get in your eyes.  Wash your face with water and your regular face wash/soap  Use your regular shampoo  Apply Chlorhexidine (Hibiclens) directly on your skin or on a wet washcloth and wash gently. When showering: Move away from the shower stream when applying Chlorhexidine (Hibiclens) to avoid rinsing off too soon.  Rinse thoroughly with warm water  Do not dilute Chlorhexidine (Hibiclens)   Dry off as usual, do not use any deodorant, powder, body lotions, perfume, after shave or cologne.

## 2024-07-16 ENCOUNTER — PATIENT MESSAGE (OUTPATIENT)
Dept: OBSTETRICS AND GYNECOLOGY | Facility: CLINIC | Age: 39
End: 2024-07-16
Payer: MEDICARE

## 2024-07-17 ENCOUNTER — ANESTHESIA (OUTPATIENT)
Dept: SURGERY | Facility: OTHER | Age: 39
End: 2024-07-17
Payer: MEDICARE

## 2024-07-17 ENCOUNTER — HOSPITAL ENCOUNTER (OUTPATIENT)
Facility: OTHER | Age: 39
Discharge: HOME | End: 2024-07-17
Attending: OBSTETRICS & GYNECOLOGY | Admitting: OBSTETRICS & GYNECOLOGY
Payer: MEDICARE

## 2024-07-17 VITALS
WEIGHT: 209 LBS | BODY MASS INDEX: 32.8 KG/M2 | DIASTOLIC BLOOD PRESSURE: 59 MMHG | OXYGEN SATURATION: 96 % | RESPIRATION RATE: 16 BRPM | HEIGHT: 67 IN | SYSTOLIC BLOOD PRESSURE: 120 MMHG | HEART RATE: 65 BPM | TEMPERATURE: 98 F

## 2024-07-17 DIAGNOSIS — D07.1 VIN III (VULVAR INTRAEPITHELIAL NEOPLASIA III): ICD-10-CM

## 2024-07-17 PROCEDURE — 37000009 HC ANESTHESIA EA ADD 15 MINS: Performed by: OBSTETRICS & GYNECOLOGY

## 2024-07-17 PROCEDURE — 36000707: Performed by: OBSTETRICS & GYNECOLOGY

## 2024-07-17 PROCEDURE — 88305 TISSUE EXAM BY PATHOLOGIST: CPT | Performed by: PATHOLOGY

## 2024-07-17 PROCEDURE — 37000008 HC ANESTHESIA 1ST 15 MINUTES: Performed by: OBSTETRICS & GYNECOLOGY

## 2024-07-17 PROCEDURE — 71000033 HC RECOVERY, INTIAL HOUR: Performed by: OBSTETRICS & GYNECOLOGY

## 2024-07-17 PROCEDURE — 25000003 PHARM REV CODE 250: Performed by: ANESTHESIOLOGY

## 2024-07-17 PROCEDURE — 36000706: Performed by: OBSTETRICS & GYNECOLOGY

## 2024-07-17 PROCEDURE — 71000015 HC POSTOP RECOV 1ST HR: Performed by: OBSTETRICS & GYNECOLOGY

## 2024-07-17 PROCEDURE — 63600175 PHARM REV CODE 636 W HCPCS: Performed by: NURSE ANESTHETIST, CERTIFIED REGISTERED

## 2024-07-17 PROCEDURE — 88309 TISSUE EXAM BY PATHOLOGIST: CPT | Performed by: PATHOLOGY

## 2024-07-17 PROCEDURE — 63600175 PHARM REV CODE 636 W HCPCS: Performed by: ANESTHESIOLOGY

## 2024-07-17 PROCEDURE — 25000003 PHARM REV CODE 250: Performed by: OBSTETRICS & GYNECOLOGY

## 2024-07-17 PROCEDURE — 63600175 PHARM REV CODE 636 W HCPCS: Performed by: OBSTETRICS & GYNECOLOGY

## 2024-07-17 PROCEDURE — 88342 IMHCHEM/IMCYTCHM 1ST ANTB: CPT | Performed by: PATHOLOGY

## 2024-07-17 PROCEDURE — 71000039 HC RECOVERY, EACH ADD'L HOUR: Performed by: OBSTETRICS & GYNECOLOGY

## 2024-07-17 PROCEDURE — 25000003 PHARM REV CODE 250: Performed by: NURSE ANESTHETIST, CERTIFIED REGISTERED

## 2024-07-17 PROCEDURE — S0039 INJECTION, SULFAMETHOXAZOLE: HCPCS | Performed by: OBSTETRICS & GYNECOLOGY

## 2024-07-17 PROCEDURE — 71000016 HC POSTOP RECOV ADDL HR: Performed by: OBSTETRICS & GYNECOLOGY

## 2024-07-17 PROCEDURE — 56620 VULVECTOMY SIMPLE PARTIAL: CPT | Mod: ,,, | Performed by: OBSTETRICS & GYNECOLOGY

## 2024-07-17 RX ORDER — ONDANSETRON HYDROCHLORIDE 2 MG/ML
INJECTION, SOLUTION INTRAVENOUS
Status: DISCONTINUED | OUTPATIENT
Start: 2024-07-17 | End: 2024-07-17

## 2024-07-17 RX ORDER — DEXAMETHASONE SODIUM PHOSPHATE 4 MG/ML
INJECTION, SOLUTION INTRA-ARTICULAR; INTRALESIONAL; INTRAMUSCULAR; INTRAVENOUS; SOFT TISSUE
Status: DISCONTINUED | OUTPATIENT
Start: 2024-07-17 | End: 2024-07-17

## 2024-07-17 RX ORDER — GLUCAGON 1 MG
1 KIT INJECTION
Status: DISCONTINUED | OUTPATIENT
Start: 2024-07-17 | End: 2024-07-17 | Stop reason: HOSPADM

## 2024-07-17 RX ORDER — MEPERIDINE HYDROCHLORIDE 25 MG/ML
12.5 INJECTION INTRAMUSCULAR; INTRAVENOUS; SUBCUTANEOUS ONCE AS NEEDED
Status: DISCONTINUED | OUTPATIENT
Start: 2024-07-17 | End: 2024-07-17 | Stop reason: HOSPADM

## 2024-07-17 RX ORDER — ACETAMINOPHEN 500 MG
1000 TABLET ORAL
Status: COMPLETED | OUTPATIENT
Start: 2024-07-17 | End: 2024-07-17

## 2024-07-17 RX ORDER — LIDOCAINE HYDROCHLORIDE 20 MG/ML
INJECTION INTRAVENOUS
Status: DISCONTINUED | OUTPATIENT
Start: 2024-07-17 | End: 2024-07-17

## 2024-07-17 RX ORDER — MUPIROCIN 20 MG/G
OINTMENT TOPICAL
Status: DISCONTINUED | OUTPATIENT
Start: 2024-07-17 | End: 2024-07-17 | Stop reason: HOSPADM

## 2024-07-17 RX ORDER — HYDROMORPHONE HYDROCHLORIDE 2 MG/ML
0.4 INJECTION, SOLUTION INTRAMUSCULAR; INTRAVENOUS; SUBCUTANEOUS EVERY 5 MIN PRN
Status: DISCONTINUED | OUTPATIENT
Start: 2024-07-17 | End: 2024-07-17 | Stop reason: HOSPADM

## 2024-07-17 RX ORDER — FENTANYL CITRATE 50 UG/ML
INJECTION, SOLUTION INTRAMUSCULAR; INTRAVENOUS
Status: DISCONTINUED | OUTPATIENT
Start: 2024-07-17 | End: 2024-07-17

## 2024-07-17 RX ORDER — HYDROCODONE BITARTRATE AND ACETAMINOPHEN 5; 325 MG/1; MG/1
1 TABLET ORAL EVERY 6 HOURS PRN
Qty: 10 TABLET | Refills: 0 | Status: SHIPPED | OUTPATIENT
Start: 2024-07-17

## 2024-07-17 RX ORDER — OXYCODONE HYDROCHLORIDE 5 MG/1
5 TABLET ORAL EVERY 4 HOURS PRN
Status: DISCONTINUED | OUTPATIENT
Start: 2024-07-17 | End: 2024-07-17 | Stop reason: HOSPADM

## 2024-07-17 RX ORDER — DIPHENHYDRAMINE HCL 25 MG
25 CAPSULE ORAL EVERY 4 HOURS PRN
Status: DISCONTINUED | OUTPATIENT
Start: 2024-07-17 | End: 2024-07-17 | Stop reason: HOSPADM

## 2024-07-17 RX ORDER — IBUPROFEN 600 MG/1
600 TABLET ORAL EVERY 6 HOURS PRN
Qty: 30 TABLET | Refills: 0 | Status: SHIPPED | OUTPATIENT
Start: 2024-07-17

## 2024-07-17 RX ORDER — RIFABUTIN 150 MG/1
150 CAPSULE ORAL DAILY
Status: DISCONTINUED | OUTPATIENT
Start: 2024-07-17 | End: 2024-07-17 | Stop reason: HOSPADM

## 2024-07-17 RX ORDER — HYDROCODONE BITARTRATE AND ACETAMINOPHEN 5; 325 MG/1; MG/1
1 TABLET ORAL EVERY 4 HOURS PRN
Status: DISCONTINUED | OUTPATIENT
Start: 2024-07-17 | End: 2024-07-17 | Stop reason: HOSPADM

## 2024-07-17 RX ORDER — PROPOFOL 10 MG/ML
VIAL (ML) INTRAVENOUS
Status: DISCONTINUED | OUTPATIENT
Start: 2024-07-17 | End: 2024-07-17

## 2024-07-17 RX ORDER — SODIUM CHLORIDE 9 MG/ML
INJECTION, SOLUTION INTRAVENOUS CONTINUOUS
Status: DISCONTINUED | OUTPATIENT
Start: 2024-07-17 | End: 2024-07-17 | Stop reason: HOSPADM

## 2024-07-17 RX ORDER — DIPHENHYDRAMINE HYDROCHLORIDE 50 MG/ML
25 INJECTION INTRAMUSCULAR; INTRAVENOUS EVERY 4 HOURS PRN
Status: DISCONTINUED | OUTPATIENT
Start: 2024-07-17 | End: 2024-07-17 | Stop reason: HOSPADM

## 2024-07-17 RX ORDER — DIPHENHYDRAMINE HYDROCHLORIDE 50 MG/ML
12.5 INJECTION INTRAMUSCULAR; INTRAVENOUS EVERY 30 MIN PRN
Status: DISCONTINUED | OUTPATIENT
Start: 2024-07-17 | End: 2024-07-17 | Stop reason: HOSPADM

## 2024-07-17 RX ORDER — KETOROLAC TROMETHAMINE 30 MG/ML
INJECTION, SOLUTION INTRAMUSCULAR; INTRAVENOUS
Status: DISCONTINUED | OUTPATIENT
Start: 2024-07-17 | End: 2024-07-17

## 2024-07-17 RX ORDER — PROCHLORPERAZINE EDISYLATE 5 MG/ML
5 INJECTION INTRAMUSCULAR; INTRAVENOUS EVERY 30 MIN PRN
Status: DISCONTINUED | OUTPATIENT
Start: 2024-07-17 | End: 2024-07-17 | Stop reason: HOSPADM

## 2024-07-17 RX ORDER — SODIUM CHLORIDE, SODIUM LACTATE, POTASSIUM CHLORIDE, CALCIUM CHLORIDE 600; 310; 30; 20 MG/100ML; MG/100ML; MG/100ML; MG/100ML
INJECTION, SOLUTION INTRAVENOUS CONTINUOUS
Status: DISCONTINUED | OUTPATIENT
Start: 2024-07-17 | End: 2024-07-17 | Stop reason: HOSPADM

## 2024-07-17 RX ORDER — CEPHALEXIN 500 MG/1
500 CAPSULE ORAL EVERY 6 HOURS
Qty: 28 CAPSULE | Refills: 0 | Status: SHIPPED | OUTPATIENT
Start: 2024-07-17 | End: 2024-07-24

## 2024-07-17 RX ORDER — ONDANSETRON 8 MG/1
8 TABLET, ORALLY DISINTEGRATING ORAL EVERY 8 HOURS PRN
Status: DISCONTINUED | OUTPATIENT
Start: 2024-07-17 | End: 2024-07-17 | Stop reason: HOSPADM

## 2024-07-17 RX ORDER — LIDOCAINE HYDROCHLORIDE 10 MG/ML
0.5 INJECTION, SOLUTION EPIDURAL; INFILTRATION; INTRACAUDAL; PERINEURAL ONCE
Status: DISCONTINUED | OUTPATIENT
Start: 2024-07-17 | End: 2024-07-17 | Stop reason: HOSPADM

## 2024-07-17 RX ORDER — SULFAMETHOXAZOLE AND TRIMETHOPRIM 400; 80 MG/1; MG/1
1 TABLET ORAL 2 TIMES DAILY
Qty: 14 TABLET | Refills: 0 | Status: SHIPPED | OUTPATIENT
Start: 2024-07-17 | End: 2024-07-24

## 2024-07-17 RX ORDER — MIDAZOLAM HYDROCHLORIDE 1 MG/ML
INJECTION INTRAMUSCULAR; INTRAVENOUS
Status: DISCONTINUED | OUTPATIENT
Start: 2024-07-17 | End: 2024-07-17

## 2024-07-17 RX ORDER — SODIUM CHLORIDE 0.9 % (FLUSH) 0.9 %
3 SYRINGE (ML) INJECTION
Status: DISCONTINUED | OUTPATIENT
Start: 2024-07-17 | End: 2024-07-17 | Stop reason: HOSPADM

## 2024-07-17 RX ADMIN — CEFAZOLIN 2 G: 2 INJECTION, POWDER, FOR SOLUTION INTRAMUSCULAR; INTRAVENOUS at 12:07

## 2024-07-17 RX ADMIN — MIDAZOLAM HYDROCHLORIDE 2 MG: 1 INJECTION, SOLUTION INTRAMUSCULAR; INTRAVENOUS at 11:07

## 2024-07-17 RX ADMIN — ONDANSETRON HYDROCHLORIDE 4 MG: 2 INJECTION INTRAMUSCULAR; INTRAVENOUS at 12:07

## 2024-07-17 RX ADMIN — CARBOXYMETHYLCELLULOSE SODIUM 2 DROP: 2.5 SOLUTION/ DROPS OPHTHALMIC at 11:07

## 2024-07-17 RX ADMIN — FENTANYL CITRATE 100 MCG: 50 INJECTION, SOLUTION INTRAMUSCULAR; INTRAVENOUS at 11:07

## 2024-07-17 RX ADMIN — HYDROMORPHONE HYDROCHLORIDE 0.4 MG: 2 INJECTION INTRAMUSCULAR; INTRAVENOUS; SUBCUTANEOUS at 01:07

## 2024-07-17 RX ADMIN — DEXAMETHASONE SODIUM PHOSPHATE 8 MG: 4 INJECTION, SOLUTION INTRAMUSCULAR; INTRAVENOUS at 12:07

## 2024-07-17 RX ADMIN — MUPIROCIN: 20 OINTMENT TOPICAL at 10:07

## 2024-07-17 RX ADMIN — OXYCODONE HYDROCHLORIDE 5 MG: 5 TABLET ORAL at 12:07

## 2024-07-17 RX ADMIN — KETOROLAC TROMETHAMINE 30 MG: 30 INJECTION, SOLUTION INTRAMUSCULAR; INTRAVENOUS at 12:07

## 2024-07-17 RX ADMIN — FENTANYL CITRATE 50 MCG: 50 INJECTION, SOLUTION INTRAMUSCULAR; INTRAVENOUS at 12:07

## 2024-07-17 RX ADMIN — SULFAMETHOXAZOLE AND TRIMETHOPRIM 465 MG: 80; 16 INJECTION, SOLUTION, CONCENTRATE INTRAVENOUS at 11:07

## 2024-07-17 RX ADMIN — PROPOFOL 200 MG: 10 INJECTION, EMULSION INTRAVENOUS at 11:07

## 2024-07-17 RX ADMIN — LIDOCAINE HYDROCHLORIDE 100 MG: 20 INJECTION, SOLUTION INTRAVENOUS at 11:07

## 2024-07-17 RX ADMIN — ACETAMINOPHEN 1000 MG: 500 TABLET ORAL at 10:07

## 2024-07-17 RX ADMIN — ONDANSETRON 8 MG: 8 TABLET, ORALLY DISINTEGRATING ORAL at 01:07

## 2024-07-17 RX ADMIN — SODIUM CHLORIDE, SODIUM LACTATE, POTASSIUM CHLORIDE, AND CALCIUM CHLORIDE: 600; 310; 30; 20 INJECTION, SOLUTION INTRAVENOUS at 11:07

## 2024-07-17 NOTE — ANESTHESIA POSTPROCEDURE EVALUATION
Anesthesia Post Evaluation    Patient: Jm Newton    Procedure(s) Performed: Procedure(s) (LRB):  EXCISION-WIDE LOCAL (Right)    Final Anesthesia Type: general      Patient location during evaluation: PACU  Patient participation: Yes- Able to Participate  Level of consciousness: awake and alert  Post-procedure vital signs: reviewed and stable  Pain management: adequate  Airway patency: patent    PONV status at discharge: No PONV  Anesthetic complications: no      Cardiovascular status: blood pressure returned to baseline  Respiratory status: spontaneous ventilation  Hydration status: euvolemic  Follow-up not needed.          Vitals Value Taken Time   /59 07/17/24 1415   Temp 36.6 °C (97.8 °F) 07/17/24 1345   Pulse 65 07/17/24 1415   Resp 16 07/17/24 1415   SpO2 96 % 07/17/24 1415         Event Time   Out of Recovery 13:38:00         Pain/Katherine Score: Pain Rating Prior to Med Admin: 7 (7/17/2024  1:16 PM)  Katherine Score: 9 (7/17/2024  2:15 PM)

## 2024-07-17 NOTE — TRANSFER OF CARE
"Anesthesia Transfer of Care Note    Patient: Jm Newton    Procedure(s) Performed: Procedure(s) (LRB):  EXCISION-WIDE LOCAL (Right)    Patient location: PACU    Anesthesia Type: general    Transport from OR: Transported from OR on 6-10 L/min O2 by face mask with adequate spontaneous ventilation    Post pain: adequate analgesia    Post assessment: no apparent anesthetic complications    Post vital signs: stable    Level of consciousness: awake and alert    Nausea/Vomiting: no nausea/vomiting    Complications: none    Transfer of care protocol was followed      Last vitals: Visit Vitals  /82 (BP Location: Left arm, Patient Position: Lying)   Pulse 71   Temp 36.1 °C (96.9 °F) (Skin)   Resp 16   Ht 5' 7" (1.702 m)   Wt 94.8 kg (209 lb)   LMP 10/02/2019   SpO2 99%   Breastfeeding No   BMI 32.73 kg/m²     "

## 2024-07-17 NOTE — ANESTHESIA PROCEDURE NOTES
Intubation    Date/Time: 7/17/2024 11:58 AM    Performed by: Shanti Duong CRNA  Authorized by: Shanti Duong CRNA    Intubation:     Induction:  Intravenous    Intubated:  Postinduction    Mask Ventilation:  Not attempted    Attempts:  1    Attempted By:  CRNA    Difficult Airway Encountered?: No      Complications:  None    Airway Device:  Supraglottic airway/LMA    Airway Device Size:  4.0    Style/Cuff Inflation:  Uncuffed    Placement Verified By:  Capnometry    Complicating Factors:  None    Findings Post-Intubation:  BS equal bilateral and atraumatic/condition of teeth unchanged

## 2024-07-17 NOTE — INTERVAL H&P NOTE
The patient has been examined and the H&P has been reviewed:    I concur with the findings and no changes have occurred since H&P was written.    Surgery risks, benefits and alternative options discussed and understood by patient/family.    Jm Newton is 38 y.o.  presenting for scheduled EUA & WLE of vulva.    Temp:  [98.1 °F (36.7 °C)] 98.1 °F (36.7 °C)  Pulse:  [71] 71  Resp:  [16] 16  SpO2:  [99 %] 99 %  BP: (135)/(82) 135/82    General: NAD, alert, oriented, cooperative  HEENT: NCAT, EOM grossly intact  Lungs: Normal WOB  Heart: regular rate  Abdomen: soft, nondistended, nontender, no rebound or guarding    Consents in chart. Pre-operative heparin not indicated. All questions answered and concerns addressed. To OR for planned procedure.       Karina You MD  OB/GYN PGY-2

## 2024-07-17 NOTE — PLAN OF CARE
Jm Irma Newton has met all discharge criteria from Phase II. Vital Signs are stable, ambulating  without difficulty. Nausea has now subsided. Discharge instructions given, patient verbalized understanding. Discharged from facility via wheelchair in stable condition.     All belongings accounted for at discharge.

## 2024-07-17 NOTE — OP NOTE
DATE OF PROCEDURE: 7/17/2024       SURGEON:  Chantal Worrell M.D.     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 Exam Under Anesthesia 2. Wide Local excision of vulva      COMPLICATIONS:  None.     ESTIMATED BLOOD LOSS: minimal     ANESTHESIA:  General via laryngeal mask airway.     INTRAOPERATIVE FINDINGS:  hypopigmented area on right perineum lateral to the perineal body, hypopigmented area on left labia minora (1 cm), hypopigmented area left clitoral lujan (1 cm)      PROCEDURE IN DETAIL:  The patient was seen in the pre-operative holding area and consents were verified. She was was given an opportunity to ask questions, and all were answered. The patient was taken  to the Operating Suite.  General anesthesia was administered.  Once this was felt to be adequate, she was placed in dorsal lithotomy position with her feet in josefa stirrups.  She was then prepped and draped in the usual sterile fashion. The above noted findings were present. An elliptical incision was drawn around the perineal lesion with a 1cm margin. Knife was used to make skin incision. Allis was used to elevate the lesion and knife was used to resect lesion. Bovie cautery was used for hemostasis. A stitch was placed at 12 o'clock, and specimen was sent to pathology. Re approximatation of the skin was performed with 2-0 vicryl.  Hemostasis was noted. A 1 cm biopsy was done on the left labia minora and left clitoral lujan with Metzenbaum scissors.  Cautery was used for hemostasis.  Interrupted suture of 2-O vicryl used for closure.  Hemostasis noted.  Case was complete. Patient was reversed from general anesthesia and taken to recovery.  Of note, I was present for and performed all key aspects of the procedure.

## 2024-07-17 NOTE — DISCHARGE SUMMARY
[facility] ([place])  Discharge Summary     Patient ID:  Jm Newton  [id number]  38 y.o.  1985    Admit date: 7/17/2024    Discharge Date and Time:  07/17/2024     Admitting Physician: Chantal Worrell MD     Discharge Provider: Chantal Worrell    Reason for Admission: GREG III (vulvar intraepithelial neoplasia III) [D07.1]    Admission Condition: good    Procedures Performed: Procedure(s) (LRB):  EXCISION-WIDE LOCAL (Right)    Hospital Course She was admitted for WLE.  Prior WLE showed GREG III with positive margins from 12-3.  She also has a area of hypopigmentation on the left labia minora and left clitoral lujan.      WLE and biopsies were done without difficulty.  See op note for details.  Once her pain was well tolerated, she was eating and drinking, voiding, and ambulating without difficulty she was discharged home.     Consults: None        Final Diagnoses:      Active Problem List with Overview Notes    Diagnosis Date Noted    GREG III (vulvar intraepithelial neoplasia III) 11/28/2023    Left ankle pain 10/19/2022    Thyroid nodule 05/06/2021    Graves' disease     Moderate persistent asthma 12/14/2020    EMEKA II (cervical intraepithelial neoplasia II) 07/10/2018    Class 2 obesity due to excess calories without serious comorbidity with body mass index (BMI) of 38.0 to 38.9 in adult 06/18/2018    Moderate recurrent major depression 06/21/2017    HIV (human immunodeficiency virus infection) 09/02/2014         Discharged Condition: good    .    Disposition: Home or Self Care    Follow Up/Patient Instructions:     Medications:  Reconciled Home Medications:      Medication List        START taking these medications      cephALEXin 500 MG capsule  Commonly known as: KEFLEX  Take 1 capsule (500 mg total) by mouth every 6 (six) hours. for 7 days     HYDROcodone-acetaminophen 5-325 mg per tablet  Commonly known as: NORCO  Take 1 tablet by mouth every 6 (six) hours as needed for Pain.     ibuprofen  600 MG tablet  Commonly known as: ADVIL,MOTRIN  Take 1 tablet (600 mg total) by mouth every 6 (six) hours as needed for Pain.     sulfamethoxazole-trimethoprim 400-80mg 400-80 mg per tablet  Commonly known as: BACTRIM  Take 1 tablet by mouth 2 (two) times daily. for 7 days            CONTINUE taking these medications      albuterol 90 mcg/actuation inhaler  Commonly known as: PROVENTIL/VENTOLIN HFA  Inhale 2 puffs into the lungs every 4 (four) hours as needed for Shortness of Breath or Wheezing.     budesonide-formoterol 160-4.5 mcg 160-4.5 mcg/actuation Hfaa  Commonly known as: SYMBICORT  Inhale 2 puffs into the lungs every 12 (twelve) hours. Controller     butalbital-acetaminophen-caffeine -40 mg -40 mg per tablet  Commonly known as: FIORICET, ESGIC  Take 1 tablet by mouth every 4 to 6 hours as needed.     cetirizine 10 MG tablet  Commonly known as: ZYRTEC  Take 10 mg by mouth every evening.     DESCOVY 200-25 mg Tab  Generic drug: emtricitabine-tenofovir alafen  once daily.     EScitalopram oxalate 10 MG tablet  Commonly known as: LEXAPRO  Take 1 tablet (10 mg total) by mouth once daily.     hydrOXYzine HCL 25 MG tablet  Commonly known as: ATARAX  Take 1 tablet (25 mg total) by mouth 2 (two) times a day.     ketorolac 0.5% 0.5 % Drop  Commonly known as: ACULAR  INSTILL 1 DROP INTO BOTH EYES FOUR TIMES DAILY FOR INFLAMMATION     OPTICHAMBER HERBERT LG MASK MISC  U UTD BID     PREZCOBIX 800-150 mg-mg Tab tablet  Generic drug: darunavir-cobicistat  Take 1 tablet by mouth.     RESTASIS 0.05 % ophthalmic emulsion  Generic drug: cycloSPORINE  INSTILL 1 DROP INTO BOTH EYES TWICE DAILY FOR DRY EYE     tramadol-acetaminophen 37.5-325 mg 37.5-325 mg Tab  Commonly known as: ULTRACET  SMARTSI Tablet(s) By Mouth Every 8-12 Hours PRN            Discharge Procedure Orders   Diet general     Pelvic Rest     Call MD for:  temperature >100.4     Call MD for:  persistent nausea and vomiting     Call MD for:  severe  uncontrolled pain     Call MD for:  difficulty breathing, headache or visual disturbances     Call MD for:  redness, tenderness, or signs of infection (pain, swelling, redness, odor or green/yellow discharge around incision site)     No dressing needed      Follow-up Information       Chantal Worrell MD Follow up in 2 week(s).    Specialties: Obstetrics, Obstetrics and Gynecology  Contact information:  [address] [place] [address] [id number] [phone]                           Activity: pelvic rest  Diet: regular diet  Wound Care: keep wound clean and dry    Follow-up with Dr. Chantal Worrell in 2 weeks.    Signed:  Chantal Worrell  7/17/2024  12:46 PM

## 2024-07-18 ENCOUNTER — TELEPHONE (OUTPATIENT)
Dept: OBSTETRICS AND GYNECOLOGY | Facility: CLINIC | Age: 39
End: 2024-07-18
Payer: MEDICARE

## 2024-07-22 LAB
FINAL PATHOLOGIC DIAGNOSIS: NORMAL
GROSS: NORMAL
Lab: NORMAL
MICROSCOPIC EXAM: NORMAL

## 2024-07-23 ENCOUNTER — TELEPHONE (OUTPATIENT)
Dept: OBSTETRICS AND GYNECOLOGY | Facility: CLINIC | Age: 39
End: 2024-07-23
Payer: MEDICARE

## 2024-07-23 ENCOUNTER — PATIENT MESSAGE (OUTPATIENT)
Dept: OBSTETRICS AND GYNECOLOGY | Facility: CLINIC | Age: 39
End: 2024-07-23
Payer: MEDICARE

## 2024-07-23 DIAGNOSIS — B20 HIV INFECTION, UNSPECIFIED SYMPTOM STATUS: ICD-10-CM

## 2024-07-23 DIAGNOSIS — D07.1 VIN III (VULVAR INTRAEPITHELIAL NEOPLASIA III): Primary | ICD-10-CM

## 2024-07-23 NOTE — TELEPHONE ENCOUNTER
Called and discussed path results.  Recommend that she resume her HIV medications to get her viral load back to undetectable levels and that she follow up with gyn oncology.  All questions answered.  Consult placed.  Please schedule postop with me.

## 2024-07-24 ENCOUNTER — TELEPHONE (OUTPATIENT)
Dept: GYNECOLOGIC ONCOLOGY | Facility: CLINIC | Age: 39
End: 2024-07-24
Payer: MEDICARE

## 2024-07-24 NOTE — TELEPHONE ENCOUNTER
Contacted pt x3, phone goes directly to dineout. Left vm for pt to contact Gyn-onc clinic to schedule appt at [phone] or reply to [facility] message.         ----- Message from Helena Sullivan RN sent at 7/23/2024  3:16 PM CDT -----  Regarding: RE:  [place]  Could you please reach out to the patient and get her scheduled?  Thanks  Helena  ----- Message -----  From: Heidy Lima MD  Sent: 7/23/2024   3:10 PM CDT  To: Chantal Worrell MD; [facility] Staff  Subject: RE:                                              Sounds good we will get her in!  Just FYI I'm on vacation this week so may be 2-3 weeks before she's seen    Thanks   Heidy  ----- Message -----  From: Chantal Worrell MD  Sent: 7/23/2024   1:53 PM EDT  To: Heidy Lima MD; [facility] Staff    Jm Moody is a very sweet patient of mine who has GREG III in 3 spots.  Her prior WLE had positive margins.  I reexcised it as well as biopsied 2 other areas that are GREG III.  Complicating this is that she stopped her HIV medication and has a CD4 count of 15.  She has an appointment with her ID doctors on 8/1 to restart her medication, but I think she needs follow up with you as well.  I just called and talked to her, so she knows your office is going to reach out.  I will schedule a postop with me as well.  Thanks,  Chantal

## 2024-07-29 ENCOUNTER — OFFICE VISIT (OUTPATIENT)
Dept: OBSTETRICS AND GYNECOLOGY | Facility: CLINIC | Age: 39
End: 2024-07-29
Payer: MEDICARE

## 2024-07-29 ENCOUNTER — TELEPHONE (OUTPATIENT)
Dept: OBSTETRICS AND GYNECOLOGY | Facility: CLINIC | Age: 39
End: 2024-07-29

## 2024-07-29 ENCOUNTER — PATIENT MESSAGE (OUTPATIENT)
Dept: OBSTETRICS AND GYNECOLOGY | Facility: CLINIC | Age: 39
End: 2024-07-29

## 2024-07-29 VITALS
BODY MASS INDEX: 33.28 KG/M2 | WEIGHT: 212.06 LBS | SYSTOLIC BLOOD PRESSURE: 132 MMHG | HEIGHT: 67 IN | DIASTOLIC BLOOD PRESSURE: 60 MMHG

## 2024-07-29 DIAGNOSIS — T81.31XA DISRUPTION OR DEHISCENCE OF CLOSURE OF SKIN, INITIAL ENCOUNTER: Primary | ICD-10-CM

## 2024-07-29 PROCEDURE — 3075F SYST BP GE 130 - 139MM HG: CPT | Mod: CPTII,S$GLB,, | Performed by: OBSTETRICS & GYNECOLOGY

## 2024-07-29 PROCEDURE — 3078F DIAST BP <80 MM HG: CPT | Mod: CPTII,S$GLB,, | Performed by: OBSTETRICS & GYNECOLOGY

## 2024-07-29 PROCEDURE — 99212 OFFICE O/P EST SF 10 MIN: CPT | Mod: 24,S$GLB,, | Performed by: OBSTETRICS & GYNECOLOGY

## 2024-07-29 PROCEDURE — 99999 PR PBB SHADOW E&M-EST. PATIENT-LVL III: CPT | Mod: PBBFAC,,, | Performed by: OBSTETRICS & GYNECOLOGY

## 2024-07-29 PROCEDURE — 1159F MED LIST DOCD IN RCRD: CPT | Mod: CPTII,S$GLB,, | Performed by: OBSTETRICS & GYNECOLOGY

## 2024-07-29 PROCEDURE — 3008F BODY MASS INDEX DOCD: CPT | Mod: CPTII,S$GLB,, | Performed by: OBSTETRICS & GYNECOLOGY

## 2024-07-29 NOTE — PROGRESS NOTES
"Past medical, surgical, social, family, and obstetric histories; medications; prior records and results; and available outside records were reviewed and updated in the EMR.  Pertinent findings were noted below.    Reason for Visit   Post-op Evaluation (1. Exam Under Anesthesia 2. Wide Local excision of vulva //Path: (GREG 3/HSIL))    HPI   38 y.o. female     Patient's last menstrual period was 10/02/2019.    Here because her incision opened. Her last one in the past also opened and it eventually healed on its own but wanted to come to make sure not infected. She has been doing sitz baths 10min 2-3x/day. No bleeding.      Exam   /60   Ht 5' 7" (1.702 m)   Wt 96.2 kg (212 lb 1.3 oz)   LMP 10/02/2019   BMI 33.22 kg/m²     Physical Exam    Genitourinary:    Pelvic exam was performed with patient lithotomy exam.   There is lesion on the right vulva.        2x2cm opening in right posterior vulvar at site of WLE  Normal granulation tissue noted, no surrounding erythema or purulent drainage  Vicryl sutures noted  Assessment and Plan   Disruption or dehiscence of closure of skin, initial encounter      Sutures trimmed  No e/o infection  Reassured patient  Rec wound close by secondary intention  Wound care discussed  Questions answered    RTC for post op appt with Dr Worrell - already scheduled    "

## 2024-08-12 NOTE — PROGRESS NOTES
Lab Documentation:    Order Type: Written Order placed in [facility]    Patient in for lab visit only per provider treatment plan.

## 2024-08-15 ENCOUNTER — TELEPHONE (OUTPATIENT)
Dept: GYNECOLOGIC ONCOLOGY | Facility: CLINIC | Age: 39
End: 2024-08-15
Payer: MEDICARE

## 2024-08-15 ENCOUNTER — OFFICE VISIT (OUTPATIENT)
Dept: GYNECOLOGIC ONCOLOGY | Facility: CLINIC | Age: 39
End: 2024-08-15
Payer: MEDICARE

## 2024-08-15 VITALS
SYSTOLIC BLOOD PRESSURE: 121 MMHG | DIASTOLIC BLOOD PRESSURE: 72 MMHG | HEIGHT: 67 IN | HEART RATE: 71 BPM | BODY MASS INDEX: 34.06 KG/M2 | WEIGHT: 217 LBS

## 2024-08-15 DIAGNOSIS — B20 HIV INFECTION, UNSPECIFIED SYMPTOM STATUS: ICD-10-CM

## 2024-08-15 DIAGNOSIS — D07.1 VIN III (VULVAR INTRAEPITHELIAL NEOPLASIA III): Primary | ICD-10-CM

## 2024-08-15 DIAGNOSIS — D07.1 VIN III (VULVAR INTRAEPITHELIAL NEOPLASIA III): ICD-10-CM

## 2024-08-15 DIAGNOSIS — N87.1 CIN II (CERVICAL INTRAEPITHELIAL NEOPLASIA II): ICD-10-CM

## 2024-08-15 PROCEDURE — 99999 PR PBB SHADOW E&M-EST. PATIENT-LVL IV: CPT | Mod: PBBFAC,,, | Performed by: OBSTETRICS & GYNECOLOGY

## 2024-08-15 RX ORDER — SULFAMETHOXAZOLE AND TRIMETHOPRIM 800; 160 MG/1; MG/1
1 TABLET ORAL
COMMUNITY
Start: 2024-08-02

## 2024-08-15 NOTE — PROGRESS NOTES
SUBJECTIVE     Chief complaint: GREG III     Referring provider: Chantal Worrell MD    History of present Illness:  Jm Newton is a 39 y.o.  female presenting for new diagnosis of GREG III. She is s/p resection with Dr. Worrell. She denies vaginal bleeding, itch, discharge.     She  has a past medical history of Asthma, Encounter for blood transfusion, Graves disease, HIV infection (), Hyperthyroidism in pregnancy, antepartum, and Vulvar intraepithelial neoplasia (GREG) grade 3 (2024).    Data Reviewed:   24:       Component 4 wk ago   Final Pathologic Diagnosis 1. Right vulvar lesion, excision:  Severe vulvar squamous dysplasia (GREG 3/HSIL)  Severe dysplasia is present at the 6-12:00 peripheral margin and 12:00 tip of this specimen    2. Left labia minora, biopsy:  Severe vulvar squamous dysplasia (GREG 3/HSIL)  Severe dysplasia is present at 1 peripheral margin of this specimen    3. Left clitoral lujan, biopsy:  Severe vulvar squamous dysplasia (GREG 3/HSIL)  Severe dysplasia is present at both peripheral margins of this specimen   INTRAOPERATIVE FINDINGS:  hypopigmented area on right perineum lateral to the perineal body, hypopigmented area on left labia minora (1 cm), hypopigmented area left clitoral lujan (1 cm)       Review of Systems per HPI   Review of patient's allergies indicates:   Allergen Reactions    Azithromycin Swelling and Edema     Current Outpatient Medications   Medication Instructions    albuterol (PROVENTIL/VENTOLIN HFA) 90 mcg/actuation inhaler 2 puffs, Inhalation, Every 4 hours PRN    budesonide-formoterol 160-4.5 mcg (SYMBICORT) 160-4.5 mcg/actuation HFAA 2 puffs, Inhalation, Every 12 hours, Controller    butalbital-acetaminophen-caffeine -40 mg (FIORICET, ESGIC) -40 mg per tablet 1 tablet, Oral, Every 4-6 hours PRN    cetirizine (ZYRTEC) 10 mg, Oral, Nightly    DESCOVY 200-25 mg Tab Daily    EScitalopram oxalate (LEXAPRO) 10 mg, Oral, Daily     HYDROcodone-acetaminophen (NORCO) 5-325 mg per tablet 1 tablet, Oral, Every 6 hours PRN    hydrOXYzine HCL (ATARAX) 25 mg, Oral, 2 times daily    ibuprofen (ADVIL,MOTRIN) 600 mg, Oral, Every 6 hours PRN    inhaler,assist device,lg mask (OPTICHAMBER HERBERT LG MASK MISC) U UTD BID    ketorolac 0.5% (ACULAR) 0.5 % Drop INSTILL 1 DROP INTO BOTH EYES FOUR TIMES DAILY FOR INFLAMMATION    PREZCOBIX 800-150 mg-mg Tab tablet 1 tablet, Oral    RESTASIS 0.05 % ophthalmic emulsion INSTILL 1 DROP INTO BOTH EYES TWICE DAILY FOR DRY EYE    sulfamethoxazole-trimethoprim 800-160mg (BACTRIM DS) 800-160 mg Tab 1 tablet, Oral    tramadol-acetaminophen 37.5-325 mg (ULTRACET) 37.5-325 mg Tab SMARTSI Tablet(s) By Mouth Every 8-12 Hours PRN     Past Medical History:   Diagnosis Date    Asthma     Encounter for blood transfusion     Graves disease     HIV infection     dx     Hyperthyroidism in pregnancy, antepartum     Vulvar intraepithelial neoplasia (GREG) grade 3 2024      Past Surgical History:   Procedure Laterality Date    BARTHOLIN GLAND CYST EXCISION Right 11/15/2023    Procedure: EXCISION, CYST, BARTHOLIN'S GLAND;  Surgeon: Chantal Worrell MD;  [facility];  Service: OB/GYN;  Laterality: Right;    CERVICAL CERCLAGE      emergent with twins    CERVICAL CERCLAGE N/A 2019    Procedure: CERCLAGE, CERVIX;  Surgeon: Obey Henry MD;  Location: [facility];  Service: OB/GYN;  Laterality: N/A;     SECTION WITH TUBAL LIGATION N/A 6/15/2019    Procedure:  SECTION, WITH TUBAL LIGATION;  Surgeon: Madeline Walden MD;  Location: [facility] L&D;  Service: OB/GYN;  Laterality: N/A;     SECTION, CLASSIC      Last section with classical extention, from [facility]    COLD KNIFE CONIZATION OF CERVIX N/A 2018    Procedure: CONE BIOPSY, CERVIX, USING COLD KNIFE;  Surgeon: Chantal Worrell MD;  Location: [facility];  Service: OB/GYN;  Laterality: N/A;    CONIZATION OF CERVIX USING LOOP ELECTROSURGICAL EXCISION  PROCEDURE (LEEP) N/A 2018    Procedure: LEEP CONIZATION, CERVIX;  Surgeon: Chantal Worrell MD;  Location: [facility] OR;  Service: OB/GYN;  Laterality: N/A;    CYSTOSCOPY N/A 10/22/2019    Procedure: CYSTOSCOPY;  Surgeon: Georgia Howard MD;  Location: [facility];  Service: OB/GYN;  Laterality: N/A;    DILATION AND CURETTAGE OF UTERUS      EXCISION-WIDE LOCAL Right 2024    Procedure: EXCISION-WIDE LOCAL;  Surgeon: Chantal Worrell MD;  Location: [facility] OR;  Service: OB/GYN;  Laterality: Right;    PARTIAL VULVECTOMY N/A 11/15/2023    Procedure: VULVECTOMY, PARTIAL;  Surgeon: Chantal Worrell MD;  Location: [facility] OR;  Service: OB/GYN;  Laterality: N/A;    ROBOT-ASSISTED LAPAROSCOPIC ABDOMINAL HYSTERECTOMY USING DA GALINDO XI N/A 10/22/2019    Procedure: XI ROBOTIC HYSTERECTOMY;  Surgeon: Georgia Howard MD;  Location: [facility];  Service: OB/GYN;  Laterality: N/A;    ROBOT-ASSISTED SURGICAL REMOVAL OF FALLOPIAN TUBE USING DA GALINDO XI Bilateral 10/22/2019    Procedure: XI ROBOTIC SALPINGECTOMY;  Surgeon: Georgia Howard MD;  Location: [facility];  Service: OB/GYN;  Laterality: Bilateral;    THYROIDECTOMY Right 2021    Procedure: THYROIDECTOMY;  Surgeon: Maksim Sifuentes MD;  Location: [address] FLR;  Service: ENT;  Laterality: Right;  Right possible  total    WISDOM TOOTH EXTRACTION        OB History    Para Term  AB Living   7 7 1 6   5   SAB IAB Ectopic Multiple Live Births         2 7      # Outcome Date GA Lbr Adrian/2nd Weight Sex Type Anes PTL Lv   7  06/15/19 32w5d   M CS-LTranv EPI Y ANAHY   6 Term 14 37w5d  3.26 kg (7 lb 3 oz) F CS-LTranv EPI N ANAHY   5A   24w0d  0.51 kg (1 lb 2 oz) M CS-LTranv  Y DEC      Birth Comments: lived for 2 months in NICU      Complications: Premature rupture of membranes   5B   24w0d  0.992 kg (2 lb 3 oz) M CS-LTranv  Y DEC      Birth Comments: lived only 2 hours      Complications: Premature rupture of membranes   4  09 35w0d   "2.608 kg (5 lb 12 oz) M CS-LTranv  Y ANAHY      Birth Comments: HIV, CS d/t viral load      Complications: Premature rupture of membranes   3  08 32w0d  2.722 kg (6 lb) M Vag-Spont  Y ANAHY      Birth Comments:  labor      Complications: Premature rupture of membranes   2  10/05/06 24w0d   M Vag-Spont  Y FD      Birth Comments: abruption and stillbirth      Complications: Abruptio Placenta   1  03 35w0d  2.722 kg (6 lb) M Vag-Spont  Y ANAHY      Birth Comments:  labor     Social History     Tobacco Use    Smoking status: Never     Passive exposure: Never    Smokeless tobacco: Never   Substance Use Topics    Alcohol use: Yes     Alcohol/week: 0.0 standard drinks of alcohol     Comment: socially    Drug use: No      Family History   Problem Relation Name Age of Onset    Hypertension Maternal Grandmother      Diabetes Maternal Grandmother      Sleep apnea Son      Colon cancer Neg Hx      Ovarian cancer Neg Hx       Health Maintenance Topics with due status: Not Due       Topic Last Completion Date    TETANUS VACCINE 2019    DEXA Scan 2021    Hemoglobin A1c (Diabetic Prevention Screening) 2022    Influenza Vaccine 2024     Health Maintenance Due   Topic Date Due    Hepatitis C Screening  Never done    Pneumococcal Vaccines (Age 0-64) (1 of 2 - PCV) Never done    COVID-19 Vaccine (2023- season) 2023     OBJECTIVE   /72   Pulse 71   Ht 5' 7" (1.702 m)   Wt 98.4 kg (217 lb)   LMP 10/02/2019   BMI 33.99 kg/m²     Physical Exam  Vitals reviewed.   Constitutional:       Appearance: Normal appearance.   Pulmonary:      Effort: Pulmonary effort is normal.   Abdominal:      General: Abdomen is flat.      Palpations: Abdomen is soft.   Genitourinary:     Comments: Photo below showing areas of acetowhite changes - clitoral and right perineal   Neurological:      Mental Status: She is alert.        ASSESSMENT AND PLAN     1. GREG III (vulvar " intraepithelial neoplasia III)  - Ambulatory referral/consult to Gynecologic Oncology    2. HIV infection, unspecified symptom status  - Ambulatory referral/consult to Gynecologic Oncology      Counseled  patient  on her diagnosis of  high grade vulvar dysplasia/GREG III. We discussed this is a pre-malignant condition, but that there is a small risk of underlying malignancy and risk of progression to malignancy if left untreated . Given positive margins on resection and exam findings with some residual aceto white change in sensitive areas (lidia clitoral, perineum) , my recommendation is to proceed with a CO2 laser ablation.  Risks include bleeding, infection, wound breakdown, injury to surrounding organs/structures, disfigurement of genitalia, neuropathic changes, cardiovascular events, venothrombotic events and even death. Pt verbalized understanding and stated no further questions. Consents signed.       Heidy Lima MD  Gynecologic Oncology

## 2024-09-09 ENCOUNTER — ANESTHESIA EVENT (OUTPATIENT)
Dept: SURGERY | Facility: OTHER | Age: 39
End: 2024-09-09
Payer: MEDICARE

## 2024-09-12 ENCOUNTER — PATIENT MESSAGE (OUTPATIENT)
Dept: PREADMISSION TESTING | Facility: OTHER | Age: 39
End: 2024-09-12
Payer: MEDICAID

## 2024-09-12 NOTE — PRE-PROCEDURE INSTRUCTIONS
Information to Prepare you for your Surgery    PRE-ADMIT TESTING -  [phone]    [address]  [facility]          Your surgery has been scheduled at [facility]. We are pleased to have the opportunity to serve you. For Further Information please call [phone].    On the day of surgery please report to the Information Desk on the 1st floor.    CONTACT YOUR PHYSICIAN'S OFFICE THE DAY PRIOR TO YOUR SURGERY TO OBTAIN YOUR ARRIVAL TIME.     The evening before surgery do not eat anything after 9 p.m. ( this includes hard candy, chewing gum and mints).  You may only have GATORADE, POWERADE AND WATER  from 9 p.m. until you leave your home.   DO NOT DRINK ANY LIQUIDS ON THE WAY TO THE HOSPITAL.      Why does your anesthesiologist allow you to drink Gatorade/Powerade before surgery?  Gatorade/Powerade helps to increase your comfort before surgery and to decrease your nausea after surgery. The carbohydrates in Gatorade/Powerade help reduce your body's stress response to surgery.  If you are a diabetic-drink only water prior to surgery.    Outpatient Surgery- May allow 2 adult (18 and older) Support Persons (1 being the designated ) for all surgical/procedural patients. A breastfeeding mother will be allowed her infant and 2 adult Support Persons. No one under the age of 18 will be allowed in the building. No swapping out of visitors in the [facility].      SPECIAL MEDICATION INSTRUCTIONS: TAKE medications checked off by the Anesthesiologist on your Medication List.    Angiogram Patients: Take medications as instructed by your physician, including aspirin.     Surgery Patients:    If you take ASPIRIN - Your PHYSICIAN/SURGEON will need to inform you IF/OR when you need to stop taking aspirin prior to your surgery.     The week prior to surgery do not ot take any medications containing IBUPROFEN or NSAIDS ( Advil, Motrin, Goodys, BC, Aleve, Naproxen etc)  If you are not sure if you should take a medicine please call your surgeon's office.  Ok to take Tylenol    Do Not Wear any make-up (especially eye make-up) to surgery. Please remove any false eyelashes or eyelash extensions. If you arrive the day of surgery with makeup/eyelashes on you will be required to remove prior to surgery. (There is a risk of corneal abrasions if eye makeup/eyelash extensions are not removed)      Leave all valuables at home.   Do Not wear any jewelry or watches, including any metal in body piercings. Jewelry must be removed prior to coming to the hospital.  There is a possibility that rings that are unable to be removed may be cut off if they are on the surgical extremity.    Please remove all hair extensions, wigs, clips and any other metal accessories/ ornaments from your hair.  These items may pose a flammable/fire risk in Surgery and must be removed.    Do not shave your surgical area at least 5 days prior to your surgery. The surgical prep will be performed at the hospital according to Infection Control regulations.    Contact Lens must be removed before surgery. Either do not wear the contact lens or bring a case and solution for storage.  Please bring a container for eyeglasses or dentures as required.  Bring any paperwork your physician has provided, such as consent forms,  history and physicals, doctor's orders, etc.   Bring comfortable clothes that are loose fitting to wear upon discharge. Take into consideration the type of surgery being performed.  Maintain your diet as advised per your physician the day prior to surgery.      Adequate rest the night before surgery is advised.   Park in the Parking lot behind the hospital or in the [place] [facility] across the street from the parking lot. Parking is complimentary.  If you will be discharged the same day as your procedure, please arrange for a responsible adult to drive you home or to accompany you if traveling by taxi.    YOU WILL NOT BE PERMITTED TO DRIVE OR TO LEAVE THE HOSPITAL ALONE AFTER SURGERY.   If you are being discharged the same day, it is strongly recommended that you arrange for someone to remain with you for the first 24 hrs following your surgery.    The Surgeon will speak to your family/visitor after your surgery regarding the outcome of your surgery and post op care.  The Surgeon may speak to you after your surgery, but there is a possibility you may not remember the details.  Please check with your family members regarding the conversation with the Surgeon.    We strongly recommend whoever is bringing you home be present for discharge instructions.  This will ensure a thorough understanding for your post op home care.    If the patient has fever, cough, or signs/symptoms of Flu or Covid please do not come in for your surgery. Contact your surgeon and your primary care physician for further instructions.           Thank you for your cooperation.  The Staff of [facility].            Bathing Instructions with Hibiclens    Shower the evening before and morning of your procedure with Chlorhexidine (Hibiclens)  do not use Chlorhexidine on your face or genitals. Do not get in your eyes.  Wash your face with water and your regular face wash/soap  Use your regular shampoo  Apply Chlorhexidine (Hibiclens) directly on your skin or on a wet washcloth and wash gently. When showering: Move away from the shower stream when applying Chlorhexidine (Hibiclens) to avoid rinsing off too soon.  Rinse thoroughly with warm water  Do not dilute Chlorhexidine (Hibiclens)   Dry off as usual, do not use any deodorant, powder, body lotions, perfume, after shave or cologne.

## 2024-09-16 ENCOUNTER — TELEPHONE (OUTPATIENT)
Dept: GYNECOLOGIC ONCOLOGY | Facility: CLINIC | Age: 39
End: 2024-09-16
Payer: MEDICARE

## 2024-09-17 DIAGNOSIS — N90.3 VULVAR DYSPLASIA: ICD-10-CM

## 2024-09-17 DIAGNOSIS — D07.1 VIN III (VULVAR INTRAEPITHELIAL NEOPLASIA III): Primary | ICD-10-CM

## 2024-09-17 RX ORDER — CEFAZOLIN SODIUM 2 G/50ML
2 SOLUTION INTRAVENOUS
Status: CANCELLED | OUTPATIENT
Start: 2024-09-17

## 2024-09-17 RX ORDER — MUPIROCIN 20 MG/G
OINTMENT TOPICAL
Status: CANCELLED | OUTPATIENT
Start: 2024-09-17

## 2024-09-17 RX ORDER — HEPARIN SODIUM 5000 [USP'U]/ML
5000 INJECTION, SOLUTION INTRAVENOUS; SUBCUTANEOUS ONCE
Status: CANCELLED | OUTPATIENT
Start: 2024-09-17

## 2024-09-17 RX ORDER — LIDOCAINE HYDROCHLORIDE 10 MG/ML
1 INJECTION, SOLUTION EPIDURAL; INFILTRATION; INTRACAUDAL; PERINEURAL ONCE
Status: CANCELLED | OUTPATIENT
Start: 2024-09-17 | End: 2024-09-17

## 2024-09-18 ENCOUNTER — ANESTHESIA (OUTPATIENT)
Dept: SURGERY | Facility: OTHER | Age: 39
End: 2024-09-18
Payer: MEDICARE

## 2024-09-18 ENCOUNTER — HOSPITAL ENCOUNTER (OUTPATIENT)
Facility: OTHER | Age: 39
Discharge: HOME | End: 2024-09-18
Attending: OBSTETRICS & GYNECOLOGY | Admitting: OBSTETRICS & GYNECOLOGY
Payer: MEDICARE

## 2024-09-18 VITALS
HEART RATE: 68 BPM | OXYGEN SATURATION: 96 % | WEIGHT: 220 LBS | DIASTOLIC BLOOD PRESSURE: 65 MMHG | RESPIRATION RATE: 16 BRPM | TEMPERATURE: 98 F | BODY MASS INDEX: 35.36 KG/M2 | HEIGHT: 66 IN | SYSTOLIC BLOOD PRESSURE: 117 MMHG

## 2024-09-18 DIAGNOSIS — D07.1 VIN III (VULVAR INTRAEPITHELIAL NEOPLASIA III): Primary | ICD-10-CM

## 2024-09-18 DIAGNOSIS — N90.3 VULVAR DYSPLASIA: ICD-10-CM

## 2024-09-18 DIAGNOSIS — D07.1 VIN III (VULVAR INTRAEPITHELIAL NEOPLASIA III): ICD-10-CM

## 2024-09-18 DIAGNOSIS — Z01.818 PREOP TESTING: ICD-10-CM

## 2024-09-18 LAB
BASOPHILS # BLD AUTO: 0.01 K/UL (ref 0–0.2)
BASOPHILS NFR BLD: 0.3 % (ref 0–1.9)
DIFFERENTIAL METHOD BLD: ABNORMAL
EOSINOPHIL # BLD AUTO: 0.1 K/UL (ref 0–0.5)
EOSINOPHIL NFR BLD: 1.6 % (ref 0–8)
ERYTHROCYTE [DISTWIDTH] IN BLOOD BY AUTOMATED COUNT: 12 % (ref 11.5–14.5)
HCT VFR BLD AUTO: 34.2 % (ref 37–48.5)
HGB BLD-MCNC: 11.7 G/DL (ref 12–16)
IMM GRANULOCYTES # BLD AUTO: 0 K/UL (ref 0–0.04)
IMM GRANULOCYTES NFR BLD AUTO: 0 % (ref 0–0.5)
LYMPHOCYTES # BLD AUTO: 1.3 K/UL (ref 1–4.8)
LYMPHOCYTES NFR BLD: 40.4 % (ref 18–48)
MCH RBC QN AUTO: 29.4 PG (ref 27–31)
MCHC RBC AUTO-ENTMCNC: 34.2 G/DL (ref 32–36)
MCV RBC AUTO: 86 FL (ref 82–98)
MONOCYTES # BLD AUTO: 0.3 K/UL (ref 0.3–1)
MONOCYTES NFR BLD: 9.4 % (ref 4–15)
NEUTROPHILS # BLD AUTO: 1.5 K/UL (ref 1.8–7.7)
NEUTROPHILS NFR BLD: 48.3 % (ref 38–73)
NRBC BLD-RTO: 0 /100 WBC
PLATELET # BLD AUTO: 240 K/UL (ref 150–450)
PMV BLD AUTO: 9.8 FL (ref 9.2–12.9)
RBC # BLD AUTO: 3.98 M/UL (ref 4–5.4)
WBC # BLD AUTO: 3.19 K/UL (ref 3.9–12.7)

## 2024-09-18 PROCEDURE — 36415 COLL VENOUS BLD VENIPUNCTURE: CPT | Performed by: OBSTETRICS & GYNECOLOGY

## 2024-09-18 PROCEDURE — 71000039 HC RECOVERY, EACH ADD'L HOUR: Performed by: OBSTETRICS & GYNECOLOGY

## 2024-09-18 PROCEDURE — 56515 DESTROY VULVA LESION/S COMPL: CPT | Mod: 78,,, | Performed by: OBSTETRICS & GYNECOLOGY

## 2024-09-18 PROCEDURE — 63600175 PHARM REV CODE 636 W HCPCS: Performed by: NURSE ANESTHETIST, CERTIFIED REGISTERED

## 2024-09-18 PROCEDURE — 37000008 HC ANESTHESIA 1ST 15 MINUTES: Performed by: OBSTETRICS & GYNECOLOGY

## 2024-09-18 PROCEDURE — 25000003 PHARM REV CODE 250: Performed by: ANESTHESIOLOGY

## 2024-09-18 PROCEDURE — 36000707: Performed by: OBSTETRICS & GYNECOLOGY

## 2024-09-18 PROCEDURE — 63600175 PHARM REV CODE 636 W HCPCS: Performed by: OBSTETRICS & GYNECOLOGY

## 2024-09-18 PROCEDURE — 25000003 PHARM REV CODE 250: Performed by: OBSTETRICS & GYNECOLOGY

## 2024-09-18 PROCEDURE — 85025 COMPLETE CBC W/AUTO DIFF WBC: CPT | Performed by: OBSTETRICS & GYNECOLOGY

## 2024-09-18 PROCEDURE — 71000015 HC POSTOP RECOV 1ST HR: Performed by: OBSTETRICS & GYNECOLOGY

## 2024-09-18 PROCEDURE — 71000033 HC RECOVERY, INTIAL HOUR: Performed by: OBSTETRICS & GYNECOLOGY

## 2024-09-18 PROCEDURE — 71000016 HC POSTOP RECOV ADDL HR: Performed by: OBSTETRICS & GYNECOLOGY

## 2024-09-18 PROCEDURE — 36000706: Performed by: OBSTETRICS & GYNECOLOGY

## 2024-09-18 PROCEDURE — 37000009 HC ANESTHESIA EA ADD 15 MINS: Performed by: OBSTETRICS & GYNECOLOGY

## 2024-09-18 PROCEDURE — 63600175 PHARM REV CODE 636 W HCPCS: Performed by: ANESTHESIOLOGY

## 2024-09-18 PROCEDURE — 25000003 PHARM REV CODE 250: Performed by: NURSE ANESTHETIST, CERTIFIED REGISTERED

## 2024-09-18 RX ORDER — ACETAMINOPHEN 500 MG
1000 TABLET ORAL EVERY 6 HOURS PRN
Status: CANCELLED | OUTPATIENT
Start: 2024-09-18

## 2024-09-18 RX ORDER — HYDROMORPHONE HYDROCHLORIDE 2 MG/ML
0.4 INJECTION, SOLUTION INTRAMUSCULAR; INTRAVENOUS; SUBCUTANEOUS EVERY 5 MIN PRN
Status: DISCONTINUED | OUTPATIENT
Start: 2024-09-18 | End: 2024-09-18 | Stop reason: HOSPADM

## 2024-09-18 RX ORDER — DEXTROMETHORPHAN HYDROBROMIDE, GUAIFENESIN 5; 100 MG/5ML; MG/5ML
650 LIQUID ORAL EVERY 6 HOURS PRN
Qty: 30 TABLET | Refills: 1 | Status: SHIPPED | OUTPATIENT
Start: 2024-09-18

## 2024-09-18 RX ORDER — ACETIC ACID 3 %
LIQUID (ML) MISCELLANEOUS
Status: DISCONTINUED | OUTPATIENT
Start: 2024-09-18 | End: 2024-09-18 | Stop reason: HOSPADM

## 2024-09-18 RX ORDER — ACETAMINOPHEN 500 MG
1000 TABLET ORAL
Status: COMPLETED | OUTPATIENT
Start: 2024-09-18 | End: 2024-09-18

## 2024-09-18 RX ORDER — OXYCODONE HYDROCHLORIDE 5 MG/1
5 TABLET ORAL EVERY 4 HOURS PRN
Qty: 8 TABLET | Refills: 0 | Status: SHIPPED | OUTPATIENT
Start: 2024-09-18

## 2024-09-18 RX ORDER — MUPIROCIN 20 MG/G
OINTMENT TOPICAL
Status: DISCONTINUED | OUTPATIENT
Start: 2024-09-18 | End: 2024-09-18 | Stop reason: HOSPADM

## 2024-09-18 RX ORDER — PROPOFOL 10 MG/ML
VIAL (ML) INTRAVENOUS
Status: DISCONTINUED | OUTPATIENT
Start: 2024-09-18 | End: 2024-09-18

## 2024-09-18 RX ORDER — GLUCAGON 1 MG
1 KIT INJECTION
Status: DISCONTINUED | OUTPATIENT
Start: 2024-09-18 | End: 2024-09-18 | Stop reason: HOSPADM

## 2024-09-18 RX ORDER — PROCHLORPERAZINE EDISYLATE 5 MG/ML
5 INJECTION INTRAMUSCULAR; INTRAVENOUS EVERY 30 MIN PRN
Status: DISCONTINUED | OUTPATIENT
Start: 2024-09-18 | End: 2024-09-18 | Stop reason: HOSPADM

## 2024-09-18 RX ORDER — LIDOCAINE HYDROCHLORIDE 20 MG/ML
JELLY TOPICAL
Qty: 22 ML | Refills: 0 | Status: SHIPPED | OUTPATIENT
Start: 2024-09-18

## 2024-09-18 RX ORDER — LIDOCAINE HYDROCHLORIDE 20 MG/ML
INJECTION INTRAVENOUS
Status: DISCONTINUED | OUTPATIENT
Start: 2024-09-18 | End: 2024-09-18

## 2024-09-18 RX ORDER — OXYCODONE HYDROCHLORIDE 5 MG/1
5 TABLET ORAL EVERY 4 HOURS PRN
Status: CANCELLED | OUTPATIENT
Start: 2024-09-18

## 2024-09-18 RX ORDER — HEPARIN SODIUM 5000 [USP'U]/ML
5000 INJECTION, SOLUTION INTRAVENOUS; SUBCUTANEOUS ONCE
Status: COMPLETED | OUTPATIENT
Start: 2024-09-18 | End: 2024-09-18

## 2024-09-18 RX ORDER — SODIUM CHLORIDE 0.9 % (FLUSH) 0.9 %
3 SYRINGE (ML) INJECTION
Status: DISCONTINUED | OUTPATIENT
Start: 2024-09-18 | End: 2024-09-18 | Stop reason: HOSPADM

## 2024-09-18 RX ORDER — KETOROLAC TROMETHAMINE 30 MG/ML
15 INJECTION, SOLUTION INTRAMUSCULAR; INTRAVENOUS EVERY 6 HOURS PRN
Status: CANCELLED | OUTPATIENT
Start: 2024-09-18 | End: 2024-09-21

## 2024-09-18 RX ORDER — BICTEGRAVIR SODIUM, EMTRICITABINE, AND TENOFOVIR ALAFENAMIDE FUMARATE 50; 200; 25 MG/1; MG/1; MG/1
1 TABLET ORAL
Status: ON HOLD | COMMUNITY
Start: 2024-08-01 | End: 2024-09-18

## 2024-09-18 RX ORDER — SODIUM CHLORIDE, SODIUM LACTATE, POTASSIUM CHLORIDE, CALCIUM CHLORIDE 600; 310; 30; 20 MG/100ML; MG/100ML; MG/100ML; MG/100ML
INJECTION, SOLUTION INTRAVENOUS CONTINUOUS
Status: DISCONTINUED | OUTPATIENT
Start: 2024-09-18 | End: 2024-09-18 | Stop reason: HOSPADM

## 2024-09-18 RX ORDER — OXYCODONE HYDROCHLORIDE 5 MG/1
5 TABLET ORAL
Status: DISCONTINUED | OUTPATIENT
Start: 2024-09-18 | End: 2024-09-18 | Stop reason: HOSPADM

## 2024-09-18 RX ORDER — IBUPROFEN 600 MG/1
600 TABLET ORAL EVERY 6 HOURS PRN
Qty: 30 TABLET | Refills: 1 | Status: SHIPPED | OUTPATIENT
Start: 2024-09-18

## 2024-09-18 RX ORDER — MIDAZOLAM HYDROCHLORIDE 1 MG/ML
INJECTION INTRAMUSCULAR; INTRAVENOUS
Status: DISCONTINUED | OUTPATIENT
Start: 2024-09-18 | End: 2024-09-18

## 2024-09-18 RX ORDER — LIDOCAINE HYDROCHLORIDE 10 MG/ML
0.5 INJECTION, SOLUTION EPIDURAL; INFILTRATION; INTRACAUDAL; PERINEURAL ONCE
Status: DISCONTINUED | OUTPATIENT
Start: 2024-09-18 | End: 2024-09-18 | Stop reason: HOSPADM

## 2024-09-18 RX ORDER — SILVER SULFADIAZINE 10 G/1000G
CREAM TOPICAL
Status: DISCONTINUED | OUTPATIENT
Start: 2024-09-18 | End: 2024-09-18 | Stop reason: HOSPADM

## 2024-09-18 RX ORDER — LIDOCAINE HYDROCHLORIDE 10 MG/ML
1 INJECTION, SOLUTION EPIDURAL; INFILTRATION; INTRACAUDAL; PERINEURAL ONCE
Status: DISCONTINUED | OUTPATIENT
Start: 2024-09-18 | End: 2024-09-18 | Stop reason: HOSPADM

## 2024-09-18 RX ORDER — PREGABALIN 75 MG/1
75 CAPSULE ORAL ONCE
Status: COMPLETED | OUTPATIENT
Start: 2024-09-18 | End: 2024-09-18

## 2024-09-18 RX ORDER — MEPERIDINE HYDROCHLORIDE 25 MG/ML
12.5 INJECTION INTRAMUSCULAR; INTRAVENOUS; SUBCUTANEOUS ONCE AS NEEDED
Status: DISCONTINUED | OUTPATIENT
Start: 2024-09-18 | End: 2024-09-18 | Stop reason: HOSPADM

## 2024-09-18 RX ORDER — KETOROLAC TROMETHAMINE 30 MG/ML
30 INJECTION, SOLUTION INTRAMUSCULAR; INTRAVENOUS ONCE
Status: COMPLETED | OUTPATIENT
Start: 2024-09-18 | End: 2024-09-18

## 2024-09-18 RX ORDER — DEXAMETHASONE SODIUM PHOSPHATE 4 MG/ML
INJECTION, SOLUTION INTRA-ARTICULAR; INTRALESIONAL; INTRAMUSCULAR; INTRAVENOUS; SOFT TISSUE
Status: DISCONTINUED | OUTPATIENT
Start: 2024-09-18 | End: 2024-09-18

## 2024-09-18 RX ORDER — HYDROMORPHONE HYDROCHLORIDE 2 MG/ML
0.2 INJECTION, SOLUTION INTRAMUSCULAR; INTRAVENOUS; SUBCUTANEOUS
Status: CANCELLED | OUTPATIENT
Start: 2024-09-18

## 2024-09-18 RX ORDER — FENTANYL CITRATE 50 UG/ML
INJECTION, SOLUTION INTRAMUSCULAR; INTRAVENOUS
Status: DISCONTINUED | OUTPATIENT
Start: 2024-09-18 | End: 2024-09-18

## 2024-09-18 RX ORDER — ONDANSETRON HYDROCHLORIDE 2 MG/ML
4 INJECTION, SOLUTION INTRAVENOUS EVERY 4 HOURS PRN
Status: CANCELLED | OUTPATIENT
Start: 2024-09-18

## 2024-09-18 RX ADMIN — DEXAMETHASONE SODIUM PHOSPHATE 8 MG: 4 INJECTION, SOLUTION INTRAMUSCULAR; INTRAVENOUS at 03:09

## 2024-09-18 RX ADMIN — HYDROMORPHONE HYDROCHLORIDE 0.4 MG: 2 INJECTION, SOLUTION INTRAMUSCULAR; INTRAVENOUS; SUBCUTANEOUS at 03:09

## 2024-09-18 RX ADMIN — ACETAMINOPHEN 1000 MG: 500 TABLET ORAL at 11:09

## 2024-09-18 RX ADMIN — HEPARIN SODIUM 5000 UNITS: 5000 INJECTION INTRAVENOUS; SUBCUTANEOUS at 01:09

## 2024-09-18 RX ADMIN — CEFAZOLIN 2 G: 2 INJECTION, POWDER, FOR SOLUTION INTRAMUSCULAR; INTRAVENOUS at 02:09

## 2024-09-18 RX ADMIN — MIDAZOLAM HYDROCHLORIDE 2 MG: 1 INJECTION INTRAMUSCULAR; INTRAVENOUS at 02:09

## 2024-09-18 RX ADMIN — OXYCODONE HYDROCHLORIDE 5 MG: 5 TABLET ORAL at 03:09

## 2024-09-18 RX ADMIN — PROPOFOL 150 MG: 10 INJECTION, EMULSION INTRAVENOUS at 02:09

## 2024-09-18 RX ADMIN — SODIUM CHLORIDE, SODIUM LACTATE, POTASSIUM CHLORIDE, AND CALCIUM CHLORIDE: 600; 310; 30; 20 INJECTION, SOLUTION INTRAVENOUS at 02:09

## 2024-09-18 RX ADMIN — MUPIROCIN: 20 OINTMENT TOPICAL at 11:09

## 2024-09-18 RX ADMIN — PROCHLORPERAZINE EDISYLATE 5 MG: 5 INJECTION INTRAMUSCULAR; INTRAVENOUS at 04:09

## 2024-09-18 RX ADMIN — CARBOXYMETHYLCELLULOSE SODIUM 2 DROP: 2.5 SOLUTION/ DROPS OPHTHALMIC at 02:09

## 2024-09-18 RX ADMIN — FENTANYL CITRATE 100 MCG: 50 INJECTION, SOLUTION INTRAMUSCULAR; INTRAVENOUS at 02:09

## 2024-09-18 RX ADMIN — KETOROLAC TROMETHAMINE 30 MG: 30 INJECTION, SOLUTION INTRAMUSCULAR; INTRAVENOUS at 04:09

## 2024-09-18 RX ADMIN — LIDOCAINE HYDROCHLORIDE 75 MG: 20 INJECTION, SOLUTION INTRAVENOUS at 02:09

## 2024-09-18 RX ADMIN — FENTANYL CITRATE 100 MCG: 50 INJECTION, SOLUTION INTRAMUSCULAR; INTRAVENOUS at 03:09

## 2024-09-18 RX ADMIN — PREGABALIN 75 MG: 75 CAPSULE ORAL at 11:09

## 2024-09-18 NOTE — OP NOTE
[facility] ([place])    Procedure(s) (LRB):  Exam under anesthesia (N/A)  TREATMENT, VULVA, USING LASER (N/A)    DATE OF SURGERY  9/18/2024     Surgeon(s) and Role  Primary: Heidy Lima MD     ANESTHESIA TYPE  General     PRE-OPERATIVE DIAGNOSIS  GREG III (vulvar intraepithelial neoplasia III) [D07.1]  EMEKA II (cervical intraepithelial neoplasia II) [N87.1]    POST-OPERATIVE DIAGNOSIS  Post-Op Diagnosis Codes:     * GREG III (vulvar intraepithelial neoplasia III) [D07.1]     * EMEKA II (cervical intraepithelial neoplasia II) [N87.1]    FINDINGS  Aceto white changes noted at margins of previous resection and all were lasered.     Procedure in Detail: The patient was taken to the OR and anesthesia was administered.  Once this was felt to be adequate, she was prepped and draped and placed in stirrups.  Acetic acid was applied to the perineum for 5 minutes.  The areas to  be lasered were easily seen and the laser was tested and set at 10 Bacon continuous.  Laser ablation of the multiple areas was performed without complications.  Sponge, lap, and needle counts were correct.  I was present and scrubbed for all parts of the procedure. Silvadene cream was applied and the patient was awoken and taken to recovery in stable condition.       SPECIMENS  Specimens (From admission, onward)      None             DRAINS: None        ESTIMATED BLOOD LOSS  *5cc *           IMPLANTS  * No implants in log *

## 2024-09-18 NOTE — PLAN OF CARE
Patient prefers to have Roni (friend) present for discharge teaching.  Roni is not present at preop, but was called to confirm receipt of the text and made aware of surgical times and that he would receive updates throughout the process.

## 2024-09-18 NOTE — ANESTHESIA PREPROCEDURE EVALUATION
09/18/2024  Jm Newton is a 39 y.o., female.      Pre-op Assessment    I have reviewed the Patient Summary Reports.     I have reviewed the Nursing Notes. I have reviewed the NPO Status.   I have reviewed the Medications.     Review of Systems  Anesthesia Hx:  No problems with previous Anesthesia                Social:  Non-Smoker       Hematology/Oncology:    Oncology Normal                                   EENT/Dental:  EENT/Dental Normal           Cardiovascular:  Exercise tolerance: good                                           Pulmonary:    Asthma mild                   Hepatic/GI:  Hepatic/GI Normal                 Endocrine:  Endocrine Normal            Psych:  Psychiatric History anxiety depression                Physical Exam  General: Well nourished, Cooperative and Alert    Airway:  Mallampati: II   Mouth Opening: Normal  TM Distance: Normal  Tongue: Normal  Neck ROM: Normal ROM    Dental:  Intact        Anesthesia Plan  Type of Anesthesia, risks & benefits discussed:    Anesthesia Type: Gen ETT, Gen Supraglottic Airway  Intra-op Monitoring Plan: Standard ASA Monitors  Post Op Pain Control Plan: multimodal analgesia  Induction:  IV  Airway Plan: Video  Informed Consent: Informed consent signed with the Patient and all parties understand the risks and agree with anesthesia plan.  All questions answered.   ASA Score: 3    Ready For Surgery From Anesthesia Perspective.     .

## 2024-09-18 NOTE — H&P
Pt seen and examined this AM. No change since H&P. Proceed to OR.      Heidy Lima MD  Gynecologic Oncology       SUBJECTIVE      Chief complaint: GREG III      Referring provider: Chantal Worrell MD     History of present Illness:  Jm Newton is a 39 y.o.  female presenting for new diagnosis of GREG III. She is s/p resection with Dr. Worrell. She denies vaginal bleeding, itch, discharge.      She  has a past medical history of Asthma, Encounter for blood transfusion, Graves disease, HIV infection (), Hyperthyroidism in pregnancy, antepartum, and Vulvar intraepithelial neoplasia (GREG) grade 3 (2024).     Data Reviewed:   24:         Component 4 wk ago   Final Pathologic Diagnosis 1. Right vulvar lesion, excision:  Severe vulvar squamous dysplasia (GREG 3/HSIL)  Severe dysplasia is present at the 6-12:00 peripheral margin and 12:00 tip of this specimen    2. Left labia minora, biopsy:  Severe vulvar squamous dysplasia (GREG 3/HSIL)  Severe dysplasia is present at 1 peripheral margin of this specimen    3. Left clitoral lujan, biopsy:  Severe vulvar squamous dysplasia (GREG 3/HSIL)  Severe dysplasia is present at both peripheral margins of this specimen   INTRAOPERATIVE FINDINGS:  hypopigmented area on right perineum lateral to the perineal body, hypopigmented area on left labia minora (1 cm), hypopigmented area left clitoral lujan (1 cm)         Review of Systems per HPI        Review of patient's allergies indicates:   Allergen Reactions    Azithromycin Swelling and Edema           Current Outpatient Medications   Medication Instructions    albuterol (PROVENTIL/VENTOLIN HFA) 90 mcg/actuation inhaler 2 puffs, Inhalation, Every 4 hours PRN    budesonide-formoterol 160-4.5 mcg (SYMBICORT) 160-4.5 mcg/actuation HFAA 2 puffs, Inhalation, Every 12 hours, Controller    butalbital-acetaminophen-caffeine -40 mg (FIORICET, ESGIC) -40 mg per tablet 1 tablet, Oral, Every 4-6 hours PRN     cetirizine (ZYRTEC) 10 mg, Oral, Nightly    DESCOVY 200-25 mg Tab Daily    EScitalopram oxalate (LEXAPRO) 10 mg, Oral, Daily    HYDROcodone-acetaminophen (NORCO) 5-325 mg per tablet 1 tablet, Oral, Every 6 hours PRN    hydrOXYzine HCL (ATARAX) 25 mg, Oral, 2 times daily    ibuprofen (ADVIL,MOTRIN) 600 mg, Oral, Every 6 hours PRN    inhaler,assist device,lg mask (OPTICHAMBER HERBERT LG MASK MISC) U UTD BID    ketorolac 0.5% (ACULAR) 0.5 % Drop INSTILL 1 DROP INTO BOTH EYES FOUR TIMES DAILY FOR INFLAMMATION    PREZCOBIX 800-150 mg-mg Tab tablet 1 tablet, Oral    RESTASIS 0.05 % ophthalmic emulsion INSTILL 1 DROP INTO BOTH EYES TWICE DAILY FOR DRY EYE    sulfamethoxazole-trimethoprim 800-160mg (BACTRIM DS) 800-160 mg Tab 1 tablet, Oral    tramadol-acetaminophen 37.5-325 mg (ULTRACET) 37.5-325 mg Tab SMARTSI Tablet(s) By Mouth Every 8-12 Hours PRN      Past Medical History        Past Medical History:   Diagnosis Date    Asthma      Encounter for blood transfusion      Graves disease      HIV infection      dx     Hyperthyroidism in pregnancy, antepartum      Vulvar intraepithelial neoplasia (GREG) grade 3 2024         Past Surgical History         Past Surgical History:   Procedure Laterality Date    BARTHOLIN GLAND CYST EXCISION Right 11/15/2023     Procedure: EXCISION, CYST, BARTHOLIN'S GLAND;  Surgeon: Chantal Worrell MD;  Location: [facility];  Service: OB/GYN;  Laterality: Right;    CERVICAL CERCLAGE         emergent with twins    CERVICAL CERCLAGE N/A 2019     Procedure: CERCLAGE, CERVIX;  Surgeon: Obey Henry MD;  Location: [facility] L&D;  Service: OB/GYN;  Laterality: N/A;     SECTION WITH TUBAL LIGATION N/A 6/15/2019     Procedure:  SECTION, WITH TUBAL LIGATION;  Surgeon: Madeline Walden MD;  Location: [facility] L&D;  Service: OB/GYN;  Laterality: N/A;     SECTION, CLASSIC         Last section with classical extention, from [facility]    COLD KNIFE CONIZATION OF CERVIX N/A  2018     Procedure: CONE BIOPSY, CERVIX, USING COLD KNIFE;  Surgeon: Chantal Worrell MD;  Location: [facility];  Service: OB/GYN;  Laterality: N/A;    CONIZATION OF CERVIX USING LOOP ELECTROSURGICAL EXCISION PROCEDURE (LEEP) N/A 2018     Procedure: LEEP CONIZATION, CERVIX;  Surgeon: Chantal Worrell MD;  Location: [facility] OR;  Service: OB/GYN;  Laterality: N/A;    CYSTOSCOPY N/A 10/22/2019     Procedure: CYSTOSCOPY;  Surgeon: Georgia Howard MD;  Location: [facility];  Service: OB/GYN;  Laterality: N/A;    DILATION AND CURETTAGE OF UTERUS        EXCISION-WIDE LOCAL Right 2024     Procedure: EXCISION-WIDE LOCAL;  Surgeon: Chantal Worrell MD;  Location: [facility];  Service: OB/GYN;  Laterality: Right;    PARTIAL VULVECTOMY N/A 11/15/2023     Procedure: VULVECTOMY, PARTIAL;  Surgeon: Chantal Worrell MD;  Location: [facility];  Service: OB/GYN;  Laterality: N/A;    ROBOT-ASSISTED LAPAROSCOPIC ABDOMINAL HYSTERECTOMY USING DA GALINDO XI N/A 10/22/2019     Procedure: XI ROBOTIC HYSTERECTOMY;  Surgeon: Georgia Howard MD;  Location: [facility];  Service: OB/GYN;  Laterality: N/A;    ROBOT-ASSISTED SURGICAL REMOVAL OF FALLOPIAN TUBE USING DA GALINDO XI Bilateral 10/22/2019     Procedure: XI ROBOTIC SALPINGECTOMY;  Surgeon: Georgia Howard MD;  Location: [facility];  Service: OB/GYN;  Laterality: Bilateral;    THYROIDECTOMY Right 2021     Procedure: THYROIDECTOMY;  Surgeon: Maksim Sifuentes MD;  Location: [facility] 2ND FLR;  Service: ENT;  Laterality: Right;  Right possible  total    WISDOM TOOTH EXTRACTION                              OB History    Para Term  AB Living   7 7 1 6   5   SAB IAB Ectopic Multiple Live Births            2 7          # Outcome Date GA Lbr Adrian/2nd Weight Sex Type Anes PTL Lv   7  06/15/19 32w5d     M CS-LTranv EPI Y ANAHY   6 Term 14 37w5d   3.26 kg (7 lb 3 oz) F CS-LTranv EPI N ANAHY   5A   24w0d   0.51 kg (1 lb 2 oz) M CS-LTranv   Y DEC      Birth Comments: lived for  "2 months in NICU      Complications: Premature rupture of membranes   5B   24w0d   0.992 kg (2 lb 3 oz) M CS-LTranv   Y DEC      Birth Comments: lived only 2 hours      Complications: Premature rupture of membranes   4  09 35w0d   2.608 kg (5 lb 12 oz) M CS-LTranv   Y ANAHY      Birth Comments: HIV, CS d/t viral load      Complications: Premature rupture of membranes   3  08 32w0d   2.722 kg (6 lb) M Vag-Spont   Y ANAHY      Birth Comments:  labor      Complications: Premature rupture of membranes   2  10/05/06 24w0d     M Vag-Spont   Y FD      Birth Comments: abruption and stillbirth      Complications: Abruptio Placenta   1  03 35w0d   2.722 kg (6 lb) M Vag-Spont   Y ANAHY      Birth Comments:  labor      Social History   Social History            Tobacco Use    Smoking status: Never       Passive exposure: Never    Smokeless tobacco: Never   Substance Use Topics    Alcohol use: Yes       Alcohol/week: 0.0 standard drinks of alcohol       Comment: socially    Drug use: No                Family History   Problem Relation Name Age of Onset    Hypertension Maternal Grandmother        Diabetes Maternal Grandmother        Sleep apnea Son        Colon cancer Neg Hx        Ovarian cancer Neg Hx               Health Maintenance Topics with due status: Not Due         Topic Last Completion Date     TETANUS VACCINE 2019     DEXA Scan 2021     Hemoglobin A1c (Diabetic Prevention Screening) 2022     Influenza Vaccine 2024           Health Maintenance Due   Topic Date Due    Hepatitis C Screening  Never done    Pneumococcal Vaccines (Age 0-64) (1 of 2 - PCV) Never done    COVID-19 Vaccine (2023- season) 2023      OBJECTIVE   /72   Pulse 71   Ht 5' 7" (1.702 m)   Wt 98.4 kg (217 lb)   LMP 10/02/2019   BMI 33.99 kg/m²      Physical Exam  Vitals reviewed.   Constitutional:       Appearance: Normal appearance. "   Pulmonary:      Effort: Pulmonary effort is normal.   Abdominal:      General: Abdomen is flat.      Palpations: Abdomen is soft.   Genitourinary:     Comments: Photo below showing areas of acetowhite changes - clitoral and right perineal   Neurological:      Mental Status: She is alert.         ASSESSMENT AND PLAN      1. GREG III (vulvar intraepithelial neoplasia III)  - Ambulatory referral/consult to Gynecologic Oncology     2. HIV infection, unspecified symptom status  - Ambulatory referral/consult to Gynecologic Oncology        Counseled  patient  on her diagnosis of  high grade vulvar dysplasia/GREG III. We discussed this is a pre-malignant condition, but that there is a small risk of underlying malignancy and risk of progression to malignancy if left untreated . Given positive margins on resection and exam findings with some residual aceto white change in sensitive areas (lidia clitoral, perineum) , my recommendation is to proceed with a CO2 laser ablation.  Risks include bleeding, infection, wound breakdown, injury to surrounding organs/structures, disfigurement of genitalia, neuropathic changes, cardiovascular events, venothrombotic events and even death. Pt verbalized understanding and stated no further questions. Consents signed.         Heidy Lima MD  Gynecologic Oncology

## 2024-09-18 NOTE — ANESTHESIA POSTPROCEDURE EVALUATION
Anesthesia Post Evaluation    Patient: Jm Newton    Procedure(s) Performed: Procedure(s) (LRB):  Exam under anesthesia (N/A)  TREATMENT, VULVA, USING LASER (N/A)    Final Anesthesia Type: general      Patient location during evaluation: PACU  Patient participation: Yes- Able to Participate  Level of consciousness: awake and alert  Post-procedure vital signs: reviewed and stable  Pain management: adequate  Airway patency: patent  OMAYRA mitigation strategies: Extubation while patient is awake  PONV status at discharge: No PONV  Anesthetic complications: no      Cardiovascular status: hemodynamically stable  Respiratory status: unassisted  Hydration status: euvolemic  Follow-up not needed.              Vitals Value Taken Time   /72 09/18/24 1630   Temp 36.4 °C (97.6 °F) 09/18/24 1630   Pulse 69 09/18/24 1630   Resp 16 09/18/24 1630   SpO2 94 % 09/18/24 1630         Event Time   Out of Recovery 16:17:00         Pain/Katherine Score: Pain Rating Prior to Med Admin: 6 (9/18/2024  4:12 PM)  Katherine Score: 9 (9/18/2024  4:30 PM)

## 2024-09-18 NOTE — DISCHARGE SUMMARY
Discharge Summary  Gynecology Oncology       Admit Date: 2024    Discharge Date and Time: 2024     Attending Physician: Heidy Lima MD    Principal Diagnoses: GREG III (vulvar intraepithelial neoplasia III)    Active Hospital Problems    Diagnosis  POA    *s/p laser ablation for GREG III [D07.1]  Yes      Resolved Hospital Problems   No resolved problems to display.       Procedures: Procedure(s) (LRB):  Exam under anesthesia (N/A)  TREATMENT, VULVA, USING LASER (N/A)    Discharged Condition: stable    Hospital Course:   Jm Nweton is a 39 y.o. y.o.  female who presented on 2024   for above procedures for the treatment of VIN3. Patient tolerated procedure. Post-operative course was uncomplicated.  On day of discharge, patient was urinating, ambulating, and tolerating a regular diet without difficulty. Pain was well controlled on PO medication. She was discharged home on POD#0 in stable condition with instructions to follow up with Dr. Lima in 4 weeks.     Consults: None    Significant Diagnostic Studies:  Recent Labs   Lab 24  1133   WBC 3.19*   HGB 11.7*   HCT 34.2*   MCV 86           Treatments:   1. Surgery as above    Disposition: Home or Self Care    Patient Instructions:   Current Discharge Medication List        START taking these medications    Details   acetaminophen (TYLENOL) 650 MG TbSR Take 1 tablet (650 mg total) by mouth every 6 (six) hours as needed (pain).  Qty: 30 tablet, Refills: 1      LIDOcaine HCL 2% (XYLOCAINE) 2 % jelly Apply topically as needed (Apply to vulva as needed).  Qty: 30 mL, Refills: 0      oxyCODONE (ROXICODONE) 5 MG immediate release tablet Take 1 tablet (5 mg total) by mouth every 4 (four) hours as needed for Pain.  Qty: 8 tablet, Refills: 0    Comments: Quantity prescribed more than 7 day supply? No           CONTINUE these medications which have CHANGED    Details   ibuprofen (ADVIL,MOTRIN) 600 MG tablet Take 1 tablet (600  mg total) by mouth every 6 (six) hours as needed for Pain.  Qty: 30 tablet, Refills: 1           CONTINUE these medications which have NOT CHANGED    Details   albuterol (PROVENTIL/VENTOLIN HFA) 90 mcg/actuation inhaler Inhale 2 puffs into the lungs every 4 (four) hours as needed for Shortness of Breath or Wheezing.  Qty: 18 g, Refills: 11    Associated Diagnoses: Moderate persistent asthma, unspecified whether complicated      budesonide-formoterol 160-4.5 mcg (SYMBICORT) 160-4.5 mcg/actuation HFAA Inhale 2 puffs into the lungs every 12 (twelve) hours. Controller  Qty: 10.2 g, Refills: 11    Associated Diagnoses: Moderate persistent asthma, unspecified whether complicated      DESCOVY 200-25 mg Tab once daily.   Refills: 3      EScitalopram oxalate (LEXAPRO) 10 MG tablet Take 1 tablet (10 mg total) by mouth once daily.  Qty: 30 tablet, Refills: 2    Associated Diagnoses: Depression, unspecified depression type      hydrOXYzine HCL (ATARAX) 25 MG tablet Take 1 tablet (25 mg total) by mouth 2 (two) times a day.  Qty: 60 tablet, Refills: 1    Associated Diagnoses: Depression, unspecified depression type      PREZCOBIX 800-150 mg-mg Tab tablet Take 1 tablet by mouth once daily.      RESTASIS 0.05 % ophthalmic emulsion INSTILL 1 DROP INTO BOTH EYES TWICE DAILY FOR DRY EYE      sulfamethoxazole-trimethoprim 800-160mg (BACTRIM DS) 800-160 mg Tab Take 1 tablet by mouth once daily.      butalbital-acetaminophen-caffeine -40 mg (FIORICET, ESGIC) -40 mg per tablet Take 1 tablet by mouth every 4 to 6 hours as needed.      cetirizine (ZYRTEC) 10 MG tablet Take 10 mg by mouth every evening.      inhaler,assist device,lg mask (OPTICHAMBER HERBERT LG MASK MISC) U UTD BID      ketorolac 0.5% (ACULAR) 0.5 % Drop INSTILL 1 DROP INTO BOTH EYES FOUR TIMES DAILY FOR INFLAMMATION           STOP taking these medications       HYDROcodone-acetaminophen (NORCO) 5-325 mg per tablet Comments:   Reason for Stopping:          tramadol-acetaminophen 37.5-325 mg (ULTRACET) 37.5-325 mg Tab Comments:   Reason for Stopping:               Discharge Procedure Orders   Diet general     Lifting restrictions   Order Comments: LIFTING:  No lifting greater than 15 lb for 2 weeks.    PELVIC REST:  No douching, tampons, or intercourse for 6 weeks.     Other restrictions (specify):   Order Comments: No alcoholic beverages while taking narcotic pain medications.  Do not drive while taking narcotic pain medication.    No douching, tampons, or intercourse for six weeks if you had any kind of procedure performed in the outside or inside of your vagina.      Avoid straining with bowel movements and/or constipation.     Call MD for:  temperature >100.4     Call MD for:  persistent nausea and vomiting     Call MD for:  severe uncontrolled pain     Call MD for:  difficulty breathing, headache or visual disturbances     Call MD for:  hives     Call MD for:   Order Comments: inability to void,urine is ketchup colored or you have large clots, vaginal bleeding is heavier than a period.    VAGINAL DISCHARGE: You may develop a vaginal discharge and intermittent vaginal spotting after surgery and up to 6 weeks postoperatively.  The discharge may have an odor and may change in color but it is normal. Contact your surgical team if you develop vaginal or vulvar irritation along with a discharge.  Also contact your surgical team if you have vaginal discharge that smells like urine or stool.    CONSTIPATION REMEDIES: Patients are often constipated after surgery or with use of oral narcotic medicine. You should continue to take the stool softener, Senokot-S during the next six weeks, and consume adequate amounts of water.  If you have not had a bowel movement for 3 days after dismissal, or are uncomfortable and unable to pass stool, please try one or all of the following measures:  1.  Milk of Magnesia - 30 cc by mouth every 12 hours   2.  Dulcolax suppository - One  suppository per rectum every 4-6 hours   3.  Metamucil, Fibercon or other bulk former - use as directed  4.  Fleets Enema  5.  Prunes or Prune juice    If you continue to have constipation after trying the above remedies, you should contact your surgical team using the contact information listed above    PAIN MEDICATIONS:   Take your pain medications as instructed. It is best to take pain medications before your pain becomes severe. This will allow you to take less medication yet have better pain relief. For the first 2 or 3 days it may be helpful to take your pain medications on a regular schedule (e.g. every 4 to 6 hours). This will help you to keep your pain under better control. You should then begin to take fewer medications each day until you no longer need them. Do not take pain medication on an empty stomach. This may lead to nausea and vomiting.     Wound care routine (specify)   Order Comments: VULVAR CARE:  After urinating or having a bowel movement, do the following to clean and dry the area  - Use a moistened wipe, making sure to wipe front-to-back  - Use a small squirt bottle with lukewarm water to rinse the area  - Use a clean, soft towel to pat dry the area    You may shower or take a tub bath following surgery   - When taking a tub bath to help relieve discomfort or to clean the area, use clear, warm water  - Do not use bubble bath or bath oil   - After bathing, use a clean, soft towel to pat dry the area     -Avoid wearing tight-fitting clothing over your incisions.  -You may use silvadene cream to the area twice a day every day and lidocaine jelly PRN for symptomatic care   -You may soak in a bathtub 4-6 times per day (10-15 minutes each time) to help with your vulvar discomfort and swelling.     Activity as tolerated   Order Comments: Return to normal activity slowly as you feel able.  For 2 weeks your exercise should be limited to walking.  You may walk as far as you wish, as long as you  increase your level of exertion gradually and avoid slippery surfaces.        Follow-up Information       Heidy Lima MD Follow up on 10/17/2024.    Specialty: Gynecologic Oncology  Why: Post-op visit  Contact information:  [address]  [facility] [address] [phone]                           ,Dyan Garcia MD  Obstetrics and Gynecology, PGY-2

## 2024-09-18 NOTE — ANESTHESIA PROCEDURE NOTES
Intubation    Date/Time: 9/18/2024 2:55 PM    Performed by: Arcelia Doyle CRNA  Authorized by: Be Montana MD    Intubation:     Induction:  Intravenous    Intubated:  Postinduction    Mask Ventilation:  Easy mask    Attempts:  1    Attempted By:  CRNA    Difficult Airway Encountered?: No      Complications:  None    Airway Device:  Supraglottic airway/LMA    Airway Device Size:  4.0    Secured at:  The lips    Placement Verified By:  Capnometry    Complicating Factors:  Obesity    Findings Post-Intubation:  Atraumatic/condition of teeth unchanged

## 2024-09-18 NOTE — TRANSFER OF CARE
"Anesthesia Transfer of Care Note    Patient: Jm Newton    Procedure(s) Performed: Procedure(s) (LRB):  Exam under anesthesia (N/A)  TREATMENT, VULVA, USING LASER (N/A)    Patient location: PACU    Anesthesia Type: general    Transport from OR: Transported from OR on 2-3 L/min O2 by NC with adequate spontaneous ventilation    Post pain: adequate analgesia    Post assessment: no apparent anesthetic complications    Post vital signs: stable    Level of consciousness: awake    Nausea/Vomiting: no nausea/vomiting    Complications: none    Transfer of care protocol was followed      Last vitals: Visit Vitals  /84 (BP Location: Left arm, Patient Position: Sitting)   Pulse 68   Temp 36.9 °C (98.5 °F) (Oral)   Resp 18   Ht 5' 6" (1.676 m)   Wt 99.8 kg (220 lb)   LMP 10/02/2019   SpO2 99%   Breastfeeding No   BMI 35.51 kg/m²     "

## 2024-09-19 ENCOUNTER — PATIENT MESSAGE (OUTPATIENT)
Dept: GYNECOLOGIC ONCOLOGY | Facility: CLINIC | Age: 39
End: 2024-09-19
Payer: MEDICARE

## 2024-09-23 ENCOUNTER — TELEPHONE (OUTPATIENT)
Dept: GYNECOLOGIC ONCOLOGY | Facility: CLINIC | Age: 39
End: 2024-09-23
Payer: MEDICARE

## 2024-09-23 NOTE — TELEPHONE ENCOUNTER
----- Message from Melissa Mac sent at 9/23/2024 10:10 AM CDT -----  Regarding: Patient advice  Contact: Pt  [phone]            Name of Pharmacy:   [facility][id number] [address] AT [address] [address]  Phone: [phone] Fax: [phone]    Name Of caller: Jm      Name of Medication: silver sulfADIAZINE 1% cream    Comments/advisory: Requesting a refill  Also requesting a call back to discuss pain and swelling want to know if it is normal

## 2024-09-24 DIAGNOSIS — D07.1 VIN III (VULVAR INTRAEPITHELIAL NEOPLASIA III): Primary | ICD-10-CM

## 2024-09-24 RX ORDER — SILVER SULFADIAZINE 10 G/1000G
CREAM TOPICAL
Qty: 50 G | Refills: 1 | Status: SHIPPED | OUTPATIENT
Start: 2024-09-24 | End: 2025-09-24

## 2024-09-24 NOTE — TELEPHONE ENCOUNTER
----- Message from Melissa Mac sent at 9/23/2024  4:26 PM CDT -----  Regarding: Patient advice  Contact: Pt  [phone]              Caller:  Jm      Returning call to: Elizabeth      Caller can be reached at:    [phone]    Nature of the call: Returning missed call in regards to a refill request  and to discuss pain and swelling want to know if it is normal    Name of Medication: silver sulfADIAZINE 1% cream    Name of Pharmacy preferred:   [facility] #[id number] [address] AT [address] [address]  Phone: [phone] Fax: [phone]

## 2024-09-24 NOTE — TELEPHONE ENCOUNTER
Two pt identifiers used. Pt Rx request for silver sulfADIAZINE 1% cream. Per pt, MD used cream in OR and she was sent home with the remainder. Pt states cream was effective in relieving genital discomfort. Pt also requests an updated return to work note to be uploaded to portal. No additional concerns.                          Rx request routed to provider.

## 2024-10-17 ENCOUNTER — TELEPHONE (OUTPATIENT)
Dept: GYNECOLOGIC ONCOLOGY | Facility: CLINIC | Age: 39
End: 2024-10-17
Payer: MEDICARE

## 2024-10-17 ENCOUNTER — OFFICE VISIT (OUTPATIENT)
Dept: GYNECOLOGIC ONCOLOGY | Facility: CLINIC | Age: 39
End: 2024-10-17
Payer: MEDICARE

## 2024-10-17 VITALS
SYSTOLIC BLOOD PRESSURE: 132 MMHG | HEART RATE: 81 BPM | BODY MASS INDEX: 36.91 KG/M2 | TEMPERATURE: 98 F | HEIGHT: 67 IN | OXYGEN SATURATION: 98 % | WEIGHT: 235.19 LBS | DIASTOLIC BLOOD PRESSURE: 68 MMHG

## 2024-10-17 DIAGNOSIS — D07.1 VIN III (VULVAR INTRAEPITHELIAL NEOPLASIA III): Primary | ICD-10-CM

## 2024-10-17 PROCEDURE — 99999 PR PBB SHADOW E&M-EST. PATIENT-LVL IV: CPT | Mod: PBBFAC,,, | Performed by: OBSTETRICS & GYNECOLOGY

## 2024-10-17 PROCEDURE — 3075F SYST BP GE 130 - 139MM HG: CPT | Mod: CPTII,S$GLB,, | Performed by: OBSTETRICS & GYNECOLOGY

## 2024-10-17 PROCEDURE — 99024 POSTOP FOLLOW-UP VISIT: CPT | Mod: S$GLB,,, | Performed by: OBSTETRICS & GYNECOLOGY

## 2024-10-17 PROCEDURE — 3078F DIAST BP <80 MM HG: CPT | Mod: CPTII,S$GLB,, | Performed by: OBSTETRICS & GYNECOLOGY

## 2024-10-17 PROCEDURE — 1159F MED LIST DOCD IN RCRD: CPT | Mod: CPTII,S$GLB,, | Performed by: OBSTETRICS & GYNECOLOGY

## 2024-10-17 RX ORDER — SILVER SULFADIAZINE 10 G/1000G
CREAM TOPICAL 2 TIMES DAILY
Qty: 20 G | Refills: 0 | Status: SHIPPED | OUTPATIENT
Start: 2024-10-17

## 2024-10-17 RX ORDER — LIDOCAINE AND PRILOCAINE 25; 25 MG/G; MG/G
CREAM TOPICAL
Qty: 30 G | Refills: 0 | Status: SHIPPED | OUTPATIENT
Start: 2024-10-17

## 2024-10-17 NOTE — PROGRESS NOTES
SUBJECTIVE     Chief complaint: GREG III     Referring provider: No ref. provider found    History of present Illness:  Jm Newton is a 39 y.o.  female presenting for post op check. S/p CO2 laser for GREG III. Still some pain at laser sites, but does feel they are healing. Using silvadene.     She  has a past medical history of Asthma, Encounter for blood transfusion, Graves disease, HIV infection (), Hyperthyroidism in pregnancy, antepartum, and Vulvar intraepithelial neoplasia (GREG) grade 3 (2024).    Data Reviewed:   24:       Component 4 wk ago   Final Pathologic Diagnosis 1. Right vulvar lesion, excision:  Severe vulvar squamous dysplasia (GREG 3/HSIL)  Severe dysplasia is present at the 6-12:00 peripheral margin and 12:00 tip of this specimen    2. Left labia minora, biopsy:  Severe vulvar squamous dysplasia (GREG 3/HSIL)  Severe dysplasia is present at 1 peripheral margin of this specimen    3. Left clitoral lujan, biopsy:  Severe vulvar squamous dysplasia (GREG 3/HSIL)  Severe dysplasia is present at both peripheral margins of this specimen   INTRAOPERATIVE FINDINGS:  hypopigmented area on right perineum lateral to the perineal body, hypopigmented area on left labia minora (1 cm), hypopigmented area left clitoral lujan (1 cm)       Review of Systems per HPI     Past Medical History:   Diagnosis Date    Asthma     Encounter for blood transfusion     Graves disease     HIV infection     dx     Hyperthyroidism in pregnancy, antepartum     Vulvar intraepithelial neoplasia (GREG) grade 3 2024      Past Surgical History:   Procedure Laterality Date    BARTHOLIN GLAND CYST EXCISION Right 11/15/2023    Procedure: EXCISION, CYST, BARTHOLIN'S GLAND;  Surgeon: Chantal Worrell MD;  Location: [facility];  Service: OB/GYN;  Laterality: Right;    CERVICAL CERCLAGE      emergent with twins    CERVICAL CERCLAGE N/A 2019    Procedure: CERCLAGE, CERVIX;  Surgeon: Obey Henry MD;   Location: [facility];  Service: OB/GYN;  Laterality: N/A;     SECTION WITH TUBAL LIGATION N/A 6/15/2019    Procedure:  SECTION, WITH TUBAL LIGATION;  Surgeon: Madeline Walden MD;  Location: [facility];  Service: OB/GYN;  Laterality: N/A;     SECTION, CLASSIC      Last section with classical extention, from [facility]    COLD KNIFE CONIZATION OF CERVIX N/A 2018    Procedure: CONE BIOPSY, CERVIX, USING COLD KNIFE;  Surgeon: Chantal Worrell MD;  Location: [facility];  Service: OB/GYN;  Laterality: N/A;    CONIZATION OF CERVIX USING LOOP ELECTROSURGICAL EXCISION PROCEDURE (LEEP) N/A 2018    Procedure: LEEP CONIZATION, CERVIX;  Surgeon: Chantal Worrell MD;  Location: [facility];  Service: OB/GYN;  Laterality: N/A;    CYSTOSCOPY N/A 10/22/2019    Procedure: CYSTOSCOPY;  Surgeon: Georgia Howard MD;  Location: [facility];  Service: OB/GYN;  Laterality: N/A;    DILATION AND CURETTAGE OF UTERUS      EXAMINATION UNDER ANESTHESIA N/A 2024    Procedure: Exam under anesthesia;  Surgeon: Heidy Lima MD;  Location: [facility];  Service: Gynecology Oncology;  Laterality: N/A;  1 hr case    EXCISION-WIDE LOCAL Right 2024    Procedure: EXCISION-WIDE LOCAL;  Surgeon: Chantal Worrell MD;  Location: [facility];  Service: OB/GYN;  Laterality: Right;    PARTIAL VULVECTOMY N/A 11/15/2023    Procedure: VULVECTOMY, PARTIAL;  Surgeon: Chantal Worrell MD;  Location: [facility];  Service: OB/GYN;  Laterality: N/A;    ROBOT-ASSISTED LAPAROSCOPIC ABDOMINAL HYSTERECTOMY USING DA GALINDO XI N/A 10/22/2019    Procedure: XI ROBOTIC HYSTERECTOMY;  Surgeon: Georgia Howard MD;  Location: [facility];  Service: OB/GYN;  Laterality: N/A;    ROBOT-ASSISTED SURGICAL REMOVAL OF FALLOPIAN TUBE USING DA GALINDO XI Bilateral 10/22/2019    Procedure: XI ROBOTIC SALPINGECTOMY;  Surgeon: Georgia Howard MD;  Location: [facility];  Service: OB/GYN;  Laterality: Bilateral;    THYROIDECTOMY Right 2021    Procedure: THYROIDECTOMY;  Surgeon:  "Maksim Sifuentes MD;  [facility];  Service: ENT;  Laterality: Right;  Right possible  total    TREATMENT OF VULVA USING LASER N/A 9/18/2024    Procedure: TREATMENT, VULVA, USING LASER;  Surgeon: Heidy Lima MD;  [facility];  Service: Gynecology Oncology;  Laterality: N/A;  MD request- CO2 laser    WISDOM TOOTH EXTRACTION            OBJECTIVE   /68 (Patient Position: Sitting)   Pulse 81   Temp 98.3 °F (36.8 °C)   Ht 5' 7" (1.702 m)   Wt 106.7 kg (235 lb 3.2 oz)   LMP 10/02/2019   SpO2 98%   BMI 36.84 kg/m²     Physical Exam  Genitourinary:     Comments: Healing clitoral lujan and right perineal laser beds. No signs of infection.      ASSESSMENT AND PLAN     1. s/p laser ablation for GREG III  - LIDOcaine-prilocaine (EMLA) cream; Apply topically as needed.  Dispense: 30 g; Refill: 0  - silver sulfADIAZINE 1% (SILVADENE) 1 % cream; Apply topically 2 (two) times daily.  Dispense: 20 g; Refill: 0    Healing well  Continue local care and add EMLA cream for pain control  RTC 3 months for ongoing surveillance of GREG 3    Heidy Lima MD  Gynecologic Oncology             "

## 2024-11-27 ENCOUNTER — PATIENT MESSAGE (OUTPATIENT)
Dept: RESEARCH | Facility: HOSPITAL | Age: 39
End: 2024-11-27
Payer: MEDICAID

## 2025-01-16 ENCOUNTER — TELEPHONE (OUTPATIENT)
Dept: GYNECOLOGIC ONCOLOGY | Facility: CLINIC | Age: 40
End: 2025-01-16
Payer: MEDICARE

## 2025-01-17 ENCOUNTER — TELEPHONE (OUTPATIENT)
Dept: GYNECOLOGIC ONCOLOGY | Facility: CLINIC | Age: 40
End: 2025-01-17
Payer: MEDICARE

## 2025-04-25 NOTE — PROGRESS NOTES
SUBJECTIVE:   38 y.o. female  is here for follow up after bartholin's marsupialization and WLE for GREG II.  Pathology shows GREG III with positive margins from 12-3 o'clock.  She is worried that she pulled some stitches.    ROS:  GENERAL: No fever, chills, fatigability or weight loss.  VULVAR: No pain, no lesions and no itching.  VAGINAL: No relaxation, no itching, no discharge, no abnormal bleeding and no lesions.  ABDOMEN: No abdominal pain. Denies nausea. Denies vomiting. No diarrhea. No constipation  BREAST: Denies pain. No lumps. No discharge.  URINARY: No incontinence, no nocturia, no frequency and no dysuria.  CARDIOVASCULAR: No chest pain. No shortness of breath. No leg cramps.  NEUROLOGICAL: No headaches. No vision changes.        Vitals:    23 1355   BP: 120/72   Pulse: 71         OBJECTIVE:   She appears well, afebrile.  Abdomen: benign, soft, nontender, no masses.  VULVA: WLE stitches pulled through.  Pink area of granulation tissue, no purulence, necrosis, exudate.  Area of marsupialization healing well        ASSESSMENT:   Jm was seen today for post-op evaluation.    Diagnoses and all orders for this visit:    s/p WLE & marsupialization of bartholin's    GREG III (vulvar intraepithelial neoplasia III)    Postoperative wound dehiscence, initial encounter    Other orders  -     mupirocin (BACTROBAN) 2 % ointment; Apply topically 3 (three) times daily.  -     sulfamethoxazole-trimethoprim 400-80mg (BACTRIM) 400-80 mg per tablet; Take 1 tablet by mouth 2 (two) times daily. for 7 days      Plan sitz baths.  Dressing with bactroban and 4x4.  Will rtc in 1 week.  Once healed will need to plan repeat excision at the positive margins.   Yes

## (undated) DEVICE — MANIPULATOR VCARE PLUS 37MM LG

## (undated) DEVICE — OBTURATOR BLADELESS 8MM XI CLR

## (undated) DEVICE — LEGGING CLEAR POLY 2/PACK

## (undated) DEVICE — TRAY MINOR GEN SURG

## (undated) DEVICE — SPONGE GAUZE 16PLY 4X4

## (undated) DEVICE — SEE MEDLINE ITEM 157194

## (undated) DEVICE — CATH URETH INTMIT FEM 14FR 6IN

## (undated) DEVICE — ADHESIVE DERMABOND ADVANCED

## (undated) DEVICE — TRAY DRY SKIN SCRUB PREP

## (undated) DEVICE — SUT VICRYL CTD 2-0 GI 27 SH

## (undated) DEVICE — ADHESIVE MASTISOL VIAL 48/BX

## (undated) DEVICE — COVER TABLE REINF 50X90IN

## (undated) DEVICE — SUT 0 VICRYL / CT-1

## (undated) DEVICE — GLOVE SENSICARE PI GRN 6.5

## (undated) DEVICE — Device

## (undated) DEVICE — GLOVE BIOGEL SKINSENSE PI 6.5

## (undated) DEVICE — SOL POVIDONE SCRUB IODINE 4 OZ

## (undated) DEVICE — APPLICATOR HEMOBLAST LAPSCP

## (undated) DEVICE — SET TRI-LUMEN FILTERED TUBE

## (undated) DEVICE — ELECTRODE BLADE INSULATED 1 IN

## (undated) DEVICE — DRESSING TELFA N ADH 3X8

## (undated) DEVICE — GLOVE SENSICARE PI MICRO 5.5

## (undated) DEVICE — SUT MCRYL PLUS 4-0 PS2 27IN

## (undated) DEVICE — DRAPE UNDERBUTTOCKS PCH STRL

## (undated) DEVICE — SEAL UNIVERSAL 5MM-8MM XI

## (undated) DEVICE — JELLY KY LUBRICATING 5G PACKET

## (undated) DEVICE — ELECTRODE LOOP 20MMX12MM

## (undated) DEVICE — SEE MEDLINE ITEM 157110

## (undated) DEVICE — CLIP MED TICALL

## (undated) DEVICE — ELECTRODE REM PLYHSV RETURN 9

## (undated) DEVICE — SEE MEDLINE ITEM 157196

## (undated) DEVICE — SUT VICRYL 4-0 RB1 27IN UD

## (undated) DEVICE — SEE MEDLINE ITEM 152622

## (undated) DEVICE — SUT LIGACLIP SMALL XTRA

## (undated) DEVICE — SKINMARKER & RULER REGULAR X-F

## (undated) DEVICE — GLOVE SENSICARE PI GRN 6

## (undated) DEVICE — TOWEL OR XRAY BLUE 17X26IN

## (undated) DEVICE — SOL POVIDONE PREP IODINE 4 OZ

## (undated) DEVICE — SCRUB HIBICLENS 4% CHG 4OZ

## (undated) DEVICE — PAD ABDOMINAL STERILE 8X10IN

## (undated) DEVICE — CORD BIPOLAR 12 FOOT

## (undated) DEVICE — SOL NS 1000CC

## (undated) DEVICE — JELLY SURGILUBE 5GR

## (undated) DEVICE — SEE MEDLINE ITEM 157128

## (undated) DEVICE — SUT VICRYL 3-0 27 SH

## (undated) DEVICE — SET CYSTO IRRIGATION UNIV SPIK

## (undated) DEVICE — COVER TIP CURVED SCISSORS XI

## (undated) DEVICE — SUT 2/0 30IN SILK BLK BRAI

## (undated) DEVICE — BLADE SURG #15 CARBON STEEL

## (undated) DEVICE — SOL STRL WATER INJ 1000ML BG

## (undated) DEVICE — SOL ELECTROLUBE ANTI-STIC

## (undated) DEVICE — CONTAINER SPECIMEN STRL 4OZ

## (undated) DEVICE — GLOVE SENSICARE PI SURG 6

## (undated) DEVICE — SOL IRR SOD CHL .9% POUR

## (undated) DEVICE — NDL 18GA X1 1/2 REG BEVEL

## (undated) DEVICE — TUBING NEPTUNE 2 SMOKE 10IN

## (undated) DEVICE — HOOK LONE STAR BLUNT 12MM

## (undated) DEVICE — GLOVE BIOGEL SKINSENSE PI 6.0

## (undated) DEVICE — SEE MEDLINE ITEM 154981

## (undated) DEVICE — GAUZE SPONGE PEANUT STRL

## (undated) DEVICE — GOWN SURGICAL X-LARGE

## (undated) DEVICE — TOWEL OR DISP STRL BLUE 4/PK

## (undated) DEVICE — KIT WING PAD POSITIONING

## (undated) DEVICE — SYR 10CC LUER LOCK

## (undated) DEVICE — SUT ETHILON 3-0 PS2 18 BLK

## (undated) DEVICE — ELECTRODE NDL

## (undated) DEVICE — SEE MEDLINE ITEM 156923

## (undated) DEVICE — DRAPE UND BUTT W/POUCH 40X44IN

## (undated) DEVICE — SYR 50ML CATH TIP

## (undated) DEVICE — IRRIGATOR ENDOSCOPY DISP.

## (undated) DEVICE — SUT 2-0 12-18IN SILK

## (undated) DEVICE — PAD PREP 50/CA

## (undated) DEVICE — SOL 9P NACL IRR PIC IL

## (undated) DEVICE — DRAPE ARM DAVINCI XI

## (undated) DEVICE — ELECTRODE BALL RED 5MM

## (undated) DEVICE — POSITIONER HEAD ADULT

## (undated) DEVICE — SUT 3-0 12-18IN SILK

## (undated) DEVICE — SUT MONOCRYL 4-0 PS-2

## (undated) DEVICE — SUT VICRYL PLUS 3-0 SH 18IN

## (undated) DEVICE — DEVICE ANC SW STAT FOLEY 6-24

## (undated) DEVICE — SOL CLEARIFY VISUALIZATION LAP

## (undated) DEVICE — PENCIL ELECTROSURG HOLST W/BLD

## (undated) DEVICE — BLADE TONGUE DEPRESSOR STRL

## (undated) DEVICE — PAD ABD 8X10 STERILE

## (undated) DEVICE — BELLOW CANN HEMOBLAST 1.65GR

## (undated) DEVICE — SWAB PROCTO 16 X 1 1/2 ST 2/PK

## (undated) DEVICE — DRAPE COLUMN DAVINCI XI

## (undated) DEVICE — DRESSING LEUKOPLAST FLEX 1X3IN

## (undated) DEVICE — TUBING SUC UNIV W/CONN 12FT

## (undated) DEVICE — SOL WATER STRL IRR 1000ML

## (undated) DEVICE — NDL INSUF ULTRA VERESS 120MM

## (undated) DEVICE — SUT VICRYL 2-0 36 CT-1

## (undated) DEVICE — INSERT CUSHIONPRONE VIEW LARGE

## (undated) DEVICE — NDL HYPO REG 25G X 1 1/2

## (undated) DEVICE — SHEET EENT SPLIT

## (undated) DEVICE — BLADE SURG STAINLESS STEEL #11

## (undated) DEVICE — NDL SAFETY 22G X 1.5 ECLIPSE

## (undated) DEVICE — CLOSURE SKIN STERI STRIP 1/2X4

## (undated) DEVICE — SEE MEDLINE ITEM 152487